# Patient Record
Sex: FEMALE | Race: WHITE | NOT HISPANIC OR LATINO | Employment: FULL TIME | URBAN - METROPOLITAN AREA
[De-identification: names, ages, dates, MRNs, and addresses within clinical notes are randomized per-mention and may not be internally consistent; named-entity substitution may affect disease eponyms.]

---

## 2017-02-23 ENCOUNTER — COMMUNICATION - HEALTHEAST (OUTPATIENT)
Dept: FAMILY MEDICINE | Facility: CLINIC | Age: 57
End: 2017-02-23

## 2017-04-18 ENCOUNTER — COMMUNICATION - HEALTHEAST (OUTPATIENT)
Dept: FAMILY MEDICINE | Facility: CLINIC | Age: 57
End: 2017-04-18

## 2017-04-18 DIAGNOSIS — I10 HYPERTENSION: ICD-10-CM

## 2017-06-08 ENCOUNTER — COMMUNICATION - HEALTHEAST (OUTPATIENT)
Dept: FAMILY MEDICINE | Facility: CLINIC | Age: 57
End: 2017-06-08

## 2017-08-09 ENCOUNTER — OFFICE VISIT - HEALTHEAST (OUTPATIENT)
Dept: FAMILY MEDICINE | Facility: CLINIC | Age: 57
End: 2017-08-09

## 2017-08-09 DIAGNOSIS — M51.369 DEGENERATIVE DISC DISEASE, LUMBAR: ICD-10-CM

## 2017-08-09 DIAGNOSIS — K90.41 GLUTEN INTOLERANCE: ICD-10-CM

## 2017-08-09 ASSESSMENT — MIFFLIN-ST. JEOR: SCORE: 1925.77

## 2017-08-15 ENCOUNTER — COMMUNICATION - HEALTHEAST (OUTPATIENT)
Dept: FAMILY MEDICINE | Facility: CLINIC | Age: 57
End: 2017-08-15

## 2017-08-21 ENCOUNTER — OFFICE VISIT - HEALTHEAST (OUTPATIENT)
Dept: FAMILY MEDICINE | Facility: CLINIC | Age: 57
End: 2017-08-21

## 2017-08-21 DIAGNOSIS — R06.2 WHEEZING: ICD-10-CM

## 2017-08-21 DIAGNOSIS — R50.9 FEVER: ICD-10-CM

## 2017-08-21 DIAGNOSIS — J20.9 ACUTE BRONCHITIS: ICD-10-CM

## 2017-08-21 DIAGNOSIS — R05.9 COUGH: ICD-10-CM

## 2017-08-29 ENCOUNTER — AMBULATORY - HEALTHEAST (OUTPATIENT)
Dept: PHYSICAL MEDICINE AND REHAB | Facility: CLINIC | Age: 57
End: 2017-08-29

## 2017-08-29 ENCOUNTER — HOSPITAL ENCOUNTER (OUTPATIENT)
Dept: PHYSICAL MEDICINE AND REHAB | Facility: CLINIC | Age: 57
Discharge: HOME | End: 2017-08-29
Attending: FAMILY MEDICINE

## 2017-08-29 ENCOUNTER — COMMUNICATION - HEALTHEAST (OUTPATIENT)
Dept: PHYSICAL MEDICINE AND REHAB | Facility: CLINIC | Age: 57
End: 2017-08-29

## 2017-08-29 DIAGNOSIS — M47.816 LUMBAR FACET ARTHROPATHY: ICD-10-CM

## 2017-08-29 DIAGNOSIS — M54.50 LUMBAR SPINE PAIN: ICD-10-CM

## 2017-08-29 DIAGNOSIS — M70.61 TROCHANTERIC BURSITIS OF RIGHT HIP: ICD-10-CM

## 2017-08-30 ENCOUNTER — COMMUNICATION - HEALTHEAST (OUTPATIENT)
Dept: FAMILY MEDICINE | Facility: CLINIC | Age: 57
End: 2017-08-30

## 2017-09-05 ENCOUNTER — OFFICE VISIT - HEALTHEAST (OUTPATIENT)
Dept: PHYSICAL THERAPY | Facility: CLINIC | Age: 57
End: 2017-09-05

## 2017-09-05 DIAGNOSIS — M54.50 RIGHT-SIDED LOW BACK PAIN WITHOUT SCIATICA: ICD-10-CM

## 2017-09-05 DIAGNOSIS — G89.29 CHRONIC RIGHT-SIDED LOW BACK PAIN WITHOUT SCIATICA: ICD-10-CM

## 2017-09-05 DIAGNOSIS — M62.81 GENERALIZED MUSCLE WEAKNESS: ICD-10-CM

## 2017-09-05 DIAGNOSIS — M25.551 LATERAL PAIN OF RIGHT HIP: ICD-10-CM

## 2017-09-05 DIAGNOSIS — M54.50 CHRONIC RIGHT-SIDED LOW BACK PAIN WITHOUT SCIATICA: ICD-10-CM

## 2017-09-06 ENCOUNTER — OFFICE VISIT - HEALTHEAST (OUTPATIENT)
Dept: FAMILY MEDICINE | Facility: CLINIC | Age: 57
End: 2017-09-06

## 2017-09-06 DIAGNOSIS — J40 BRONCHITIS: ICD-10-CM

## 2017-09-12 ENCOUNTER — OFFICE VISIT - HEALTHEAST (OUTPATIENT)
Dept: PHYSICAL THERAPY | Facility: CLINIC | Age: 57
End: 2017-09-12

## 2017-09-12 DIAGNOSIS — M54.50 CHRONIC RIGHT-SIDED LOW BACK PAIN WITHOUT SCIATICA: ICD-10-CM

## 2017-09-12 DIAGNOSIS — M62.81 GENERALIZED MUSCLE WEAKNESS: ICD-10-CM

## 2017-09-12 DIAGNOSIS — M25.551 LATERAL PAIN OF RIGHT HIP: ICD-10-CM

## 2017-09-12 DIAGNOSIS — M54.16 LUMBAR RADICULOPATHY: ICD-10-CM

## 2017-09-12 DIAGNOSIS — G89.29 CHRONIC RIGHT-SIDED LOW BACK PAIN WITHOUT SCIATICA: ICD-10-CM

## 2017-09-12 DIAGNOSIS — M62.89 MUSCLE TIGHTNESS: ICD-10-CM

## 2017-09-12 DIAGNOSIS — R29.3 ABNORMAL POSTURE: ICD-10-CM

## 2017-09-15 ENCOUNTER — OFFICE VISIT - HEALTHEAST (OUTPATIENT)
Dept: PHYSICAL THERAPY | Facility: CLINIC | Age: 57
End: 2017-09-15

## 2017-09-15 DIAGNOSIS — G89.29 CHRONIC RIGHT-SIDED LOW BACK PAIN WITHOUT SCIATICA: ICD-10-CM

## 2017-09-15 DIAGNOSIS — M25.551 LATERAL PAIN OF RIGHT HIP: ICD-10-CM

## 2017-09-15 DIAGNOSIS — R29.3 ABNORMAL POSTURE: ICD-10-CM

## 2017-09-15 DIAGNOSIS — M62.89 MUSCLE TIGHTNESS: ICD-10-CM

## 2017-09-15 DIAGNOSIS — M62.81 GENERALIZED MUSCLE WEAKNESS: ICD-10-CM

## 2017-09-15 DIAGNOSIS — M54.16 LUMBAR RADICULOPATHY: ICD-10-CM

## 2017-09-15 DIAGNOSIS — M54.50 CHRONIC RIGHT-SIDED LOW BACK PAIN WITHOUT SCIATICA: ICD-10-CM

## 2017-09-19 ENCOUNTER — OFFICE VISIT - HEALTHEAST (OUTPATIENT)
Dept: PHYSICAL THERAPY | Facility: CLINIC | Age: 57
End: 2017-09-19

## 2017-09-19 DIAGNOSIS — M25.551 LATERAL PAIN OF RIGHT HIP: ICD-10-CM

## 2017-09-19 DIAGNOSIS — G89.29 CHRONIC RIGHT-SIDED LOW BACK PAIN WITHOUT SCIATICA: ICD-10-CM

## 2017-09-19 DIAGNOSIS — M54.50 CHRONIC RIGHT-SIDED LOW BACK PAIN WITHOUT SCIATICA: ICD-10-CM

## 2017-09-19 DIAGNOSIS — M62.81 GENERALIZED MUSCLE WEAKNESS: ICD-10-CM

## 2017-09-25 ENCOUNTER — TRANSFERRED RECORDS (OUTPATIENT)
Dept: HEALTH INFORMATION MANAGEMENT | Facility: CLINIC | Age: 57
End: 2017-09-25

## 2017-10-02 ENCOUNTER — COMMUNICATION - HEALTHEAST (OUTPATIENT)
Dept: FAMILY MEDICINE | Facility: CLINIC | Age: 57
End: 2017-10-02

## 2017-10-02 DIAGNOSIS — J45.30 MILD PERSISTENT ASTHMA: ICD-10-CM

## 2017-12-29 ENCOUNTER — OFFICE VISIT - HEALTHEAST (OUTPATIENT)
Dept: FAMILY MEDICINE | Facility: CLINIC | Age: 57
End: 2017-12-29

## 2017-12-29 DIAGNOSIS — J45.901 ASTHMA EXACERBATION: ICD-10-CM

## 2017-12-29 DIAGNOSIS — J40 BRONCHITIS: ICD-10-CM

## 2017-12-29 ASSESSMENT — MIFFLIN-ST. JEOR: SCORE: 1921.23

## 2018-01-17 ENCOUNTER — COMMUNICATION - HEALTHEAST (OUTPATIENT)
Dept: FAMILY MEDICINE | Facility: CLINIC | Age: 58
End: 2018-01-17

## 2018-01-17 DIAGNOSIS — I10 HYPERTENSION: ICD-10-CM

## 2018-01-29 ENCOUNTER — COMMUNICATION - HEALTHEAST (OUTPATIENT)
Dept: FAMILY MEDICINE | Facility: CLINIC | Age: 58
End: 2018-01-29

## 2018-01-29 DIAGNOSIS — J45.30 MILD PERSISTENT ASTHMA WITHOUT COMPLICATION: ICD-10-CM

## 2018-01-29 DIAGNOSIS — J45.30 MILD PERSISTENT ASTHMA: ICD-10-CM

## 2018-03-21 ENCOUNTER — OFFICE VISIT - HEALTHEAST (OUTPATIENT)
Dept: FAMILY MEDICINE | Facility: CLINIC | Age: 58
End: 2018-03-21

## 2018-03-21 ENCOUNTER — COMMUNICATION - HEALTHEAST (OUTPATIENT)
Dept: FAMILY MEDICINE | Facility: CLINIC | Age: 58
End: 2018-03-21

## 2018-03-21 DIAGNOSIS — F32.A DEPRESSION: ICD-10-CM

## 2018-03-21 DIAGNOSIS — J45.901 ASTHMA EXACERBATION: ICD-10-CM

## 2018-03-21 ASSESSMENT — MIFFLIN-ST. JEOR: SCORE: 1880.87

## 2018-07-21 ENCOUNTER — COMMUNICATION - HEALTHEAST (OUTPATIENT)
Dept: FAMILY MEDICINE | Facility: CLINIC | Age: 58
End: 2018-07-21

## 2018-07-21 DIAGNOSIS — I10 HYPERTENSION: ICD-10-CM

## 2018-09-05 ENCOUNTER — OFFICE VISIT - HEALTHEAST (OUTPATIENT)
Dept: FAMILY MEDICINE | Facility: CLINIC | Age: 58
End: 2018-09-05

## 2018-09-05 ENCOUNTER — HOSPITAL ENCOUNTER (OUTPATIENT)
Dept: CT IMAGING | Facility: HOSPITAL | Age: 58
Discharge: HOME | End: 2018-09-05
Attending: FAMILY MEDICINE

## 2018-09-05 DIAGNOSIS — R10.84 ABDOMINAL PAIN, GENERALIZED: ICD-10-CM

## 2018-09-05 DIAGNOSIS — R19.7 DIARRHEA: ICD-10-CM

## 2018-09-05 LAB
ALBUMIN SERPL-MCNC: 4 G/DL (ref 3.5–5)
ALP SERPL-CCNC: 74 U/L (ref 45–120)
ALT SERPL W P-5'-P-CCNC: 23 U/L (ref 0–45)
ANION GAP SERPL CALCULATED.3IONS-SCNC: 12 MMOL/L (ref 5–18)
AST SERPL W P-5'-P-CCNC: 21 U/L (ref 0–40)
BILIRUB SERPL-MCNC: 1 MG/DL (ref 0–1)
BUN SERPL-MCNC: 7 MG/DL (ref 8–22)
CALCIUM SERPL-MCNC: 9.4 MG/DL (ref 8.5–10.5)
CHLORIDE BLD-SCNC: 104 MMOL/L (ref 98–107)
CO2 SERPL-SCNC: 26 MMOL/L (ref 22–31)
CREAT SERPL-MCNC: 0.74 MG/DL (ref 0.6–1.1)
ERYTHROCYTE [DISTWIDTH] IN BLOOD BY AUTOMATED COUNT: 13.6 % (ref 11–14.5)
GFR SERPL CREATININE-BSD FRML MDRD: >60 ML/MIN/1.73M2
GLUCOSE BLD-MCNC: 95 MG/DL (ref 70–125)
HCT VFR BLD AUTO: 39.9 % (ref 35–47)
HGB BLD-MCNC: 13.4 G/DL (ref 12–16)
LIPASE SERPL-CCNC: 26 U/L (ref 0–52)
MCH RBC QN AUTO: 28.4 PG (ref 27–34)
MCHC RBC AUTO-ENTMCNC: 33.7 G/DL (ref 32–36)
MCV RBC AUTO: 84 FL (ref 80–100)
PLATELET # BLD AUTO: 254 THOU/UL (ref 140–440)
PMV BLD AUTO: 7 FL (ref 7–10)
POTASSIUM BLD-SCNC: 3.9 MMOL/L (ref 3.5–5)
PROT SERPL-MCNC: 6.8 G/DL (ref 6–8)
RBC # BLD AUTO: 4.72 MILL/UL (ref 3.8–5.4)
SODIUM SERPL-SCNC: 142 MMOL/L (ref 136–145)
WBC: 5.6 THOU/UL (ref 4–11)

## 2018-09-06 ENCOUNTER — COMMUNICATION - HEALTHEAST (OUTPATIENT)
Dept: FAMILY MEDICINE | Facility: CLINIC | Age: 58
End: 2018-09-06

## 2018-09-10 ENCOUNTER — COMMUNICATION - HEALTHEAST (OUTPATIENT)
Dept: FAMILY MEDICINE | Facility: CLINIC | Age: 58
End: 2018-09-10

## 2018-09-10 ENCOUNTER — OFFICE VISIT - HEALTHEAST (OUTPATIENT)
Dept: FAMILY MEDICINE | Facility: CLINIC | Age: 58
End: 2018-09-10

## 2018-09-10 DIAGNOSIS — F41.1 GENERALIZED ANXIETY DISORDER: ICD-10-CM

## 2018-09-10 DIAGNOSIS — R10.84 ABDOMINAL PAIN, GENERALIZED: ICD-10-CM

## 2018-09-24 ENCOUNTER — COMMUNICATION - HEALTHEAST (OUTPATIENT)
Dept: FAMILY MEDICINE | Facility: CLINIC | Age: 58
End: 2018-09-24

## 2018-09-24 ENCOUNTER — AMBULATORY - HEALTHEAST (OUTPATIENT)
Dept: FAMILY MEDICINE | Facility: CLINIC | Age: 58
End: 2018-09-24

## 2018-10-08 ENCOUNTER — OFFICE VISIT - HEALTHEAST (OUTPATIENT)
Dept: FAMILY MEDICINE | Facility: CLINIC | Age: 58
End: 2018-10-08

## 2018-10-08 DIAGNOSIS — R07.89 ATYPICAL CHEST PAIN: ICD-10-CM

## 2018-10-09 LAB
ATRIAL RATE - MUSE: 57 BPM
DIASTOLIC BLOOD PRESSURE - MUSE: NORMAL MMHG
INTERPRETATION ECG - MUSE: NORMAL
P AXIS - MUSE: 15 DEGREES
PR INTERVAL - MUSE: 112 MS
QRS DURATION - MUSE: 94 MS
QT - MUSE: 454 MS
QTC - MUSE: 441 MS
R AXIS - MUSE: 4 DEGREES
SYSTOLIC BLOOD PRESSURE - MUSE: NORMAL MMHG
T AXIS - MUSE: 29 DEGREES
VENTRICULAR RATE- MUSE: 57 BPM

## 2018-10-19 ENCOUNTER — COMMUNICATION - HEALTHEAST (OUTPATIENT)
Dept: FAMILY MEDICINE | Facility: CLINIC | Age: 58
End: 2018-10-19

## 2018-10-19 DIAGNOSIS — I10 HYPERTENSION: ICD-10-CM

## 2018-11-12 ENCOUNTER — COMMUNICATION - HEALTHEAST (OUTPATIENT)
Dept: FAMILY MEDICINE | Facility: CLINIC | Age: 58
End: 2018-11-12

## 2018-11-14 ENCOUNTER — COMMUNICATION - HEALTHEAST (OUTPATIENT)
Dept: CARE COORDINATION | Facility: CLINIC | Age: 58
End: 2018-11-14

## 2018-11-15 ENCOUNTER — OFFICE VISIT - HEALTHEAST (OUTPATIENT)
Dept: FAMILY MEDICINE | Facility: CLINIC | Age: 58
End: 2018-11-15

## 2018-11-15 DIAGNOSIS — R07.89 ATYPICAL CHEST PAIN: ICD-10-CM

## 2018-11-15 DIAGNOSIS — I10 ESSENTIAL HYPERTENSION: ICD-10-CM

## 2018-11-16 ENCOUNTER — COMMUNICATION - HEALTHEAST (OUTPATIENT)
Dept: FAMILY MEDICINE | Facility: CLINIC | Age: 58
End: 2018-11-16

## 2018-11-21 ENCOUNTER — COMMUNICATION - HEALTHEAST (OUTPATIENT)
Dept: PHARMACY | Facility: CLINIC | Age: 58
End: 2018-11-21

## 2018-11-21 DIAGNOSIS — Z71.89 ENCOUNTER FOR HERB AND VITAMIN SUPPLEMENT MANAGEMENT: ICD-10-CM

## 2018-11-21 DIAGNOSIS — I10 ESSENTIAL HYPERTENSION, BENIGN: ICD-10-CM

## 2018-11-21 DIAGNOSIS — J45.30 MILD PERSISTENT ASTHMA WITHOUT COMPLICATION: ICD-10-CM

## 2018-11-21 DIAGNOSIS — M51.16 LUMBAR DISC HERNIATION WITH RADICULOPATHY: ICD-10-CM

## 2018-12-04 ENCOUNTER — COMMUNICATION - HEALTHEAST (OUTPATIENT)
Dept: SCHEDULING | Facility: CLINIC | Age: 58
End: 2018-12-04

## 2018-12-05 ENCOUNTER — AMBULATORY - HEALTHEAST (OUTPATIENT)
Dept: FAMILY MEDICINE | Facility: CLINIC | Age: 58
End: 2018-12-05

## 2018-12-05 ENCOUNTER — COMMUNICATION - HEALTHEAST (OUTPATIENT)
Dept: FAMILY MEDICINE | Facility: CLINIC | Age: 58
End: 2018-12-05

## 2018-12-05 DIAGNOSIS — R00.2 PALPITATIONS: ICD-10-CM

## 2018-12-07 ENCOUNTER — COMMUNICATION - HEALTHEAST (OUTPATIENT)
Dept: FAMILY MEDICINE | Facility: CLINIC | Age: 58
End: 2018-12-07

## 2018-12-07 ENCOUNTER — AMBULATORY - HEALTHEAST (OUTPATIENT)
Dept: FAMILY MEDICINE | Facility: CLINIC | Age: 58
End: 2018-12-07

## 2018-12-19 ENCOUNTER — OFFICE VISIT - HEALTHEAST (OUTPATIENT)
Dept: CARDIOLOGY | Facility: CLINIC | Age: 58
End: 2018-12-19

## 2018-12-19 DIAGNOSIS — R00.2 PALPITATIONS: ICD-10-CM

## 2018-12-19 DIAGNOSIS — I49.1 PREMATURE ATRIAL CONTRACTIONS: ICD-10-CM

## 2018-12-19 DIAGNOSIS — E66.01 MORBID OBESITY (H): ICD-10-CM

## 2018-12-19 DIAGNOSIS — I10 ESSENTIAL HYPERTENSION, BENIGN: ICD-10-CM

## 2018-12-19 DIAGNOSIS — R60.9 EDEMA, PITTING: ICD-10-CM

## 2018-12-19 DIAGNOSIS — R06.09 EXERTIONAL DYSPNEA: ICD-10-CM

## 2018-12-19 ASSESSMENT — MIFFLIN-ST. JEOR: SCORE: 1890.85

## 2018-12-27 ENCOUNTER — HOSPITAL ENCOUNTER (OUTPATIENT)
Dept: CARDIOLOGY | Facility: CLINIC | Age: 58
Discharge: HOME | End: 2018-12-27
Attending: INTERNAL MEDICINE

## 2018-12-27 DIAGNOSIS — I49.1 PREMATURE ATRIAL CONTRACTIONS: ICD-10-CM

## 2018-12-27 DIAGNOSIS — I10 ESSENTIAL HYPERTENSION, BENIGN: ICD-10-CM

## 2018-12-27 DIAGNOSIS — R06.09 OTHER FORMS OF DYSPNEA: ICD-10-CM

## 2018-12-27 DIAGNOSIS — R00.2 PALPITATIONS: ICD-10-CM

## 2018-12-27 DIAGNOSIS — R06.09 EXERTIONAL DYSPNEA: ICD-10-CM

## 2018-12-27 DIAGNOSIS — R60.9 EDEMA, PITTING: ICD-10-CM

## 2018-12-27 LAB
AORTIC ROOT: 3.6 CM
AORTIC VALVE MEAN VELOCITY: 99.2 CM/S
ASCENDING AORTA: 4 CM
AV DIMENSIONLESS INDEX VTI: 0.9
AV MEAN GRADIENT: 5 MMHG
AV PEAK GRADIENT: 9.5 MMHG
AV VALVE AREA: 3.2 CM2
AV VELOCITY RATIO: 0.9
BSA FOR ECHO PROCEDURE: 2.47 M2
CV BLOOD PRESSURE: ABNORMAL MMHG
CV ECHO HEIGHT: 67 IN
CV ECHO WEIGHT: 284 LBS
DOP CALC AO PEAK VEL: 154 CM/S
DOP CALC AO VTI: 33.9 CM
DOP CALC LVOT AREA: 3.46 CM2
DOP CALC LVOT DIAMETER: 2.1 CM
DOP CALC LVOT PEAK VEL: 142 CM/S
DOP CALC LVOT STROKE VOLUME: 107.7 CM3
DOP CALCLVOT PEAK VEL VTI: 31.1 CM
EJECTION FRACTION: 65 % (ref 55–75)
FRACTIONAL SHORTENING: 35.8 % (ref 28–44)
INTERVENTRICULAR SEPTUM IN END DIASTOLE: 1.1 CM (ref 0.6–0.9)
IVS/PW RATIO: 1
LA AREA 1: 27 CM2
LA AREA 2: 28 CM2
LEFT ATRIUM LENGTH: 6.5 CM
LEFT ATRIUM SIZE: 4.3 CM
LEFT ATRIUM VOLUME INDEX: 40 ML/M2
LEFT ATRIUM VOLUME: 98.9 ML
LEFT VENTRICLE CARDIAC INDEX: 2.7 L/MIN/M2
LEFT VENTRICLE CARDIAC OUTPUT: 6.8 L/MIN
LEFT VENTRICLE DIASTOLIC VOLUME INDEX: 37.2 CM3/M2 (ref 29–61)
LEFT VENTRICLE DIASTOLIC VOLUME: 92 CM3 (ref 46–106)
LEFT VENTRICLE HEART RATE: 63 BPM
LEFT VENTRICLE MASS INDEX: 92.2 G/M2
LEFT VENTRICLE SYSTOLIC VOLUME INDEX: 13 CM3/M2 (ref 8–24)
LEFT VENTRICLE SYSTOLIC VOLUME: 32 CM3 (ref 14–42)
LEFT VENTRICULAR INTERNAL DIMENSION IN DIASTOLE: 5.3 CM (ref 3.8–5.2)
LEFT VENTRICULAR INTERNAL DIMENSION IN SYSTOLE: 3.4 CM (ref 2.2–3.5)
LEFT VENTRICULAR MASS: 227.7 G
LEFT VENTRICULAR OUTFLOW TRACT MEAN GRADIENT: 3 MMHG
LEFT VENTRICULAR OUTFLOW TRACT MEAN VELOCITY: 80.3 CM/S
LEFT VENTRICULAR OUTFLOW TRACT PEAK GRADIENT: 8 MMHG
LEFT VENTRICULAR POSTERIOR WALL IN END DIASTOLE: 1.1 CM (ref 0.6–0.9)
LV STROKE VOLUME INDEX: 43.6 ML/M2
MITRAL VALVE E/A RATIO: 0.9
MV AVERAGE E/E' RATIO: 8.7 CM/S
MV DECELERATION TIME: 222 MS
MV E'TISSUE VEL-LAT: 9.07 CM/S
MV E'TISSUE VEL-MED: 8.29 CM/S
MV LATERAL E/E' RATIO: 8.3
MV MEDIAL E/E' RATIO: 9.1
MV PEAK A VELOCITY: 87.2 CM/S
MV PEAK E VELOCITY: 75.7 CM/S
NUC REST DIASTOLIC VOLUME INDEX: 4544 LBS
NUC REST SYSTOLIC VOLUME INDEX: 67 IN
PR MAX PG: 4 MMHG
PR PEAK VELOCITY: 102 CM/S
TRICUSPID VALVE ANULAR PLANE SYSTOLIC EXCURSION: 1.9 CM

## 2018-12-27 ASSESSMENT — MIFFLIN-ST. JEOR: SCORE: 1890.85

## 2019-04-16 ENCOUNTER — COMMUNICATION - HEALTHEAST (OUTPATIENT)
Dept: ADMINISTRATIVE | Facility: CLINIC | Age: 59
End: 2019-04-16

## 2019-04-24 ENCOUNTER — OFFICE VISIT - HEALTHEAST (OUTPATIENT)
Dept: FAMILY MEDICINE | Facility: CLINIC | Age: 59
End: 2019-04-24

## 2019-04-24 DIAGNOSIS — K29.70 GASTRITIS WITHOUT BLEEDING, UNSPECIFIED CHRONICITY, UNSPECIFIED GASTRITIS TYPE: ICD-10-CM

## 2019-04-24 DIAGNOSIS — Z12.31 VISIT FOR SCREENING MAMMOGRAM: ICD-10-CM

## 2019-04-24 DIAGNOSIS — M25.551 HIP PAIN, RIGHT: ICD-10-CM

## 2019-04-24 DIAGNOSIS — Z12.11 SCREEN FOR COLON CANCER: ICD-10-CM

## 2019-04-28 ENCOUNTER — COMMUNICATION - HEALTHEAST (OUTPATIENT)
Dept: FAMILY MEDICINE | Facility: CLINIC | Age: 59
End: 2019-04-28

## 2019-04-28 DIAGNOSIS — I10 BENIGN ESSENTIAL HYPERTENSION: ICD-10-CM

## 2019-04-29 ENCOUNTER — RECORDS - HEALTHEAST (OUTPATIENT)
Dept: ADMINISTRATIVE | Facility: OTHER | Age: 59
End: 2019-04-29

## 2019-05-10 ENCOUNTER — RECORDS - HEALTHEAST (OUTPATIENT)
Dept: ADMINISTRATIVE | Facility: OTHER | Age: 59
End: 2019-05-10

## 2019-05-16 ENCOUNTER — COMMUNICATION - HEALTHEAST (OUTPATIENT)
Dept: FAMILY MEDICINE | Facility: CLINIC | Age: 59
End: 2019-05-16

## 2019-05-16 DIAGNOSIS — K29.70 GASTRITIS WITHOUT BLEEDING, UNSPECIFIED CHRONICITY, UNSPECIFIED GASTRITIS TYPE: ICD-10-CM

## 2019-05-21 ENCOUNTER — COMMUNICATION - HEALTHEAST (OUTPATIENT)
Dept: FAMILY MEDICINE | Facility: CLINIC | Age: 59
End: 2019-05-21

## 2019-05-21 ENCOUNTER — RECORDS - HEALTHEAST (OUTPATIENT)
Dept: ADMINISTRATIVE | Facility: OTHER | Age: 59
End: 2019-05-21

## 2019-05-21 DIAGNOSIS — M25.551 HIP PAIN, RIGHT: ICD-10-CM

## 2019-06-03 ENCOUNTER — RECORDS - HEALTHEAST (OUTPATIENT)
Dept: ADMINISTRATIVE | Facility: OTHER | Age: 59
End: 2019-06-03

## 2019-06-04 ENCOUNTER — RECORDS - HEALTHEAST (OUTPATIENT)
Dept: ADMINISTRATIVE | Facility: OTHER | Age: 59
End: 2019-06-04

## 2019-07-16 ENCOUNTER — COMMUNICATION - HEALTHEAST (OUTPATIENT)
Dept: FAMILY MEDICINE | Facility: CLINIC | Age: 59
End: 2019-07-16

## 2019-07-16 DIAGNOSIS — M25.551 HIP PAIN, RIGHT: ICD-10-CM

## 2019-07-29 ENCOUNTER — RECORDS - HEALTHEAST (OUTPATIENT)
Dept: ADMINISTRATIVE | Facility: OTHER | Age: 59
End: 2019-07-29

## 2019-08-16 ENCOUNTER — RECORDS - HEALTHEAST (OUTPATIENT)
Dept: ADMINISTRATIVE | Facility: OTHER | Age: 59
End: 2019-08-16

## 2019-08-26 ENCOUNTER — RECORDS - HEALTHEAST (OUTPATIENT)
Dept: ADMINISTRATIVE | Facility: OTHER | Age: 59
End: 2019-08-26

## 2019-09-11 ENCOUNTER — RECORDS - HEALTHEAST (OUTPATIENT)
Dept: ADMINISTRATIVE | Facility: OTHER | Age: 59
End: 2019-09-11

## 2019-09-17 ENCOUNTER — OFFICE VISIT - HEALTHEAST (OUTPATIENT)
Dept: FAMILY MEDICINE | Facility: CLINIC | Age: 59
End: 2019-09-17

## 2019-09-17 DIAGNOSIS — R05.9 COUGH: ICD-10-CM

## 2019-09-17 DIAGNOSIS — J45.901 MODERATE ASTHMA WITH EXACERBATION, UNSPECIFIED WHETHER PERSISTENT: ICD-10-CM

## 2019-09-17 ASSESSMENT — PATIENT HEALTH QUESTIONNAIRE - PHQ9: SUM OF ALL RESPONSES TO PHQ QUESTIONS 1-9: 3

## 2019-09-19 ENCOUNTER — COMMUNICATION - HEALTHEAST (OUTPATIENT)
Dept: SCHEDULING | Facility: CLINIC | Age: 59
End: 2019-09-19

## 2019-09-23 ENCOUNTER — OFFICE VISIT - HEALTHEAST (OUTPATIENT)
Dept: FAMILY MEDICINE | Facility: CLINIC | Age: 59
End: 2019-09-23

## 2019-09-23 DIAGNOSIS — J40 BRONCHITIS: ICD-10-CM

## 2019-09-23 DIAGNOSIS — R06.2 WHEEZING: ICD-10-CM

## 2019-09-30 ENCOUNTER — COMMUNICATION - HEALTHEAST (OUTPATIENT)
Dept: FAMILY MEDICINE | Facility: CLINIC | Age: 59
End: 2019-09-30

## 2019-09-30 DIAGNOSIS — I10 HYPERTENSION: ICD-10-CM

## 2019-10-02 ENCOUNTER — COMMUNICATION - HEALTHEAST (OUTPATIENT)
Dept: FAMILY MEDICINE | Facility: CLINIC | Age: 59
End: 2019-10-02

## 2019-10-09 ENCOUNTER — RECORDS - HEALTHEAST (OUTPATIENT)
Dept: ADMINISTRATIVE | Facility: OTHER | Age: 59
End: 2019-10-09

## 2019-11-04 ENCOUNTER — COMMUNICATION - HEALTHEAST (OUTPATIENT)
Dept: FAMILY MEDICINE | Facility: CLINIC | Age: 59
End: 2019-11-04

## 2019-11-04 DIAGNOSIS — J45.30 MILD PERSISTENT ASTHMA: ICD-10-CM

## 2019-11-05 RX ORDER — ALBUTEROL SULFATE 90 UG/1
AEROSOL, METERED RESPIRATORY (INHALATION)
Qty: 54 EACH | Refills: 1 | Status: SHIPPED | OUTPATIENT
Start: 2019-11-05 | End: 2022-08-25

## 2019-11-11 ENCOUNTER — COMMUNICATION - HEALTHEAST (OUTPATIENT)
Dept: FAMILY MEDICINE | Facility: CLINIC | Age: 59
End: 2019-11-11

## 2019-11-11 ENCOUNTER — OFFICE VISIT - HEALTHEAST (OUTPATIENT)
Dept: CARDIOLOGY | Facility: CLINIC | Age: 59
End: 2019-11-11

## 2019-11-11 DIAGNOSIS — I11.9 LVH (LEFT VENTRICULAR HYPERTROPHY) DUE TO HYPERTENSIVE DISEASE, WITHOUT HEART FAILURE: ICD-10-CM

## 2019-11-11 DIAGNOSIS — E66.01 MORBID OBESITY (H): ICD-10-CM

## 2019-11-11 DIAGNOSIS — I49.1 PREMATURE ATRIAL CONTRACTIONS: ICD-10-CM

## 2019-11-11 DIAGNOSIS — Z11.1 TUBERCULOSIS SCREENING: ICD-10-CM

## 2019-11-11 DIAGNOSIS — I10 ESSENTIAL HYPERTENSION: ICD-10-CM

## 2019-11-11 DIAGNOSIS — R00.2 PALPITATIONS: ICD-10-CM

## 2019-11-21 ENCOUNTER — OFFICE VISIT - HEALTHEAST (OUTPATIENT)
Dept: FAMILY MEDICINE | Facility: CLINIC | Age: 59
End: 2019-11-21

## 2019-11-21 DIAGNOSIS — I10 ESSENTIAL HYPERTENSION, BENIGN: ICD-10-CM

## 2019-11-21 DIAGNOSIS — I10 HYPERTENSION: ICD-10-CM

## 2019-11-21 DIAGNOSIS — I10 BENIGN ESSENTIAL HYPERTENSION: ICD-10-CM

## 2019-11-21 DIAGNOSIS — Z11.1 TUBERCULOSIS SCREENING: ICD-10-CM

## 2019-11-21 DIAGNOSIS — R53.83 LETHARGY: ICD-10-CM

## 2019-11-21 DIAGNOSIS — M25.551 HIP PAIN, RIGHT: ICD-10-CM

## 2019-11-21 LAB
25(OH)D3 SERPL-MCNC: 28.9 NG/ML (ref 30–80)
ALBUMIN SERPL-MCNC: 3.8 G/DL (ref 3.5–5)
ALBUMIN UR-MCNC: NEGATIVE MG/DL
ALP SERPL-CCNC: 73 U/L (ref 45–120)
ALT SERPL W P-5'-P-CCNC: 21 U/L (ref 0–45)
ANION GAP SERPL CALCULATED.3IONS-SCNC: 8 MMOL/L (ref 5–18)
APPEARANCE UR: CLEAR
AST SERPL W P-5'-P-CCNC: 20 U/L (ref 0–40)
BILIRUB SERPL-MCNC: 0.5 MG/DL (ref 0–1)
BILIRUB UR QL STRIP: NEGATIVE
BUN SERPL-MCNC: 10 MG/DL (ref 8–22)
CALCIUM SERPL-MCNC: 9.5 MG/DL (ref 8.5–10.5)
CHLORIDE BLD-SCNC: 105 MMOL/L (ref 98–107)
CHOLEST SERPL-MCNC: 177 MG/DL
CO2 SERPL-SCNC: 30 MMOL/L (ref 22–31)
COLOR UR AUTO: YELLOW
CREAT SERPL-MCNC: 0.72 MG/DL (ref 0.6–1.1)
ERYTHROCYTE [DISTWIDTH] IN BLOOD BY AUTOMATED COUNT: 13.7 % (ref 11–14.5)
FASTING STATUS PATIENT QL REPORTED: YES
GFR SERPL CREATININE-BSD FRML MDRD: >60 ML/MIN/1.73M2
GLUCOSE BLD-MCNC: 113 MG/DL (ref 70–125)
GLUCOSE UR STRIP-MCNC: NEGATIVE MG/DL
HCT VFR BLD AUTO: 37.9 % (ref 35–47)
HDLC SERPL-MCNC: 35 MG/DL
HGB BLD-MCNC: 13.1 G/DL (ref 12–16)
HGB UR QL STRIP: NEGATIVE
KETONES UR STRIP-MCNC: NEGATIVE MG/DL
LDLC SERPL CALC-MCNC: 126 MG/DL
LEUKOCYTE ESTERASE UR QL STRIP: NEGATIVE
MCH RBC QN AUTO: 29.8 PG (ref 27–34)
MCHC RBC AUTO-ENTMCNC: 34.4 G/DL (ref 32–36)
MCV RBC AUTO: 86 FL (ref 80–100)
NITRATE UR QL: NEGATIVE
PH UR STRIP: 6 [PH] (ref 5–8)
PLATELET # BLD AUTO: 284 THOU/UL (ref 140–440)
PMV BLD AUTO: 7.3 FL (ref 7–10)
POTASSIUM BLD-SCNC: 4.5 MMOL/L (ref 3.5–5)
PROT SERPL-MCNC: 6.5 G/DL (ref 6–8)
RBC # BLD AUTO: 4.39 MILL/UL (ref 3.8–5.4)
SODIUM SERPL-SCNC: 143 MMOL/L (ref 136–145)
SP GR UR STRIP: 1.02 (ref 1–1.03)
TRIGL SERPL-MCNC: 80 MG/DL
UROBILINOGEN UR STRIP-ACNC: NORMAL
WBC: 5.5 THOU/UL (ref 4–11)

## 2019-11-22 ENCOUNTER — COMMUNICATION - HEALTHEAST (OUTPATIENT)
Dept: FAMILY MEDICINE | Facility: CLINIC | Age: 59
End: 2019-11-22

## 2019-11-25 LAB
GAMMA INTERFERON BACKGROUND BLD IA-ACNC: 0.01 IU/ML
M TB IFN-G BLD-IMP: NEGATIVE
MITOGEN IGNF BCKGRD COR BLD-ACNC: 0 IU/ML
MITOGEN IGNF BCKGRD COR BLD-ACNC: 0 IU/ML
QTF INTERPRETATION: NORMAL
QTF MITOGEN - NIL: 7.92 IU/ML

## 2019-12-20 ENCOUNTER — RECORDS - HEALTHEAST (OUTPATIENT)
Dept: ADMINISTRATIVE | Facility: OTHER | Age: 59
End: 2019-12-20

## 2020-01-31 ENCOUNTER — OFFICE VISIT - HEALTHEAST (OUTPATIENT)
Dept: FAMILY MEDICINE | Facility: CLINIC | Age: 60
End: 2020-01-31

## 2020-01-31 DIAGNOSIS — H90.8 MIXED CONDUCTIVE AND SENSORINEURAL HEARING LOSS, UNSPECIFIED LATERALITY: ICD-10-CM

## 2020-01-31 DIAGNOSIS — H69.93 DYSFUNCTION OF BOTH EUSTACHIAN TUBES: ICD-10-CM

## 2020-01-31 DIAGNOSIS — H91.93 DECREASED HEARING OF BOTH EARS: ICD-10-CM

## 2020-01-31 DIAGNOSIS — H93.13 TINNITUS, BILATERAL: ICD-10-CM

## 2020-01-31 ASSESSMENT — MIFFLIN-ST. JEOR: SCORE: 1943.92

## 2020-02-05 ENCOUNTER — COMMUNICATION - HEALTHEAST (OUTPATIENT)
Dept: FAMILY MEDICINE | Facility: CLINIC | Age: 60
End: 2020-02-05

## 2020-02-05 DIAGNOSIS — R42 DIZZINESS: ICD-10-CM

## 2020-02-05 RX ORDER — MECLIZINE HYDROCHLORIDE 25 MG/1
25 TABLET ORAL 3 TIMES DAILY PRN
Qty: 30 TABLET | Refills: 1 | Status: SHIPPED | OUTPATIENT
Start: 2020-02-05 | End: 2022-06-05

## 2020-02-07 ENCOUNTER — OFFICE VISIT - HEALTHEAST (OUTPATIENT)
Dept: AUDIOLOGY | Facility: CLINIC | Age: 60
End: 2020-02-07

## 2020-02-07 DIAGNOSIS — H93.13 TINNITUS OF BOTH EARS: ICD-10-CM

## 2020-02-07 DIAGNOSIS — R42 DIZZINESS AND GIDDINESS: ICD-10-CM

## 2020-02-07 DIAGNOSIS — H90.3 SENSORINEURAL HEARING LOSS, BILATERAL: ICD-10-CM

## 2020-02-12 ENCOUNTER — OFFICE VISIT - HEALTHEAST (OUTPATIENT)
Dept: OTOLARYNGOLOGY | Facility: CLINIC | Age: 60
End: 2020-02-12

## 2020-02-12 DIAGNOSIS — R42 DIZZINESS: ICD-10-CM

## 2020-02-12 DIAGNOSIS — G44.209 TENSION-TYPE HEADACHE, NOT INTRACTABLE, UNSPECIFIED CHRONICITY PATTERN: ICD-10-CM

## 2020-02-16 ENCOUNTER — COMMUNICATION - HEALTHEAST (OUTPATIENT)
Dept: FAMILY MEDICINE | Facility: CLINIC | Age: 60
End: 2020-02-16

## 2020-02-16 DIAGNOSIS — H69.93 DYSFUNCTION OF BOTH EUSTACHIAN TUBES: ICD-10-CM

## 2020-02-21 ENCOUNTER — HOSPITAL ENCOUNTER (OUTPATIENT)
Dept: CT IMAGING | Facility: CLINIC | Age: 60
Discharge: HOME | End: 2020-02-21
Attending: OTOLARYNGOLOGY

## 2020-02-21 DIAGNOSIS — R42 DIZZINESS: ICD-10-CM

## 2020-02-21 DIAGNOSIS — G44.209 TENSION-TYPE HEADACHE, NOT INTRACTABLE, UNSPECIFIED CHRONICITY PATTERN: ICD-10-CM

## 2020-02-27 ENCOUNTER — COMMUNICATION - HEALTHEAST (OUTPATIENT)
Dept: OTOLARYNGOLOGY | Facility: CLINIC | Age: 60
End: 2020-02-27

## 2020-03-04 ENCOUNTER — AMBULATORY - HEALTHEAST (OUTPATIENT)
Dept: OTOLARYNGOLOGY | Facility: CLINIC | Age: 60
End: 2020-03-04

## 2020-03-04 ENCOUNTER — TRANSCRIBE ORDERS (OUTPATIENT)
Dept: OTHER | Age: 60
End: 2020-03-04

## 2020-03-04 DIAGNOSIS — G44.209 TENSION HEADACHE: ICD-10-CM

## 2020-03-04 DIAGNOSIS — G44.209 TENSION HEADACHE: Primary | ICD-10-CM

## 2020-03-06 ENCOUNTER — COMMUNICATION - HEALTHEAST (OUTPATIENT)
Dept: FAMILY MEDICINE | Facility: CLINIC | Age: 60
End: 2020-03-06

## 2020-03-06 DIAGNOSIS — H69.93 DYSFUNCTION OF BOTH EUSTACHIAN TUBES: ICD-10-CM

## 2020-03-13 NOTE — TELEPHONE ENCOUNTER
RECORDS RECEIVED FROM: External   Date of Appt: 3/18/20   NOTES (FOR ALL VISITS) STATUS DETAILS   OFFICE NOTE from referring provider Care Everywhere Dr Wm Bustos @ [facility]:  2/12/20   OFFICE NOTE from other specialist Care Everywhere Dr Rhina Maurer @ [facility]:  1/31/20   DISCHARGE SUMMARY from hospital N/A    DISCHARGE REPORT from the ER N/A    OPERATIVE REPORT N/A    MEDICATION LIST Care Everywhere    IMAGING  (FOR ALL VISITS)     EMG N/A    EEG N/A    MRI (HEAD, NECK, SPINE) N/A    LUMBAR PUNCTURE N/A    [facility] Scan N/A    CT (HEAD, NECK, SPINE) In process [facility]:  CT Head 2/21/20      Action 3/13/20 MV 11.30am   Action Taken Imaging request faxed to [facility] for:  CT Head 2/21/20     Action 3.16.20 sv    Action Taken Image CT Head 2/21/20 received in PACS

## 2020-03-18 ENCOUNTER — PRE VISIT (OUTPATIENT)
Dept: NEUROLOGY | Facility: CLINIC | Age: 60
End: 2020-03-18

## 2020-10-06 ENCOUNTER — OFFICE VISIT - HEALTHEAST (OUTPATIENT)
Dept: OTOLARYNGOLOGY | Facility: CLINIC | Age: 60
End: 2020-10-06

## 2020-10-06 ENCOUNTER — OFFICE VISIT - HEALTHEAST (OUTPATIENT)
Dept: AUDIOLOGY | Facility: CLINIC | Age: 60
End: 2020-10-06

## 2020-10-06 ENCOUNTER — AMBULATORY - HEALTHEAST (OUTPATIENT)
Dept: OTOLARYNGOLOGY | Facility: CLINIC | Age: 60
End: 2020-10-06

## 2020-10-06 DIAGNOSIS — G44.209 TENSION-TYPE HEADACHE, NOT INTRACTABLE, UNSPECIFIED CHRONICITY PATTERN: ICD-10-CM

## 2020-10-06 DIAGNOSIS — H93.13 TINNITUS OF BOTH EARS: ICD-10-CM

## 2020-10-06 DIAGNOSIS — H90.3 SENSORINEURAL HEARING LOSS (SNHL) OF BOTH EARS: ICD-10-CM

## 2020-10-06 DIAGNOSIS — H93.8X3 SENSATION OF FULLNESS IN BOTH EARS: ICD-10-CM

## 2020-10-06 DIAGNOSIS — H93.8X9 EAR FULLNESS: ICD-10-CM

## 2020-10-06 DIAGNOSIS — H69.92 DYSFUNCTION OF LEFT EUSTACHIAN TUBE: ICD-10-CM

## 2020-10-07 ENCOUNTER — COMMUNICATION - HEALTHEAST (OUTPATIENT)
Dept: FAMILY MEDICINE | Facility: CLINIC | Age: 60
End: 2020-10-07

## 2020-10-07 DIAGNOSIS — M25.551 HIP PAIN, RIGHT: ICD-10-CM

## 2020-10-07 DIAGNOSIS — I10 BENIGN ESSENTIAL HYPERTENSION: ICD-10-CM

## 2020-10-07 DIAGNOSIS — I10 HYPERTENSION: ICD-10-CM

## 2020-10-10 RX ORDER — HYDROCHLOROTHIAZIDE 25 MG/1
TABLET ORAL
Qty: 90 TABLET | Refills: 3 | Status: SHIPPED | OUTPATIENT
Start: 2020-10-10 | End: 2021-10-02

## 2020-10-19 ENCOUNTER — COMMUNICATION - HEALTHEAST (OUTPATIENT)
Dept: FAMILY MEDICINE | Facility: CLINIC | Age: 60
End: 2020-10-19

## 2020-10-19 DIAGNOSIS — R06.2 WHEEZING: ICD-10-CM

## 2020-10-19 DIAGNOSIS — J40 BRONCHITIS: ICD-10-CM

## 2020-10-20 RX ORDER — BUDESONIDE AND FORMOTEROL FUMARATE DIHYDRATE 160; 4.5 UG/1; UG/1
AEROSOL RESPIRATORY (INHALATION)
Qty: 10.2 INHALER | Refills: 3 | Status: SHIPPED | OUTPATIENT
Start: 2020-10-20 | End: 2021-10-03

## 2021-02-10 ENCOUNTER — COMMUNICATION - HEALTHEAST (OUTPATIENT)
Dept: FAMILY MEDICINE | Facility: CLINIC | Age: 61
End: 2021-02-10

## 2021-02-10 DIAGNOSIS — M25.551 HIP PAIN, RIGHT: ICD-10-CM

## 2021-02-10 RX ORDER — NAPROXEN 500 MG/1
TABLET ORAL
Qty: 60 TABLET | Refills: 0 | Status: SHIPPED | OUTPATIENT
Start: 2021-02-10 | End: 2021-10-03

## 2021-02-16 ENCOUNTER — OFFICE VISIT - HEALTHEAST (OUTPATIENT)
Dept: FAMILY MEDICINE | Facility: CLINIC | Age: 61
End: 2021-02-16

## 2021-02-16 ENCOUNTER — COMMUNICATION - HEALTHEAST (OUTPATIENT)
Dept: FAMILY MEDICINE | Facility: CLINIC | Age: 61
End: 2021-02-16

## 2021-02-16 DIAGNOSIS — F41.9 ANXIETY: ICD-10-CM

## 2021-02-16 DIAGNOSIS — R42 LIGHT HEADEDNESS: ICD-10-CM

## 2021-02-16 LAB
ALBUMIN SERPL-MCNC: 4.1 G/DL (ref 3.5–5)
ALP SERPL-CCNC: 80 U/L (ref 45–120)
ALT SERPL W P-5'-P-CCNC: 24 U/L (ref 0–45)
ANION GAP SERPL CALCULATED.3IONS-SCNC: 9 MMOL/L (ref 5–18)
AST SERPL W P-5'-P-CCNC: 26 U/L (ref 0–40)
BILIRUB SERPL-MCNC: 0.6 MG/DL (ref 0–1)
BUN SERPL-MCNC: 15 MG/DL (ref 8–22)
CALCIUM SERPL-MCNC: 9.8 MG/DL (ref 8.5–10.5)
CHLORIDE BLD-SCNC: 102 MMOL/L (ref 98–107)
CO2 SERPL-SCNC: 28 MMOL/L (ref 22–31)
CREAT SERPL-MCNC: 0.71 MG/DL (ref 0.6–1.1)
ERYTHROCYTE [DISTWIDTH] IN BLOOD BY AUTOMATED COUNT: 14.2 % (ref 11–14.5)
GFR SERPL CREATININE-BSD FRML MDRD: >60 ML/MIN/1.73M2
GLUCOSE BLD-MCNC: 94 MG/DL (ref 70–125)
HBA1C MFR BLD: 6.2 %
HCT VFR BLD AUTO: 39.8 % (ref 35–47)
HGB BLD-MCNC: 12.7 G/DL (ref 12–16)
MCH RBC QN AUTO: 27.7 PG (ref 27–34)
MCHC RBC AUTO-ENTMCNC: 31.9 G/DL (ref 32–36)
MCV RBC AUTO: 87 FL (ref 80–100)
PLATELET # BLD AUTO: 260 THOU/UL (ref 140–440)
PMV BLD AUTO: 9.2 FL (ref 7–10)
POTASSIUM BLD-SCNC: 4.5 MMOL/L (ref 3.5–5)
PROT SERPL-MCNC: 6.8 G/DL (ref 6–8)
RBC # BLD AUTO: 4.59 MILL/UL (ref 3.8–5.4)
SODIUM SERPL-SCNC: 139 MMOL/L (ref 136–145)
WBC: 7.5 THOU/UL (ref 4–11)

## 2021-02-16 ASSESSMENT — MIFFLIN-ST. JEOR: SCORE: 1925.32

## 2021-02-17 ENCOUNTER — COMMUNICATION - HEALTHEAST (OUTPATIENT)
Dept: CARDIOLOGY | Facility: CLINIC | Age: 61
End: 2021-02-17

## 2021-03-10 ENCOUNTER — COMMUNICATION - HEALTHEAST (OUTPATIENT)
Dept: FAMILY MEDICINE | Facility: CLINIC | Age: 61
End: 2021-03-10

## 2021-03-10 DIAGNOSIS — F41.9 ANXIETY: ICD-10-CM

## 2021-03-11 RX ORDER — CITALOPRAM HYDROBROMIDE 10 MG/1
TABLET ORAL
Qty: 30 TABLET | Refills: 1 | Status: SHIPPED | OUTPATIENT
Start: 2021-03-11 | End: 2021-08-24

## 2021-03-16 ENCOUNTER — OFFICE VISIT - HEALTHEAST (OUTPATIENT)
Dept: CARDIOLOGY | Facility: CLINIC | Age: 61
End: 2021-03-16

## 2021-03-16 DIAGNOSIS — R68.89 COLD INTOLERANCE: ICD-10-CM

## 2021-03-16 DIAGNOSIS — I49.1 PAC (PREMATURE ATRIAL CONTRACTION): ICD-10-CM

## 2021-03-16 DIAGNOSIS — R00.2 PALPITATIONS: ICD-10-CM

## 2021-03-16 DIAGNOSIS — R06.09 EXERTIONAL DYSPNEA: ICD-10-CM

## 2021-03-16 DIAGNOSIS — I10 BENIGN ESSENTIAL HYPERTENSION: ICD-10-CM

## 2021-03-16 LAB — TSH SERPL DL<=0.005 MIU/L-ACNC: 1.27 UIU/ML (ref 0.3–5)

## 2021-03-16 ASSESSMENT — MIFFLIN-ST. JEOR: SCORE: 1927.13

## 2021-03-17 ENCOUNTER — COMMUNICATION - HEALTHEAST (OUTPATIENT)
Dept: CARDIOLOGY | Facility: CLINIC | Age: 61
End: 2021-03-17

## 2021-03-17 DIAGNOSIS — I10 BENIGN ESSENTIAL HYPERTENSION: ICD-10-CM

## 2021-03-17 DIAGNOSIS — R00.2 PALPITATIONS: ICD-10-CM

## 2021-03-17 DIAGNOSIS — I49.1 PAC (PREMATURE ATRIAL CONTRACTION): ICD-10-CM

## 2021-03-22 ENCOUNTER — COMMUNICATION - HEALTHEAST (OUTPATIENT)
Dept: CARDIOLOGY | Facility: CLINIC | Age: 61
End: 2021-03-22

## 2021-03-22 DIAGNOSIS — R00.2 PALPITATIONS: ICD-10-CM

## 2021-03-22 RX ORDER — NEBIVOLOL 10 MG/1
10 TABLET ORAL DAILY
Qty: 90 TABLET | Refills: 1 | Status: SHIPPED | OUTPATIENT
Start: 2021-03-22 | End: 2021-10-01

## 2021-03-31 ENCOUNTER — COMMUNICATION - HEALTHEAST (OUTPATIENT)
Dept: FAMILY MEDICINE | Facility: CLINIC | Age: 61
End: 2021-03-31

## 2021-03-31 DIAGNOSIS — F41.9 ANXIETY: ICD-10-CM

## 2021-04-15 ENCOUNTER — OFFICE VISIT - HEALTHEAST (OUTPATIENT)
Dept: FAMILY MEDICINE | Facility: CLINIC | Age: 61
End: 2021-04-15

## 2021-04-15 DIAGNOSIS — F41.1 GENERALIZED ANXIETY DISORDER: ICD-10-CM

## 2021-04-15 DIAGNOSIS — Z00.00 ROUTINE GENERAL MEDICAL EXAMINATION AT A HEALTH CARE FACILITY: ICD-10-CM

## 2021-04-15 DIAGNOSIS — E78.00 HYPERCHOLESTEREMIA: ICD-10-CM

## 2021-04-15 LAB
ALBUMIN UR-MCNC: NEGATIVE G/DL
APPEARANCE UR: CLEAR
BILIRUB UR QL STRIP: NEGATIVE
CHOLEST SERPL-MCNC: 178 MG/DL
COLOR UR AUTO: YELLOW
FASTING STATUS PATIENT QL REPORTED: YES
GLUCOSE UR STRIP-MCNC: NEGATIVE MG/DL
HDLC SERPL-MCNC: 41 MG/DL
HGB UR QL STRIP: NEGATIVE
KETONES UR STRIP-MCNC: NEGATIVE MG/DL
LDLC SERPL CALC-MCNC: 117 MG/DL
LEUKOCYTE ESTERASE UR QL STRIP: NEGATIVE
NITRATE UR QL: NEGATIVE
PH UR STRIP: 6.5 [PH] (ref 5–8)
SP GR UR STRIP: 1.02 (ref 1–1.03)
TRIGL SERPL-MCNC: 100 MG/DL
TSH SERPL DL<=0.005 MIU/L-ACNC: 1.55 UIU/ML (ref 0.3–5)
UROBILINOGEN UR STRIP-ACNC: NORMAL

## 2021-04-16 ENCOUNTER — HOSPITAL ENCOUNTER (OUTPATIENT)
Dept: CARDIOLOGY | Facility: HOSPITAL | Age: 61
Discharge: HOME | End: 2021-04-16
Attending: INTERNAL MEDICINE

## 2021-04-16 DIAGNOSIS — R00.2 PALPITATIONS: ICD-10-CM

## 2021-04-16 DIAGNOSIS — I10 BENIGN ESSENTIAL HYPERTENSION: ICD-10-CM

## 2021-04-16 DIAGNOSIS — R06.09 EXERTIONAL DYSPNEA: ICD-10-CM

## 2021-04-16 LAB
AORTIC ROOT: 3.7 CM
AORTIC VALVE MEAN VELOCITY: 85 CM/S
ASCENDING AORTA: 4.2 CM
AV DIMENSIONLESS INDEX VTI: 1
AV MEAN GRADIENT: 3 MMHG
AV PEAK GRADIENT: 5.4 MMHG
AV VALVE AREA: 3.4 CM2
AV VELOCITY RATIO: 1
BSA FOR ECHO PROCEDURE: 2.47 M2
CV BLOOD PRESSURE: ABNORMAL MMHG
CV ECHO HEIGHT: 67 IN
CV ECHO WEIGHT: 286 LBS
DOP CALC AO PEAK VEL: 116 CM/S
DOP CALC AO VTI: 29.3 CM
DOP CALC LVOT AREA: 3.46 CM2
DOP CALC LVOT DIAMETER: 2.1 CM
DOP CALC LVOT PEAK VEL: 114 CM/S
DOP CALC LVOT STROKE VOLUME: 98.7 CM3
DOP CALCLVOT PEAK VEL VTI: 28.5 CM
EJECTION FRACTION: 59 % (ref 55–75)
FRACTIONAL SHORTENING: 25.5 % (ref 28–44)
INTERVENTRICULAR SEPTUM IN END DIASTOLE: 0.8 CM (ref 0.6–0.9)
IVS/PW RATIO: 1
LA AREA 1: 21.8 CM2
LA AREA 2: 22.2 CM2
LEFT ATRIUM LENGTH: 5.85 CM
LEFT ATRIUM SIZE: 4.2 CM
LEFT ATRIUM VOLUME INDEX: 28.5 ML/M2
LEFT ATRIUM VOLUME: 70.3 ML
LEFT VENTRICLE CARDIAC INDEX: 2.5 L/MIN/M2
LEFT VENTRICLE CARDIAC OUTPUT: 6.1 L/MIN
LEFT VENTRICLE DIASTOLIC VOLUME INDEX: 62.8 CM3/M2 (ref 29–61)
LEFT VENTRICLE DIASTOLIC VOLUME: 155 CM3 (ref 46–106)
LEFT VENTRICLE HEART RATE: 62 BPM
LEFT VENTRICLE MASS INDEX: 49.5 G/M2
LEFT VENTRICLE SYSTOLIC VOLUME INDEX: 25.9 CM3/M2 (ref 8–24)
LEFT VENTRICLE SYSTOLIC VOLUME: 64 CM3 (ref 14–42)
LEFT VENTRICULAR INTERNAL DIMENSION IN DIASTOLE: 4.7 CM (ref 3.8–5.2)
LEFT VENTRICULAR INTERNAL DIMENSION IN SYSTOLE: 3.5 CM (ref 2.2–3.5)
LEFT VENTRICULAR MASS: 122.3 G
LEFT VENTRICULAR OUTFLOW TRACT MEAN GRADIENT: 3 MMHG
LEFT VENTRICULAR OUTFLOW TRACT MEAN VELOCITY: 79.7 CM/S
LEFT VENTRICULAR OUTFLOW TRACT PEAK GRADIENT: 5 MMHG
LEFT VENTRICULAR POSTERIOR WALL IN END DIASTOLE: 0.8 CM (ref 0.6–0.9)
LV STROKE VOLUME INDEX: 39.9 ML/M2
MITRAL VALVE E/A RATIO: 1.2
MV AVERAGE E/E' RATIO: 6.5 CM/S
MV DECELERATION TIME: 257 MS
MV E'TISSUE VEL-LAT: 12.3 CM/S
MV E'TISSUE VEL-MED: 10.1 CM/S
MV LATERAL E/E' RATIO: 5.9
MV MEDIAL E/E' RATIO: 7.2
MV PEAK A VELOCITY: 62.7 CM/S
MV PEAK E VELOCITY: 73.1 CM/S
NUC REST DIASTOLIC VOLUME INDEX: 4576 LBS
NUC REST SYSTOLIC VOLUME INDEX: 67 IN
PV ACCELERATION TIME: 74 MS
RIGHT VENTRICULAR INTERNAL DIMENSION IN DYSTOLE: 3.9 CM
TRICUSPID VALVE ANULAR PLANE SYSTOLIC EXCURSION: 2 CM

## 2021-04-16 ASSESSMENT — MIFFLIN-ST. JEOR: SCORE: 1899.92

## 2021-04-20 ENCOUNTER — COMMUNICATION - HEALTHEAST (OUTPATIENT)
Dept: CARDIOLOGY | Facility: CLINIC | Age: 61
End: 2021-04-20

## 2021-04-26 ENCOUNTER — COMMUNICATION - HEALTHEAST (OUTPATIENT)
Dept: FAMILY MEDICINE | Facility: CLINIC | Age: 61
End: 2021-04-26

## 2021-05-26 ENCOUNTER — RECORDS - HEALTHEAST (OUTPATIENT)
Dept: ADMINISTRATIVE | Facility: CLINIC | Age: 61
End: 2021-05-26

## 2021-05-26 ASSESSMENT — PATIENT HEALTH QUESTIONNAIRE - PHQ9: SUM OF ALL RESPONSES TO PHQ QUESTIONS 1-9: 3

## 2021-05-28 ASSESSMENT — ASTHMA QUESTIONNAIRES
ACT_TOTALSCORE: 22
ACT_TOTALSCORE: 16

## 2021-05-28 NOTE — PROGRESS NOTES
Assessment:     1. Visit for screening mammogram  Mammo Screening Bilateral   2. Screen for colon cancer  Ambulatory referral for Colonoscopy   3. Hip pain, right  naproxen (NAPROSYN) 500 MG tablet   4. Gastritis without bleeding, unspecified chronicity, unspecified gastritis type  omeprazole (PRILOSEC) 20 MG capsule       Plan:     1. Visit for screening mammogram  Following screening will be ordered  - Mammo Screening Bilateral; Future    2. Screen for colon cancer    - Ambulatory referral for Colonoscopy    3. Hip pain, right  Patient will take the following medication morning and night in addition to an extra strength Tylenol along with it; patient is to discontinue ibuprofen  - naproxen (NAPROSYN) 500 MG tablet; Take 1 tablet (500 mg total) by mouth 2 (two) times a day with meals.  Dispense: 60 tablet; Refill: 0    4. Gastritis without bleeding, unspecified chronicity, unspecified gastritis type  Patient is a take the following medication for 30 days first thing in the morning on empty stomach  - omeprazole (PRILOSEC) 20 MG capsule; Take 1 capsule (20 mg total) by mouth daily.  Dispense: 30 capsule; Refill: 0      Subjective:   Milana is here complaining of right hip pain right back discomfort and ongoing pain in her right foot.  Patient sustained a fracture of her right fifth metatarsal distally comminuted due to slipping on a crack in the sidewalk and was seen by orthopedic surgery placed in immobilizer and now is 3 weeks out.  She has been getting physical therapy.  She has ongoing pain has been taking at least 10 ibuprofen daily's 3 at a time sometimes on empty stomach.  She is experiencing some abdominal bloating discomfort and poor pain relief.  We are going to change her therapy were going to combine Naprosyn and Tylenol hopefully that there will potentiate each other.  Patient is experiencing right hip pain secondary to imbalance of her lower extremities due to wearing compensatory boot to immobilize the  foot fracture.  Patient also has lumbar disc disease prominent on that right side.  She is experiencing gastritis-like symptoms and abdominal discomfort because of the ibuprofen in my opinion.  We did discuss safe use of Tylenol extra strength for chronic pain.  Patient is seeing orthopedist this afternoon but wanted to know my opinion on these various matters.  We will follow along with her she will let us know if her stomach issues worsen or do not improve on the new plan.    Review of Systems: A complete 14 point review of systems was obtained and is negative or as stated in the history of present illness.    Past Medical History:   Diagnosis Date     Asthma      Hypertension      Low back pain      Family History   Problem Relation Age of Onset     Hypertension Mother      Cancer Father      Diabetes Father      Hypertension Father      Cancer Sister      Past Surgical History:   Procedure Laterality Date     BACK SURGERY  08/2010    L4-5, S1 Hemilaminectomy, foraminotomy     SC REMOVAL OF OVARY/TUBE(S)      Description: Salpingo-oophorectomy Left Side;  Proc Date: 12/06/2004;     SC SUPRACERV ABD HYSTERECTOMY      Description: Supracervical Hysterectomy;  Proc Date: 12/06/2004;     Social History     Tobacco Use     Smoking status: Never Smoker     Smokeless tobacco: Never Used   Substance Use Topics     Alcohol use: Yes     Alcohol/week: 1.2 oz     Types: 1 Glasses of wine, 1 Cans of beer per week     Drug use: No         Objective:   /74   Pulse 64   Temp 98  F (36.7  C) (Oral)   Wt (!) 285 lb 1.6 oz (129.3 kg)   BMI 44.65 kg/m      General Appearance:  Alert, cooperative, no distress  Head:  Normocephalic, no obvious abnormality  Ears: TM anatomy normal  Eyes:  PERRL, EOM's intact, conjunctiva and corneas clear  Nose:  Nares symmetrical, septum midline, mucosa pink, no sinus tenderness  Throat:  Lips, tongue, and mucosa are moist, pink, and intact  Neck:  Supple, symmetrical, trachea midline, no  adenopathy; thyroid: no enlargement, symmetric,no tenderness/mass/nodules; no carotid bruit, no JVD  Back:  Symmetrical, no curvature, ROM normal, no CVA tenderness  Chest/Breast:  No mass or tenderness  Lungs:  Clear to auscultation bilaterally, respirations unlabored   Heart:  Normal PMI, regular rate & rhythm, S1 and S2 normal, no murmurs, rubs, or gallops  Abdomen:  Soft, non-tender, bowel sounds active all four quadrants, no mass, or organomegaly  Musculoskeletal:  Tone and strength strong and symmetrical, all extremities  Lymphatic:  No adenopathy  Skin/Hair/Nails:  Skin warm, dry, and intact, no rashes  Neurologic:  Alert and oriented x3, no cranial nerve deficits, normal strength and tone, gait steady  Extremities:  No edema.  Ibrahima's sign negative.  Patient is walking with an immobilizer boot on her right lower extremity to include her foot  Genitourinary: deferred  Pulses:  Equal bilaterally     The following high BMI interventions were performed this visit: weight monitoring      This note has been dictated using voice recognition software. Any grammatical or context distortions are unintentional and inherent to the the software.

## 2021-05-28 NOTE — TELEPHONE ENCOUNTER
Refill Approved    Rx renewed per Medication Renewal Policy. Medication was last renewed on 4/24/19.    Jasmin Baker, [facility] Triage/Med Refill 5/16/2019     Requested Prescriptions   Pending Prescriptions Disp Refills     omeprazole (PRILOSEC) 20 MG capsule [Pharmacy Med Name: OMEPRAZOLE DR 20 MG CAPSULE] 30 capsule 0     Sig: TAKE 1 CAPSULE BY MOUTH EVERY DAY       GI Medications Refill Protocol Passed - 5/16/2019  9:36 AM        Passed - PCP or prescribing provider visit in last 12 or next 3 months.     Last office visit with prescriber/PCP: 4/24/2019 Burak Arnold MD OR same dept: 4/24/2019 Burak Arnold MD OR same specialty: 4/24/2019 Burak Arnold MD  Last physical: Visit date not found Last MTM visit: Visit date not found   Next visit within 3 mo: Visit date not found  Next physical within 3 mo: Visit date not found  Prescriber OR PCP: Burak Arnold MD  Last diagnosis associated with med order: 1. Gastritis without bleeding, unspecified chronicity, unspecified gastritis type  - omeprazole (PRILOSEC) 20 MG capsule [Pharmacy Med Name: OMEPRAZOLE DR 20 MG CAPSULE]; TAKE 1 CAPSULE BY MOUTH EVERY DAY  Dispense: 30 capsule; Refill: 0    If protocol passes may refill for 12 months if within 3 months of last provider visit (or a total of 15 months).

## 2021-05-28 NOTE — TELEPHONE ENCOUNTER
RN cannot approve Refill Request    RN can NOT refill this medication med is not covered by policy/route to provider     . Last office visit: 4/24/2019 Burak Arnold MD Last Physical: Visit date not found Last MTM visit: Visit date not found Last visit same specialty: 4/24/2019 Burak Arnold MD.  Next visit within 3 mo: Visit date not found  Next physical within 3 mo: Visit date not found      Jasmin Baker, [facility] Triage/Med Refill 4/29/2019    Requested Prescriptions   Pending Prescriptions Disp Refills     BYSTOLIC 5 mg tablet [Pharmacy Med Name: BYSTOLIC TAB 5MG] 90 tablet 1     Sig: TAKE 1 TABLET BY MOUTH     DAILY       There is no refill protocol information for this order

## 2021-05-29 NOTE — TELEPHONE ENCOUNTER
RN cannot approve Refill Request    RN can NOT refill this medication med is not covered by policy/route to provider. Last office visit: 4/24/2019 Burak Arnold MD Last Physical: Visit date not found Last MTM visit: Visit date not found Last visit same specialty: 4/24/2019 Burak Arnold MD.  Next visit within 3 mo: Visit date not found  Next physical within 3 mo: Visit date not found      Nighat Cifuentes, [facility] Triage/Med Refill 5/21/2019    Requested Prescriptions   Pending Prescriptions Disp Refills     naproxen (NAPROSYN) 500 MG tablet [Pharmacy Med Name: NAPROXEN 500 MG TABLET] 60 tablet 0     Sig: TAKE 1 TABLET BY MOUTH TWICE A DAY WITH MEALS       There is no refill protocol information for this order

## 2021-05-30 NOTE — TELEPHONE ENCOUNTER
RN cannot approve Refill Request    RN can NOT refill this medication med is not covered by policy/route to provider. Last office visit: 4/24/2019 Burak Arnold MD Last Physical: Visit date not found Last MTM visit: Visit date not found Last visit same specialty: 4/24/2019 Burak Arnold MD.  Next visit within 3 mo: Visit date not found  Next physical within 3 mo: Visit date not found      Nighat Cifuentes, [facility] Triage/Med Refill 7/16/2019    Requested Prescriptions   Pending Prescriptions Disp Refills     naproxen (NAPROSYN) 500 MG tablet [Pharmacy Med Name: NAPROXEN 500 MG TABLET] 60 tablet 0     Sig: TAKE 1 TABLET BY MOUTH TWICE A DAY WITH MEALS       There is no refill protocol information for this order

## 2021-05-31 ENCOUNTER — RECORDS - HEALTHEAST (OUTPATIENT)
Dept: ADMINISTRATIVE | Facility: CLINIC | Age: 61
End: 2021-05-31

## 2021-05-31 VITALS — BODY MASS INDEX: 43.68 KG/M2 | WEIGHT: 288.2 LBS | HEIGHT: 68 IN

## 2021-05-31 VITALS — WEIGHT: 287.2 LBS | BODY MASS INDEX: 43.53 KG/M2 | HEIGHT: 68 IN

## 2021-05-31 VITALS — BODY MASS INDEX: 43.58 KG/M2 | WEIGHT: 286.6 LBS

## 2021-05-31 VITALS — BODY MASS INDEX: 43.06 KG/M2 | WEIGHT: 283.19 LBS

## 2021-05-31 VITALS — BODY MASS INDEX: 43.49 KG/M2 | WEIGHT: 286 LBS

## 2021-06-01 ENCOUNTER — RECORDS - HEALTHEAST (OUTPATIENT)
Dept: ADMINISTRATIVE | Facility: CLINIC | Age: 61
End: 2021-06-01

## 2021-06-01 VITALS — WEIGHT: 287.44 LBS | BODY MASS INDEX: 45.02 KG/M2

## 2021-06-01 VITALS — BODY MASS INDEX: 44.23 KG/M2 | HEIGHT: 67 IN | WEIGHT: 281.8 LBS

## 2021-06-01 VITALS — WEIGHT: 282.06 LBS | BODY MASS INDEX: 44.18 KG/M2

## 2021-06-01 NOTE — PROGRESS NOTES
Assessment:     1. Bronchitis  XR Chest 2 Views    budesonide-formoterol (SYMBICORT) 160-4.5 mcg/actuation inhaler   2. Wheezing  XR Chest 2 Views    budesonide-formoterol (SYMBICORT) 160-4.5 mcg/actuation inhaler       Plan:     1. Bronchitis  I do not see an active infiltrate on today's repeat film however patient is clinically worse according to her description and review of last week's visit she has finished her corticosteroid taper as well as a course of antibiotics and has been nebulizing 2-3 times per day; medical decision making was to put her on a combination inhaler at this point as she is experiencing side effects from the corticosteroid orally  - XR Chest 2 Views  - budesonide-formoterol (SYMBICORT) 160-4.5 mcg/actuation inhaler; Inhale 2 puffs 2 (two) times a day.  Dispense: 1 Inhaler; Refill: 3    2. Wheezing  See plan as above; patient should probably be off of work this week as well as last week we will cover with a letter depending on when she goes back to work depending on whether or not she implies short-term disability benefit she will call us if she develops a fever or productive cough or any change in her clinical situation  - XR Chest 2 Views  - budesonide-formoterol (SYMBICORT) 160-4.5 mcg/actuation inhaler; Inhale 2 puffs 2 (two) times a day.  Dispense: 1 Inhaler; Refill: 3      Subjective:   Milana returns after being seen 1 week ago for acute bronchitis with wheezing was treated very appropriately with antibiotics corticosteroids and nebulization.  She is failed to respond to this treatment and is audibly wheezing most of the day and is becoming tachypneic and more short of breath.  She does not describe febrile episodes although she does and is experiencing some night sweats.  Temp has been below 99.2.  Patient is hydrating herself.  She is unable to work she was given Aleve for last week and will undoubtedly need more time this week.  Clinical examination reveals expiratory wheezes and  limited inspiratory effort chest x-ray was repeated again today and I did not see active infiltrate.  I will place the patient on combination inhaled corticosteroid medication as she is not anxious to continue on with oral steroids.  No indication here for increased antibiotics.  Will review chest x-ray with radiology however.  I will cover her with a short-term disability note or medical leave if she requires longer length of time then this 10-day to 2-week.  As noted in the plan above.    Review of Systems: A complete 14 point review of systems was obtained and is negative or as stated in the history of present illness.    Past Medical History:   Diagnosis Date     Asthma      Hypertension      Low back pain      Family History   Problem Relation Age of Onset     Hypertension Mother      Cancer Father      Diabetes Father      Hypertension Father      Cancer Sister      Past Surgical History:   Procedure Laterality Date     BACK SURGERY  08/2010    L4-5, S1 Hemilaminectomy, foraminotomy     VT REMOVAL OF OVARY/TUBE(S)      Description: Salpingo-oophorectomy Left Side;  Proc Date: 12/06/2004;     VT SUPRACERV ABD HYSTERECTOMY      Description: Supracervical Hysterectomy;  Proc Date: 12/06/2004;     Social History     Tobacco Use     Smoking status: Never Smoker     Smokeless tobacco: Never Used   Substance Use Topics     Alcohol use: Yes     Alcohol/week: 2.0 standard drinks     Types: 1 Glasses of wine, 1 Cans of beer per week     Drug use: No         Objective:   /80   Pulse 68   Temp 97.5  F (36.4  C) (Oral)   Wt (!) 287 lb 11.2 oz (130.5 kg)   SpO2 94%   BMI 45.06 kg/m      General Appearance:  Alert, cooperative, no distress  Head:  Normocephalic, no obvious abnormality  Ears: TM anatomy normal  Eyes:  PERRL, EOM's intact, conjunctiva and corneas clear  Nose:  Nares symmetrical, septum midline, mucosa pink, no sinus tenderness  Throat:  Lips, tongue, and mucosa are moist, pink, and intact  Neck:   Supple, symmetrical, trachea midline, no adenopathy; thyroid: no enlargement, symmetric,no tenderness/mass/nodules; no carotid bruit, no JVD  Back:  Symmetrical, no curvature, ROM normal, no CVA tenderness  Chest/Breast:  No mass or tenderness  Lungs: Patient has limited inspiratory effort bilaterally and expiratory wheezes throughout her chest no rhonchi or rales were heard however  Heart:  Normal PMI, regular rate & rhythm, S1 and S2 normal, no murmurs, rubs, or gallops  Abdomen:  Soft, non-tender, bowel sounds active all four quadrants, no mass, or organomegaly  Musculoskeletal:  Tone and strength strong and symmetrical, all extremities  Lymphatic:  No adenopathy  Skin/Hair/Nails:  Skin warm, dry, and intact, no rashes  Neurologic:  Alert and oriented x3, no cranial nerve deficits, normal strength and tone, gait steady  Extremities:  No edema.  Ibrahima's sign negative.    Genitourinary: deferred  Pulses:  Equal bilaterally           This note has been dictated using voice recognition software. Any grammatical or context distortions are unintentional and inherent to the the software.

## 2021-06-01 NOTE — TELEPHONE ENCOUNTER
Refill Approved    Rx renewed per Medication Renewal Policy. Medication was last renewed on 2/29/16.    Jasmin Baker, [facility] Triage/Med Refill 9/30/2019     Requested Prescriptions   Pending Prescriptions Disp Refills     hydroCHLOROthiazide (HYDRODIURIL) 25 MG tablet [Pharmacy Med Name: HYDROCHLOROT TAB 25MG] 90 tablet 3     Sig: TAKE 1 TABLET BY MOUTH     DAILY       Diuretics/Combination Diuretics Refill Protocol  Passed - 9/30/2019  2:53 PM        Passed - Visit with PCP or prescribing provider visit in past 12 months     Last office visit with prescriber/PCP: 9/23/2019 Burak Arnold MD OR same dept: 9/23/2019 Burak Arnold MD OR same specialty: 9/23/2019 Burak Arnold MD  Last physical: Visit date not found Last MTM visit: Visit date not found   Next visit within 3 mo: Visit date not found  Next physical within 3 mo: Visit date not found  Prescriber OR PCP: Burak Arnold MD  Last diagnosis associated with med order: 1. Hypertension  - hydroCHLOROthiazide (HYDRODIURIL) 25 MG tablet [Pharmacy Med Name: HYDROCHLOROT TAB 25MG]; TAKE 1 TABLET BY MOUTH     DAILY  Dispense: 90 tablet; Refill: 3    If protocol passes may refill for 12 months if within 3 months of last provider visit (or a total of 15 months).             Passed - Serum Potassium in past 12 months      Lab Results   Component Value Date    Potassium 3.4 (L) 12/06/2018             Passed - Serum Sodium in past 12 months      Lab Results   Component Value Date    Sodium 140 12/06/2018             Passed - Blood pressure on file in past 12 months     BP Readings from Last 1 Encounters:   09/23/19 138/80             Passed - Serum Creatinine in past 12 months      Creatinine   Date Value Ref Range Status   12/06/2018 0.72 0.60 - 1.10 mg/dL Final

## 2021-06-01 NOTE — TELEPHONE ENCOUNTER
"Cox on Tuesday     CC: Status update       Since visit     > \"Settling more in to the chest\"      > \"Chest is still a bit heavy\"    > Fever yesterday 100F   > Today - no fever     > Albuterol - yes helps partially    > Is taking ABX and just filled pred yesterday      Exertional shortness of breath       Tele# [phone]      Needs note for work - today and tomorrow  - can be put online for her to  please put on line       A/P:   > Will message provider to let them know your status - does take some time for ABX to be fully effective       > Cont with pred and prn nebs - push fluids      > If worsening shortness of breath, high fevers or other worsening sx, should be seen in ED            Chinmay Mckoy RN   Triage and Medication Refills       "

## 2021-06-01 NOTE — TELEPHONE ENCOUNTER
Spoke with pt and informed her that the form was signed and faxed back to her employer. Form sent in for scanning.

## 2021-06-01 NOTE — TELEPHONE ENCOUNTER
Name of form/paperwork: Other:  Devika form - short term disability  Have you been seen for this request: Yes:  9/23/19  Do we have the form: Devika will be faxing the form to [phone]  When is form needed by: the sooner the better per patient  How would you like the form returned: fax  Fax Number: see form  Patient Notified form requests are processed in 3-5 business days: Yes  (If patient needs form sooner, please note that in this message.)  Okay to leave a detailed message? Yes

## 2021-06-01 NOTE — TELEPHONE ENCOUNTER
Who is requesting the letter?  patient  Why is the letter needed? Requesting a letter to return to work today 9/30/2019 with no restrictions.  How would you like this letter returned? Please put on patient's my chart.  Okay to leave a detailed message? Yes

## 2021-06-01 NOTE — PROGRESS NOTES
ASSESSMENT:  1. Acute Bronchitis  58-year-old female with acute bronchitis in the setting of underlying asthma.  We will start a prednisone taper and albuterol nebs every 4 hours as needed.  Chest x-ray did not reveal evidence of an infiltrate.  Follow-up if symptoms are worsening or unresolving.  - XR Chest 2 Views  - albuterol nebulizer solution 2.5 mg (PROVENTIL)       PLAN:  There are no Patient Instructions on file for this visit.    Orders Placed This Encounter   Procedures     XR Chest 2 Views     Order Specific Question:   Reason for Exam (Describe Symptoms):     Answer:   cough and fever     Order Specific Question:   Is the patient pregnant?     Answer:   No     Order Specific Question:   Can the procedure be changed per Radiologist protocol?     Answer:   Yes     There are no discontinued medications.    No follow-ups on file.    CHIEF COMPLAINT:  Chief Complaint   Patient presents with     Cough     x yesterday- chest hurts when coughing.     Sore Throat     x yesterday     Breathing Problem     Fever     x today       HISTORY OF PRESENT ILLNESS:  Milana is a 58 y.o. female presenting to the clinic today to address her sore throat, cough, breathing problem, and fever.    Coughing and sore throat: The patient reports that she has been coughing since yesterday, and acknowledge that her coughing has been increasing in intensity and pain level. In addition she confirms that she has a sore throat.     Fever: She has an ongoing fever since yesterday.     Nasal congestion and Possible Sinus Infection: The patient acknowledge that her ears are starting to get plugged, and reports that her face hurts.     Chest pain: The patient reports that her chest is congested and it takes a lot of effort for her to breath. She has been taking Albuterol nebulizer solution 2.5 mg to treat her condition.       REVIEW OF SYSTEMS:   Positive: Fever, coughing, sore throat, chest pain.   All other systems are  negative.    PFSH:  Laminectomy mircodisectomy 8/19/2010  Broke her right foot recently    TOBACCO USE:  Social History     Tobacco Use   Smoking Status Never Smoker   Smokeless Tobacco Never Used       VITALS:  Vitals:    09/17/19 1342 09/17/19 1346   BP: 128/70    Patient Site: Left Arm    Patient Position: Sitting    Cuff Size: Adult Large    Pulse: 88    Temp:  100  F (37.8  C)   TempSrc:  Oral   SpO2: 91%    Weight: (!) 297 lb 3.2 oz (134.8 kg)      Wt Readings from Last 3 Encounters:   09/17/19 (!) 297 lb 3.2 oz (134.8 kg)   04/24/19 (!) 285 lb 1.6 oz (129.3 kg)   12/27/18 (!) 280 lb (127 kg)     Body mass index is 46.55 kg/m .    PHYSICAL EXAM:    General Appearance:  Alert, cooperative, no distress, appears stated age   HEENT:  Throat appears red    Neck: Supple, symmetrical, no adenopathy.   Lungs:   Crackles basal  bilaterally, good air movements.    Heart:  Regular rate and rhythm, S1, S2 normal, no murmur, rub or gallop   Abdomen:   Soft, non-tender, positive bowel sounds, no masses, no organomegaly   Extremities: Extremities normal.  No cyanosis or edema   Skin: Warm, dry.  Skin color, texture, turgor normal, no rashes or lesions   Neurologic: Nonfocal.           QUALITY MEASURES:  ADDITIONAL HISTORY SUMMARIZED (2): Review 7/27/2019 ED Dr. Hill regarding foot pain or injury   DECISION TO OBTAIN EXTRA INFORMATION (1): None.   RADIOLOGY TESTS (1): Order chest x-ray 2 view  LABS (1): None.  MEDICINE TESTS (1): None.  INDEPENDENT REVIEW (2 each): Review and interpreted chest x-ray showing no signs of Pneumonia.     The visit lasted a total of 25 minutes face to face with the patient. Over 50% of the time was spent counseling and educating the patient about acute bronchitis exacerbation.    ILucia, am scribing for and in the presence of, Dr. Cox.    I, Dr. Cox, personally performed the services described in this documentation, as scribed by Lucia Still in my presence, and it is both  accurate and complete.    MEDICATIONS:  Current Outpatient Medications   Medication Sig Dispense Refill     aspirin 81 MG EC tablet Take 1 tablet (81 mg total) by mouth daily.  0     b complex vitamins tablet Take 1 tablet by mouth daily.       BLACK COHOSH ORAL Take 1 tablet by mouth daily.       BYSTOLIC 5 mg tablet TAKE 1 TABLET BY MOUTH     DAILY 90 tablet 3     hydrochlorothiazide (HYDRODIURIL) 25 MG tablet Take 1 tablet (25 mg total) by mouth daily. 90 tablet 3     magnesium oxide 250 mg Tab Take 250 mg by mouth daily.       methocarbamol (ROBAXIN) 500 MG tablet Take 1 tablet (500 mg total) by mouth 3 (three) times a day as needed. 45 tablet 1     naproxen (NAPROSYN) 500 MG tablet TAKE 1 TABLET BY MOUTH TWICE A DAY WITH MEALS 60 tablet 0     omeprazole (PRILOSEC) 20 MG capsule TAKE 1 CAPSULE BY MOUTH EVERY DAY 90 capsule 3     s-adenosylmethionine sul tosyl (NAYELY-E ORAL) Take by mouth daily.       VENTOLIN HFA 90 mcg/actuation inhaler INHALE 1 TO 2 PUFFS ORALLY EVERY FOUR HOURS AS NEEDED 54 each 1     Current Facility-Administered Medications   Medication Dose Route Frequency Provider Last Rate Last Dose     albuterol nebulizer solution 2.5 mg (PROVENTIL)  2.5 mg Nebulization Once Elyse Cox MD           Total data points:3

## 2021-06-02 VITALS — WEIGHT: 285.1 LBS | BODY MASS INDEX: 44.65 KG/M2

## 2021-06-02 VITALS — HEIGHT: 67 IN | WEIGHT: 284 LBS | BODY MASS INDEX: 44.57 KG/M2

## 2021-06-02 VITALS — HEIGHT: 67 IN | BODY MASS INDEX: 44.57 KG/M2 | WEIGHT: 284 LBS

## 2021-06-02 VITALS — WEIGHT: 283.2 LBS | BODY MASS INDEX: 44.36 KG/M2

## 2021-06-02 VITALS — WEIGHT: 288.1 LBS | BODY MASS INDEX: 45.12 KG/M2

## 2021-06-03 VITALS
WEIGHT: 293 LBS | DIASTOLIC BLOOD PRESSURE: 78 MMHG | BODY MASS INDEX: 45.97 KG/M2 | SYSTOLIC BLOOD PRESSURE: 126 MMHG | HEART RATE: 72 BPM

## 2021-06-03 VITALS
OXYGEN SATURATION: 97 % | HEART RATE: 65 BPM | RESPIRATION RATE: 18 BRPM | SYSTOLIC BLOOD PRESSURE: 118 MMHG | DIASTOLIC BLOOD PRESSURE: 76 MMHG | BODY MASS INDEX: 46.39 KG/M2 | WEIGHT: 293 LBS

## 2021-06-03 VITALS
WEIGHT: 293 LBS | BODY MASS INDEX: 46.55 KG/M2 | TEMPERATURE: 100 F | DIASTOLIC BLOOD PRESSURE: 70 MMHG | SYSTOLIC BLOOD PRESSURE: 128 MMHG | OXYGEN SATURATION: 91 % | HEART RATE: 88 BPM

## 2021-06-03 VITALS
DIASTOLIC BLOOD PRESSURE: 80 MMHG | BODY MASS INDEX: 45.06 KG/M2 | WEIGHT: 287.7 LBS | SYSTOLIC BLOOD PRESSURE: 138 MMHG | OXYGEN SATURATION: 94 % | TEMPERATURE: 97.5 F | HEART RATE: 68 BPM

## 2021-06-03 NOTE — TELEPHONE ENCOUNTER
New Appointment Needed  What is the reason for the visit:  Patient requesting Blood Tb Screening  Mantoux Placement  Appt Request  What is the purpose of the mantoux?:  volunteering    Is there a form to be completed?:   No - states she just needs documentation  How soon do you need the mantoux placed?:  Patient states she needs it this week    Provider Preference: [facility] nurse  How soon do you need to be seen?: this week   Waitlist offered?: No  Okay to leave a detailed message:  Yes

## 2021-06-03 NOTE — PROGRESS NOTES
Assessment:     1. Benign essential hypertension  nebivolol (BYSTOLIC) 5 MG tablet    Comprehensive Metabolic Panel    HM2(CBC w/o Differential)    Lipid Cascade    Urinalysis-UC if Indicated    Vitamin D, Total (25-Hydroxy)   2. Hypertension  hydroCHLOROthiazide (HYDRODIURIL) 25 MG tablet    Comprehensive Metabolic Panel    HM2(CBC w/o Differential)    Lipid Cascade    Urinalysis-UC if Indicated    Vitamin D, Total (25-Hydroxy)   3. Hip pain, right  naproxen (NAPROSYN) 500 MG tablet    Comprehensive Metabolic Panel   4. Lethargy  Comprehensive Metabolic Panel    HM2(CBC w/o Differential)    Lipid Cascade    Urinalysis-UC if Indicated    Vitamin D, Total (25-Hydroxy)       Plan:     1. Benign essential hypertension  Patient's blood pressure is well controlled on the Bystolic however she is behind with regards to monitoring her laboratories will order the following parameters  - nebivolol (BYSTOLIC) 5 MG tablet; Take 1 tablet (5 mg total) by mouth daily.  Dispense: 90 tablet; Refill: 3  - Comprehensive Metabolic Panel  - HM2(CBC w/o Differential)  - Lipid Cascade  - Urinalysis-UC if Indicated  - Vitamin D, Total (25-Hydroxy)    2. Hypertension  Patient is continue to take her diuretic  - hydroCHLOROthiazide (HYDRODIURIL) 25 MG tablet; Take 1 tablet (25 mg total) by mouth daily.  Dispense: 90 tablet; Refill: 3  - Comprehensive Metabolic Panel  - HM2(CBC w/o Differential)  - Lipid Cascade  - Urinalysis-UC if Indicated  - Vitamin D, Total (25-Hydroxy)    3. Hip pain, right  Patient will continue to try to lose some weight she may take naproxen for multiple joint discomfort along with Tylenol  - naproxen (NAPROSYN) 500 MG tablet; Take 1 tablet (500 mg total) by mouth 2 (two) times a day with meals.  Dispense: 60 tablet; Refill: 0  - Comprehensive Metabolic Panel    4. Lethargy  Patient has had a past medical history of low vitamin D therefore will add the following parameter Milana is  - Comprehensive Metabolic Panel  -  HM2(CBC w/o Differential)  - Lipid Cascade  - Urinalysis-UC if Indicated  - Vitamin D, Total (25-Hydroxy)      Subjective:   Being required to do a TB Gold test so we will order that today.  Also she is somewhat behind in her health monitoring with regards to her lipids with kidney function and hemogram which we will order here today.  She is been a little more lethargic but will investigate her vitamin D status especially now.  She fortunately has recovered from her asthma exacerbation with her inhaler.  She questions whether she should go on her Singulair which is helped her in the past and we agree to restart that and take it throughout the winter.  She denies any other constitutional complaints at this time no cranial nerve dysfunction no shortness of breath or angina or chest discomfort her GERD is under good control bowels are moving normally patient will get her zoster immunizations probably at [facility].  All medical questions asked were answered we did we did do a chart review medication review today we will see her for her follow-up examination in 3 months.    Review of Systems: A complete 14 point review of systems was obtained and is negative or as stated in the history of present illness.    Past Medical History:   Diagnosis Date     Asthma      Hypertension      Low back pain      Family History   Problem Relation Age of Onset     Hypertension Mother      Cancer Father      Diabetes Father      Hypertension Father      Cancer Sister      Past Surgical History:   Procedure Laterality Date     BACK SURGERY  08/2010    L4-5, S1 Hemilaminectomy, foraminotomy     AK REMOVAL OF OVARY/TUBE(S)      Description: Salpingo-oophorectomy Left Side;  Proc Date: 12/06/2004;     AK SUPRACERV ABD HYSTERECTOMY      Description: Supracervical Hysterectomy;  Proc Date: 12/06/2004;     Social History     Tobacco Use     Smoking status: Never Smoker     Smokeless tobacco: Never Used   Substance Use Topics     Alcohol use:  Yes     Alcohol/week: 2.0 standard drinks     Types: 1 Glasses of wine, 1 Cans of beer per week     Drug use: No         Objective:   /78   Pulse 72   Wt (!) 293 lb 8 oz (133.1 kg)   BMI 45.97 kg/m      General Appearance:  Alert, cooperative, no distress  Head:  Normocephalic, no obvious abnormality  Ears: TM anatomy normal  Eyes:  PERRL, EOM's intact, conjunctiva and corneas clear  Nose:  Nares symmetrical, septum midline, mucosa pink, no sinus tenderness  Throat:  Lips, tongue, and mucosa are moist, pink, and intact  Neck:  Supple, symmetrical, trachea midline, no adenopathy; thyroid: no enlargement, symmetric,no tenderness/mass/nodules; no carotid bruit, no JVD  Back:  Symmetrical, no curvature, ROM normal, no CVA tenderness  Chest/Breast:  No mass or tenderness  Lungs:  Clear to auscultation bilaterally, respirations unlabored   Heart:  Normal PMI, regular rate & rhythm, S1 and S2 normal, no murmurs, rubs, or gallops  Abdomen:  Soft, non-tender, bowel sounds active all four quadrants, no mass, or organomegaly  Musculoskeletal:  Tone and strength strong and symmetrical, all extremities  Lymphatic:  No adenopathy  Skin/Hair/Nails:  Skin warm, dry, and intact, no rashes  Neurologic:  Alert and oriented x3, no cranial nerve deficits, normal strength and tone, gait steady  Extremities:  No edema.  Ibrahima's sign negative.    Genitourinary: deferred  Pulses:  Equal bilaterally     The following high BMI interventions were performed this visit: weight monitoring      This note has been dictated using voice recognition software. Any grammatical or context distortions are unintentional and inherent to the the software.

## 2021-06-04 VITALS
DIASTOLIC BLOOD PRESSURE: 76 MMHG | WEIGHT: 293 LBS | SYSTOLIC BLOOD PRESSURE: 110 MMHG | BODY MASS INDEX: 45.99 KG/M2 | OXYGEN SATURATION: 96 % | HEART RATE: 68 BPM | HEIGHT: 67 IN

## 2021-06-05 VITALS
BODY MASS INDEX: 45.77 KG/M2 | OXYGEN SATURATION: 96 % | RESPIRATION RATE: 20 BRPM | HEART RATE: 98 BPM | SYSTOLIC BLOOD PRESSURE: 124 MMHG | HEIGHT: 67 IN | DIASTOLIC BLOOD PRESSURE: 78 MMHG | TEMPERATURE: 68 F | WEIGHT: 291.6 LBS

## 2021-06-05 VITALS
DIASTOLIC BLOOD PRESSURE: 78 MMHG | WEIGHT: 292 LBS | HEART RATE: 68 BPM | HEIGHT: 67 IN | BODY MASS INDEX: 45.83 KG/M2 | SYSTOLIC BLOOD PRESSURE: 126 MMHG | RESPIRATION RATE: 14 BRPM

## 2021-06-05 VITALS — WEIGHT: 286 LBS | HEIGHT: 67 IN | BODY MASS INDEX: 44.89 KG/M2

## 2021-06-05 VITALS
HEART RATE: 68 BPM | SYSTOLIC BLOOD PRESSURE: 118 MMHG | DIASTOLIC BLOOD PRESSURE: 68 MMHG | OXYGEN SATURATION: 96 % | WEIGHT: 286 LBS | BODY MASS INDEX: 44.79 KG/M2

## 2021-06-05 NOTE — TELEPHONE ENCOUNTER
Medication Request  Medication name:   Meclizine  Requested Pharmacy: [facility][id number]  Reason for request:   Dizziness  When did you use medication last?:    Currently using  Patient offered appointment:  No, patient saw Provider on 1/31/2020.  Okay to leave a detailed message: yes

## 2021-06-05 NOTE — PROGRESS NOTES
Assessment/Plan:     1. Dysfunction of both eustachian tubes  Ambulatory referral to ENT    fluticasone propionate (FLONASE) 50 mcg/actuation nasal spray   2. Decreased hearing of both ears  Hearing Screening    Ambulatory referral to ENT   3. Tinnitus, bilateral  Ambulatory referral to ENT   4. Mixed conductive and sensorineural hearing loss, unspecified laterality  Ambulatory referral to ENT       Diagnoses and all orders for this visit:    Dysfunction of both eustachian tubes  -     Ambulatory referral to ENT  -     fluticasone propionate (FLONASE) 50 mcg/actuation nasal spray; Instill 2 sprays in each nostril once daily  Dispense: 16 g; Refill: 1  -Overall, I discussed I feel her symptoms are consistent with eustachian tube dysfunction.  Given the significant decrease in her hearing, I am recommending that she be evaluated by ear nose and throat provider.  In the meantime we will try some fluticasone nasal spray.  Counseled her on use of this medication and common side effects.  Encouraged her to try to keep her ears above the water when she goes to the float spa.    Decreased hearing of both ears  -     Hearing Screening  -     Ambulatory referral to ENT    Tinnitus, bilateral  -     Ambulatory referral to ENT    Mixed conductive and sensorineural hearing loss, unspecified laterality  -     Ambulatory referral to ENT           Subjective:      Milana Blum is a 59 y.o. female who comes in today with concern about dizziness, headache as well as a buzzing sound in her ear.  She reports that the the symptoms have been going on for about 2 to 3 weeks.  She reports that she has started going to a float spa where the water contains magnesium and Epson salts.  Her ears do sit under the water.  She is not sure if that is causing symptoms.  States that at times the buzzing in her ears is very loud and it is difficult to hear other things.  Seems to be louder when she is feeling more stressed.  She describes a  headache across the forehead and over the top of her head.  She has felt dizzy when standing up.  Sort of a lightheaded feeling.  She has not had pain in her ears.  No drainage in her ears.  No pressure in the cheeks.  Little bit of runny nose.  She did have a slight sore throat this morning.  She feels tired but feels that that is probably just due to work.  Does not have significant cough or chest congestion.  She does have asthma and is compliant with use of her inhalers as well as her Singulair.  She has not had fevers or chills.  She has hypertension and reports that her blood pressure has been under good control.  We reviewed medications and allergies.  Review of systems is otherwise negative.  No other concerns or questions today.    Current Outpatient Medications   Medication Sig Dispense Refill     albuterol (PROVENTIL) 2.5 mg /3 mL (0.083 %) nebulizer solution Take 3 mL (2.5 mg total) by nebulization every 4 (four) hours as needed for wheezing. 25 vial 2     albuterol (VENTOLIN HFA) 90 mcg/actuation inhaler INHALE 1 TO 2 PUFFS ORALLY EVERY FOUR HOURS AS NEEDED 54 each 1     aspirin 81 MG EC tablet Take 1 tablet (81 mg total) by mouth daily.  0     b complex vitamins tablet Take 1 tablet by mouth daily.       BLACK COHOSH ORAL Take 1 tablet by mouth daily.       budesonide-formoterol (SYMBICORT) 160-4.5 mcg/actuation inhaler Inhale 2 puffs 2 (two) times a day. 1 Inhaler 3     hydroCHLOROthiazide (HYDRODIURIL) 25 MG tablet Take 1 tablet (25 mg total) by mouth daily. 90 tablet 3     magnesium oxide 250 mg Tab Take 250 mg by mouth daily.       methocarbamol (ROBAXIN) 500 MG tablet Take 1 tablet (500 mg total) by mouth 3 (three) times a day as needed. 45 tablet 1     naproxen (NAPROSYN) 500 MG tablet Take 1 tablet (500 mg total) by mouth 2 (two) times a day with meals. 60 tablet 0     nebivolol (BYSTOLIC) 5 MG tablet Take 1 tablet (5 mg total) by mouth daily. 90 tablet 3     omeprazole (PRILOSEC) 20 MG capsule  TAKE 1 CAPSULE BY MOUTH EVERY DAY 90 capsule 3     s-adenosylmethionine sul tosyl (NAYELY-E ORAL) Take by mouth daily.       fluticasone propionate (FLONASE) 50 mcg/actuation nasal spray Instill 2 sprays in each nostril once daily 16 g 1     No current facility-administered medications for this visit.        Past Medical History, Family History, and Social History reviewed.  Past Medical History:   Diagnosis Date     Asthma      Hypertension      Low back pain      Past Surgical History:   Procedure Laterality Date     BACK SURGERY  08/2010    L4-5, S1 Hemilaminectomy, foraminotomy     AL REMOVAL OF OVARY/TUBE(S)      Description: Salpingo-oophorectomy Left Side;  Proc Date: 12/06/2004;     AL SUPRACERV ABD HYSTERECTOMY      Description: Supracervical Hysterectomy;  Proc Date: 12/06/2004;     House dust and Other environmental allergy  Family History   Problem Relation Age of Onset     Hypertension Mother      Cancer Father      Diabetes Father      Hypertension Father      Cancer Sister      Social History     Socioeconomic History     Marital status:      Spouse name: Not on file     Number of children: Not on file     Years of education: Not on file     Highest education level: Not on file   Occupational History     Not on file   Social Needs     Financial resource strain: Not on file     Food insecurity:     Worry: Not on file     Inability: Not on file     Transportation needs:     Medical: Not on file     Non-medical: Not on file   Tobacco Use     Smoking status: Never Smoker     Smokeless tobacco: Never Used   Substance and Sexual Activity     Alcohol use: Yes     Alcohol/week: 2.0 standard drinks     Types: 1 Glasses of wine, 1 Cans of beer per week     Drug use: No     Sexual activity: Yes     Partners: Male     Birth control/protection: Surgical   Lifestyle     Physical activity:     Days per week: Not on file     Minutes per session: Not on file     Stress: Not on file   Relationships     Social  "connections:     Talks on phone: Not on file     Gets together: Not on file     Attends Religion service: Not on file     Active member of club or organization: Not on file     Attends meetings of clubs or organizations: Not on file     Relationship status: Not on file     Intimate partner violence:     Fear of current or ex partner: Not on file     Emotionally abused: Not on file     Physically abused: Not on file     Forced sexual activity: Not on file   Other Topics Concern     Not on file   Social History Narrative     Not on file         Review of systems is as stated in HPI, and the remainder of the 10 system review is otherwise negative.    Objective:     Vitals:    01/31/20 1136   BP: 110/76   Patient Site: Right Arm   Patient Position: Sitting   Cuff Size: Thigh   Pulse: 68   SpO2: 96%   Weight: (!) 295 lb 11.2 oz (134.1 kg)   Height: 5' 7\" (1.702 m)    Body mass index is 46.31 kg/m .    General appearance: alert, appears stated age and cooperative  Head: Normocephalic, without obvious abnormality, atraumatic  Eyes: conjunctivae/corneas clear. PERRL, EOM's intact. Fundi benign.  Ears: normal TM's and external ear canals both ears  Nose: Nares normal. Septum midline. Mucosa normal. No drainage or sinus tenderness.  Throat: lips, mucosa, and tongue normal; teeth and gums normal  Neck: no adenopathy  Lungs: clear to auscultation bilaterally  Heart: regular rate and rhythm, S1, S2 normal, no murmur, click, rub or gallop  Neurologic: Grossly normal        This note has been dictated using voice recognition software. Any grammatical or context distortions are unintentional and inherent to the the software.       "

## 2021-06-05 NOTE — TELEPHONE ENCOUNTER
I see you saw this patient on 1/31/2020  After reviewing your note I did not see meclizine was a recommendation.    Patient states she is taking meclizine but I am unable to determine who prescribed this for her previously.

## 2021-06-05 NOTE — PATIENT INSTRUCTIONS - HE
Try flonase (fluticasone) nasal spray    Take naproxen twice daily for 5-7 days    ENT referral placed

## 2021-06-06 NOTE — TELEPHONE ENCOUNTER
Refill Approved    Rx renewed per Medication Renewal Policy. Medication was last renewed on 1/31/20.    Emerita Hernandez, [facility] [place] Connection Triage/Med Refill 3/8/2020     Requested Prescriptions   Pending Prescriptions Disp Refills     fluticasone propionate (FLONASE) 50 mcg/actuation nasal spray 16 g 1     Sig: Instill 2 sprays in each nostril once daily       Nasal Steroid Refill Protocol Passed - 3/6/2020 10:09 AM        Passed - Patient has had office visit/physical in last 2 years     Last office visit with prescriber/PCP: 11/21/2019 OR same dept: 1/31/2020 Rhina Maurer MD OR same specialty: 1/31/2020 Rhina Maurer MD Last physical: Visit date not found Last MTM visit: Visit date not found    Next appt within 3 mo: Visit date not found  Next physical within 3 mo: Visit date not found  Prescriber OR PCP: Burak Arnold MD  Last diagnosis associated with med order: 1. Dysfunction of both eustachian tubes  - fluticasone propionate (FLONASE) 50 mcg/actuation nasal spray; Instill 2 sprays in each nostril once daily  Dispense: 16 g; Refill: 1     If protocol passes may refill for 12 months if within 3 months of last provider visit (or a total of 15 months).

## 2021-06-12 NOTE — PROGRESS NOTES
Assessment/Plan:     1. Bronchitis  Normal exam I see no indication for reimaging at this time as patient is improving and she is agreeable but could consider if symptoms continue to linger.  I see no indication for continuing further antibiotics and suspect postinflammatory bronchitis.  Recommended continue with albuterol at home can take over-the-counter medications and will add prescription syrup.  Call return to care if symptoms worsen or do not improve.  - albuterol nebulizer solution 3 mL (PROVENTIL); Take 3 mL by nebulization once.  - Nebulizer  - codeine-guaiFENesin (GUAIFENESIN AC)  mg/5 mL liquid; Take 5-10 mL by mouth 3 (three) times a day as needed for cough.  Dispense: 240 mL; Refill: 0        Subjective:      Milana Blum is a 56 y.o. female comes in today to follow-up on bronchitis.  States it has been going on for about 3 weeks saw primary care provider and had albuterol nebulizer as well as chest x-ray which was normal and given prescription of Zithromax.  I reviewed the note of the chest x-ray.  She states that she did feel significantly better after the Zithromax and has been using her albuterol but will sometimes still cough particularly at the end of the day and at night when she lays down.  She states it is keeping her up and this has become quite inconvenient for her.  She states that wheezing and shortness of breath have improved.  She feels albuterol nebulizer works better than the inhaler and requests an additional treatment today.  She states no new or different symptoms.  She feels minimal postnasal drip and mucus again more so in the evening.  No other new concerns today.  She has not been using any over-the-counter medications.  She states she had some old prescription cough syrup which she used a few times and it did seem to help.    Current Outpatient Prescriptions   Medication Sig Dispense Refill     albuterol (PROVENTIL HFA;VENTOLIN HFA) 90 mcg/actuation inhaler Inhale  1-2 puffs every 4 (four) hours as needed. 3 Inhaler 3     b complex vitamins tablet Take 1 tablet by mouth daily.       BLACK COHOSH ORAL Take 1 tablet by mouth daily.       BYSTOLIC 5 mg tablet TAKE 1 TABLET BY MOUTH DAILY 90 tablet 1     fluticasone (FLONASE) 50 mcg/actuation nasal spray 1 spray into each nostril daily. (Patient taking differently: 1 spray into each nostril daily as needed. ) 16 g 2     hydrochlorothiazide (HYDRODIURIL) 25 MG tablet Take 1 tablet (25 mg total) by mouth daily. 90 tablet 3     magnesium 30 mg tablet Take 30 mg by mouth 3 (three) times a week.       methocarbamol (ROBAXIN) 500 MG tablet Take 1 tablet (500 mg total) by mouth 3 (three) times a day as needed. 45 tablet 1     oxyCODONE-acetaminophen (PERCOCET) 5-325 mg per tablet Take 1 tablet by mouth every 4 (four) hours as needed for pain. 20 tablet 0     potassium citrate (UROCIT-K) 10 mEq (1,080 mg) SR tablet Take by mouth 3 (three) times a week.       QVAR 40 mcg/actuation inhaler        triamcinolone (KENALOG) 0.1 % cream Apply topically 2 (two) times a day. 30 g 1     codeine-guaiFENesin (GUAIFENESIN AC)  mg/5 mL liquid Take 5-10 mL by mouth 3 (three) times a day as needed for cough. 240 mL 0     diclofenac (CATAFLAM) 50 MG tablet Take 1 tablet (50 mg total) by mouth 3 (three) times a day as needed. 60 tablet 1     No current facility-administered medications for this visit.      Allergies   Allergen Reactions     Other Environmental Allergy Itching     Past Medical History, Family History, and Social History reviewed.  Past Medical History:   Diagnosis Date     Low back pain      Past Surgical History:   Procedure Laterality Date     BACK SURGERY  08/2010    L4-5, S1 Hemilaminectomy, foraminotomy     MA REMOVAL OF OVARY/TUBE(S)      Description: Salpingo-oophorectomy Left Side;  Proc Date: 12/06/2004;     MA SUPRACERV ABD HYSTERECTOMY      Description: Supracervical Hysterectomy;  Proc Date: 12/06/2004;     Other  environmental allergy  Family History   Problem Relation Age of Onset     Hypertension Mother      Cancer Father      Diabetes Father      Hypertension Father      Cancer Sister      Social History     Social History     Marital status:      Spouse name: N/A     Number of children: N/A     Years of education: N/A     Occupational History     Not on file.     Social History Main Topics     Smoking status: Never Smoker     Smokeless tobacco: Never Used     Alcohol use 1.2 oz/week     1 Cans of beer, 1 Glasses of wine per week     Drug use: No     Sexual activity: Yes     Partners: Male     Birth control/ protection: Surgical     Other Topics Concern     Not on file     Social History Narrative         Review of systems is as stated in HPI, and the remainder of the 10 system review is otherwise negative.    Objective:     Vitals:    09/06/17 1341   BP: 134/78   Patient Site: Right Arm   Patient Position: Sitting   Cuff Size: Adult Large   Pulse: 64   Weight: (!) 286 lb 9.6 oz (130 kg)    Body mass index is 43.58 kg/(m^2).  Wt Readings from Last 3 Encounters:   09/06/17 (!) 286 lb 9.6 oz (130 kg)   08/29/17 (!) 286 lb (129.7 kg)   08/21/17 (!) 283 lb 3 oz (128.5 kg)       General Appearance:    Alert, cooperative, no distress, appears stated age    Head:    Normocephalic, without obvious abnormality, atraumatic   Eyes:    PERRL, EOM's intact, no conjunctivitis    Ears:    Normal TM's and external ear canals   Nose:   Mucosa normal, no drainage     or sinus tenderness   Throat:   Oropharynx is clear   Neck:   Supple, symmetrical, no adenopathy, no thyromegally, no carotid bruit        Lungs:     Clear to auscultation bilaterally, respirations unlabored   Chest Wall:    No tenderness or deformity    Heart:    Regular rate and rhythm, S1 and S2 normal, no murmur, rub    or gallop                               Psych:   grossly normal mood and affect without acute anxiety or psychosis    Skin:   No rashes or lesions    :          This note has been dictated using voice recognition software. Any grammatical or context distortions are unintentional and inherent to the software.

## 2021-06-12 NOTE — PROGRESS NOTES
Assessment/Plan:     1. Degenerative disc disease, lumbar  No signs of urinary tract infection and urinalysis.  Will try Robaxin and refer to spine care for ongoing concerns  - Ambulatory referral to [facility]  - Urinalysis-UC if Indicated  - methocarbamol (ROBAXIN) 500 MG tablet; Take 1 tablet (500 mg total) by mouth 3 (three) times a day as needed.  Dispense: 45 tablet; Refill: 1    2. Gluten intolerance  Will try cream to help with gluten intolerance rash.  Patient will work on avoiding gluten.  - triamcinolone (KENALOG) 0.1 % cream; Apply topically 2 (two) times a day.  Dispense: 30 g; Refill: 1        Subjective:      Milana Blum is a 56 y.o. female comes in today due to intermittent back pain.  Has known lumbar disc disease.  She did have UTI with pyelonephritis a few months ago so she is cautious every time she gets back pain wants to check a urine but has not had any other urinary symptoms.  Pain is mostly in the low mid back not in the flank region.  She states that the pain is intermittent mostly in the morning when she first gets out of bed has been going on for a month after she moves around it eases up and feels better.  She has been using the Flexeril muscle relaxer but it does not seem to help wonders what else she could take.  She feels stiff.  She has had no changes with her bowel movements.  States that she is a little bit more gassy.  She has no gluten intolerance but has been eating gluten she developed a rash that she believes is from gluten has been using over-the-counter creams wants to know what else she can use.    Current Outpatient Prescriptions   Medication Sig Dispense Refill     albuterol (PROVENTIL HFA;VENTOLIN HFA) 90 mcg/actuation inhaler Inhale 1-2 puffs every 4 (four) hours as needed. 3 Inhaler 3     b complex vitamins tablet Take 1 tablet by mouth daily.       BLACK COHOSH ORAL Take 1 tablet by mouth daily.       BYSTOLIC 5 mg tablet TAKE 1 TABLET BY MOUTH DAILY 90 tablet  1     fluticasone (FLONASE) 50 mcg/actuation nasal spray 1 spray into each nostril daily. (Patient taking differently: 1 spray into each nostril daily as needed. ) 16 g 2     hydrochlorothiazide (HYDRODIURIL) 25 MG tablet Take 1 tablet (25 mg total) by mouth daily. 90 tablet 3     magnesium 30 mg tablet Take 30 mg by mouth 3 (three) times a week.       oxyCODONE-acetaminophen (PERCOCET) 5-325 mg per tablet Take 1 tablet by mouth every 4 (four) hours as needed for pain. 20 tablet 0     potassium citrate (UROCIT-K) 10 mEq (1,080 mg) SR tablet Take by mouth 3 (three) times a week.       methocarbamol (ROBAXIN) 500 MG tablet Take 1 tablet (500 mg total) by mouth 3 (three) times a day as needed. 45 tablet 1     triamcinolone (KENALOG) 0.1 % cream Apply topically 2 (two) times a day. 30 g 1     No current facility-administered medications for this visit.      Allergies   Allergen Reactions     Other Environmental Allergy Itching     Past Medical History, Family History, and Social History reviewed.  Past Medical History:   Diagnosis Date     Low back pain      Past Surgical History:   Procedure Laterality Date     BACK SURGERY  08/2010    L4-5, S1 Hemilaminectomy, foraminotomy     HI REMOVAL OF OVARY/TUBE(S)      Description: Salpingo-oophorectomy Left Side;  Proc Date: 12/06/2004;     HI SUPRACERV ABD HYSTERECTOMY      Description: Supracervical Hysterectomy;  Proc Date: 12/06/2004;     Other environmental allergy  Family History   Problem Relation Age of Onset     Hypertension Mother      Cancer Father      Diabetes Father      Hypertension Father      Cancer Sister      Social History     Social History     Marital status:      Spouse name: N/A     Number of children: N/A     Years of education: N/A     Occupational History     Not on file.     Social History Main Topics     Smoking status: Never Smoker     Smokeless tobacco: Never Used     Alcohol use 1.2 oz/week     1 Cans of beer, 1 Glasses of wine per week  "    Drug use: No     Sexual activity: Yes     Partners: Male     Birth control/ protection: Surgical     Other Topics Concern     Not on file     Social History Narrative         Review of systems is as stated in HPI, and the remainder of the 10 system review is otherwise negative.    Objective:     Vitals:    08/09/17 1410   BP: 126/78   Patient Site: Left Arm   Patient Position: Sitting   Cuff Size: Adult Large   Pulse: 72   Weight: (!) 288 lb 3.2 oz (130.7 kg)   Height: 5' 8\" (1.727 m)    Body mass index is 43.82 kg/(m^2).  Wt Readings from Last 3 Encounters:   08/09/17 (!) 288 lb 3.2 oz (130.7 kg)   12/16/16 (!) 282 lb 7 oz (128.1 kg)   11/29/16 (!) 285 lb (129.3 kg)       General Appearance:    Alert, cooperative, no distress, appears stated age    Head:    Normocephalic, without obvious abnormality, atraumatic   Eyes:    PERRL, no conjunctivitis    Ears:    Normal external ear canals   Nose:   Mucosa normal, no drainage        Throat:   Oropharynx is clear   Neck:   Supple, symmetrical, no adenopathy, no thyromegally, no carotid bruit        Lungs:     Clear to auscultation bilaterally, respirations unlabored   Chest Wall:    No tenderness or deformity    Heart:    Regular rate and rhythm, S1 and S2 normal, no murmur, rub    or gallop       Abdomen:     Soft, non-tender, bowel sounds active all four quadrants,     no masses, no organomegaly           Extremities:   Extremities normal, atraumatic, no cyanosis or edema   Pulses:   2+ and symmetric all extremities   Neuro:   cranial nerves grossly intact, grossly normal sensation and reflexes in extremities    Psych:   grossly normal mood and affect without acute anxiety or psychosis    Skin:  Several patches of mild the erythematous micropapules, otherwise no rashes or lesions   Spine:  Somewhat difficult to fully appreciate due to body habitus.  There is exaggerated lumbar lordosis and some muscle spasms in the very low lumbar region area of patient's pain. "         This note has been dictated using voice recognition software. Any grammatical or context distortions are unintentional and inherent to the software.

## 2021-06-12 NOTE — PROGRESS NOTES
ASSESSMENT: Milana Blum is a 56 y.o. female with past medical history significant for hyperlipidemia, hypertension, asthma, morbid obesity, gluten intolerance who presents today for new patient evaluation of chronic bilateral low back pain and right lateral hip pain.  Patient has a history of a left L4-5, L5-S1 hemilaminectomy in 2010. She did well after surgery until about a year and half ago after a slip and fall on the ice.  MRI of the lumbar spine showed moderate to advanced facet arthropathy at L4-5 and L5-S1 which I believe is likely the source of her pain.  She may also have a mild right trochanteric bursitis.    COLLIN: 40  Who 5: 12    PLAN:  A shared decision making model was used.  The patient's values and choices were respected.  The following represents what was discussed and decided upon by the physician assistant and the patient.      1.  DIAGNOSTIC TESTS: I reviewed the MRI lumbar spine.  No further diagnostic tests were ordered.    2.  PHYSICAL THERAPY: The patient will benefit from physical therapy.  I would like to discuss her case with 1 of our physical therapist to see if she would be a good candidate for the medics program.  She has minimal motion at her degenerative spondylolisthesis.  If they do not feel that she is a candidate for medics, I will refer her to traditional physical therapy.    3.  MEDICATIONS: No changes are made to the patient's medications.  She uses pain relieving medication sparingly.  She uses methocarbamol and Percocet sparingly for pain.  She also applies topical pain relieving medications including Tegretol and on a cup.    4.  INTERVENTIONS:   No interventions were ordered today.  The patient does not feel that her pain is severe enough to warrant intervention at this time.  If it were to worsen, I recommend a bilateral L4-5 and L5-S1 facet joint injection.    5.  PATIENT EDUCATION: The patient would benefit from weight loss.  We did not discuss this at today's  visit.  I will plan on discussing this with her at her follow-up visit.  She may benefit from referral to the bariatric clinic.  -We did discuss utilizing the sit to stand mechanism on her desk at work.  She should also take frequent breaks to walk around the office.  -The patient is in agreement the above plan.  All questions were answered.    6.  FOLLOW-UP:   A nurse will call the patient once I have touch base with 1 of our physical therapist let her know whether she will be beginning the medics physical therapy program our traditional physical therapy program.  I would like to follow-up with the patient in 4-6 weeks after she has started physical therapy to monitor her progress.  If she has any questions or concerns in the meantime, she should not hesitate to contact our clinic.      SUBJECTIVE:  Milana Blum  Is a 56 y.o. female who presents today in consultation at the request of Dr. Thorne for new patient evaluation of low back pain.  The patient has a history of a left L4-5, L5-S1 hemilaminectomy in 2010 with Dr. Christianson.  At that time the patient had severe pain radiating down the left leg.  The patient states that she did very well after surgery.  She had 100% resolution of her leg pain and significant relief of her low back pain.  In February 2016 she slipped and fell on the ice and had increased pain again.  At that time she had low back pain radiating down the left leg.  She went to physical therapy at [facility] in 2016.  They did lumbar traction which helped to alleviate the leg pain.  The leg pain has now completely resolved.  However, she continues to have bilateral low back pain.    The patient complains of bilateral low back pain.  The pain spans across low back and a broadband distribution at the belt line.  Both sides the lower back are equally affected.  The pain radiates out into the right lateral hip.  She denies any radiation further down the leg.  She describes the pain  as a stiff, burning pain.  It is worse when she first wakes up in the morning.  It is also aggravated with transitioning from a seated to a standing position, especially she has been sitting for a prolonged period of time.  The pain is alleviated with repositioning and stretching.  The patient has chronic intermittent numbness and tingling in the plantar aspect of her left foot which is stable.  She denies any weakness down the legs.  She denies any loss of bowel or bladder control.    The patient to physical therapy 2016 at [facility], as mentioned above.  She has a home traction unit which she uses which does help to alleviate the numbness and tingling in the left foot.  She goes to chiropractor occasionally which provides temporary relief of her pain.  She is also be getting massages once per month which have been helpful.  The patient did try a right sacroiliac joint injection on July 5, 2016 which did not provide any relief of her pain.  She has not had any additional interventional pain management since her surgery.  The patient uses Percocet sparingly for pain.  She uses methocarbamol 500 mg.  She is taking this once over the past couple of weeks.  She also has diclofenac 50 mg which she has not started taking.  She uses Arnica and Tiger balm as well.    Current Outpatient Prescriptions on File Prior to Encounter   Medication Sig Dispense Refill     albuterol (PROVENTIL HFA;VENTOLIN HFA) 90 mcg/actuation inhaler Inhale 1-2 puffs every 4 (four) hours as needed. 3 Inhaler 3     b complex vitamins tablet Take 1 tablet by mouth daily.       BLACK COHOSH ORAL Take 1 tablet by mouth daily.       BYSTOLIC 5 mg tablet TAKE 1 TABLET BY MOUTH DAILY 90 tablet 1     diclofenac (CATAFLAM) 50 MG tablet Take 1 tablet (50 mg total) by mouth 3 (three) times a day as needed. 60 tablet 1     fluticasone (FLONASE) 50 mcg/actuation nasal spray 1 spray into each nostril daily. (Patient taking differently: 1 spray into each nostril  daily as needed. ) 16 g 2     hydrochlorothiazide (HYDRODIURIL) 25 MG tablet Take 1 tablet (25 mg total) by mouth daily. 90 tablet 3     magnesium 30 mg tablet Take 30 mg by mouth 3 (three) times a week.       methocarbamol (ROBAXIN) 500 MG tablet Take 1 tablet (500 mg total) by mouth 3 (three) times a day as needed. 45 tablet 1     oxyCODONE-acetaminophen (PERCOCET) 5-325 mg per tablet Take 1 tablet by mouth every 4 (four) hours as needed for pain. 20 tablet 0     potassium citrate (UROCIT-K) 10 mEq (1,080 mg) SR tablet Take by mouth 3 (three) times a week.       QVAR 40 mcg/actuation inhaler        triamcinolone (KENALOG) 0.1 % cream Apply topically 2 (two) times a day. 30 g 1     [DISCONTINUED] azithromycin (ZITHROMAX) 250 MG tablet Take 2 tabs on day 1 and then 1 tab daily for next 4 days 6 tablet 0         Allergies   Allergen Reactions     Other Environmental Allergy Itching       Past Medical History:   Diagnosis Date     Low back pain       Patient Active Problem List   Diagnosis     Hyperlipidemia     Earache (Symptom)     Hypertension     Hay Fever     Mild persistent asthma     Varicose veins     Degenerative disc disease, lumbar     Edema     Morbid obesity     Gluten intolerance       Past Surgical History:   Procedure Laterality Date     BACK SURGERY  08/2010    L4-5, S1 Hemilaminectomy, foraminotomy     CO REMOVAL OF OVARY/TUBE(S)      Description: Salpingo-oophorectomy Left Side;  Proc Date: 12/06/2004;     CO SUPRACERV ABD HYSTERECTOMY      Description: Supracervical Hysterectomy;  Proc Date: 12/06/2004;       Family History   Problem Relation Age of Onset     Hypertension Mother      Cancer Father      Diabetes Father      Hypertension Father      Cancer Sister        Social history: The patient is single.  She works as a .  She drinks alcohol occasionally.  She denies tobacco or illicit drug use.    ROS: Positive for irregular heartbeat, swelling of feet, anxiety, poor sleep  quality, pain with intimacy, back pain, joint pain, headache, depression/worry.  Specifically negative for bowel/bladder dysfunction, fevers,chills, appetite changes, unexplained weight loss.   Otherwise 13 systems reviewed are negative.  Please see the patient's intake questionnaire from today for details.      OBJECTIVE:  PHYSICAL EXAMINATION:    CONSTITUTIONAL:  Vital signs as above.  No acute distress.  The patient is well nourished and well groomed.  PSYCHIATRIC:  The patient is awake, alert, oriented to person, place, time and answering questions appropriately with clear speech.    HEENT: Normocephalic, atraumatic.  Sclera clear.  Neck is supple.  SKIN:  Skin over the face, bilateral lower extremities, and posterior torso is clean, dry, intact without rashes.    GAIT:  Gait is non-antalgic.  The patient is able to heel and toe walk without significant difficulty.    STANDING EXAMINATION: Tender to palpation over bilateral lower lumbar paraspinous muscles, right greater than left.  Lumbar flexion is within normal limits.  Lumbar extension is within normal limits.  Lumbar facet loading maneuvers reproduce low back pain bilaterally.  MUSCLE STRENGTH:  The patient has 5/5 strength for the bilateral hip flexors, knee flexors/extensors, ankle dorsiflexors/plantar flexors, great toe extensors, ankle evertors/invertors.  NEUROLOGICAL: 1+ patellar, and achilles reflexes bilaterally.  Negative Babinski's bilaterally.  No ankle clonus bilaterally. Sensation to light touch is intact in the bilateral L4, L5, and S1 dermatomes.  VASCULAR:  2/4 dorsalis pedis pulses bilaterally.  Bilateral lower extremities are warm.  There is no pitting edema of the bilateral lower extremities.  ABDOMINAL:  Soft, non-distended, non-tender throughout all quadrants.  No pulsatile mass palpated in the left lower quadrant.  LYMPH NODES:  No palpable or tender inguinal lymph nodes.  MUSCULOSKELETAL: Straight leg raise is negative bilaterally.   Internal and external rotation of the right hip reproduces right lateral hip pain.  The patient tenderness palpation of the right greater trochanter.    RESULTS: I reviewed the MRI lumbar spine from [facility] dated March 23, 2016.  This shows postoperative changes of a left L4-5, L5-S1 hemilaminectomy.  There is moderate chronic appearing left L5-S1 foraminal stenosis.  There is mild to moderate left and mild right L4-5 foraminal stenosis.  There is mild central canal stenosis and mild right foraminal stenosis at L3-4.  The patient has moderate to advanced facet arthropathy at L4-5 and moderate facet arthropathy bilaterally at L5-S1.  She also has mild facet arthropathy at L2-3 and mild to moderate facet arthropathy at L3-4.  Please see report for further details.    As reviewed flexion-extension lumbar spine x-rays from [facility] dated May 11, 2016.  This shows 1 mm degenerative anterolisthesis of L4 in relation to L5 in the neutral upright position which increases to 2 mm with flexion and reduces to 0 mm with extension.

## 2021-06-12 NOTE — TELEPHONE ENCOUNTER
RN cannot approve Refill Request    RN can NOT refill this medication med is not covered by policy/route to provider. Last office visit: 11/21/2019 Burak Arnold MD Last Physical: Visit date not found Last MTM visit: Visit date not found Last visit same specialty: 1/31/2020 Rhina Maurer MD.  Next visit within 3 mo: Visit date not found  Next physical within 3 mo: Visit date not found      Elizabeth Martínez, [facility] Triage/Med Refill 10/19/2020    Requested Prescriptions   Pending Prescriptions Disp Refills     SYMBICORT 160-4.5 mcg/actuation inhaler [Pharmacy Med Name: SYMBICORT 160-4.5 MCG INHALER] 10.2 Inhaler 3     Sig: INHALE 2 PUFFS BY MOUTH TWICE A DAY       There is no refill protocol information for this order

## 2021-06-12 NOTE — PROGRESS NOTES
Assessment/Plan:     1. Acute bronchitis     2. Screen for colon cancer     3. Cough  XR Chest PA and Lateral    albuterol nebulizer solution 3 mL (PROVENTIL)    Nebulizer   4. Fever  XR Chest PA and Lateral    albuterol nebulizer solution 3 mL (PROVENTIL)    Nebulizer   5. Wheezing  XR Chest PA and Lateral    albuterol nebulizer solution 3 mL (PROVENTIL)    Nebulizer       Diagnoses and all orders for this visit:    Acute bronchitis  -Albuterol neb administered in clinic.  Following nebulizer treatment, patient had some improvement in symptoms.  Chest x-ray was negative.  Discussed my concern about the crackles and wheezing primarily located in the left lower lung.  Elected to treat with azithromycin.  Prescription sent to pharmacy.  Counseled on use of medication and common side effects.  Continue with Qvar inhaler and albuterol inhaler.  No provided to remain off of work until Wednesday.  Encouraged rest and adequate fluid intake.  Follow-up if symptoms worsening, not improving or new concerns develop.    Cough  -     XR Chest PA and Lateral  -     albuterol nebulizer solution 3 mL (PROVENTIL); Take 3 mL by nebulization once.  -     Nebulizer    Fever  -     XR Chest PA and Lateral  -     albuterol nebulizer solution 3 mL (PROVENTIL); Take 3 mL by nebulization once.  -     Nebulizer    Wheezing  -     XR Chest PA and Lateral  -     albuterol nebulizer solution 3 mL (PROVENTIL); Take 3 mL by nebulization once.  -     Nebulizer    Other orders  -     Cancel: Ambulatory referral for Colonoscopy  -     azithromycin (ZITHROMAX) 250 MG tablet; Take 2 tabs on day 1 and then 1 tab daily for next 4 days  Dispense: 6 tablet; Refill: 0               Subjective:      Milana Blum is a 56 y.o. female who comes in today with concern about cough and fever.  Symptoms started 3 days ago.  Initially had sinus symptoms.  She thought it was allergies or possibly due to breathing and some Borax which she had used to clean her  carpet.  She had sinus pressure and ear pain.  Also a little bit of sore throat.  Symptoms became worse on Saturday and moved down into her chest.  Sinus symptoms improved and the ear pain resolved.  No longer having sore throat.  Has been having a cough.  Coughed most of the night Saturday and Sunday.  Developed fever on Saturday and had a temperature up to 102 yesterday.  Fever broke this morning.  Chest feels very congested and tight.  Has difficulty coughing due to discomfort.  Cough is productive of some phlegm in the morning.  She does have underlying asthma.  Only uses Qvar and albuterol inhaler when needed.  Has been using Qvar for the last couple of days as well as her albuterol inhaler.  Has been taking Advil for the fever.  Has had sick contacts at work.  Medications and allergies are reviewed.  Review of systems is otherwise negative.  No other concerns today.    Current Outpatient Prescriptions   Medication Sig Dispense Refill     albuterol (PROVENTIL HFA;VENTOLIN HFA) 90 mcg/actuation inhaler Inhale 1-2 puffs every 4 (four) hours as needed. 3 Inhaler 3     b complex vitamins tablet Take 1 tablet by mouth daily.       BLACK COHOSH ORAL Take 1 tablet by mouth daily.       BYSTOLIC 5 mg tablet TAKE 1 TABLET BY MOUTH DAILY 90 tablet 1     fluticasone (FLONASE) 50 mcg/actuation nasal spray 1 spray into each nostril daily. (Patient taking differently: 1 spray into each nostril daily as needed. ) 16 g 2     hydrochlorothiazide (HYDRODIURIL) 25 MG tablet Take 1 tablet (25 mg total) by mouth daily. 90 tablet 3     magnesium 30 mg tablet Take 30 mg by mouth 3 (three) times a week.       methocarbamol (ROBAXIN) 500 MG tablet Take 1 tablet (500 mg total) by mouth 3 (three) times a day as needed. 45 tablet 1     potassium citrate (UROCIT-K) 10 mEq (1,080 mg) SR tablet Take by mouth 3 (three) times a week.       QVAR 40 mcg/actuation inhaler        triamcinolone (KENALOG) 0.1 % cream Apply topically 2 (two) times a  day. 30 g 1     azithromycin (ZITHROMAX) 250 MG tablet Take 2 tabs on day 1 and then 1 tab daily for next 4 days 6 tablet 0     diclofenac (CATAFLAM) 50 MG tablet Take 1 tablet (50 mg total) by mouth 3 (three) times a day as needed. 60 tablet 1     oxyCODONE-acetaminophen (PERCOCET) 5-325 mg per tablet Take 1 tablet by mouth every 4 (four) hours as needed for pain. 20 tablet 0     No current facility-administered medications for this visit.        Past Medical History, Family History, and Social History reviewed.  Past Medical History:   Diagnosis Date     Low back pain      Past Surgical History:   Procedure Laterality Date     BACK SURGERY  08/2010    L4-5, S1 Hemilaminectomy, foraminotomy     MI REMOVAL OF OVARY/TUBE(S)      Description: Salpingo-oophorectomy Left Side;  Proc Date: 12/06/2004;     MI SUPRACERV ABD HYSTERECTOMY      Description: Supracervical Hysterectomy;  Proc Date: 12/06/2004;     Other environmental allergy  Family History   Problem Relation Age of Onset     Hypertension Mother      Cancer Father      Diabetes Father      Hypertension Father      Cancer Sister      Social History     Social History     Marital status:      Spouse name: N/A     Number of children: N/A     Years of education: N/A     Occupational History     Not on file.     Social History Main Topics     Smoking status: Never Smoker     Smokeless tobacco: Never Used     Alcohol use 1.2 oz/week     1 Cans of beer, 1 Glasses of wine per week     Drug use: No     Sexual activity: Yes     Partners: Male     Birth control/ protection: Surgical     Other Topics Concern     Not on file     Social History Narrative         Review of systems is as stated in HPI, and the remainder of the 10 system review is otherwise negative.    Objective:     Vitals:    08/21/17 1527   BP: 128/80   Patient Site: Right Arm   Patient Position: Sitting   Cuff Size: Adult Large   Pulse: 65   Temp: 98.1  F (36.7  C)   TempSrc: Tympanic   SpO2: 96%    Weight: (!) 283 lb 3 oz (128.5 kg)    Body mass index is 43.06 kg/(m^2).    General appearance: alert, appears stated age and cooperative  Eyes: conjunctivae/corneas clear. PERRL, EOM's intact. Fundi benign.  Ears: normal TM's and external ear canals both ears  Nose: Nares normal. Septum midline. Mucosa normal. No drainage or sinus tenderness.  Throat: lips, mucosa, and tongue normal; teeth and gums normal  Neck: no adenopathy  Lungs: Decreased air movement bilaterally, crackles present left lower lung field.  Following albuterol nebulizer treatment there was slight improvement in air movement in both lungs.  Crackles and wheezing remained in left lower lung.  Heart: regular rate and rhythm, S1, S2 normal, no murmur, click, rub or gallop  Neurologic: Grossly normal    Results: Chest x-ray was performed in clinic.  I personally reviewed this chest x-ray.  It was negative      This note has been dictated using voice recognition software. Any grammatical or context distortions are unintentional and inherent to the the software.

## 2021-06-12 NOTE — PROGRESS NOTES
[facility] Daily Progress Note    Patient Name: Milana Blum  Date of evaluation: 9/5/2017  Today's Date: 9/12/2017   Visit Number: 2/16 ([facility])  Referral Diagnosis: Lumbar facet arthropathy  Referring provider: Katie Villaseñor  Visit Diagnosis:     ICD-10-CM    1. Chronic right-sided low back pain without sciatica M54.5     G89.29    2. Lateral pain of right hip M25.551    3. Generalized muscle weakness M62.81    4. Lumbar radiculopathy M54.16    5. Muscle tightness M62.89    6. Abnormal posture R29.3        Assessment:       Patient tolerated MedX well today. Benefits from more movement as this makes her feel better.     HPI from eval: Patient presenting with Chronic history of R sided LBP and hip pain secondary to moderate facet arthropathy. Also of note is history of previous lumbar surgery in 2010 and fall in 2/2016 that exacerbated her symptoms. Presently, she is noting difficulty with sit to stand, difficulty with extended sitting/standing/walking duration. PT found on exam reduction in B hip IR and positive neural tension at end range on the R, as well as below average lumbar extension strength. Patient is a good candidate for admittance to MedX program.    Goals:  Pt. will be independent with home exercise program in : 4 weeks  Pt will: improve flexion to extension ratio on lumbar MedX <2.5:1 for better muscle balance in 8 weeks  Pt will: decrease OCLLIN by 30% to demonstrate improved function in 8 weeks  Pt will: imprive APTA sit to stand score > 9 reps for improved balance in 8 weeks     Plan / Patient Instructions:      Plan for next visit:  check hip flexor tightness, dynamic exercise on MedX, rotary torso, sit<>Stand as HEP     Subjective:        Left lower back pain since carrying something heavy over the weekend.   Sees chiro occasionally, gets massages 1x/month. Sits all day for work.     Objective:       Showed patient STM with tennis ball today, this seemed helpful.     Exercises: see  flowsheet for date performed in clinic  Exercise #1: Supine piriformis stretch x 30 seconds/leg  Comment #1: supine sciatic nerve glide x 20 reps  Exercise #2: Treadmill x 4 minutes  Comment #2: quadruped 3 way  Exercise #3: supine LTR 10x    Treatment Today     TREATMENT MINUTES COMMENTS   Evaluation     Self-care/ Home management     Manual therapy     Neuromuscular Re-education     Therapeutic Activity     Therapeutic Exercises 25 Discussed changing positions frequently, she sits long hours at work. She works from home 2x/wk so may be able to use her counter more often.    Gait training     Modality__________________                Total 25    Blank areas are intentional and mean the treatment did not include these items.          Lucia Sanchez, HAO, CLT  9/12/2017  8:21 AM

## 2021-06-12 NOTE — PROGRESS NOTES
[facility]   [facility] Initial Evaluation    Patient Name: Milana Blum  Date of evaluation: 9/5/2017  Referral Diagnosis: Lumbar facet arthropathy  Referring provider: Katie Villaseñor PA-C  Visit Diagnosis:     ICD-10-CM    1. Right-sided low back pain without sciatica M54.5    2. Lateral pain of right hip M25.551    3. Generalized muscle weakness M62.81        Assessment:    Patient presenting with Chronic history of R sided LBP and hip pain secondary to moderate facet arthropathy. Also of note is history of previous lumbar surgery in 2010 and fall in 2/2016 that exacerbated her symptoms. Presently, she is noting difficulty with sit to stand, difficulty with extended sitting/standing/walking duration. PT found on exam reduction in B hip IR and positive neural tension at end range on the R, as well as below average lumbar extension strength. Patient is a good candidate for admittance to Med[facility] program.    Pt. is appropriate for skilled PT intervention as outlined in the Plan of Care (POC).  Pt. is a good candidate for skilled PT services to improve pain levels and function.    Goals:  Pt. will be independent with home exercise program in : 4 weeks  Pt will: improve flexion to extension ratio on lumbar MedX <2.5:1 for better muscle balance in 8 weeks  Pt will: decrease COLLIN by 30% to demonstrate improved function in 8 weeks  Pt will: imprive APTA sit to stand score > 9 reps for improved balance in 8 weeks    Patient's expectations/goals are realistic.    Barriers to Learning or Achieving Goals:  Chronicity of the problem.       Plan / Patient Instructions:        Plan of Care:   Communication with: Referral Source  Patient Related Instruction: Treatment plan and rationale;Nature of Condition;Posture;Body mechanics;Self Care instruction  Times per Week: 2  Number of Weeks: 12  Number of Visits: 20  Therapeutic Exercise: Strengthening;Stretching  Neuromuscular Reeducation:  posture;balance/proprioception  Manual Therapy: soft tissue mobilization;myofascial release  Modalities: other  Modalities: prn    Plan for next visit: check hip flexor tightness, dynamic exercise on MedX, rotary torso     Subjective:       History of Present Illness:    Milana is a 56 y.o. female who presents to therapy today with complaints of LBP and R lateral hip pain that has worsened. She reports lumbar spine surgery in 2010 which helped for awhile but she experienced a re-aggrevation of symptoms this past winter when she fell on the ice. Reports to have traction unit at home which helps and is seeing a massage therapist. She worries about falling. Her current routine includes walking downtown.    Goals: strength, better balance (confidence)  Worse with: getting out of chair, sitting duration (discussed limiting this to 20 minutes)  Better with: massage, traction, movement      Pain Rating:3  Pain rating at best: 2  Pain rating at worst: 10-->after strenuous activity, too much walking, sitting to much  Pain description: sharp    Functional limitations are described as occurring with:   sitting 30 minute  standing 15 min  walking 30 minute    Patient reports benefit from:  movement or exercise          Objective:      Note: Items left blank indicates the item was not performed or not indicated at the time of the evaluation.    Patient Outcome Measures :    No Data Recorded   Scores range from 0-100%, where a score of 0% represents minimal pain and maximal function. The minimal clinically important difference is a score reduction of 12%.    Examination  1. Right-sided low back pain without sciatica     2. Lateral pain of right hip     3. Generalized muscle weakness       Involved side: Right  Posture Observation:      General sitting posture is  fair.  General standing posture is fair.    Lumbar ROM:    Date: 9/5/17     *Indicate scale AROM AROM AROM   Lumbar Flexion 22 cm finger tip to floor     Lumbar Extension  Moderate loss, small increase      Right Left Right Left Right Left   Lumbar Sidebending 55 cm finger tip, p on R 58 cm finger tip to floor       Lumbar Rotation           Lower Extremity Strength:     Date: 9/5/17     LE strength/5 Right Left Right Left Right Left   Hip Flexion (L1-3) 4- 4-       Hip Extension (L5-S1)         Hip Abduction (L4-5) 4 4       Hip Adduction (L2-3) 4 4       Hip External Rotation         Hip Internal Rotation         Knee Extension (L3-4) 5 5       Knee Flexion 5 5       Ankle Dorsiflexion (L4-5) 4- 4-       Great Toe Extension (L5) 5 5       Ankle Plantar flexion (S1) 4 4       Abdominals        Sensation   Equal and normal B       Reflex Testing  Lumbar Dermatomes Right Left UE Reflexes Right Left   Iliac Crest and Groin (L1)   Biceps (C5-6)     Anterior Medial Thigh (L2)   Brachioradialis (C5-6)     Anterior Thigh, Medial Epicondyle Femur (L3)   Triceps (C7-8)     Lateral Thigh, Anterior Knee, Medial Leg/Malleolus (L4)   Analy s test     Lateral Leg, Dorsal Foot (L5)   LE Reflexes     Lateral Foot (S1)   Patellar (L3-4)     Posterior Leg (S2)   Achilles (S1-2)     Other:   Babinski Response       Palpation: R superior glut on MedX    Lumbar Special Tests:     Lumbar Special Tests Right Left SI Tests Right  Left   Quadrant test   SI Compression     Straight leg raise 75+, neural tension 60- SI Distraction     Crossover response - - POSH Test     Slump   Sacral Thrust     Sit-up test  FADIR     Trunk extensor endurance test  Resisted Abduction     Prone instability test  Other:     Pubic shotgun  Other:       Repeated Motion Testing:  Does not peripheralize    Passive Mobility - Joint Integrity:  Hypomobile    LE Screen:  Major loss of Hip IR B     APTA sit to stand test: 6 reps in 30 seconds    Treatment Today     TREATMENT MINUTES COMMENTS   Evaluation 20 low   Self-care/ Home management     Manual therapy     Neuromuscular Re-education     Therapeutic Activity     Therapeutic  Exercises 25 See flow sheet  Patient educated on PT POC and goals for therapy.   Gait training     Modality__________________                Total 45    Blank areas are intentional and mean the treatment did not include these items.       PT Evaluation Code: (Please list factors)  Patient History/Comorbidities: lumbar surgery, obesity  Examination: lumbopelvic  Clinical Presentation: stable  Clinical Decision Making: low      Patient History/  Comorbidities Examination  (body structures and functions, activity limitations, and/or participation restrictions) Clinical Presentation Clinical Decision Making (Complexity)   No documented Comorbidities or personal factors 1-2 Elements Stable and/or uncomplicated Low   1-2 documented comorbidities or personal factor 3 Elements Evolving clinical presentation with changing characteristics Moderate   3-4 documented comorbidities or personal factors 4 or more Unstable and unpredictable High              Jessica England  9/5/2017  8:21 AM

## 2021-06-12 NOTE — TELEPHONE ENCOUNTER
RN cannot approve Refill Request    RN can NOT refill this medication Protocol failed and NO refill given. Last office visit: 11/21/2019 Burak Arnold MD Last Physical: Visit date not found Last MTM visit: Visit date not found Last visit same specialty: 1/31/2020 Rhina Maurer MD.  Next visit within 3 mo: Visit date not found  Next physical within 3 mo: Visit date not found      Judi Diamond, [facility] Triage/Med Refill 10/9/2020    Requested Prescriptions   Pending Prescriptions Disp Refills     hydroCHLOROthiazide (HYDRODIURIL) 25 MG tablet [Pharmacy Med Name: HYDROCHLOROT TAB 25MG] 90 tablet 3     Sig: TAKE 1 TABLET DAILY       Diuretics/Combination Diuretics Refill Protocol  Passed - 10/7/2020  9:47 AM        Passed - Visit with PCP or prescribing provider visit in past 12 months     Last office visit with prescriber/PCP: 11/21/2019 Burak Arnold MD OR same dept: 1/31/2020 Rhina Maurer MD OR same specialty: 1/31/2020 Rhina Maurer MD  Last physical: Visit date not found Last MTM visit: Visit date not found   Next visit within 3 mo: Visit date not found  Next physical within 3 mo: Visit date not found  Prescriber OR PCP: Burak Arnold MD  Last diagnosis associated with med order: 1. Hypertension  - hydroCHLOROthiazide (HYDRODIURIL) 25 MG tablet [Pharmacy Med Name: HYDROCHLOROT TAB 25MG]; TAKE 1 TABLET DAILY  Dispense: 90 tablet; Refill: 3    2. Hip pain, right  - naproxen (NAPROSYN) 500 MG tablet [Pharmacy Med Name: NAPROXEN TAB 500MG]; TAKE 1 TABLET TWICE DAILY  WITH MEALS  Dispense: 60 tablet; Refill: 0    3. Benign essential hypertension  - BYSTOLIC 5 mg tablet [Pharmacy Med Name: BYSTOLIC TAB 5MG]; TAKE 1 TABLET DAILY  Dispense: 90 tablet; Refill: 3    If protocol passes may refill for 12 months if within 3 months of last provider visit (or a total of 15 months).             Passed - Serum Potassium in past 12 months      Lab Results   Component Value Date    Potassium 4.5 11/21/2019              Passed - Serum Sodium in past 12 months      Lab Results   Component Value Date    Sodium 143 11/21/2019             Passed - Blood pressure on file in past 12 months     BP Readings from Last 1 Encounters:   01/31/20 110/76             Passed - Serum Creatinine in past 12 months      Creatinine   Date Value Ref Range Status   11/21/2019 0.72 0.60 - 1.10 mg/dL Final                naproxen (NAPROSYN) 500 MG tablet [Pharmacy Med Name: NAPROXEN TAB 500MG] 60 tablet 0     Sig: TAKE 1 TABLET TWICE DAILY  WITH MEALS       There is no refill protocol information for this order        BYSTOLIC 5 mg tablet [Pharmacy Med Name: BYSTOLIC TAB 5MG] 90 tablet 3     Sig: TAKE 1 TABLET DAILY       There is no refill protocol information for this order

## 2021-06-12 NOTE — PROGRESS NOTES
HISTORY OF PRESENT ILLNESS  Patient comes in for recheck. She is complaining of similar issues for which she was seen in 7-8 months ago. issues as before. She has had pain around the left ear with pressure. In addition she continues to have tinnitus. She also feels pressure in the left ear. She had a CT HEAD and CT IAC, both of which were unremarkable.    REVIEW OF SYSTEMS  Review of Systems: a 10-system review was performed. Pertinent positives are noted in the HPI and on a separate scanned document in the chart.    PMH, PSH, FH and SH has documented in the EHR.      EXAM    CONSTITUTIONAL  General Appearance:  Normal, well developed, well nourished, no obvious distress  Ability to Communicate:  communicates appropriately.    HEAD AND FACE  Appearance and Symmetry:  Normal, no scalp or facial scarring or suspicious lesions.    EARS  Clinical speech reception threshold:  Normal, able to hear normal speech.  Auricle:  Normal, Auricles without scars, lesions, masses.  External auditory canal:  Normal, External auditory canal normal.  Tympanic membrane:  Normal, Tympanic membranes normal without swelling or erythema.    NOSE (speculum or scope)  Architecture:  Normal, Grossly normal external nasal architecture with no masses or lesions.  Mucosa:  Normal mucosa, No polyps or masses.  Septum:  Normal, Septum non-obstructing.  Turbinates:  Normal, No turbinate abnormalities    ORAL CAVITY AND OROPHARYNX  Lips:  Normal.  Dental and gingiva:  Normal, No obvious dental or gingival disease.  Mucosa:  Normal, Moist mucous membranes.  Tongue:  Normal, Tongue mobile with no mucosal abnormalities  Hard and soft palate:  Normal, Hard and soft palate without cleft or mucosal lesions.  Oral pharynx:  Normal, Posterior pharynx without lesions or remarkable asymmetry.  Saliva:  Normal, Clear saliva.  Masses:  Normal, No palpable masses or pathologically enlarged lymph nodes.    NECK  Masses/lymph nodes:  Normal, No worrisome neck  masses or lymph nodes.  Salivary glands:  Normal, Parotid and submandibular glands.  Trachea and larynx position:  Normal, Trachea and larynx midline.  Thyroid:  Normal, No thyroid abnormality.  Tenderness:  Normal, No cervical tenderness.  Suppleness:  Normal, Neck supple    NEUROLOGICAL  Speech pattern:  Normal, Proasaic    RESPIRATORY  Symmetry and Respiratory effort:  Normal, Symmetric chest movement and expansion with no increased intercostal retractions or use of accessory muscles.     HEARING TEST  Results of hearing test as documented in audiology notes which were reviewed.    IMPRESSION  1. No evidence of ear pathology. I explained that her symptoms are likely secondary to Eustachian tube dysfunction and possible TMJ discomfort. The area of discomfort is in the area of the temporalis muscle.   2. Bilateral sensorineural hearing loss with tinnitus.    RECOMMENDATION  Findings were discussed and I provided reassurance.     Wm Bustos MD

## 2021-06-13 NOTE — PROGRESS NOTES
[facility] Daily Progress Note    Patient Name: Milana Blum  Date of evaluation: 9/5/2017  Today's Date: 9/15/2017   Visit Number: 3/16 (MedX)  Referral Diagnosis: Lumbar facet arthropathy  Referring provider: Katie Villaseñor  Visit Diagnosis:     ICD-10-CM    1. Chronic right-sided low back pain without sciatica M54.5     G89.29    2. Lateral pain of right hip M25.551    3. Generalized muscle weakness M62.81    4. Lumbar radiculopathy M54.16    5. Muscle tightness M62.89    6. Abnormal posture R29.3        Assessment:       Patient tolerated MedX well today. Benefits from more movement as this makes her feel better.     HPI from eval: Patient presenting with Chronic history of R sided LBP and hip pain secondary to moderate facet arthropathy. Also of note is history of previous lumbar surgery in 2010 and fall in 2/2016 that exacerbated her symptoms. Presently, she is noting difficulty with sit to stand, difficulty with extended sitting/standing/walking duration. PT found on exam reduction in B hip IR and positive neural tension at end range on the R, as well as below average lumbar extension strength. Patient is a good candidate for admittance to MedX program.    Goals:  Pt. will be independent with home exercise program in : 4 weeks  Pt will: improve flexion to extension ratio on lumbar MedX <2.5:1 for better muscle balance in 8 weeks  Pt will: decrease COLLIN by 30% to demonstrate improved function in 8 weeks  Pt will: imprive APTA sit to stand score > 9 reps for improved balance in 8 weeks     Plan / Patient Instructions:      Plan for next visit: keep resistance low on MedX and slowly increase d/t initial day on MedX with DE.   Add core strengthening exercises to HEP (has stretches right now) also can go to the gym.      Subjective:        Saw chiro yesterday, helped neck.   She was very sore in her whole back after last session (1st session of DE on MedX). She had to take a muscle relaxor, tried  stretches and iced.      Left lower back pain since carrying something heavy over the weekend.   Sees chiro occasionally, gets massages 1x/month. Sits all day for work.     Objective:       Reminded patient of tennis ball trick.     Discussed DOMS and what to do to keep making it better.     Left trunk spasms improve with reach and roll.     Showed patient STM with tennis ball today, this seemed helpful.    Kept medx and rotary torso resistance low.     Exercises: see flowsheet for date performed in clinic  Exercise #1: Supine piriformis stretch x 30 seconds/leg  Comment #1: supine sciatic nerve glide x 20 reps  Exercise #2: Treadmill x 4 minutes  Comment #2: quadruped 3 way  Exercise #3: supine LTR 10x  Comment #3: standing at parallel bar trunk flexion stretch 10x in beginning and at end of session  Exercise #4: reach and roll 5x bilat  Comment #4: rotary torso 20# small ROM 90 sec each way.    Treatment Today     TREATMENT MINUTES COMMENTS   Evaluation     Self-care/ Home management     Manual therapy     Neuromuscular Re-education     Therapeutic Activity     Therapeutic Exercises 28 Discussed again changing positions frequently, she sits long hours at work. She works from home 2x/wk so may be able to use her counter more often.   Ed on DOMS  Added reach and roll to HEP   Gait training     Modality__________________                Total 28    Blank areas are intentional and mean the treatment did not include these items.          Lucia Sanchez, HAMIDAT, CLT  9/15/2017  8:21 AM

## 2021-06-13 NOTE — PROGRESS NOTES
[facility] Daily Progress Note    Patient Name: Milana Blum   Today's Date: 9/20/2017   Date of evaluation: 9/5/2017  Visit Number: 4/16 (MedX)  Referral Diagnosis: Lumbar facet arthropathy  Referring provider: Katie Villaseñor PA-C  Visit Diagnosis:     ICD-10-CM    1. Chronic right-sided low back pain without sciatica M54.5     G89.29    2. Lateral pain of right hip M25.551    3. Generalized muscle weakness M62.81        Assessment:       Patient is currently in the 2nd week of the Med program. She is having increased symptoms today due to the amount of time she has been sitting at work. PT continues to apply encouragement about at least standing up from the seated position after 20 minutes to stretch even if it is for a small amount of time.  Patient with high reps until fatigue and will increase weight next session.      HPI from eval: Patient presenting with Chronic history of R sided LBP and hip pain secondary to moderate facet arthropathy. Also of note is history of previous lumbar surgery in 2010 and fall in 2/2016 that exacerbated her symptoms. Presently, she is noting difficulty with sit to stand, difficulty with extended sitting/standing/walking duration. PT found on exam reduction in B hip IR and positive neural tension at end range on the R, as well as below average lumbar extension strength. Patient is a good candidate for admittance to MedX program.    Goals:  Pt. will be independent with home exercise program in : 4 weeks  Pt will: improve flexion to extension ratio on lumbar MedX <2.5:1 for better muscle balance in 8 weeks  Pt will: decrease COLLIN by 30% to demonstrate improved function in 8 weeks  Pt will: imprive APTA sit to stand score > 9 reps for improved balance in 8 weeks     Plan / Patient Instructions:      Plan for next visit: increase weight on medx   Add core strengthening exercises to HEP (has stretches right now) also can go to the gym.      Subjective:     Pain report:  "3/10    She is noting to have some good days and bad. She is doing alright. She states that she was not as sores after the last session. She states today was just a bad day at work with a lot of sitting      Objective:       Patient is using the tennis ball at home. Patient is currently in 2nd week of the medX program.    Kept medx and rotary torso resistance low.   Lumbar MedX Initial testing ( 4-week Re-test   AROM (full=  0-72  lumbar) 0-45    Max Extension Torque  195#    Average Extension Torque 127#    Flex: ext ratio (ideal 1.4:1) 3.15:1        Exercises: see flowsheet for date performed in clinic  Exercise #1: Supine piriformis stretch x 30 seconds/leg  Comment #1: supine sciatic nerve glide x 20 reps  Exercise #2: Treadmill x 5  minutes  Comment #2: quadruped 3 way  Exercise #3: supine LTR 10x  Comment #3: standing at parallel bar trunk flexion stretch 10x in beginning and at end of session  Exercise #4: reach and roll 5x bilat, reviewed and gave picture for  Comment #4: Rotary Torso 90\" 22# started to the L    Treatment Today     TREATMENT MINUTES COMMENTS   Evaluation     Self-care/ Home management     Manual therapy     Neuromuscular Re-education     Therapeutic Activity     Therapeutic Exercises 25 See flow sheet   Gait training     Modality__________________                Total 25    Blank areas are intentional and mean the treatment did not include these items.          Jessica England, HAO  9/20/2017  8:21 AM               "

## 2021-06-14 NOTE — PROGRESS NOTES
[facility] Discharge Summary  Patient Name: Milana Blum  Date: 11/28/2017  Referral Diagnosis: lumbar facet arthropathy  Referring provider: Burak Arnold MD  Visit Diagnosis:   1. Chronic right-sided low back pain without sciatica     2. Lateral pain of right hip     3. Generalized muscle weakness         Goals:  Pt. will be independent with home exercise program in : 4 weeks  Pt will: improve flexion to extension ratio on lumbar MedX <2.5:1 for better muscle balance in 8 weeks  Pt will: decrease COLLIN by 30% to demonstrate improved function in 8 weeks  Pt will: imprive APTA sit to stand score > 9 reps for improved balance in 8 weeks    Patient was seen for 4 visits from 9/5/17 to 9/19/17 with 0 missed appointments.  The patient discontinued therapy, did not return.    Therapy will be discontinued at this time.  The patient will need a new referral to resume.    Thank you for your referral.  Jessica England  11/28/2017  4:23 PM

## 2021-06-15 NOTE — PROGRESS NOTES
Assessment/Plan:     1. Bronchitis    Suspect likely viral self-limiting illness.  Rapid flu checked because patient does not want to spread this to her immunocompromised boyfriend.  Nebulizer given in office with some improvement of symptoms.  Has inhaler at home.  Due to long holiday weekend will be willing to give prescription of Zithromax but discussed with patient that she really may not need it as this is likely viral and she is in agreement to only fill if needed.  Plan to call return to care if symptoms worsen or do not improve.  - Influenza A/B Rapid Test  - albuterol nebulizer solution 3 mL (PROVENTIL); Take 3 mL by nebulization once.  - Nebulizer  Recent Results (from the past 24 hour(s))   Influenza A/B Rapid Test   Result Value Ref Range    Influenza  A, Rapid Antigen No Influenza A antigen detected No Influenza A antigen detected    Influenza B, Rapid Antigen No Influenza B antigen detected No Influenza B antigen detected     2. Asthma exacerbation  Patient has albuterol inhaler at home.  Has not from Qvar to be helpful.  Discussed trying other inhaler she is more interested in trying Singulair sent to pharmacy as below.  - montelukast (SINGULAIR) 10 mg tablet; Take 1 tablet (10 mg total) by mouth daily.  Dispense: 30 tablet; Refill: 6      Reviewed quality measures including update of mammogram and colon cancer screening which patient adamantly denies.  Strongly recommended that she consider the screenings in the future.  Discussed immunization update including flu shot and tetanus shot which patient declines.    Subjective:      Milana Blum is a 57 y.o. female is in today for upper respiratory infection for about 3 days.  She states it started first with a sore throat and chest congestion and headache.  She states that she believes she had a low-grade fever about 100 the first few days but in the last 24 hours no headache.  She states it caused her asthma to flare up and she has had some  wheezing at night.  She has albuterol inhaler at home which she is using.  She was previously prescribed Qvar but states that this has not been helpful for her.  She wonders what else she could be using.  States she is having more flareups because her boyfriend's house is very [place]ann[place] and she is taking care of him.  He was diagnosed with cancer he is on chemo has low immune system so she is particularly concerned because she does not want to get him sick.      Current Outpatient Prescriptions   Medication Sig Dispense Refill     b complex vitamins tablet Take 1 tablet by mouth daily.       BLACK COHOSH ORAL Take 1 tablet by mouth daily.       BYSTOLIC 5 mg tablet TAKE 1 TABLET BY MOUTH DAILY 90 tablet 1     hydrochlorothiazide (HYDRODIURIL) 25 MG tablet Take 1 tablet (25 mg total) by mouth daily. 90 tablet 3     magnesium 30 mg tablet Take 30 mg by mouth 3 (three) times a week.       potassium citrate (UROCIT-K) 10 mEq (1,080 mg) SR tablet Take by mouth 3 (three) times a week.       QVAR 40 mcg/actuation inhaler        VENTOLIN HFA 90 mcg/actuation inhaler INHALE 1 TO 2 PUFFS ORALLY EVERY FOUR HOURS AS NEEDED 1 Inhaler 2     azithromycin (ZITHROMAX Z-PENG) 250 MG tablet Take 2 tablets (500 mg) on  Day 1,  followed by 1 tablet (250 mg) once daily on Days 2 through 5. 6 tablet 0     codeine-guaiFENesin (GUAIFENESIN AC)  mg/5 mL liquid Take 5-10 mL by mouth 3 (three) times a day as needed for cough. 240 mL 0     diclofenac (CATAFLAM) 50 MG tablet Take 1 tablet (50 mg total) by mouth 3 (three) times a day as needed. 60 tablet 1     methocarbamol (ROBAXIN) 500 MG tablet Take 1 tablet (500 mg total) by mouth 3 (three) times a day as needed. 45 tablet 1     montelukast (SINGULAIR) 10 mg tablet Take 1 tablet (10 mg total) by mouth daily. 30 tablet 6     oxyCODONE-acetaminophen (PERCOCET) 5-325 mg per tablet Take 1 tablet by mouth every 4 (four) hours as needed for pain. 20 tablet 0     triamcinolone (KENALOG) 0.1 % cream  "Apply topically 2 (two) times a day. 30 g 1     No current facility-administered medications for this visit.      Allergies   Allergen Reactions     Other Environmental Allergy Itching     Past Medical History, Family History, and Social History reviewed.  Past Medical History:   Diagnosis Date     Low back pain      Past Surgical History:   Procedure Laterality Date     BACK SURGERY  08/2010    L4-5, S1 Hemilaminectomy, foraminotomy     CT REMOVAL OF OVARY/TUBE(S)      Description: Salpingo-oophorectomy Left Side;  Proc Date: 12/06/2004;     CT SUPRACERV ABD HYSTERECTOMY      Description: Supracervical Hysterectomy;  Proc Date: 12/06/2004;     Other environmental allergy  Family History   Problem Relation Age of Onset     Hypertension Mother      Cancer Father      Diabetes Father      Hypertension Father      Cancer Sister      Social History     Social History     Marital status:      Spouse name: N/A     Number of children: N/A     Years of education: N/A     Occupational History     Not on file.     Social History Main Topics     Smoking status: Never Smoker     Smokeless tobacco: Never Used     Alcohol use 1.2 oz/week     1 Cans of beer, 1 Glasses of wine per week     Drug use: No     Sexual activity: Yes     Partners: Male     Birth control/ protection: Surgical     Other Topics Concern     Not on file     Social History Narrative         Review of systems is as stated in HPI, and the remainder of the 10 system review is otherwise negative.    Objective:     Vitals:    12/29/17 1445   BP: 120/82   Pulse: 75   Temp: 97.9  F (36.6  C)   SpO2: 96%   Weight: (!) 287 lb 3.2 oz (130.3 kg)   Height: 5' 8\" (1.727 m)    Body mass index is 43.67 kg/(m^2).  Wt Readings from Last 3 Encounters:   12/29/17 (!) 287 lb 3.2 oz (130.3 kg)   09/06/17 (!) 286 lb 9.6 oz (130 kg)   08/29/17 (!) 286 lb (129.7 kg)       General Appearance:    Alert, cooperative, no distress, appears stated age    Head:    Normocephalic, " without obvious abnormality, atraumatic   Eyes:    PERRL, EOM's intact, no conjunctivitis    Ears:    Normal TM's and external ear canals   Nose:   Mucosa normal, no drainage     or sinus tenderness   Throat:   Oropharynx is clear   Neck:   Supple, symmetrical, no adenopathy, no thyromegally, no carotid bruit        Lungs:     Clear to auscultation bilaterally, respirations unlabored   Chest Wall:    No tenderness or deformity    Heart:    Regular rate and rhythm, S1 and S2 normal, no murmur, rub    or gallop                   Extremities:   Extremities normal, atraumatic, no cyanosis or edema               Skin:   No rashes or lesions   :          This note has been dictated using voice recognition software. Any grammatical or context distortions are unintentional and inherent to the software.

## 2021-06-15 NOTE — TELEPHONE ENCOUNTER
"Review of patient chart shows in office visit with PCP office 2/16/21. B/P WNL, HR slightly elevated at 98 BPM.     PC to patient and discussion. Reports sudden onset this past Friday of a being tired/fatigued, a headache and feeling lightheaded. Headache is a \"pressure\" squeezing my head at the temples bilaterally. This started on Friday. So patient stopped caffeine and reduced her sugar thinking that it could be related to her blood sugar. Pt states that she has been told she is pre-diabetic and has a family history of diabetes and stroke. States that with movement, standing or any movement the lightheaded feeling comes on, headache and then a \"hot rush\" to the top of her head. Denies sweating or chills with this head rush sensation.     Denies any acute illness or covid symptoms; N/V/D, cough, shortness of breath, fever, chills,     B/P today 120/70 and HR 68. Still having lightheaded/floating of her head feeling today with getting up and movement. No headache today, has subsided. No palpitations.     Pt has lost weight due to decreased eating, however eating candy frequently.     Pt does endorse some anxiety and increased stress level. 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 She was prescribed a low dose anti-anxiety medication but she hasn't started that yet;Celexa 10 mg daily.     Writer inquired what the patient is doing for good self-care and reports that she has an appt with her acupuncturist tonight. She has been drinking an herbal calming tea.   -refrain from Naproxen or NSAIDS if possible, use Tylenol instead to treat headache or discomfort  -Maintain hydration  -Reduce caffeine from 2 cups of coffee per day to a 1/2 cup to 1 cup per day, as it may be contributing to your headache.  -also balanced diet, drastic removal of sugar can also cause low glucose levels " which can cause headaches, dizziness, etc.       Offered reassurance that she as seen in person by a medical physician just a day ago. Also, that her dizziness is lessening, not increasing. Continue to monitor. Will have an appt arranged with MAT, which is arranged for 3/16/2021. Pt will call if any ongoing concerns or questions between now and OV. Continue to promote self-care. Encouraged follow-up discussion with prescriber to alleviate her fears/hesitancy of starting the medicationCMM,Rn

## 2021-06-15 NOTE — TELEPHONE ENCOUNTER
RN cannot approve Refill Request    RN can NOT refill this medication med is not covered by policy/route to provider. Last office visit: 11/21/2019 Burak Arnold MD Last Physical: Visit date not found Last MTM visit: Visit date not found Last visit same specialty: 1/31/2020 Rhina Maurer MD.  Next visit within 3 mo: Visit date not found  Next physical within 3 mo: Visit date not found      Kassi Baird, [facility] Connection Triage/Med Refill 2/10/2021    Requested Prescriptions   Pending Prescriptions Disp Refills     naproxen (NAPROSYN) 500 MG tablet [Pharmacy Med Name: NAPROXEN TAB 500MG] 60 tablet 0     Sig: TAKE 1 TABLET TWICE DAILY  WITH MEALS       There is no refill protocol information for this order

## 2021-06-15 NOTE — PROGRESS NOTES
Assessment and Plan     1. Light headedness  Patient with a constellation of symptoms some chronic including dizziness, tinnitus, fatigue and some worsening recently such as headache, blurry vision, palpitations.  Has been worked up for dizziness/tinnitus by ENT.  Thought to be eustachian tube dysfunction.  Does have a history of PACs that she previously saw cardiology for and was placed on a beta-blocker.  Has not seen cardiology in over a year.  However her exam is normal today with regular heart beat and normal neurological exam.  I think her symptoms could be multifactorial in etiology but I do think that anxiety is playing some part with many symptoms characteristic of depression/anxiety, notes of increased stress lately, patient's description of a panic attack-like episode.  We will screen her for diabetes as well as check her electrolytes and rule out anemia with the blood test as below.  Given normal neuro exam we will not do imaging of her head at this time.  She also had a CT scan of her head not more than a year ago.  We will treat her for anxiety as below with citalopram and see if this improves symptoms.  - Glycosylated Hemoglobin A1c  - Comprehensive Metabolic Panel  - HM2(CBC w/o Differential)    2. Anxiety  - citalopram (CELEXA) 10 MG tablet; Take 1 tablet (10 mg total) by mouth daily.  Dispense: 30 tablet; Refill: 2      Follow up: Pending results otherwise 1 month for anxiety if normal or Sooner if worsening symptoms  Options for treatment and follow-up care were reviewed with the patient and/or guardian. Milana Blum and/or guardian engaged in the decision making process and verbalized understanding of the options discussed and agreed with the final plan.    Dr. Heron Jang MD         HPI:   Milana Blum is a 60 y.o.  female with problems as below who presents for:    Chief Complaint   Patient presents with     Dizziness     since friday gets sudden onset of dizziness, lightheaded,  "vision gets blurred, headache, palpitations, fatigue, rininging in ears and a warm feeling ontop of head. not short of breath, bp is good.. concerned it could be her blood sugar so has cut down on sugars since friday     Dizziness/Tinnitus:  This is a chronic problem for patient over the last year. originally saw PCP thought symptoms most likely represented eustachian tube dysfunction and referred patient for further work-up to ENT.  In 2/20 patient originally seen by ENT who recommended imaging with CT scans of her head and internal auditory canals which were normal.  Patient recommended to see neurology for complaints of a headache as well.  Though I do not know if she actually saw them.  No notes.  She saw ENT again 10/6/2020.  At that time ENT believe symptoms related to eustachian tube dysfunction and TMJ syndrome, and sensorineural hearing loss with tinnitus and provided reassurance to the patient.    She notes that now she feels different. She states that she feels like \"she is gonna pass out\".  She notes dizziness. She has a headache and then the \"ringing in her ears increases with the headache\".   Warm feeling on top of her head with this.  She describes dizziness as feeling pato he is going to faint not the room spinning. She describes the headache as a \"pressure headache\" at the temples and goes over the forehead and in the back of her neck and head. She has some visual disturbances but describes it different than her ocular migraines she had tin the past. Yesterday severe headache that was improved with naproxen.  She notes she is sleeping all the time. She dos not feel rested after sleeping and is sleeping during the day significantly.  She has been tested for sleep apnea in the past and was negative. She feels like she is having low blood sugars and has been putting out all sugar with no changes in symptoms. She also notes some palpitations or fluttering in her chest occasionally. She has a Hx of PACS " causing symptoms that were once controlled with bta blocker and she is now taking bystolic to control this. Last saw Dr. Theodore her cardiologist in 2019. She notes that she is stressed lately with work, neighbors, and a small puppy that requires a lot of care.  She notes an episode of breathing fast, sensation that something bad was going to happen, possibly some numbness and tingling in her face and hands         PMHX:     Patient Active Problem List   Diagnosis     Hypercholesterolemia     Essential hypertension, benign     Hay Fever     Mild persistent asthma     Varicose veins     Degenerative disc disease, lumbar     Edema     Morbid obesity (H)     Gluten intolerance     Numbness     Generalized muscle weakness     Lumbar disc herniation with radiculopathy     Leg pain     Intervertebral disk disease     Exertional dyspnea       Current Outpatient Medications   Medication Sig Dispense Refill     albuterol (VENTOLIN HFA) 90 mcg/actuation inhaler INHALE 1 TO 2 PUFFS ORALLY EVERY FOUR HOURS AS NEEDED 54 each 1     aspirin 81 MG EC tablet Take 1 tablet (81 mg total) by mouth daily.  0     b complex vitamins tablet Take 1 tablet by mouth daily.       BLACK COHOSH ORAL Take 1 tablet by mouth daily.       BYSTOLIC 5 mg tablet TAKE 1 TABLET DAILY 90 tablet 3     hydroCHLOROthiazide (HYDRODIURIL) 25 MG tablet TAKE 1 TABLET DAILY 90 tablet 3     magnesium oxide 250 mg Tab Take 250 mg by mouth daily.       meclizine (ANTIVERT) 25 mg tablet Take 1 tablet (25 mg total) by mouth 3 (three) times a day as needed for dizziness. 30 tablet 1     naproxen (NAPROSYN) 500 MG tablet TAKE 1 TABLET TWICE DAILY  WITH MEALS 60 tablet 0     s-adenosylmethionine sul tosyl (NAYELY-E ORAL) Take by mouth daily.       SYMBICORT 160-4.5 mcg/actuation inhaler INHALE 2 PUFFS BY MOUTH TWICE A DAY 10.2 Inhaler 3     No current facility-administered medications for this visit.        Social History     Tobacco Use     Smoking status: Never Smoker      "Smokeless tobacco: Never Used   Substance Use Topics     Alcohol use: Yes     Alcohol/week: 2.0 standard drinks     Types: 1 Glasses of wine, 1 Cans of beer per week     Drug use: No       Social History     Social History Narrative     Not on file       Allergies   Allergen Reactions     House Dust Shortness Of Breath and Wheezing     Pollen      Other Environmental Allergy Itching              Review of Systems:    Complete ROS is negative except as noted in the HPI         Physical Exam:   /78   Pulse 98   Temp (!) 68  F (20  C) (Oral)   Resp 20   Ht 5' 7\" (1.702 m)   Wt (!) 291 lb 9.6 oz (132.3 kg)   SpO2 96%   BMI 45.67 kg/m      General appearance: Alert, cooperative, no distress, appears stated age  Head: Normocephalic, atraumatic, without obvious abnormality  Eyes: Pupils equal round, reactive.  Conjunctiva clear.  Nose: Nares normal, no drainage.  Throat: Lips, mucosa, tongue normal mucosa pink and moist  Neck: Supple, symmetric, trachea midline, no adenopathy.  No thyroid enlargement, tenderness or nodules.    Back: Symmetric, no CVA tenderness.  Lungs: Clear to auscultation bilaterally, no wheezing or crackles present.  Respirations unlabored  Heart: Regular rate and rhythm, normal S1 and S2, no murmur, rub or gallop.  Abdomen: Soft, nontender, nondistended.  Bowel sounds active in all 4 quadrants.  No masses or organomegaly.  Extremities: Extremities normal, atraumatic.  No cyanosis or edema.  Skin: Skin color, texture, turgor normal no rashes or lesions on limited skin exam  Neuro: EOMI, PERRL. Orbicularis oculi and orbicularis oris muscles intact.  Smile intact and symmetrical.  Hearing normal and symmetrical.  No tongue deviation. Speech normal. Swallow normal. Shoulder shrug normal and symmetrical.  Strength 5/5 and symmetrical in upper and lower extremities. DTR 2+ and symmetric. Sensation normal in extremities. Balance intact.     "

## 2021-06-15 NOTE — TELEPHONE ENCOUNTER
----- Message from DAVID Barboza sent at 2/17/2021  8:40 AM CST -----  Regarding: ADEOLA PATIENT  General phone call:    Caller: Patient    Primary cardiologist: ADEOLA    Detailed reason for call: Patient is having dizziness, palpitations, pressure in head, she does not want to go to ER.     Best phone number: [phone]    Best time to contact: any    Ok to leave a detailed message? Yes    Device? no    Additional Info:

## 2021-06-15 NOTE — PROGRESS NOTES
Please call the patient and give them my message below,    Milana,  Your results from your recent clinic visit show:  Your CMP was normal with normal electrolytes, kidney function, and liver function  Your test for diabetes showed you are prediabetic  Your CBC is normal with no anemia or signs of infection seen    If you have more questions please send me a MyChart message saying you saw me or call the clinic    Dr. Heron Jang

## 2021-06-16 PROBLEM — R73.03 PREDIABETES: Status: ACTIVE | Noted: 2021-02-16

## 2021-06-16 PROBLEM — R06.09 EXERTIONAL DYSPNEA: Status: ACTIVE | Noted: 2018-12-19

## 2021-06-16 NOTE — PROGRESS NOTES
CC: I am here for my checkup; I would like to discuss my anxiety    HPI: Milana has been under my care for decades; I have taken care of her family previously.  Most recently she has had difficulty with dizziness and has been evaluated by ear nose and throat and currently is undergoing work-up with cardiology.  She has had a history of palpitations and shortness of breath recent echo 2018 normal currently scheduled for another nuclear stress echo today or tomorrow.  She is under the care of Dr. Theodore with took care of her father.  She recently went on [place] and in general relaxed.  She works as an  and has been employed by the same company for 26 years.  Stress level at her work is extremely high.  It is getting her physical symptoms.  She is previously tried antianxiety agents such as duloxetine citalopram paroxetine but gets side effects which include palpitations and sweating.  Apparently serotonin drugs bother her.  She has had relief in the past from lorazepam but never took that on a regular basis.  We had a long discussion about her anxiety.  Its obvious she is stressed out to the max when she is working.  Unfortunately she has to work like many people do despite the internal turmoil that it can cause.  We discussed referral to psychiatry for counseling and possible reevaluation of SSRI medications with a possibility of benzodiazepine medication under the care of psychiatry.  She is hopeful that this would be arranged for her.  I will initiate the referral here today.  I bruised all of her laboratory tests and note that we should do thyroid screening lipid screening and do a urine rest of tests were within normal limits will follow along with cardiology and hopefully are psychiatric/psychology colleagues to see if we can provide her with some generalized relief from her anxiety which is causing her multiple problems.  I did review her A1c and she is aware that  weight loss would get that under the 6.0 level.  I really enjoyed seeing Milana again here today      Review of Systems: A complete 14 point review of systems was obtained and is negative or as stated in the history of present illness.    Past Medical History:   Diagnosis Date     Asthma      Hypertension      Low back pain      Family History   Problem Relation Age of Onset     Hypertension Mother      Cancer Father      Diabetes Father      Hypertension Father      Cancer Sister      Past Surgical History:   Procedure Laterality Date     BACK SURGERY  08/2010    L4-5, S1 Hemilaminectomy, foraminotomy     [facility] REMOVAL OF OVARY/TUBE(S)      Description: Salpingo-oophorectomy Left Side;  Proc Date: 12/06/2004;     [facility] SUPRACERV ABD HYSTERECTOMY      Description: Supracervical Hysterectomy;  Proc Date: 12/06/2004;     Social History     Tobacco Use     Smoking status: Never Smoker     Smokeless tobacco: Never Used   Substance Use Topics     Alcohol use: Yes     Alcohol/week: 2.0 standard drinks     Types: 1 Glasses of wine, 1 Cans of beer per week     Drug use: No       PE:   /68   Pulse 68   Wt (!) 286 lb (129.7 kg)   SpO2 96%   BMI 44.79 kg/m       General Appearance:  Alert, cooperative, no distress  Head:  Normocephalic, no obvious abnormality  Ears: TM anatomy normal  Eyes:  PERRL, EOM's intact, conjunctiva and corneas clear  Nose:  Nares symmetrical, septum midline, mucosa pink, no sinus tenderness  Throat:  Lips, tongue, and mucosa are moist, pink, and intact  Neck:  Supple, symmetrical, trachea midline, no adenopathy; thyroid: no enlargement, symmetric,no tenderness/mass/nodules; no carotid bruit, no JVD  Back:  Symmetrical, no curvature, ROM normal, no CVA tenderness  Chest/Breast:  No mass or tenderness  Lungs:  Clear to auscultation bilaterally, respirations unlabored   Heart:  Normal PMI, regular rate & rhythm, S1 and S2 normal, no murmurs, rubs, or gallops  Abdomen:  Soft, non-tender, bowel sounds  active all four quadrants, no mass, or organomegaly  Musculoskeletal:  Tone and strength strong and symmetrical, all extremities  Lymphatic:  No adenopathy  Skin/Hair/Nails:  Skin warm, dry, and intact, no rashes  Neurologic:  Alert and oriented x3, no cranial nerve deficits, normal strength and tone, gait steady  Extremities:  No edema.  Ibrahima's sign negative.    Genitourinary: deferred  Pulses:  Equal bilaterally    Impression:     1. Hypercholesteremia  Lipid Cascade    Thyroid [place]   2. Routine general medical examination at a health care facility  Lipid [place]    Thyroid [place]    Urinalysis-UC if Indicated   3. Generalized anxiety disorder  Ambulatory referral to Psychiatry        PLAN:   1. Routine general medical examination at a [facility] care facility  Work-up to include  - Lipid Cascade  - Thyroid Cascade  - Urinalysis-UC if Indicated    2. Generalized anxiety disorder  Appointment will be made  - Ambulatory referral to Psychiatry    3. Hypercholesteremia  Work-up as follows  - Lipid Cascade  - Thyroid [place]              This note has been dictated using voice recognition software. Any grammatical or context distortions are unintentional and inherent to the the software.

## 2021-06-16 NOTE — TELEPHONE ENCOUNTER
===View-only below this line===  ----- Message -----  From: Mati Theodore DO  Sent: 4/20/2021   4:13 PM CDT  To: Samuel Sherman RN    Agree.  Echo is unchanged and normal heart function.

## 2021-06-16 NOTE — TELEPHONE ENCOUNTER
----- Message from Apple Avendano V sent at 4/20/2021  2:28 PM CDT -----  General phone call: pt wants to discuss results of echo done last week.      Caller: Milana  Primary cardiologist: jhonny  Detailed reason for call: see above  New or active symptoms? no  Best phone number: [phone]  Best time to contact: anytime  Ok to leave a detailed message? yes  Device? no    Additional Info:

## 2021-06-16 NOTE — PROGRESS NOTES
Assessment:     1. Depression     2. Asthma exacerbation  montelukast (SINGULAIR) 10 mg tablet    citalopram (CELEXA) 10 MG tablet       Plan:     1. Asthma exacerbation  Patient may refill the following for her allergies and asthma we will start her on the citalopram  - montelukast (SINGULAIR) 10 mg tablet; Take 1 tablet (10 mg total) by mouth daily.  Dispense: 90 tablet; Refill: 3  - citalopram (CELEXA) 10 MG tablet; Take 1 tablet (10 mg total) by mouth daily.  Dispense: 30 tablet; Refill: 2    2. Depression  Begin citalopram 10 mg once daily was phoned to the local pharmacy long-term use we will phone it to her mail order pharmacy      Subjective:   Milana presents here suffering from the acute loss of her consort of 14 years who had multiple medical problems and eventually ended up in hospice situation.  She has been caring for him for the last 5 or 6 weeks and currently is on FMLA.  She has tried to go back to work but has numerous breakdowns due to the situational disturbance caused by this tragic loss.  She presents today we discussed loss of a loved one at length and the anticipated healing process which is lengthy at best.  We allowed her to ventilate.  We did discuss group therapy for loss victims.  After our discussion we both decided that she should be on an antidepressant.  Citalopram as recommended by me.  We will begin it 10 mg patient was instructed not to expect in effect for 10-14 days and then will begin at a low dosage and titrate up after a revisit at 30 days.  She seemed relieved and comforted by this approach.  We will see what we will see.  I have cared for her for many years all medical questions that were asked were answered she denies any constitutional complaints other than the ache of a severe loss no shortness of breath dyspnea chest pain abdominal pain bowel changes neurological complaints other than the psychiatric psychological effects of loss.  I will see her for follow-up 30 days.   "On another note the patient is getting good relief with regards to her seasonal asthma and allergies on the recommendation of Dr. Thorne with regards to Singulair.  I have refilled her singular prescription today for her upper respiratory complaints and ongoing allergies.  We will follow both of these problems at the next visit.    Review of Systems: A complete 14 point review of systems was obtained and is negative or as stated in the history of present illness.    Past Medical History:   Diagnosis Date     Low back pain      Family History   Problem Relation Age of Onset     Hypertension Mother      Cancer Father      Diabetes Father      Hypertension Father      Cancer Sister      Past Surgical History:   Procedure Laterality Date     BACK SURGERY  08/2010    L4-5, S1 Hemilaminectomy, foraminotomy     [place] REMOVAL OF OVARY/TUBE(S)      Description: Salpingo-oophorectomy Left Side;  Proc Date: 12/06/2004;     [place] SUPRACERV ABD HYSTERECTOMY      Description: Supracervical Hysterectomy;  Proc Date: 12/06/2004;     Social History   Substance Use Topics     Smoking status: Never Smoker     Smokeless tobacco: Never Used     Alcohol use 1.2 oz/week     1 Cans of beer, 1 Glasses of wine per week         Objective:   /78  Pulse 72  Ht 5' 7\" (1.702 m)  Wt (!) 281 lb 12.8 oz (127.8 kg)  BMI 44.14 kg/m2    General Appearance:  Alert, cooperative, no distress  Head:  Normocephalic, no obvious abnormality  Ears: TM anatomy normal  Eyes:  PERRL, EOM's intact, conjunctiva and corneas clear  Nose:  Nares symmetrical, septum midline, mucosa pink, no sinus tenderness  Throat:  Lips, tongue, and mucosa are moist, pink, and intact  Neck:  Supple, symmetrical, trachea midline, no adenopathy; thyroid: no enlargement, symmetric,no tenderness/mass/nodules; no carotid bruit, no JVD  Back:  Symmetrical, no curvature, ROM normal, no CVA tenderness  Chest/Breast:  No mass or tenderness  Lungs:  Clear to auscultation bilaterally, " respirations unlabored   Heart:  Normal PMI, regular rate & rhythm, S1 and S2 normal, no murmurs, rubs, or gallops  Abdomen:  Soft, non-tender, bowel sounds active all four quadrants, no mass, or organomegaly  Musculoskeletal:  Tone and strength strong and symmetrical, all extremities  Lymphatic:  No adenopathy  Skin/Hair/Nails:  Skin warm, dry, and intact, no rashes  Neurologic:  Alert and oriented x3, no cranial nerve deficits, normal strength and tone, gait steady  Extremities:  No edema.  Ibrahima's sign negative.    Genitourinary: deferred  Pulses:  Equal bilaterally     The following high BMI interventions were performed this visit: encouragement to exercise      This note has been dictated using voice recognition software. Any grammatical or context distortions are unintentional and inherent to the the software.

## 2021-06-16 NOTE — TELEPHONE ENCOUNTER
PC to patient, and review of echo imaging. No further questions at this time. Pt appreciative of the call. CAMDEN,RN

## 2021-06-16 NOTE — TELEPHONE ENCOUNTER
Dr. Theodore please review the patients' echo from 4/16/21. Appears to be unchanged. After review, any recommendations? CAMDENRN

## 2021-06-18 NOTE — PATIENT INSTRUCTIONS - HE
Patient Instructions by Mati Theodore DO at 3/16/2021 11:10 AM     Author: Mati Theodore DO Service: -- Author Type: Physician    Filed: 3/16/2021 11:48 AM Encounter Date: 3/16/2021 Status: Addendum    : Mati Theodore DO (Physician)    Related Notes: Original Note by Mati Theodore DO (Physician) filed at 3/16/2021 11:47 AM       It was a pleasure to meet with you today in clinic.  Please do not hesitate to call the [facility] clinic with any questions or concerns at [phone].    Additional Provider Instructions:   1. Echo to re-evaluate heart function  2. Increase bystolic 10 mg dialy.   3. Check thyroid function      Sincerely,

## 2021-06-19 NOTE — LETTER
Letter by Burak Arnold MD at      Author: Burak Arnold MD Service: -- Author Type: --    Filed:  Encounter Date: 11/22/2019 Status: Signed         Milana Blum  [address]  [place] [address]             November 22, 2019         Dear Ms. Blum,    Below are the results from your recent visit:    Resulted Orders   HM2(CBC w/o Differential)   Result Value Ref Range    WBC 5.5 4.0 - 11.0 thou/uL    RBC 4.39 3.80 - 5.40 mill/uL    Hemoglobin 13.1 12.0 - 16.0 g/dL    Hematocrit 37.9 35.0 - 47.0 %    MCV 86 80 - 100 fL    MCH 29.8 27.0 - 34.0 pg    MCHC 34.4 32.0 - 36.0 g/dL    RDW 13.7 11.0 - 14.5 %    Platelets 284 140 - 440 thou/uL    MPV 7.3 7.0 - 10.0 fL   Lipid Cascade   Result Value Ref Range    Cholesterol 177 <=199 mg/dL    Triglycerides 80 <=149 mg/dL    HDL Cholesterol 35 (L) >=50 mg/dL    LDL Calculated 126 <=129 mg/dL    Patient Fasting > 8hrs? Yes    Urinalysis-UC if Indicated   Result Value Ref Range    Color, UA Yellow Colorless, Yellow, Straw, Light Yellow    Clarity, UA Clear Clear    Glucose, UA Negative Negative    Bilirubin, UA Negative Negative    Ketones, UA Negative Negative    Specific Gravity, UA 1.020 1.005 - 1.030    Blood, UA Negative Negative    pH, UA 6.0 5.0 - 8.0    Protein, UA Negative Negative mg/dL    Urobilinogen, UA 0.2 E.U./dL 0.2 E.U./dL, 1.0 E.U./dL    Nitrite, UA Negative Negative    Leukocytes, UA Negative Negative    Narrative    Microscopic not indicated  UC not indicated   Vitamin D, Total (25-Hydroxy)   Result Value Ref Range    Vitamin D, Total (25-Hydroxy) 28.9 (L) 30.0 - 80.0 ng/mL    Narrative    Deficiency <10.0 ng/mL  Insufficiency 10.0-29.9 ng/mL  Sufficiency 30.0-80.0 ng/mL  Toxicity (possible) >100.0 ng/mL   Comprehensive Metabolic Panel   Result Value Ref Range    Sodium 143 136 - 145 mmol/L    Potassium 4.5 3.5 - 5.0 mmol/L    Chloride 105 98 - 107 mmol/L    CO2 30 22 - 31 mmol/L    Anion Gap, Calculation 8 5 - 18 mmol/L    Glucose 113 70 -  125 mg/dL    BUN 10 8 - 22 mg/dL    Creatinine 0.72 0.60 - 1.10 mg/dL    GFR MDRD Af Amer >60 >60 mL/min/1.73m2    GFR MDRD Non Af Amer >60 >60 mL/min/1.73m2    Bilirubin, Total 0.5 0.0 - 1.0 mg/dL    Calcium 9.5 8.5 - 10.5 mg/dL    Protein, Total 6.5 6.0 - 8.0 g/dL    Albumin 3.8 3.5 - 5.0 g/dL    Alkaline Phosphatase 73 45 - 120 U/L    AST 20 0 - 40 U/L    ALT 21 0 - 45 U/L    Narrative    Fasting Glucose reference range is 70-99 mg/dL per  American Diabetes Association (ADA) guidelines.       Looks good ;vit D is very slightly low    Please call with questions or contact us using [employer].    Sincerely,        Electronically signed by Burak Arnold MD

## 2021-06-19 NOTE — LETTER
Letter by Elyse Cox MD at      Author: Elyse Cox MD Service: -- Author Type: --    Filed:  Encounter Date: 9/19/2019 Status: (Other)         September 19, 2019     Patient: Milana Blum   YOB: 1960   Date of Visit: 9/17/2019       To Whom It May Concern:    It is my medical opinion that Milana Blum should be excused from work 9/18/2019 and 9/19/2019 due to medical condition.    If you have any questions or concerns, please don't hesitate to call.    Sincerely,        Electronically signed by Elyse Cox MD

## 2021-06-19 NOTE — LETTER
Letter by Burak Arnold MD at      Author: Burak Arnold MD Service: -- Author Type: --    Filed:  Encounter Date: 9/30/2019 Status: Signed         September 30, 2019     Patient: Milana Blum   YOB: 1960   Date of Visit: 9/23/2019       To Whom It May Concern:    It is my medical opinion that Milana Blum may return to work on 9/30/2019 without restrictions.    If you have any questions or concerns, please don't hesitate to call.    Sincerely,        Electronically signed by Burak Arnold MD

## 2021-06-19 NOTE — LETTER
Letter by Mati Theodore DO at      Author: Mati Theodore DO Service: -- Author Type: --    Filed:  Encounter Date: 4/16/2019 Status: (Other)         Milana TEVIN Blum  [address]  [place] [address]      April 16, 2019      Dear Milana,    This letter is to remind you that you will be due for your follow up appointment with Dr. Mati Theodore. To help ensure you are in the best health possible, a regular follow-up with your cardiologist is essential.     Please call our Patient Scheduling Line at [phone] to schedule your appointment at your earliest convenience.  If you have recently scheduled an appointment, please disregard this letter.    We look forward to seeing you again. As always, we are available at the number  above for any questions or concerns you may have.      Sincerely,     The Physicians and Staff of [facility]

## 2021-06-20 NOTE — PROGRESS NOTES
Assessment:     1. Atypical chest pain  Electrocardiogram Perform - Clinic    Echo Stress Exercise       Plan:     1. Atypical chest pain  Following will be scheduled patient will follow up with me  - Electrocardiogram Perform - Clinic  - Echo Stress Exercise; Future      Subjective:   Milana has been experiencing fleeting substernal chest discomfort sometimes awakening her at night for the last 3 or 4 weeks.  This is not accompanied by any shortness of breath dyspnea diaphoresis.  Patient has been under some stress lately.  She denies any recent chest trauma.  No change in medications.  Patient did not begin therapy with paroxetine as previously recommended because of side effect profile.  Patient had similar chest pain about 20 years ago when she was going through a divorce at that time had cardiovascular workup to include cardiac consultation and stress echocardiography.  Plan is to repeat this stress echo after reviewing her EKG today which was normal.  My impression is that this is musculoskeletal not gastrointestinal and we need to rule out cardiovascular sources before instituting symptomatic therapy.  Patient understands I will see her for follow-up once test results are in.    Review of Systems: A complete 14 point review of systems was obtained and is negative or as stated in the history of present illness.    Past Medical History:   Diagnosis Date     Low back pain      Family History   Problem Relation Age of Onset     Hypertension Mother      Cancer Father      Diabetes Father      Hypertension Father      Cancer Sister      Past Surgical History:   Procedure Laterality Date     BACK SURGERY  08/2010    L4-5, S1 Hemilaminectomy, foraminotomy     [place] REMOVAL OF OVARY/TUBE(S)      Description: Salpingo-oophorectomy Left Side;  Proc Date: 12/06/2004;     [place] SUPRACERV ABD HYSTERECTOMY      Description: Supracervical Hysterectomy;  Proc Date: 12/06/2004;     Social History   Substance Use Topics     Smoking  status: Never Smoker     Smokeless tobacco: Never Used     Alcohol use 1.2 oz/week     1 Cans of beer, 1 Glasses of wine per week         Objective:   /80  Pulse 64  Wt (!) 288 lb 1.6 oz (130.7 kg)  BMI 45.12 kg/m2    General Appearance:  Alert, cooperative, no distress  Head:  Normocephalic, no obvious abnormality  Ears: TM anatomy normal  Eyes:  PERRL, EOM's intact, conjunctiva and corneas clear  Nose:  Nares symmetrical, septum midline, mucosa pink, no sinus tenderness  Throat:  Lips, tongue, and mucosa are moist, pink, and intact  Neck:  Supple, symmetrical, trachea midline, no adenopathy; thyroid: no enlargement, symmetric,no tenderness/mass/nodules; no carotid bruit, no JVD  Back:  Symmetrical, no curvature, ROM normal, no CVA tenderness  Chest/Breast:  No mass or tenderness  Lungs:  Clear to auscultation bilaterally, respirations unlabored   Heart:  Normal PMI, regular rate & rhythm, S1 and S2 normal, no murmurs, rubs, or gallops  Abdomen:  Soft, non-tender, bowel sounds active all four quadrants, no mass, or organomegaly  Musculoskeletal:  Tone and strength strong and symmetrical, all extremities  Lymphatic:  No adenopathy  Skin/Hair/Nails:  Skin warm, dry, and intact, no rashes  Neurologic:  Alert and oriented x3, no cranial nerve deficits, normal strength and tone, gait steady  Extremities:  No edema.  Ibrahima's sign negative.    Genitourinary: deferred  Pulses:  Equal bilaterally           This note has been dictated using voice recognition software. Any grammatical or context distortions are unintentional and inherent to the the software.

## 2021-06-20 NOTE — PROGRESS NOTES
Assessment/Plan:     1. Abdominal pain, generalized  Comprehensive Metabolic Panel    HM2(CBC w/o Differential)    Lipase    CT Abdomen Pelvis With Oral With IV Contrast   2. Diarrhea         Diagnoses and all orders for this visit:    Abdominal pain, generalized and diarrhea  -     Comprehensive Metabolic Panel  -     HM2(CBC w/o Differential)  -     Lipase  -     CT Abdomen Pelvis With Oral With IV Contrast; Future; Expected date: 9/5/18  -Vital signs are normal.  White blood cell count is not elevated.  Given severity of symptoms, recommend CT scan of the abdomen and pelvis.  This will be a hold and call.  Will also obtain comprehensive metabolic panel and lipase today as part of lab workup.  Further recommendations will be made once results are available.             Subjective:      Milana Blum is a 57 y.o. female who comes in today complaining of severe abdominal pain and severe diarrhea.  Symptoms started approximately 2 days ago.  She initially thought that she had contracted food poisoning.  She was at a music festival this past weekend.  Was eating food about once daily from food trucks.  However no one else who attended the festival with her had any problems with the [employer].  On her way home Monday morning she started to feel sick.  Monday afternoon she developed the symptoms of abdominal pain and diarrhea.  Has had multiple loose, watery brown to yellow colored stools.  No blood in stools.  Complains of pain throughout her abdomen but primarily located in the upper abdomen above the navel to the sternum.  Symptoms are worse anytime she tries to eat or drink.  She has been trying to stay hydrated.  She is making urine.  No urinary symptoms.  Has not had fevers or chills.  No headaches, body aches, dizziness or lightheadedness.  No vomiting.  Pain fluctuates in intensity.  Is concerned that symptoms have been going on now for 2 days.  She has no prior history of inflammatory bowel disease  or other GI problems.  She has not had colonoscopy.  Review of systems is assessed and is otherwise negative.  No other concerns or questions today.    Current Outpatient Prescriptions   Medication Sig Dispense Refill     b complex vitamins tablet Take 1 tablet by mouth daily.       BLACK COHOSH ORAL Take 1 tablet by mouth daily.       BYSTOLIC 5 mg tablet TAKE 1 TABLET BY MOUTH DAILY 90 tablet 0     hydrochlorothiazide (HYDRODIURIL) 25 MG tablet Take 1 tablet (25 mg total) by mouth daily. 90 tablet 3     potassium citrate (UROCIT-K) 10 mEq (1,080 mg) SR tablet Take by mouth 3 (three) times a week.       triamcinolone (KENALOG) 0.1 % cream Apply topically 2 (two) times a day. 30 g 1     VENTOLIN HFA 90 mcg/actuation inhaler INHALE 1 TO 2 PUFFS ORALLY EVERY FOUR HOURS AS NEEDED 54 each 1     diclofenac (CATAFLAM) 50 MG tablet Take 1 tablet (50 mg total) by mouth 3 (three) times a day as needed. 60 tablet 1     methocarbamol (ROBAXIN) 500 MG tablet Take 1 tablet (500 mg total) by mouth 3 (three) times a day as needed. 45 tablet 1     montelukast (SINGULAIR) 10 mg tablet Take 1 tablet (10 mg total) by mouth daily. 90 tablet 3     QVAR 40 mcg/actuation inhaler INHALE 2 PUFFS 2 (TWO) TIMES A DAY. 8.7 Inhaler 2     No current facility-administered medications for this visit.        Past Medical History, Family History, and Social History reviewed.  Past Medical History:   Diagnosis Date     Low back pain      Past Surgical History:   Procedure Laterality Date     BACK SURGERY  08/2010    L4-5, S1 Hemilaminectomy, foraminotomy     [place] REMOVAL OF OVARY/TUBE(S)      Description: Salpingo-oophorectomy Left Side;  Proc Date: 12/06/2004;     [place] SUPRACERV ABD HYSTERECTOMY      Description: Supracervical Hysterectomy;  Proc Date: 12/06/2004;     Other environmental allergy  Family History   Problem Relation Age of Onset     Hypertension Mother      Cancer Father      Diabetes Father      Hypertension Father      Cancer Sister       Social History     Social History     Marital status:      Spouse name: N/A     Number of children: N/A     Years of education: N/A     Occupational History     Not on file.     Social History Main Topics     Smoking status: Never Smoker     Smokeless tobacco: Never Used     Alcohol use 1.2 oz/week     1 Cans of beer, 1 Glasses of wine per week     Drug use: No     Sexual activity: Yes     Partners: Male     Birth control/ protection: Surgical     Other Topics Concern     Not on file     Social History Narrative         Review of systems is as stated in HPI, and the remainder of the 10 system review is otherwise negative.    Objective:     Vitals:    09/05/18 1150   BP: 130/70   Patient Site: Left Arm   Patient Position: Sitting   Cuff Size: Adult Large   Pulse: 78   Temp: 98.3  F (36.8  C)   TempSrc: Oral   SpO2: 97%   Weight: (!) 287 lb 7 oz (130.4 kg)    Body mass index is 45.02 kg/(m^2).    General appearance: alert, appears stated age and cooperative, moderate distress  Head: Normocephalic, without obvious abnormality, atraumatic  Lungs: clear to auscultation bilaterally  Heart: regular rate and rhythm, S1, S2 normal, no murmur, click, rub or gallop  Abdomen: obese, bowel sounds present all 4 quadrants, abdomen is soft, there is tenderness throughout the abdomen, no rebound, no guarding, no masses  Extremities: extremities normal, atraumatic, no cyanosis or edema    Recent Results (from the past 24 hour(s))   HM2(CBC w/o Differential)    Collection Time: 09/05/18 12:14 PM   Result Value Ref Range    WBC 5.6 4.0 - 11.0 thou/uL    RBC 4.72 3.80 - 5.40 mill/uL    Hemoglobin 13.4 12.0 - 16.0 g/dL    Hematocrit 39.9 35.0 - 47.0 %    MCV 84 80 - 100 fL    MCH 28.4 27.0 - 34.0 pg    MCHC 33.7 32.0 - 36.0 g/dL    RDW 13.6 11.0 - 14.5 %    Platelets 254 140 - 440 thou/uL    MPV 7.0 7.0 - 10.0 fL         This note has been dictated using voice recognition software. Any grammatical or context distortions are  unintentional and inherent to the the software.

## 2021-06-20 NOTE — PROGRESS NOTES
Assessment:     1. Generalized anxiety disorder  hydrOXYzine HCl (ATARAX) 25 MG tablet   2. Abdominal pain, generalized         Plan:     1. Generalized anxiety disorder  Patient requested Ativan therapy following the death of her father but I feel this is not indicated.  We will use this alternative medicine to help her with her anxiety and her morning state patient agrees  - hydrOXYzine HCl (ATARAX) 25 MG tablet; Take 1 tablet (25 mg total) by mouth 3 (three) times a day as needed for itching.  Dispense: 30 tablet; Refill: 1    2. Abdominal pain, generalized  I did review with the patient all of her laboratory findings including her CT scan all of which were normal; interestingly enough her diarrhea constipation abdominal pain abated 24 hours ago prior to the visit.  We had a long discussion of her past medical history and her family history of gastrointestinal cancer father recently passed away from pancreatic cancer following many years of battling pancreatitis recurrent      Subjective:   Patient is being seen here for follow-up of abdominal pain and diarrhea.  Patient attended a rock concert and there was a concern about food contamination however workup was entirely negative including laboratory work CT scan.  Patient's symptoms abated 24 hours ago the patient thought she come in to discuss anxiety and her morning state and loss and grief following the death of her father from pancreatic cancer recently.  Father was my patient also.  I am well aware of the family history of gastrointestinal cancer.  We did discuss conversion reactions and patient did come some understanding of what is causing her anxiety.  She did request benzodiazepine therapy for rescue but I do not feel this is indicated we will give her hydroxyzine as a stop gap.  I did discuss mechanism of action and we will see her for follow-up on a as needed basis.  We should give this a few months trial.  All questions that were asked were answered  system review otherwise [id number] minute of which 50% of the time was spent counseling the patient.    Review of Systems: A complete 14 point review of systems was obtained and is negative or as stated in the history of present illness.    Past Medical History:   Diagnosis Date     Low back pain      Family History   Problem Relation Age of Onset     Hypertension Mother      Cancer Father      Diabetes Father      Hypertension Father      Cancer Sister      Past Surgical History:   Procedure Laterality Date     BACK SURGERY  08/2010    L4-5, S1 Hemilaminectomy, foraminotomy     [place] REMOVAL OF OVARY/TUBE(S)      Description: Salpingo-oophorectomy Left Side;  Proc Date: 12/06/2004;     [place] SUPRACERV ABD HYSTERECTOMY      Description: Supracervical Hysterectomy;  Proc Date: 12/06/2004;     Social History   Substance Use Topics     Smoking status: Never Smoker     Smokeless tobacco: Never Used     Alcohol use 1.2 oz/week     1 Cans of beer, 1 Glasses of wine per week         Objective:   /80 (Patient Site: Right Arm, Patient Position: Sitting, Cuff Size: Adult Large)  Pulse 60  Temp 98.8  F (37.1  C)  Wt (!) 282 lb 1 oz (127.9 kg)  BMI 44.18 kg/m2    General Appearance:  Alert, cooperative, no distress  Head:  Normocephalic, no obvious abnormality  Ears: TM anatomy normal  Eyes:  PERRL, EOM's intact, conjunctiva and corneas clear  Nose:  Nares symmetrical, septum midline, mucosa pink, no sinus tenderness  Throat:  Lips, tongue, and mucosa are moist, pink, and intact  Neck:  Supple, symmetrical, trachea midline, no adenopathy; thyroid: no enlargement, symmetric,no tenderness/mass/nodules; no carotid bruit, no JVD  Back:  Symmetrical, no curvature, ROM normal, no CVA tenderness  Chest/Breast:  No mass or tenderness  Lungs:  Clear to auscultation bilaterally, respirations unlabored   Heart:  Normal PMI, regular rate & rhythm, S1 and S2 normal, no murmurs, rubs, or gallops  Abdomen:  Soft, non-tender, bowel  sounds active all four quadrants, no mass, or organomegaly  Musculoskeletal:  Tone and strength strong and symmetrical, all extremities  Lymphatic:  No adenopathy  Skin/Hair/Nails:  Skin warm, dry, and intact, no rashes  Neurologic:  Alert and oriented x3, no cranial nerve deficits, normal strength and tone, gait steady  Extremities:  No edema.  Ibrahima's sign negative.    Genitourinary: deferred  Pulses:  Equal bilaterally     The following high BMI interventions were performed this visit: encouragement to exercise      This note has been dictated using voice recognition software. Any grammatical or context distortions are unintentional and inherent to the the software.

## 2021-06-21 NOTE — PROGRESS NOTES
TCM DISCHARGE FOLLOW UP CALL    Discharge Date:  11/13/2018  Reason for hospital stay (discharge diagnosis)::  Chest Pain, HTN  Are you feeling better, the same or worse since your discharge?:  Patient is feeling better (No CP, but still some lightheadedness & sob (not new, sx present past few weeks), & not worse.)  Do you feel like you have a plan in the event of a health emergency?: Yes (Call family, brother is a paramedic.  RN informed pt of 24/7 nurse triage)    As part of your discharge plan, were  home care services ordered for you?: No    Did you receive any new medications, or was there a change to your medications?: Yes    Are you taking those medications, or do you have any established regiment?:  RN reviewed new/changed d/c medications w/pt from S.  Pt states she is taking the new BP medication, losartan, as prescribed, but forgot to take the ASA 81 mg EC this morning.  She will take the ASA when she gets home today, and put the bottle near her other medications so she doesn't forget going forward.  Pt confirmed having stopped taking Bystolic, as instructed.  Do you have any follow up visits scheduled with your PCP or Specialist?:  Yes, with PCP (Dr. Arnold 11/15/18)  (RN) Is PCP appt scheduled soon enough (within 14 days of discharge date)?: Yes    RN NOTES::  Started monitoring BP at home, her BP this morning was 122/66 (before BP medication).  RN encouraged pt to continue monitoring home BP & bring log to appt tomorrow.        Emerita Calderon RN Care Manager, Population Health

## 2021-06-21 NOTE — PROGRESS NOTES
Assessment:     1. Essential hypertension     2. Atypical chest pain         Plan:     1. Essential hypertension  Patient is to continue on her Cozaar 50 mg once daily; recheck her blood pressures twice weekly keep a diary for 6 weeks return at that time he was given 7 more days off of work to stabilize her blood pressure    2. Atypical chest pain  No further workup indicated at this time test results of cardiovascular workup while in hospital were reviewed with patient today      Subjective:   Milana returns here for follow-up she was hospitalized on Monday for chest pain troponins work stress echo were negative ejection fraction 75% patient continues to be a little lightheaded her blood pressure was found to be high she is been on by systolic for many years.  This was switched to Cozaar 50 mg 1 daily and blood pressure is come down.  Patient is not experiencing shortness of breath dyspnea chest pain or angina.  She will needs some time to stabilize his blood pressure we have given her 7 more days off of her work.  She may return at that time without restrictions all medical questions that were asked were answered I did review the patient's hospitalization at this time and all of her medical records she will follow-up with us in 6 weeks time at which time we will repeat and review her situations.25 minute visit post hospital.  Currently she gives a -14 point review of systems except for a little lightheadedness which I feel is due to medication adjustment as her blood pressure is fluctuant at this moment.  Her weight issues we will deal with and have been dealing with    Review of Systems: A complete 14 point review of systems was obtained and is negative or as stated in the history of present illness.    Past Medical History:   Diagnosis Date     Low back pain      Family History   Problem Relation Age of Onset     Hypertension Mother      Cancer Father      Diabetes Father      Hypertension Father      Cancer  Sister      Past Surgical History:   Procedure Laterality Date     BACK SURGERY  08/2010    L4-5, S1 Hemilaminectomy, foraminotomy     [place] REMOVAL OF OVARY/TUBE(S)      Description: Salpingo-oophorectomy Left Side;  Proc Date: 12/06/2004;     [place] SUPRACERV ABD HYSTERECTOMY      Description: Supracervical Hysterectomy;  Proc Date: 12/06/2004;     Social History     Tobacco Use     Smoking status: Never Smoker     Smokeless tobacco: Never Used   Substance Use Topics     Alcohol use: Yes     Alcohol/week: 1.2 oz     Types: 1 Cans of beer, 1 Glasses of wine per week     Drug use: No         Objective:   /68   Pulse 69   Wt (!) 283 lb 3.2 oz (128.5 kg)   SpO2 96%   BMI 44.36 kg/m      General Appearance:  Alert, cooperative, no distress  Head:  Normocephalic, no obvious abnormality  Ears: TM anatomy normal  Eyes:  PERRL, EOM's intact, conjunctiva and corneas clear  Nose:  Nares symmetrical, septum midline, mucosa pink, no sinus tenderness  Throat:  Lips, tongue, and mucosa are moist, pink, and intact  Neck:  Supple, symmetrical, trachea midline, no adenopathy; thyroid: no enlargement, symmetric,no tenderness/mass/nodules; no carotid bruit, no JVD  Back:  Symmetrical, no curvature, ROM normal, no CVA tenderness  Chest/Breast:  No mass or tenderness  Lungs:  Clear to auscultation bilaterally, respirations unlabored   Heart:  Normal PMI, regular rate & rhythm, S1 and S2 normal, no murmurs, rubs, or gallops  Abdomen:  Soft, non-tender, bowel sounds active all four quadrants, no mass, or organomegaly  Musculoskeletal:  Tone and strength strong and symmetrical, all extremities  Lymphatic:  No adenopathy  Skin/Hair/Nails:  Skin warm, dry, and intact, no rashes  Neurologic:  Alert and oriented x3, no cranial nerve deficits, normal strength and tone, gait steady  Extremities:  No edema.  Ibrahima's sign negative.    Genitourinary: deferred  Pulses:  Equal bilaterally     The following high BMI interventions were performed  this visit: weight monitoring      This note has been dictated using voice recognition software. Any grammatical or context distortions are unintentional and inherent to the the software.

## 2021-06-27 NOTE — PROGRESS NOTES
Progress Notes by Mati Theodore DO at 12/19/2018  7:50 AM     Author: Mati Theodore DO Service: -- Author Type: Physician    Filed: 12/19/2018  8:30 AM Encounter Date: 12/19/2018 Status: Signed    : Mati Theodore DO (Physician)           Click to link to [facility] Heart Care     [facility] HEART CARE NOTE    Thank you, Dr. Arnold, for asking the [facility] Heart Care team to see Ms. Milana Blum to evaluate palpitations.      Assessment/Recommendations   Assessment:    1.  Heart palpitations.  They were confirmed to be premature atrial contractions in the emergency department.  Since restarting her beta-blocker therapy they have nearly resolved  2.  Exertional dyspnea likely secondary to elevated blood pressure with underlying left ventricular hypertrophy.  3.  Atypical chest pain symptoms, resolved with improvement in blood pressure.  Negative nuclear stress test recently  4.  Hypertension  5.  History of diastolic dysfunction on prior echo  6.  Obesity, BMI 44 with comorbidities    Plan:  1.  Echocardiogram to assess for left ventricular hypertrophy and pulmonary hypertension.   2.  She had inpatient pulse oximetry which did not demonstrate significant findings for sleep apnea  3.  Continue Bystolic at 5 mg daily  4.  Continue hydrochlorothiazide 25 mg daily could be increased to 50 mg daily based on her weight if further blood pressure control needed.   5.  Patient is at increased risk for development of heart failure with preserved ejection fraction will need close follow-up for her blood pressure.  She does have a plan to increase her activity and possibly lose weight.       History of Present Illness    Ms. Milana Blum is a 58 y.o. female with no prior cardiac history who presents to cardiology clinic in consultation for palpitations and atypical chest pain symptoms associated with dyspnea on exertion.    Patient states a few months ago her blood pressure  regiment was changed from bystolic to carvedilol to improve blood pressure.  After discontinuing her beta-blocker therapy she noted worsening palpitations, shortness of breath, lower extremity edema and atypical chest pain symptoms.  She states her chest pain was pressure-like symptom which occurred in her mid sternum did occur at random times but also with exertion.  Went nuclear stress testing given she was hospitalized in November 2018 (documentation reviewed) she was negative for ischemia.    Recently she was seen by Dr. Alonso in the emergency department on December 6, 2018 which documentation was reviewed.  At that time she had ongoing palpitations and was recommended that her Bystolic be restarted.  After restarting her beta-blocker therapy she noted resolution of her palpitations, near resolution of her dyspnea on exertion and improvement of lower extremity edema.  Her chest pain has completely resolved.  During emergency room visit it was noted that she had symptomatic premature ventricular contractions as documented by Dr. Alonso and reviewed.    ECG (personnaly reviewed): Personally reviewed. Normal sinus rhythm. Normal ECG.     NM STRESS: 11/13/2018     1.The pharmacologic nuclear stress test is negative for inducible myocardial ischemia or infarction.    2.Ejection fraction preserved greater than 75%.    3.The findings of this examination were communicated to the nursing staff at [facility] and Dr. Cady Askew.    4.There is no prior study available.     Physical Examination Review of Systems   Vitals:    12/19/18 0742   BP: 148/80   Pulse: 68   Resp: 16     Body mass index is 44.48 kg/m .  Wt Readings from Last 3 Encounters:   12/19/18 (!) 284 lb (128.8 kg)   12/06/18 (!) 280 lb (127 kg)   11/15/18 (!) 283 lb 3.2 oz (128.5 kg)       General Appearance:   no distress, obese body habitus   ENT/Mouth: membranes moist, no oral lesions or bleeding gums.      EYES:  no scleral icterus, normal conjunctivae    Neck: no carotid bruits or thyromegaly   Chest/Lungs:   lungs are clear to auscultation, no rales or wheezing, sternal scar, equal chest wall expansion    Cardiovascular:   Regular. Normal first and second heart sounds with no murmurs, rubs, or gallops; the carotid, radial and posterior tibial pulses are intact, Jugular venous pressure- unable to assess, mild edema bilaterally    Abdomen:  no tenderness; bowel sounds are present   Extremities: no cyanosis or clubbing   Skin: no xanthelasma, warm.    Neurologic: normal gait, normal  bilateral, no tremors     Psychiatric: alert and oriented x3, calm     General: WNL  Eyes: Visual Distubance  Ears/Nose/Throat: WNL  Lungs: Shortness of Breath  Heart: Chest Pain, Shortness of Breath with activity, Leg Swelling  Stomach: Diarrhea  Bladder: WNL  Muscle/Joints: WNL  Skin: WNL  Nervous System: Daytime Sleepiness, Dizziness  Mental Health: Anxiety, Depression     Blood: WNL     Medical History  Surgical History Family History Social History   Past Medical History:   Diagnosis Date   ? Asthma    ? Hypertension    ? Low back pain     Past Surgical History:   Procedure Laterality Date   ? BACK SURGERY  08/2010    L4-5, S1 Hemilaminectomy, foraminotomy   ? NV REMOVAL OF OVARY/TUBE(S)      Description: Salpingo-oophorectomy Left Side;  Proc Date: 12/06/2004;   ? NV SUPRACERV ABD HYSTERECTOMY      Description: Supracervical Hysterectomy;  Proc Date: 12/06/2004;    Family History   Problem Relation Age of Onset   ? Hypertension Mother    ? Cancer Father    ? Diabetes Father    ? Hypertension Father    ? Cancer Sister     Social History     Socioeconomic History   ? Marital status:      Spouse name: Not on file   ? Number of children: Not on file   ? Years of education: Not on file   ? Highest education level: Not on file   Social Needs   ? Financial resource strain: Not on file   ? Food insecurity - worry: Not on file   ? Food insecurity - inability: Not on file   ?  Transportation needs - medical: Not on file   ? Transportation needs - non-medical: Not on file   Occupational History   ? Not on file   Tobacco Use   ? Smoking status: Never Smoker   ? Smokeless tobacco: Never Used   Substance and Sexual Activity   ? Alcohol use: Yes     Alcohol/week: 1.2 oz     Types: 1 Glasses of wine, 1 Cans of beer per week   ? Drug use: No   ? Sexual activity: Yes     Partners: Male     Birth control/protection: Surgical   Other Topics Concern   ? Not on file   Social History Narrative   ? Not on file          Medications  Allergies   Current Outpatient Medications   Medication Sig Dispense Refill   ? aspirin 81 MG EC tablet Take 1 tablet (81 mg total) by mouth daily.  0   ? b complex vitamins tablet Take 1 tablet by mouth daily.     ? BLACK COHOSH ORAL Take 1 tablet by mouth daily.     ? hydrochlorothiazide (HYDRODIURIL) 25 MG tablet Take 1 tablet (25 mg total) by mouth daily. 90 tablet 3   ? magnesium oxide 250 mg Tab Take 250 mg by mouth daily.     ? nebivolol (BYSTOLIC) 5 MG tablet Take 1 tablet (5 mg total) by mouth daily. 30 tablet 3   ? VENTOLIN HFA 90 mcg/actuation inhaler INHALE 1 TO 2 PUFFS ORALLY EVERY FOUR HOURS AS NEEDED 54 each 1   ? losartan (COZAAR) 50 MG tablet Take 1 tablet (50 mg total) by mouth daily for 90 doses. 90 tablet 3   ? methocarbamol (ROBAXIN) 500 MG tablet Take 1 tablet (500 mg total) by mouth 3 (three) times a day as needed. 45 tablet 1   ? s-adenosylmethionine sul tosyl (NAYELY-E ORAL) Take by mouth daily.       No current facility-administered medications for this visit.       Allergies   Allergen Reactions   ? Other Environmental Allergy Itching         Lab Results    Chemistry/lipid CBC Cardiac Enzymes/BNP/TSH/INR   Lab Results   Component Value Date    CHOL 198 02/29/2016    HDL 40 (L) 02/29/2016    LDLCALC 133 (H) 02/29/2016    TRIG 126 02/29/2016    CREATININE 0.72 12/06/2018    BUN 7 (L) 12/06/2018    K 3.4 (L) 12/06/2018     12/06/2018      12/06/2018    CO2 27 12/06/2018    Lab Results   Component Value Date    WBC 5.9 12/06/2018    HGB 13.2 12/06/2018    HCT 40.5 12/06/2018    MCV 88 12/06/2018     12/06/2018    Lab Results   Component Value Date    TROPONINI <0.01 11/12/2018

## 2021-06-28 ENCOUNTER — HOSPITAL ENCOUNTER (OUTPATIENT)
Dept: ULTRASOUND IMAGING | Facility: CLINIC | Age: 61
Discharge: HOME | End: 2021-06-28
Attending: FAMILY MEDICINE
Payer: COMMERCIAL

## 2021-06-28 ENCOUNTER — OFFICE VISIT - HEALTHEAST (OUTPATIENT)
Dept: FAMILY MEDICINE | Facility: CLINIC | Age: 61
End: 2021-06-28

## 2021-06-28 DIAGNOSIS — I80.9 PHLEBITIS: ICD-10-CM

## 2021-06-28 LAB — D DIMER PPP FEU-MCNC: 0.72 FEU UG/ML

## 2021-06-28 NOTE — PROGRESS NOTES
Progress Notes by Mati Theodore DO at 11/11/2019  1:50 PM     Author: Mati Theodore DO Service: -- Author Type: Physician    Filed: 11/11/2019  2:25 PM Encounter Date: 11/11/2019 Status: Signed    : Mati Theodore DO (Physician)           Click to link to [facility] Heart Care     [facility] HEART CARE NOTE        Assessment/Recommendations   Assessment:    1.  Heart palpitations, likely increased with recent bronchitis.  Confirmed premature atrial contractions in the emergency department in past.  Stable in past with Bystolic  2.  Left ventricular hypertrophy, without heart failure  3.  Hypertension, controlled.   4.  Recent acute bronchitis, asthma exacerbation, improving.   5.  Obesity, BMI 46 with comorbidities    Plan:  1.  Continue Bystolic at current dose of 5 mg daily for palpitations, atrial premature contractions  2.  Continue hydrochlorothiazide 25 mg daily.   3.  Advised to continue to follow primary care's recommendations for acute bronchitis.  She has not started inhaled steroid.       History of Present Illness    Ms. Milana Blum is a 59 y.o. female with no prior cardiac history who presents to cardiology clinic in follow-up for palpitations, premature atrial contractions and hypertension.    Since last evaluation in the clinic the patient has been treated for acute bronchitis.  He has history of asthma in the past.  Currently she is off her prednisone taper.  She did note some lower extremity edema with being on prednisone.  Over the last month she has noticed slightly worsening palpitations which she feels is from her acute illness.  Her wheezing and cough is gradually improving.  She has not started her inhaled steroids after stopping her oral steroids.  She is using her albuterol frequently.  She remains on Bystolic 5 mg daily.  She has noticed occasional lightheadedness with standing recently which she feels is from dehydration.  She is had no syncopal  or near syncopal episodes.  She has not had any exertional related chest pain symptoms.  No Orthopnea or PND symptoms.      ECHO: 12/28/2018    Normal left ventricular size and systolic function.The calculated left ventricular ejection fraction is 65%. This represents a normal ejection fraction. Borderline concentric hypertrophy noted. Grade I (mild) left ventricular diastolic dysfunction.    Normal right ventricular size and systolic function. TAPSE is normal    Left atrial volume is moderately increased    Estimated central venous pressure equal to 3 mmHg.    The ascending aorta is mildly dilated at 40 mm.    No previous study for comparison.       NM STRESS: 11/13/2018     1.The pharmacologic nuclear stress test is negative for inducible myocardial ischemia or infarction.    2.Ejection fraction preserved greater than 75%.    3.The findings of this examination were communicated to the nursing staff at [facility] and Dr. Cady Askew.    4.There is no prior study available.     Physical Examination Review of Systems   Vitals:    11/11/19 1401   BP: 118/76   Pulse: 65   Resp: 18   SpO2: 97%     Body mass index is 46.39 kg/m .  Wt Readings from Last 3 Encounters:   09/23/19 (!) 287 lb 11.2 oz (130.5 kg)   09/17/19 (!) 297 lb 3.2 oz (134.8 kg)   04/24/19 (!) 285 lb 1.6 oz (129.3 kg)       General Appearance:   no distress, obese body habitus   ENT/Mouth: membranes moist, no oral lesions or bleeding gums.      EYES:  no scleral icterus, normal conjunctivae   Neck: no carotid bruits or thyromegaly   Chest/Lungs:   lungs are clear to auscultation, no rales and no  wheezing, sternal scar, equal chest wall expansion    Cardiovascular:   Regular. Normal first and second heart sounds with no murmurs, rubs, or gallops; the carotid, radial and posterior tibial pulses are intact, Jugular venous pressure- unable to assess, no pitting edema bilaterally (compression stockings are in place).     Abdomen:  no tenderness;  bowel sounds are present   Extremities: no cyanosis or clubbing   Skin: no xanthelasma, warm.    Neurologic: normal gait, normal  bilateral, no tremors     Psychiatric: alert and oriented x3, calm     General: WNL  Eyes: WNL  Ears/Nose/Throat: WNL  Lungs: Cough, Wheezing  Heart: Irregular Heartbeat  Stomach: WNL  Bladder: WNL  Muscle/Joints: WNL  Skin: WNL  Nervous System: Dizziness  Mental Health: Depression, Anxiety     Blood: WNL       Medical History  Surgical History Family History Social History   Past Medical History:   Diagnosis Date   ? Asthma    ? Hypertension    ? Low back pain     Past Surgical History:   Procedure Laterality Date   ? BACK SURGERY  08/2010    L4-5, S1 Hemilaminectomy, foraminotomy   ? CT REMOVAL OF OVARY/TUBE(S)      Description: Salpingo-oophorectomy Left Side;  Proc Date: 12/06/2004;   ? CT SUPRACERV ABD HYSTERECTOMY      Description: Supracervical Hysterectomy;  Proc Date: 12/06/2004;    Family History   Problem Relation Age of Onset   ? Hypertension Mother    ? Cancer Father    ? Diabetes Father    ? Hypertension Father    ? Cancer Sister     Social History     Socioeconomic History   ? Marital status:      Spouse name: Not on file   ? Number of children: Not on file   ? Years of education: Not on file   ? Highest education level: Not on file   Occupational History   ? Not on file   Social Needs   ? Financial resource strain: Not on file   ? Food insecurity:     Worry: Not on file     Inability: Not on file   ? Transportation needs:     Medical: Not on file     Non-medical: Not on file   Tobacco Use   ? Smoking status: Never Smoker   ? Smokeless tobacco: Never Used   Substance and Sexual Activity   ? Alcohol use: Yes     Alcohol/week: 2.0 standard drinks     Types: 1 Glasses of wine, 1 Cans of beer per week   ? Drug use: No   ? Sexual activity: Yes     Partners: Male     Birth control/protection: Surgical   Lifestyle   ? Physical activity:     Days per week: Not on file      Minutes per session: Not on file   ? Stress: Not on file   Relationships   ? Social connections:     Talks on phone: Not on file     Gets together: Not on file     Attends Baptism service: Not on file     Active member of club or organization: Not on file     Attends meetings of clubs or organizations: Not on file     Relationship status: Not on file   ? Intimate partner violence:     Fear of current or ex partner: Not on file     Emotionally abused: Not on file     Physically abused: Not on file     Forced sexual activity: Not on file   Other Topics Concern   ? Not on file   Social History Narrative   ? Not on file          Medications  Allergies   Current Outpatient Medications   Medication Sig Dispense Refill   ? albuterol (PROVENTIL) 2.5 mg /3 mL (0.083 %) nebulizer solution Take 3 mL (2.5 mg total) by nebulization every 4 (four) hours as needed for wheezing. 25 vial 2   ? albuterol (VENTOLIN HFA) 90 mcg/actuation inhaler INHALE 1 TO 2 PUFFS ORALLY EVERY FOUR HOURS AS NEEDED 54 each 1   ? aspirin 81 MG EC tablet Take 1 tablet (81 mg total) by mouth daily.  0   ? b complex vitamins tablet Take 1 tablet by mouth daily.     ? BLACK COHOSH ORAL Take 1 tablet by mouth daily.     ? budesonide-formoterol (SYMBICORT) 160-4.5 mcg/actuation inhaler Inhale 2 puffs 2 (two) times a day. 1 Inhaler 3   ? BYSTOLIC 5 mg tablet TAKE 1 TABLET BY MOUTH     DAILY 90 tablet 3   ? hydroCHLOROthiazide (HYDRODIURIL) 25 MG tablet TAKE 1 TABLET BY MOUTH     DAILY 90 tablet 0   ? magnesium oxide 250 mg Tab Take 250 mg by mouth daily.     ? methocarbamol (ROBAXIN) 500 MG tablet Take 1 tablet (500 mg total) by mouth 3 (three) times a day as needed. 45 tablet 1   ? naproxen (NAPROSYN) 500 MG tablet TAKE 1 TABLET BY MOUTH TWICE A DAY WITH MEALS 60 tablet 0   ? omeprazole (PRILOSEC) 20 MG capsule TAKE 1 CAPSULE BY MOUTH EVERY DAY 90 capsule 3   ? predniSONE (DELTASONE) 20 MG tablet Take 3 tablets po for 3 days, then 2 tablets po for 3  days, then 1 tablet po for 2 days,  then 1/2 tablet daily for 2 days then off. 18 tablet 0   ? s-adenosylmethionine sul tosyl (NAYELY-E ORAL) Take by mouth daily.       No current facility-administered medications for this visit.       Allergies   Allergen Reactions   ? House Dust Shortness Of Breath and Wheezing     Pollen    ? Other Environmental Allergy Itching         Lab Results    Chemistry/lipid CBC Cardiac Enzymes/BNP/TSH/INR   Lab Results   Component Value Date    CHOL 198 02/29/2016    HDL 40 (L) 02/29/2016    LDLCALC 133 (H) 02/29/2016    TRIG 126 02/29/2016    CREATININE 0.72 12/06/2018    BUN 7 (L) 12/06/2018    K 3.4 (L) 12/06/2018     12/06/2018     12/06/2018    CO2 27 12/06/2018    Lab Results   Component Value Date    WBC 5.9 12/06/2018    HGB 13.2 12/06/2018    HCT 40.5 12/06/2018    MCV 88 12/06/2018     12/06/2018    Lab Results   Component Value Date    TROPONINI <0.01 11/12/2018

## 2021-06-28 NOTE — PROGRESS NOTES
"Progress Notes by Rhina Quispe AuD at 2/7/2020  3:00 PM     Author: Rhina Quispe AuD Service: -- Author Type: Audiologist    Filed: 2/7/2020  4:04 PM Encounter Date: 2/7/2020 Status: Signed    : Rhina Quispe AuD (Audiologist)       Hearing evaluation in conjunction with ENT exam (scheduled to see Dr. Bustos 2-12-20)    Summary:  Audiology visit completed. Please see audiogram below or under \"media\" tab for history and results.    Possible Tullio phenomenon encountered during word recognition testing in the left ear; presentation level of stimuli was 55 dBHL. Dizziness is typically worse toward the end of the day, and is often accompanied by a headache, per patient (patient's appointment today was at 3:00 PM).    Patient reported that onset of tinnitus coincided with new prescription of Symbicort inhaler for asthma.    Transducer:  Both insert phones and circumaural headphones were used.    Reliability:    Good    Recommendations:  Follow-up with ENT as scheduled 2-12-20; retest hearing annually (to monitor) or per medical management/patient concern.  Wear hearing protection consistently in noise to preserve residual hearing sensitivity and to minimize the effects of tinnitus.  Milana ABARCA Viktoria is a potential binaural amplification candidate, if patient motivation exists and medical clearance is granted.    Tung Gleason, [facility]  [place] Licensed Audiologist [id number]           "

## 2021-06-29 ENCOUNTER — COMMUNICATION - HEALTHEAST (OUTPATIENT)
Dept: FAMILY MEDICINE | Facility: CLINIC | Age: 61
End: 2021-06-29

## 2021-06-29 NOTE — PROGRESS NOTES
"Progress Notes by Rhina Quispe AuD at 10/6/2020  2:00 PM     Author: Rhina Quispe AuD Service: -- Author Type: Audiologist    Filed: 10/6/2020  5:07 PM Encounter Date: 10/6/2020 Status: Signed    : Rhina Quispe AuD (Audiologist)       Hearing evaluation in conjunction with ENT exam (Dr. Bustos)    Summary:  Audiology visit completed. Please see audiogram below or under \"media\" tab for history and results.    Hearing sensitivity and word recognition ability essentially stable, bilaterally, per 2-7-20 audiogram.    Acoustic reflex testing was not repeated today, due to time constraints.    Transducer:  Both insert phones and circumaural headphones were used.    Reliability:    Good    Recommendations:  Follow-up with ENT; retest hearing annually (to monitor) or per medical management/patient concern.  Wear hearing protection consistently in noise to preserve residual hearing sensitivity and to minimize the effects of tinnitus.  Milana Blum is a potential binaural amplification candidate, if patient motivation exists and medical clearance is granted.    Lazaro Geiger, B.S.  Audiology Doctoral Student  [facility]    I was present with the patient for the entire Audiology appointment including all procedures/testing performed by the AuD student, and agree with the student's assessment and plan as documented.    Tung Gleason, [facility]  [place] Licensed Audiologist [id number]           "

## 2021-06-30 NOTE — PROGRESS NOTES
Progress Notes by Mati Theodore DO at 3/16/2021 11:10 AM     Author: Mati Theodore DO Service: -- Author Type: Physician    Filed: 3/16/2021  2:15 PM Encounter Date: 3/16/2021 Status: Signed    : Mati Theodore DO (Physician)           Click to link to [facility]     [facility] HEART CARE NOTE        Assessment/Recommendations   Assessment:    1.  Heart palpitations, confirmed premature atrial contractions in the emergency department in past.  Stable in past with Bystolic  2.  Left ventricular hypertrophy, without heart failure  3.  Hypertension, controlled.   4.   Obesity, BMI 45.7 with comorbidities including hyperglycemic  Lab Results   Component Value Date    HGBA1C 6.2 (H) 02/16/2021   5. Progressive exertional dyspnea. Could be cardiac etiology versus deconditioned state    Plan:  1.  Increase Bystolic to 10 mg daily for palpitations, atrial premature contractions  2.  Continue hydrochlorothiazide 25 mg daily for hypertension.   3.  Complete echocardiogram to evaluate exertional dyspnea and lower extremity edema.       History of Present Illness    Ms. Milana Blum is a 60 y.o. female with history of palpitation related to premature ventricular contractions, hypertension, obesity presents to cardiology clinic in follow-up.    Since last clinical evaluation, patient has noticed worsening palpitations possibly related to increased stress in her life. She currently is on her Bystolic at 5 mg daily which is not improving her palpitations. Her palpitations are similar when she had when she had her PACs in the emergency room department in 2018. She does note with her palpitations she gets occasional discomfort in her chest only with her palpitations. She also notes dyspnea on exertion with her palpitations and also with activities. She had a nuclear stress test in 2018 was negative for ischemia. Past echocardiogram in 2018 demonstrated abnormal left ventricular  function reports reviewed below.    Currently her blood pressure is controlled. Heart rates are 60 to 70 bpm. Plan will be to also evaluate for thyroid disorder. She has heat and cold intolerance. She is noted weight gain. And increased anxiety.      ECHO: 12/28/2018    Normal left ventricular size and systolic function.The calculated left ventricular ejection fraction is 65%. This represents a normal ejection fraction. Borderline concentric hypertrophy noted. Grade I (mild) left ventricular diastolic dysfunction.    Normal right ventricular size and systolic function. TAPSE is normal    Left atrial volume is moderately increased    Estimated central venous pressure equal to 3 mmHg.    The ascending aorta is mildly dilated at 40 mm.    No previous study for comparison.       NM STRESS: 11/13/2018     1.The pharmacologic nuclear stress test is negative for inducible myocardial ischemia or infarction.    2.Ejection fraction preserved greater than 75%.    3.The findings of this examination were communicated to the nursing staff at [facility] and Dr. Cady Askew.    4.There is no prior study available.     Physical Examination Review of Systems   Vitals:    03/16/21 1102   BP: 126/78   Pulse: 68   Resp: 14     Body mass index is 45.73 kg/m .  Wt Readings from Last 3 Encounters:   03/16/21 (!) 292 lb (132.5 kg)   02/16/21 (!) 291 lb 9.6 oz (132.3 kg)   01/31/20 (!) 295 lb 11.2 oz (134.1 kg)       General Appearance:   no distress, obese body habitus   ENT/Mouth: membranes moist, no oral lesions or bleeding gums.      EYES:  no scleral icterus, normal conjunctivae   Neck: no carotid bruits or thyromegaly   Chest/Lungs:   lungs are clear to auscultation, no rales and no  wheezing, sternal scar, equal chest wall expansion    Cardiovascular:   Regular. Normal first and second heart sounds with no murmurs, rubs, or gallops; the carotid, radial and posterior tibial pulses are intact, Jugular venous pressure- unable to  assess, mild edema bilaterally     Abdomen:  no tenderness; bowel sounds are present   Extremities: no cyanosis or clubbing   Skin: no xanthelasma, warm.    Neurologic: normal gait, normal  bilateral, no tremors     Psychiatric: alert and oriented x3, calm     General: WNL  Eyes: WNL  Ears/Nose/Throat: WNL  Lungs: WNL  Heart: Chest Pain, Shortness of Breath with activity, Irregular Heartbeat, Leg Swelling  Stomach: WNL  Bladder: WNL  Muscle/Joints: WNL  Skin: WNL  Nervous System: WNL  Mental Health: Depression, Anxiety     Blood: WNL       Medical History  Surgical History Family History Social History   Past Medical History:   Diagnosis Date   ? Asthma    ? Hypertension    ? Low back pain     Past Surgical History:   Procedure Laterality Date   ? BACK SURGERY  08/2010    L4-5, S1 Hemilaminectomy, foraminotomy   ? MD REMOVAL OF OVARY/TUBE(S)      Description: Salpingo-oophorectomy Left Side;  Proc Date: 12/06/2004;   ? MD SUPRACERV ABD HYSTERECTOMY      Description: Supracervical Hysterectomy;  Proc Date: 12/06/2004;    Family History   Problem Relation Age of Onset   ? Hypertension Mother    ? Cancer Father    ? Diabetes Father    ? Hypertension Father    ? Cancer Sister     Social History     Socioeconomic History   ? Marital status:      Spouse name: Not on file   ? Number of children: Not on file   ? Years of education: Not on file   ? Highest education level: Not on file   Occupational History   ? Not on file   Social Needs   ? Financial resource strain: Not on file   ? Food insecurity     Worry: Not on file     Inability: Not on file   ? Transportation needs     Medical: Not on file     Non-medical: Not on file   Tobacco Use   ? Smoking status: Never Smoker   ? Smokeless tobacco: Never Used   Substance and Sexual Activity   ? Alcohol use: Yes     Alcohol/week: 2.0 standard drinks     Types: 1 Glasses of wine, 1 Cans of beer per week   ? Drug use: No   ? Sexual activity: Yes     Partners: Male      Birth control/protection: Surgical   Lifestyle   ? Physical activity     Days per week: Not on file     Minutes per session: Not on file   ? Stress: Not on file   Relationships   ? Social connections     Talks on phone: Not on file     Gets together: Not on file     Attends [employer] service: Not on file     Active member of club or organization: Not on file     Attends meetings of clubs or organizations: Not on file     Relationship status: Not on file   ? Intimate partner violence     Fear of current or ex partner: Not on file     Emotionally abused: Not on file     Physically abused: Not on file     Forced sexual activity: Not on file   Other Topics Concern   ? Not on file   Social History Narrative   ? Not on file          Medications  Allergies   Current Outpatient Medications   Medication Sig Dispense Refill   ? aspirin 81 MG EC tablet Take 1 tablet (81 mg total) by mouth daily.  0   ? b complex vitamins tablet Take 1 tablet by mouth daily.     ? BYSTOLIC 5 mg tablet TAKE 1 TABLET DAILY 90 tablet 3   ? hydroCHLOROthiazide (HYDRODIURIL) 25 MG tablet TAKE 1 TABLET DAILY 90 tablet 3   ? magnesium oxide 250 mg Tab Take 250 mg by mouth daily.     ? s-adenosylmethionine sul tosyl (NAYELY-E ORAL) Take by mouth daily.     ? SYMBICORT 160-4.5 mcg/actuation inhaler INHALE 2 PUFFS BY MOUTH TWICE A DAY (Patient taking differently: as needed. ) 10.2 Inhaler 3   ? albuterol (VENTOLIN HFA) 90 mcg/actuation inhaler INHALE 1 TO 2 PUFFS ORALLY EVERY FOUR HOURS AS NEEDED 54 each 1   ? BLACK COHOSH ORAL Take 1 tablet by mouth daily.     ? citalopram (CELEXA) 10 MG tablet TAKE 1 TABLET BY MOUTH EVERY DAY 30 tablet 1   ? meclizine (ANTIVERT) 25 mg tablet Take 1 tablet (25 mg total) by mouth 3 (three) times a day as needed for dizziness. 30 tablet 1   ? naproxen (NAPROSYN) 500 MG tablet TAKE 1 TABLET TWICE DAILY  WITH MEALS 60 tablet 0     No current facility-administered medications for this visit.       Allergies   Allergen Reactions    ? House Dust Shortness Of Breath and Wheezing     Pollen    ? Other Environmental Allergy Itching         Lab Results    Chemistry/lipid CBC Cardiac Enzymes/BNP/TSH/INR   Lab Results   Component Value Date    CHOL 177 11/21/2019    HDL 35 (L) 11/21/2019    LDLCALC 126 11/21/2019    TRIG 80 11/21/2019    CREATININE 0.71 02/16/2021    BUN 15 02/16/2021    K 4.5 02/16/2021     02/16/2021     02/16/2021    CO2 28 02/16/2021    Lab Results   Component Value Date    WBC 7.5 02/16/2021    HGB 12.7 02/16/2021    HCT 39.8 02/16/2021    MCV 87 02/16/2021     02/16/2021    Lab Results   Component Value Date    TROPONINI <0.01 11/12/2018

## 2021-07-05 ENCOUNTER — AMBULATORY - HEALTHEAST (OUTPATIENT)
Dept: NURSING | Facility: CLINIC | Age: 61
End: 2021-07-05

## 2021-07-06 VITALS
BODY MASS INDEX: 46.2 KG/M2 | WEIGHT: 293 LBS | SYSTOLIC BLOOD PRESSURE: 114 MMHG | DIASTOLIC BLOOD PRESSURE: 76 MMHG | OXYGEN SATURATION: 97 % | HEART RATE: 66 BPM

## 2021-07-07 NOTE — TELEPHONE ENCOUNTER
Her ultrasound does not show a DVT.  It does confirm extensive superficial thrombophlebitis.  It appears as though she has had this in the past.  Please forward to Dr. Arnold to decide if it is okay to wait for her to see the vascular specialist.  If she has more questions, it might be worth scheduling a virtual appt with Dr. Arnold tomorrow.

## 2021-07-07 NOTE — TELEPHONE ENCOUNTER
Received a call from Nathaniel with [facility].    Patients bilateral leg ultrasound is final.       Per 6/28/2021 US:  IMPRESSION:   1.  Nothing for acute DVT.     2.  Extensive superficial thrombophlebitis left greater saphenous vein within 2 cm of the confluence of the common femoral vein.     3.  Given the extent of the superficial phlebitis we will notify referring physician of this report.       Please advise on results.

## 2021-07-07 NOTE — TELEPHONE ENCOUNTER
Patient is calling to see what the results of her venous ultrasound are?   Per 6/28/2021 venous ultrasound:   IMPRESSION:   1.  Nothing for acute DVT.     2.  Extensive superficial thrombophlebitis left greater saphenous vein within 2 cm of the confluence of the common femoral vein.     3.  Given the extent of the superficial phlebitis we will notify referring physician of this report.       Patient reports she was unable to get an appointment with vascular until August.  Is it ok to wait until August to meet with the vascular specialist?       Please advise on ultrasound results and whether or not it is ok to wait until August to see the vascular specialist.    Phone number: [phone] - [place]

## 2021-07-07 NOTE — PROGRESS NOTES
Assessment:     1. Phlebitis  US Venous Legs Bilateral    Ambulatory referral to Vascular Surgery [facility]    D-dimer, Quantitative       Plan:     1. Phlebitis  Patient will continue her elevation and use of Advil until we rule out a DVT  - US Venous Legs Bilateral; Future  - Ambulatory referral to Vascular Surgery [facility]  - D-dimer, Quantitative      Subjective:   Milana has a past medical history of superficial phlebitis bilaterally today she is having a flare on her left side in the popliteal area which is exquisitely tender also in the mid calf.  Homans' sign is equivocal she is not tender on the right side but is a little bit swollen there.  She has multiple varicosities.  She previously was treated for superficial venous thrombosis at the [facility] vein center 7 or 8 years ago.  Comorbid factor is include being overweight.  Plan here today is to do a D-dimer and ultrasound of her legs.  We may have to anticoagulate her depending on the test results.  She may require an emergency room evaluation and consideration of Lovenox with subsequent warfarin or Eliquis depending on her test results.  If they proved to be negative for DVT she is to elevate it put heat on it and take her Advil all medical questions and possible outcomes were discussed with the patient and she understands.    Review of Systems: A complete 14 point review of systems was obtained and is negative or as stated in the history of present illness.    Past Medical History:   Diagnosis Date     Asthma      Hypertension      Low back pain      Family History   Problem Relation Age of Onset     Hypertension Mother      Cancer Father      Diabetes Father      Hypertension Father      Cancer Sister      Past Surgical History:   Procedure Laterality Date     BACK SURGERY  08/2010    L4-5, S1 Hemilaminectomy, foraminotomy     [place] REMOVAL OF OVARY/TUBE(S)      Description: Salpingo-oophorectomy Left Side;  Proc Date: 12/06/2004;     MA SUPRACERV ABD  HYSTERECTOMY      Description: Supracervical Hysterectomy;  Proc Date: 12/06/2004;     Social History     Tobacco Use     Smoking status: Never Smoker     Smokeless tobacco: Never Used   Substance Use Topics     Alcohol use: Yes     Alcohol/week: 2.0 standard drinks     Types: 1 Glasses of wine, 1 Cans of beer per week     Drug use: No         Objective:   /76   Pulse 66   Wt (!) 295 lb (133.8 kg)   SpO2 97%   BMI 46.20 kg/m      General Appearance:  Normal; overweight body habitus  Head:  Normal  Ears: Normal  Eyes:  Normal  Nose:  Normal  Throat:  Normal  Neck:  Normal  Back:  Normal  Chest/Breast:Normal  Lungs:  Normal  Heart:  Normal  Abdomen:  Normal  Musculoskeletal:  Normal  Lymphatic:  Normal  Skin/Hair/Nails:  Normal  Neurologic:  Normal  Extremities: She is moderately tender in the left popliteal area her superficial veins in her medial knee area on the left side are exquisitely tender Homans' sign is equivocal on the left side genitourinary: Normal  Pulses:  Normal           This note has been dictated using voice recognition software. Any grammatical or context distortions are unintentional and inherent to the the software.

## 2021-07-26 ENCOUNTER — IMMUNIZATION (OUTPATIENT)
Dept: NURSING | Facility: CLINIC | Age: 61
End: 2021-07-26
Attending: PEDIATRICS
Payer: COMMERCIAL

## 2021-07-26 DIAGNOSIS — Z23 ENCOUNTER FOR IMMUNIZATION: ICD-10-CM

## 2021-07-26 PROCEDURE — 0002A PR COVID VAC PFIZER DIL RECON 30 MCG/0.3 ML IM: CPT

## 2021-07-26 PROCEDURE — 91300 PR COVID VAC PFIZER DIL RECON 30 MCG/0.3 ML IM: CPT

## 2021-08-15 ENCOUNTER — HEALTH MAINTENANCE LETTER (OUTPATIENT)
Age: 61
End: 2021-08-15

## 2021-08-24 ENCOUNTER — OFFICE VISIT (OUTPATIENT)
Dept: VASCULAR SURGERY | Facility: CLINIC | Age: 61
End: 2021-08-24
Attending: SPECIALIST
Payer: COMMERCIAL

## 2021-08-24 VITALS
DIASTOLIC BLOOD PRESSURE: 78 MMHG | RESPIRATION RATE: 16 BRPM | TEMPERATURE: 98.7 F | BODY MASS INDEX: 46.36 KG/M2 | WEIGHT: 293 LBS | HEART RATE: 76 BPM | SYSTOLIC BLOOD PRESSURE: 130 MMHG

## 2021-08-24 DIAGNOSIS — I83.893 VARICOSE VEINS OF BILATERAL LOWER EXTREMITIES WITH OTHER COMPLICATIONS: Primary | ICD-10-CM

## 2021-08-24 PROCEDURE — 99203 OFFICE O/P NEW LOW 30 MIN: CPT | Performed by: SPECIALIST

## 2021-08-24 PROCEDURE — G0463 HOSPITAL OUTPT CLINIC VISIT: HCPCS

## 2021-08-24 RX ORDER — ZINC SULFATE 50(220)MG
220 CAPSULE ORAL DAILY
COMMUNITY
End: 2022-06-05

## 2021-08-24 ASSESSMENT — PAIN SCALES - GENERAL: PAINLEVEL: NO PAIN (0)

## 2021-08-27 NOTE — PROGRESS NOTES
[facility] Vein Consult      Assessment:     1. varicose veins, bilateral   2. spider veins, bilateral     Plan:     1. Treatment options of conservative therapy of stockings use, exercise, weight loss, elevating legs when possible.    2. Script for compression stockings 20-30 mm hg  3. Ultrasound to evaluate legs for incompetency of both deep and superficial system .   4. Surgical treatment, disucssed briefly today options  5. Follow up: 3 months.   6. Call for any questions concerns or issues    Subjective:      Milana Blum is a 60 year old female  who was referred by Burak Arnold  for evaluation of varicose veins. Symptoms include pain, aching, fatigue, burning, edema and dermatitis. Patient has history of leg selling, pain and vein issues that have progressed. Pain and symptoms have affected daily living and work activities needing medications. Here for evaluation today. no stocking or compression devic use    Allergies:House dust [dust mite extract] and Other environmental allergy    Past Medical History:   Diagnosis Date     Asthma      Hypertension      Low back pain        Past Surgical History:   Procedure Laterality Date     BACK SURGERY  08/2010    L4-5, S1 Hemilaminectomy, foraminotomy     C SUPRACERV ABD HYSTERECTOMY      Description: Supracervical Hysterectomy;  Proc Date: 12/06/2004;     IR LUMBAR EPIDURAL STEROID INJECTION  3/4/2010     [place] REMOVAL OF OVARY/TUBE(S)      Description: Salpingo-oophorectomy Left Side;  Proc Date: 12/06/2004;         Current Outpatient Medications:      albuterol (VENTOLIN HFA) 90 mcg/actuation inhaler, [ALBUTEROL (VENTOLIN HFA) 90 MCG/ACTUATION INHALER] INHALE 1 TO 2 PUFFS ORALLY EVERY FOUR HOURS AS NEEDED, Disp: 54 each, Rfl: 1     aspirin 81 MG EC tablet, [ASPIRIN 81 MG EC TABLET] Take 1 tablet (81 mg total) by mouth daily., Disp: , Rfl: 0     b complex vitamins tablet, [B COMPLEX VITAMINS TABLET] Take 1 tablet by mouth daily., Disp: , Rfl:       hydroCHLOROthiazide (HYDRODIURIL) 25 MG tablet, [HYDROCHLOROTHIAZIDE (HYDRODIURIL) 25 MG TABLET] TAKE 1 TABLET DAILY, Disp: 90 tablet, Rfl: 3     magnesium oxide 250 mg Tab, [MAGNESIUM OXIDE 250 MG TAB] Take 250 mg by mouth daily., Disp: , Rfl:      nebivoloL (BYSTOLIC) 10 MG tablet, [NEBIVOLOL (BYSTOLIC) 10 MG TABLET] Take 1 tablet (10 mg total) by mouth daily., Disp: 90 tablet, Rfl: 1     SYMBICORT 160-4.5 mcg/actuation inhaler, [SYMBICORT 160-4.5 MCG/ACTUATION INHALER] INHALE 2 PUFFS BY MOUTH TWICE A DAY, Disp: 10.2 Inhaler, Rfl: 3     zinc sulfate (ZINCATE) 220 (50 Zn) MG capsule, Take 220 mg by mouth daily, Disp: , Rfl:      BLACK COHOSH ORAL, [BLACK COHOSH ORAL] Take 1 tablet by mouth daily. (Patient not taking: Reported on 8/24/2021), Disp: , Rfl:      meclizine (ANTIVERT) 25 mg tablet, [MECLIZINE (ANTIVERT) 25 MG TABLET] Take 1 tablet (25 mg total) by mouth 3 (three) times a day as needed for dizziness. (Patient not taking: Reported on 8/24/2021), Disp: 30 tablet, Rfl: 1     naproxen (NAPROSYN) 500 MG tablet, [NAPROXEN (NAPROSYN) 500 MG TABLET] TAKE 1 TABLET TWICE DAILY  WITH MEALS (Patient not taking: Reported on 8/24/2021), Disp: 60 tablet, Rfl: 0     s-adenosylmethionine sul tosyl (NAYELY-E ORAL), [S-ADENOSYLMETHIONINE SUL TOSYL (NAYELY-E ORAL)] Take by mouth daily., Disp: , Rfl:      Family History   Problem Relation Age of Onset     Hypertension Mother      Cancer Father      Diabetes Father      Hypertension Father      Cancer Sister         reports that she has never smoked. She has never used smokeless tobacco. She reports current alcohol use of about 2.0 standard drinks of alcohol per week. She reports that she does not use drugs.      Review of Systems:  Pertinent items are noted in HPI.   Patient has symptomatic veins and changes of bilateral legs. These have progressed to the point of causing symptoms on a daily basis. This causes issues with daily activities and chores such as washing dishes,  vacuuming, outdoor upkeep and standing for long lengths of time       Objective:     Vitals:    08/24/21 1138   BP: 130/78   Pulse: 76   Resp: 16   Temp: 98.7  F (37.1  C)   Weight: 296 lb (134.3 kg)     Body mass index is 46.36 kg/m .    EXAM:  GENERAL: This is a well-developed 60 year old female who appears her stated age  HEAD: normocephalic  HEENT: Pupils equal and reactive bilaterally  MOUTH: mucus membranes intact. Normal dentation  CARDIAC: RRR without murmur  CHEST/LUNG:  Clear to auscultation bilaterally  ABDOMEN: Soft, nontender, nondistended, no masses noted   NEUROLOGIC: Focally intact, nonfocal, alert and oriented x 3  INTEGUMENT: No open lesions or ulcers  VASCULAR: Pulses intact, symmetrical upper and lower extremities. There areskin changes consistent with chronic venous insufficiency. Varicose veins present in bilateral greater saphenous distribution. Spider veins present bilateral.        Imaging: pending      Esteban Early MD  General Surgery [phone]  Vascular Surgery [phone]

## 2021-08-31 ENCOUNTER — ANCILLARY PROCEDURE (OUTPATIENT)
Dept: VASCULAR ULTRASOUND | Facility: CLINIC | Age: 61
End: 2021-08-31
Attending: SPECIALIST
Payer: COMMERCIAL

## 2021-08-31 DIAGNOSIS — I83.893 VARICOSE VEINS OF BILATERAL LOWER EXTREMITIES WITH OTHER COMPLICATIONS: ICD-10-CM

## 2021-08-31 PROCEDURE — 93970 EXTREMITY STUDY: CPT

## 2021-10-01 DIAGNOSIS — R06.2 WHEEZING: ICD-10-CM

## 2021-10-01 DIAGNOSIS — I10 HYPERTENSION: ICD-10-CM

## 2021-10-01 DIAGNOSIS — M25.551 HIP PAIN, RIGHT: ICD-10-CM

## 2021-10-01 DIAGNOSIS — J40 BRONCHITIS: ICD-10-CM

## 2021-10-01 DIAGNOSIS — R00.2 PALPITATIONS: ICD-10-CM

## 2021-10-01 RX ORDER — NEBIVOLOL 10 MG/1
10 TABLET ORAL DAILY
Qty: 90 TABLET | Refills: 1 | Status: SHIPPED | OUTPATIENT
Start: 2021-10-01 | End: 2021-10-04

## 2021-10-02 RX ORDER — HYDROCHLOROTHIAZIDE 25 MG/1
TABLET ORAL
Qty: 90 TABLET | Refills: 0 | Status: SHIPPED | OUTPATIENT
Start: 2021-10-02 | End: 2022-01-07

## 2021-10-02 NOTE — TELEPHONE ENCOUNTER
"Routing refill request to provider for review/approval because:  Failed asthma protocol    Last Written Prescription Date:  10/20/2020  Last Fill Quantity: 10.2 inhaler,  # refills: 3   Last office visit provider:  6/30/2021 Dr. Arnold     SYMBICORT 160-4.5 mcg/actuation inhaler 10.2 Inhaler 3 10/20/2020  No   Sig: INHALE 2 PUFFS BY MOUTH TWICE A DAY   Patient taking differently: as needed.        Sent to pharmacy as: Symbicort 160 mcg-4.5 mcg/actuation HFA aerosol inhaler (budesonide-formoteroL)   E-Prescribing Status: Receipt confirmed by pharmacy (10/20/2020 10:39 AM CDT         Requested Prescriptions   Pending Prescriptions Disp Refills     SYMBICORT 160-4.5 MCG/ACT Inhaler [Pharmacy Med Name: SYMBICORT 160-4.5 MCG INHALER]  1     Sig: INHALE 2 PUFFS BY MOUTH TWICE A DAY       Inhaled Steroids Protocol Failed - 10/1/2021  7:20 AM        Failed - Asthma control assessment score within normal limits in last 6 months     Please review ACT score.           Failed - Recent (6 mo) or future (30 days) visit within the authorizing provider's specialty     Patient had office visit in the last 6 months or has a visit in the next 30 days with authorizing provider or within the authorizing provider's specialty.  See \"Patient Info\" tab in inbasket, or \"Choose Columns\" in Meds & Orders section of the refill encounter.            Passed - Patient is age 12 or older        Passed - Medication is active on med list       Long-Acting Beta Agonist Inhalers Protocol  Failed - 10/1/2021  7:20 AM        Failed - Asthma control assessment score within normal limits in last 6 months     Please review ACT score.           Failed - Recent (6 mo) or future (30 days) visit within the authorizing provider's specialty     Patient had office visit in the last 6 months or has a visit in the next 30 days with authorizing provider or within the authorizing provider's specialty.  See \"Patient Info\" tab in inbasket, or \"Choose Columns\" in Meds & " Orders section of the refill encounter.            Passed - Patient is age 12 or older        Passed - Medication is active on med list             Chuyita Ballard RN 10/02/21 4:08 PM

## 2021-10-02 NOTE — TELEPHONE ENCOUNTER
"Routing refill request to provider for review/approval because:  A break in medication    Last Written Prescription Date:  2/10/2021  Last Fill Quantity: 60,  # refills: 0   Last office visit provider:  6/30/2021 Dr. Arnold     naproxen (NAPROSYN) 500 MG tablet 60 tablet 0 2/10/2021  No   Sig: TAKE 1 TABLET TWICE DAILY  WITH MEALS   Sent to pharmacy as: naproxen 500 mg tablet (NAPROSYN)   E-Prescribing Status: Receipt confirmed by pharmacy (2/10/2021  7:01 PM CST)     Renewals    Renewal provider: Burak Arnold MD            Requested Prescriptions   Pending Prescriptions Disp Refills     naproxen (NAPROSYN) 500 MG tablet [Pharmacy Med Name: NAPROXEN TAB 500MG] 60 tablet 0     Sig: TAKE 1 TABLET TWICE DAILY  WITH MEALS       NSAID Medications Passed - 10/2/2021  3:42 PM        Passed - Blood pressure under 140/90 in past 12 months     BP Readings from Last 3 Encounters:   08/24/21 130/78   06/28/21 114/76   04/15/21 118/68                 Passed - Normal ALT on file in past 12 months     Recent Labs   Lab Test 02/16/21  1034   ALT 24             Passed - Normal AST on file in past 12 months     Recent Labs   Lab Test 02/16/21  1034   AST 26             Passed - Recent (12 mo) or future (30 days) visit within the authorizing provider's specialty     Patient has had an office visit with the authorizing provider or a provider within the authorizing providers department within the previous 12 mos or has a future within next 30 days. See \"Patient Info\" tab in inbasket, or \"Choose Columns\" in Meds & Orders section of the refill encounter.              Passed - Patient is age 6-64 years        Passed - Normal CBC on file in past 12 months     Recent Labs   Lab Test 02/16/21  1034   WBC 7.5   RBC 4.59   HGB 12.7   HCT 39.8                    Passed - Medication is active on med list        Passed - No active pregnancy on record        Passed - Normal serum creatinine on file in past 12 months     Recent Labs   Lab " Test 02/16/21  1034   CR 0.71       Ok to refill medication if creatinine is low          Passed - No positive pregnancy test in past 12 months             Chuyita Ballard RN 10/02/21 4:00 PM

## 2021-10-02 NOTE — TELEPHONE ENCOUNTER
"Last Written Prescription Date:  10/10/2020  Last Fill Quantity: 90,  # refills: 3   Last office visit provider:  6/30/2021 Dr. Arnold    hydroCHLOROthiazide (HYDRODIURIL) 25 MG tablet 90 tablet 3 10/10/2020  No   Sig: TAKE 1 TABLET DAILY   Sent to pharmacy as: hydroCHLOROthiazide 25 mg tablet (HYDRODIURIL)   E-Prescribing Status: Receipt confirmed by pharmacy (10/10/2020 12:16 PM CDT)         Requested Prescriptions   Pending Prescriptions Disp Refills     hydrochlorothiazide (HYDRODIURIL) 25 MG tablet [Pharmacy Med Name: HYDROCHLOROT TAB 25MG] 90 tablet 3     Sig: TAKE 1 TABLET DAILY       Diuretics (Including Combos) Protocol Passed - 10/1/2021  6:43 AM        Passed - Blood pressure under 140/90 in past 12 months     BP Readings from Last 3 Encounters:   08/24/21 130/78   06/28/21 114/76   04/15/21 118/68                 Passed - Recent (12 mo) or future (30 days) visit within the authorizing provider's specialty     Patient has had an office visit with the authorizing provider or a provider within the authorizing providers department within the previous 12 mos or has a future within next 30 days. See \"Patient Info\" tab in inbasket, or \"Choose Columns\" in Meds & Orders section of the refill encounter.              Passed - Medication is active on med list        Passed - Patient is age 18 or older        Passed - No active pregancy on record        Passed - Normal serum creatinine on file in past 12 months     Recent Labs   Lab Test 02/16/21  1034   CR 0.71              Passed - Normal serum potassium on file in past 12 months     Recent Labs   Lab Test 02/16/21  1034   POTASSIUM 4.5                    Passed - Normal serum sodium on file in past 12 months     Recent Labs   Lab Test 02/16/21  1034                 Passed - No positive pregnancy test in past 12 months             Chuyita Ballard RN 10/02/21 4:02 PM  "

## 2021-10-03 RX ORDER — NAPROXEN 500 MG/1
TABLET ORAL
Qty: 60 TABLET | Refills: 0 | Status: SHIPPED | OUTPATIENT
Start: 2021-10-03 | End: 2022-06-05

## 2021-10-03 RX ORDER — BUDESONIDE AND FORMOTEROL FUMARATE DIHYDRATE 160; 4.5 UG/1; UG/1
AEROSOL RESPIRATORY (INHALATION)
Qty: 28 G | Refills: 3 | Status: SHIPPED | OUTPATIENT
Start: 2021-10-03 | End: 2022-08-25

## 2021-10-04 DIAGNOSIS — R00.2 PALPITATIONS: ICD-10-CM

## 2021-10-04 RX ORDER — NEBIVOLOL 10 MG/1
10 TABLET ORAL DAILY
Qty: 90 TABLET | Refills: 1 | Status: SHIPPED | OUTPATIENT
Start: 2021-10-04 | End: 2022-03-07

## 2021-10-10 ENCOUNTER — HEALTH MAINTENANCE LETTER (OUTPATIENT)
Age: 61
End: 2021-10-10

## 2021-10-14 ENCOUNTER — OFFICE VISIT (OUTPATIENT)
Dept: FAMILY MEDICINE | Facility: CLINIC | Age: 61
End: 2021-10-14
Payer: COMMERCIAL

## 2021-10-14 VITALS
OXYGEN SATURATION: 97 % | HEART RATE: 75 BPM | BODY MASS INDEX: 46.2 KG/M2 | DIASTOLIC BLOOD PRESSURE: 76 MMHG | SYSTOLIC BLOOD PRESSURE: 130 MMHG | WEIGHT: 293 LBS

## 2021-10-14 DIAGNOSIS — I10 PRIMARY HYPERTENSION: Primary | ICD-10-CM

## 2021-10-14 PROCEDURE — 99213 OFFICE O/P EST LOW 20 MIN: CPT | Performed by: FAMILY MEDICINE

## 2021-10-14 RX ORDER — NEBIVOLOL 5 MG/1
5 TABLET ORAL DAILY
Qty: 90 TABLET | Refills: 3 | Status: SHIPPED | OUTPATIENT
Start: 2021-10-14 | End: 2022-03-07

## 2021-10-14 NOTE — PROGRESS NOTES
"    Assessment & Plan     Primary hypertension  We will treat the following medication and renew it for 1 year; she is moving to [facility]  - nebivolol (BYSTOLIC) 5 MG tablet  Dispense: 90 tablet; Refill: 3               BMI:   Estimated body mass index is 46.2 kg/m  as calculated from the following:    Height as of 4/16/21: 1.702 m (5' 7\").    Weight as of this encounter: 133.8 kg (295 lb).   Weight management plan: Discussed healthy diet and exercise guidelines        No follow-ups on file.    Burak Arnold MD  [facility]    Gilda Cortés is a 60 year old who presents for the following health issues     HPI     Milana is moving to the [facility] area.  Her blood pressure has been stable.  Her dosage of Bystolic has been corrected to 5 mg we did give her a years worth of pills.  Her venous consult at [facility] was reviewed.  We wish her well with her new provider in the [facility].  She has no complaints at that time and is doing really quite well.  We will miss her she is a wonderful person we will miss all of her family.      Review of Systems   Constitutional, HEENT, cardiovascular, pulmonary, gi and gu systems are negative, except as otherwise noted.      Objective    /76   Pulse 75   Wt 133.8 kg (295 lb)   SpO2 97%   BMI 46.20 kg/m    Body mass index is 46.2 kg/m .  Physical Exam       General Appearance:  Alert, cooperative, no distress  Head:  Normocephalic, no obvious abnormality  Ears: TM anatomy normal  Eyes:  PERRL, EOM's intact, conjunctiva and corneas clear  Nose:  Nares symmetrical, septum midline, mucosa pink, no sinus tenderness  Throat:  Lips, tongue, and mucosa are moist, pink, and intact  Neck:  Supple, symmetrical, trachea midline, no adenopathy; thyroid: no enlargement, symmetric,no tenderness/mass/nodules; no carotid bruit, no JVD  Back:  Symmetrical, no curvature, ROM normal, no CVA tenderness  Chest/Breast:  No mass " or tenderness  Lungs:  Clear to auscultation bilaterally, respirations unlabored   Heart:  Normal PMI, regular rate & rhythm, S1 and S2 normal, no murmurs, rubs, or gallops  Abdomen:  Soft, non-tender, bowel sounds active all four quadrants, no mass, or organomegaly  Musculoskeletal:  Tone and strength strong and symmetrical, all extremities  Lymphatic:  No adenopathy  Skin/Hair/Nails:  Skin warm, dry, and intact, no rashes  Neurologic:  Alert and oriented x3, no cranial nerve deficits, normal strength and tone, gait steady  Extremities:  No edema.  Ibrahima's sign negative.    Genitourinary: deferred  Pulses:  Equal bilaterally

## 2021-11-24 ENCOUNTER — VIRTUAL VISIT (OUTPATIENT)
Dept: VASCULAR SURGERY | Facility: CLINIC | Age: 61
End: 2021-11-24
Attending: SPECIALIST
Payer: COMMERCIAL

## 2021-11-24 DIAGNOSIS — I83.893 VARICOSE VEINS OF BILATERAL LOWER EXTREMITIES WITH OTHER COMPLICATIONS: Primary | ICD-10-CM

## 2021-11-24 PROCEDURE — 99213 OFFICE O/P EST LOW 20 MIN: CPT | Mod: 95 | Performed by: SPECIALIST

## 2021-11-24 NOTE — NURSING NOTE
Patient is a bilateral GSV and right SSV candidate. Insurance changing to [employer] in January 2022. Patient calling to request procedure after insurance change. May wait until spring to pursue procedure.

## 2021-11-24 NOTE — PATIENT INSTRUCTIONS
Please call our clinic with updated insurance information and when you would like to submit the prior authorization request to your insurance for the procedure. [phone].    Home Care Instructions Following Enovenous Closure (venaseal)     Dressing    Wear your compression for 48 hours continuously until your ultrasound after the procedure.  You can remove your stocking to shower in 24 hours but then reapply after.    Wear the stocking for two weeks after the procedure while you are up.     Activity    The day of the procedure make sure to walk around the house for a few minutes each hour until bedtime.    The day after the procedure you should advance activity as tolerated.  NO strenuous activities though for 2 weeks.  In general avoid prolonged standing in place and sitting with your legs down.  Both of these activities will cause blood to pool in your legs resulting in more swelling and discomfort.    Do not drive or operate heavy machinery for 24 hours after the procedure.    Do not take baths or use hot tubs or swimming pools until your incision site is healed.    Do not fly for the Next 3 weeks.     Post Procedure Ultrasound    You will need an ultrasound within 2-3 days following the closure procedure.  Please schedule an ultrasound if you have not already done so.     Post Procedure Signs and Symptoms    There can be a mild amount of bruising and numbness after this procedure.  This will typically take a few weeks to fade.    You may feel pain or tenderness in your inner thigh.  Mild pain medication such as Tylenol or Ibuprofen maybe helpful.    It is normal to have fluid leaking from the injection areas the day of the procedure and it may be blood tinged.     Call if you have    Fever greater than 100.8 degrees F.    Uncontrolled bleeding from the incision site.    Pain that is not relieved by rest or pain medication.    Shortness of breath     Follow -up    Please plan to see your surgeon in the office  two weeks after your procedure    Call [phone] to schedule this appointment if one has not already been made     You will receive a phone call from our staff within 48 hours of your procedure.  Please have these instruction near by so that any questions can be addressed at that time.  If you have any concerns before that time, please do not hesitate to call us al [phone] and ask to speak to a nurse.  We want you to have a smooth recovery.     For emergencies after 4:30pm Monday - Friday, holidays and weekends:  For Dr Esteban Early call [facility] at [phone]  [facility] [phone]                  UNDERSTAND  Varicose veins may be a sign of something more severe-venous reflux disease.  Healthy leg veins have valves that keep blood flowing to the heart. Venous reflux disease develops when the valves stop working properly and allow blood to flow backward (i.e., reflux) and pool in the lower leg veins.   If venous reflux disease is left untreated, symptoms can worsen over time. Your doctor can help you understand if you have this condition.   Superficial venous reflux disease may cause the following signs and symptoms in your legs:  Varicose veins  Aching  Swelling  Cramping  Heaviness or tiredness  Itching  Restlessness  Open skin sores    Treat  Superficial venous reflux disease treatment aims to reduce or stop the backward flow of blood. The following may be prescribed to treat your superficial venous reflux disease. Your doctor can help you decide which treatment is best for you:   Compression stockings   Removing diseased vein   Closing diseased vein (through thermal or non-thermal treatment)       VenaSeal  Closure System  One non-thermal treatment option is the VenaSeal  Closure System, which improves blood flow and relieves symptoms by sealing-or closing-the diseased vein. The system delivers a small amount of a specially formulated medical adhesive to the diseased  vein. The adhesive permanently seals the vein and blood is rerouted through nearby healthy veins.      Demonstrated Outcomes  The VenaSeal  closure system is a safe and effective treatment, providing significant improvements in quality of life.1,2,3  In a [place] study , the VenaSeal  system and thermal radiofrequency ablation treatments had similar clinical results at 3 years; 94.4% closure for the VenaSeal  system and 91.9% for thermal energy.   Side effects were minor and infrequent.   The most common side effect was phlebitis (i.e., inflammation of a vein), and it typically occurred within the first 30 days after the procedure. Phlebitis is a commonly reported side effect in all vein treatments. Phlebitis occurred more frequently in VenaSeal  system-treated subjects than in RFA-treated subjects, though the difference was not statistically significant.   Please see the Potential risks section for more information.    FAQ    What can I expect of the VenaSeal  procedure?  Before the Procedure:  You will have an ultrasound imaging exam of the leg that is to be treated. This exam is important for assessing the diseased superficial vein and planning the procedure.  During the Procedure:  Your doctor can discuss the procedure with you. A brief summary of what to expect is below:   You may feel some minor pain or stinging with a needle stick to numb the site where the doctor will access your vein.   Once the area is numb, your doctor will insert the catheter (i.e., a small hollow tube) into your leg. You may feel some pressure from the placement of the catheter.   The catheter will be placed in specific areas along the diseased vein to deliver small amounts of the medical adhesive. You may feel some mild sensation of pulling. Ultrasound will be used during the procedure to guide and position the catheter.   After treatment, the catheter is removed and a small adhesive bandage placed over the puncture site.    After the  Procedure:   You will be taken to the recovery area to rest.   Your doctor will recommend follow-up care as needed.    Commonly Asked Questions  When will my symptoms improve?  Symptoms are caused by the diseased superficial vein. Thus, symptoms may improve as soon as the diseased vein is closed.  When can I return to normal activity?  The VenaSeal procedure is designed to reduce recovery time. Many patients return to normal activity immediately after the procedure. Your doctor can help you determine when you can return to normal activity.  Is the VenaSeal procedure painful?  Most patients feel little, if any, pain during the outpatient procedure.1  Is there bruising after the VenaSeal  procedure?  Most patients report [id number]-xn-[id number] bruising after the VenaSeal procedure.1  What happens to the VenaSeal  adhesive?  Only a very small amount of VenaSeal  adhesive is used to close the vein. Your body will naturally create scar tissue around the adhesive over time to keep the vessel permanently closed.  How does the VenaSeal  procedure differ from thermal energy procedures?  The VenaSeal  procedure uses an adhesive to close the superficial vein. Thermal energy procedures use heat to close the vein. The intense heat requires a large volume of numbing medicine, which is injected through many needle sticks. The injections may cause pain and bruising after the procedure.  Is the VenaSeal procedure covered by insurance?  As with any procedure, insurance coverage may vary. For more information, please contact your insurance provider.    The VenaSeal  procedure may not be right for everyone    Your doctor can help you decide if the VenaSeal  procedure is right for you. The VenaSeal  procedure is contraindicated for individuals with any of the following conditions:   Thrombophlebitis migrans (i.e., inflammation of a vein caused by a slow moving blood clot)   Acute superficial thrombophlebitis (i.e., inflammation of a vein caused  by a blood clot)   Previous hypersensitivity reactions to the VenaSeal  adhesive or cyanoacrylates   Acute sepsis (i.e., whole-body inflammation caused by an immune response to an infection)    Potential risks  The VenaSeal  procedure is minimally invasive and catheter-based. As such, it may involve the following risks. Your doctor can help you understand these risks.   Allergic reaction to the VenaSeal  adhesive   Arteriovenous fistula (i.e., an abnormal connection between an artery and a vein)   Bleeding from the access site   Deep vein thrombosis (i.e., blood clot in the deep vein system)   Edema (i.e., swelling) in the treated leg   Embolization (i.e., blockage of a vein or artery), including pulmonary embolism (i.e., blockage of an artery in the lungs)   Hematoma (i.e., the collection of blood outside of a vessel)   Hyperpigmentation (i.e., darkening of the skin)   Infection at the access site   Non-specific mild inflammation of the cutaneous and subcutaneous tissue   Pain   Paresthesia (i.e., a feeling of tingling, pricking, numbness or burning)   Phlebitis (i.e., inflammation of a vein)   Superficial thrombophlebitis (i.e., inflammation of a vein caused by a blood clot)   Urticaria (i.e., hives) or ulceration may occur at the site of injection   Vascular rupture and perforation   Visible scarring

## 2022-01-04 DIAGNOSIS — I10 HYPERTENSION: ICD-10-CM

## 2022-01-07 RX ORDER — HYDROCHLOROTHIAZIDE 25 MG/1
25 TABLET ORAL DAILY
Qty: 90 TABLET | Refills: 0 | Status: SHIPPED | OUTPATIENT
Start: 2022-01-07 | End: 2022-03-07

## 2022-01-07 NOTE — TELEPHONE ENCOUNTER
"Last Written Prescription Date:  10/2/21  Last Fill Quantity: 90,  # refills: 0   Last office visit provider:  10/14/21     Requested Prescriptions   Pending Prescriptions Disp Refills     hydrochlorothiazide (HYDRODIURIL) 25 MG tablet [Pharmacy Med Name: HYDROCHLOROT TAB 25MG] 90 tablet 0     Sig: TAKE 1 TABLET DAILY       Diuretics (Including Combos) Protocol Passed - 1/4/2022 11:28 AM        Passed - Blood pressure under 140/90 in past 12 months     BP Readings from Last 3 Encounters:   10/14/21 130/76   08/24/21 130/78   06/28/21 114/76                 Passed - Recent (12 mo) or future (30 days) visit within the authorizing provider's specialty     Patient has had an office visit with the authorizing provider or a provider within the authorizing providers department within the previous 12 mos or has a future within next 30 days. See \"Patient Info\" tab in inbasket, or \"Choose Columns\" in Meds & Orders section of the refill encounter.              Passed - Medication is active on med list        Passed - Patient is age 18 or older        Passed - No active pregancy on record        Passed - Normal serum creatinine on file in past 12 months     Recent Labs   Lab Test 02/16/21  1034   CR 0.71              Passed - Normal serum potassium on file in past 12 months     Recent Labs   Lab Test 02/16/21  1034   POTASSIUM 4.5                    Passed - Normal serum sodium on file in past 12 months     Recent Labs   Lab Test 02/16/21  1034                 Passed - No positive pregnancy test in past 12 months             Cristian Cote RN 01/07/22 7:37 AM  "

## 2022-01-10 ENCOUNTER — MYC REFILL (OUTPATIENT)
Dept: FAMILY MEDICINE | Facility: CLINIC | Age: 62
End: 2022-01-10
Payer: COMMERCIAL

## 2022-01-10 DIAGNOSIS — I10 HYPERTENSION: ICD-10-CM

## 2022-01-12 RX ORDER — HYDROCHLOROTHIAZIDE 25 MG/1
25 TABLET ORAL DAILY
Qty: 90 TABLET | Refills: 0 | OUTPATIENT
Start: 2022-01-12

## 2022-01-12 NOTE — TELEPHONE ENCOUNTER
Last Written Prescription Date:  1/7/22  Last Fill Quantity: 90,  # refills: 0     Refused Prescriptions:                       Disp   Refills    hydrochlorothiazide (HYDRODIURIL) 25 MG ta*90 tab*0        Sig: Take 1 tablet (25 mg) by mouth daily  Refused By: JONI AHUMADA  Reason for Refusal: Duplicate

## 2022-03-07 ENCOUNTER — OFFICE VISIT (OUTPATIENT)
Dept: INTERNAL MEDICINE | Facility: OTHER | Age: 62
End: 2022-03-07
Attending: STUDENT IN AN ORGANIZED HEALTH CARE EDUCATION/TRAINING PROGRAM
Payer: COMMERCIAL

## 2022-03-07 VITALS
OXYGEN SATURATION: 98 % | WEIGHT: 293 LBS | TEMPERATURE: 98 F | BODY MASS INDEX: 47.46 KG/M2 | HEART RATE: 65 BPM | DIASTOLIC BLOOD PRESSURE: 88 MMHG | SYSTOLIC BLOOD PRESSURE: 138 MMHG | RESPIRATION RATE: 18 BRPM

## 2022-03-07 DIAGNOSIS — Z11.4 SCREENING FOR HIV WITHOUT PRESENCE OF RISK FACTORS: ICD-10-CM

## 2022-03-07 DIAGNOSIS — R00.2 PALPITATIONS: Primary | ICD-10-CM

## 2022-03-07 DIAGNOSIS — I10 PRIMARY HYPERTENSION: ICD-10-CM

## 2022-03-07 DIAGNOSIS — Z11.59 NEED FOR HEPATITIS C SCREENING TEST: ICD-10-CM

## 2022-03-07 LAB
ANION GAP SERPL CALCULATED.3IONS-SCNC: 7 MMOL/L (ref 3–14)
BUN SERPL-MCNC: 13 MG/DL (ref 7–25)
CALCIUM SERPL-MCNC: 9.8 MG/DL (ref 8.6–10.3)
CHLORIDE BLD-SCNC: 101 MMOL/L (ref 98–107)
CO2 SERPL-SCNC: 31 MMOL/L (ref 21–31)
CREAT SERPL-MCNC: 0.73 MG/DL (ref 0.6–1.2)
ERYTHROCYTE [DISTWIDTH] IN BLOOD BY AUTOMATED COUNT: 14 % (ref 10–15)
GFR SERPL CREATININE-BSD FRML MDRD: >90 ML/MIN/1.73M2
GLUCOSE BLD-MCNC: 104 MG/DL (ref 70–105)
HCT VFR BLD AUTO: 40.4 % (ref 35–47)
HGB BLD-MCNC: 13.1 G/DL (ref 11.7–15.7)
MAGNESIUM SERPL-MCNC: 1.8 MG/DL (ref 1.9–2.7)
MCH RBC QN AUTO: 28.8 PG (ref 26.5–33)
MCHC RBC AUTO-ENTMCNC: 32.4 G/DL (ref 31.5–36.5)
MCV RBC AUTO: 89 FL (ref 78–100)
PLATELET # BLD AUTO: 277 10E3/UL (ref 150–450)
POTASSIUM BLD-SCNC: 3.8 MMOL/L (ref 3.5–5.1)
RBC # BLD AUTO: 4.55 10E6/UL (ref 3.8–5.2)
SODIUM SERPL-SCNC: 139 MMOL/L (ref 134–144)
WBC # BLD AUTO: 8.5 10E3/UL (ref 4–11)

## 2022-03-07 PROCEDURE — 87389 HIV-1 AG W/HIV-1&-2 AB AG IA: CPT | Mod: ZL | Performed by: STUDENT IN AN ORGANIZED HEALTH CARE EDUCATION/TRAINING PROGRAM

## 2022-03-07 PROCEDURE — 86803 HEPATITIS C AB TEST: CPT | Mod: ZL | Performed by: STUDENT IN AN ORGANIZED HEALTH CARE EDUCATION/TRAINING PROGRAM

## 2022-03-07 PROCEDURE — 83735 ASSAY OF MAGNESIUM: CPT | Mod: ZL | Performed by: STUDENT IN AN ORGANIZED HEALTH CARE EDUCATION/TRAINING PROGRAM

## 2022-03-07 PROCEDURE — 85027 COMPLETE CBC AUTOMATED: CPT | Mod: ZL | Performed by: STUDENT IN AN ORGANIZED HEALTH CARE EDUCATION/TRAINING PROGRAM

## 2022-03-07 PROCEDURE — 99214 OFFICE O/P EST MOD 30 MIN: CPT | Performed by: STUDENT IN AN ORGANIZED HEALTH CARE EDUCATION/TRAINING PROGRAM

## 2022-03-07 PROCEDURE — 80048 BASIC METABOLIC PNL TOTAL CA: CPT | Mod: ZL | Performed by: STUDENT IN AN ORGANIZED HEALTH CARE EDUCATION/TRAINING PROGRAM

## 2022-03-07 PROCEDURE — 36415 COLL VENOUS BLD VENIPUNCTURE: CPT | Mod: ZL | Performed by: STUDENT IN AN ORGANIZED HEALTH CARE EDUCATION/TRAINING PROGRAM

## 2022-03-07 RX ORDER — NEBIVOLOL 5 MG/1
5 TABLET ORAL DAILY
Qty: 90 TABLET | Refills: 3 | Status: SHIPPED | OUTPATIENT
Start: 2022-03-07 | End: 2022-06-02

## 2022-03-07 RX ORDER — HYDROCHLOROTHIAZIDE 25 MG/1
25 TABLET ORAL DAILY
Qty: 90 TABLET | Refills: 3 | Status: SHIPPED | OUTPATIENT
Start: 2022-03-07 | End: 2022-08-25

## 2022-03-07 ASSESSMENT — PAIN SCALES - GENERAL: PAINLEVEL: MODERATE PAIN (4)

## 2022-03-07 NOTE — NURSING NOTE
"Patient comes in for headache, dizziness and heart palpitations.  Katie Talley LPN ....................3/7/2022   2:27 PM  Chief Complaint   Patient presents with     Dizziness     dizziness/ heart palpitations       Initial /88 (BP Location: Right arm, Patient Position: Sitting, Cuff Size: Adult Large)   Pulse 65   Temp 98  F (36.7  C) (Tympanic)   Resp 18   Wt 137.4 kg (303 lb)   SpO2 98%   BMI 47.46 kg/m   Estimated body mass index is 47.46 kg/m  as calculated from the following:    Height as of 4/16/21: 1.702 m (5' 7\").    Weight as of this encounter: 137.4 kg (303 lb).  Medication Reconciliation: complete    Katie Talley LPN  FOOD SECURITY SCREENING QUESTIONS  Hunger Vital Signs:  Within the past 12 months we worried whether our food would run out before we got money to buy more. Never  Within the past 12 months the food we bought just didn't last and we didn't have money to get more. Never  Katie Talley LPN 3/7/2022 2:28 PM    "

## 2022-03-07 NOTE — LETTER
March 7, 2022      Milana Blum  [address] [address]        Dear ,    We are writing to inform you of your test results.      It is nice to meet you today.  Your lab results look good.  Your blood counts, electrolytes and kidney function are normal.  The only abnormality is your magnesium and it is just slightly low as we expected.  I would like you to take your magnesium supplement once daily on a regular basis.  We can recheck at a follow-up visit.  I still want you to get the Zio patch and they will contact you to make that appointment.  I will let you know results when I see them.    Resulted Orders   Magnesium   Result Value Ref Range    Magnesium 1.8 (L) 1.9 - 2.7 mg/dL   CBC W PLT No Diff   Result Value Ref Range    WBC Count 8.5 4.0 - 11.0 10e3/uL    RBC Count 4.55 3.80 - 5.20 10e6/uL    Hemoglobin 13.1 11.7 - 15.7 g/dL    Hematocrit 40.4 35.0 - 47.0 %    MCV 89 78 - 100 fL    MCH 28.8 26.5 - 33.0 pg    MCHC 32.4 31.5 - 36.5 g/dL    RDW 14.0 10.0 - 15.0 %    Platelet Count 277 150 - 450 10e3/uL   Basic Metabolic Panel   Result Value Ref Range    Sodium 139 134 - 144 mmol/L    Potassium 3.8 3.5 - 5.1 mmol/L    Chloride 101 98 - 107 mmol/L    Carbon Dioxide (CO2) 31 21 - 31 mmol/L    Anion Gap 7 3 - 14 mmol/L    Urea Nitrogen 13 7 - 25 mg/dL    Creatinine 0.73 0.60 - 1.20 mg/dL    Calcium 9.8 8.6 - 10.3 mg/dL    Glucose 104 70 - 105 mg/dL    GFR Estimate >90 >60 mL/min/1.73m2      Comment:      Effective December 21, 2021 eGFRcr in adults is calculated using the 2021 CKD-EPI creatinine equation which includes age and gender ( et al., NEJM, DOI: 10.1056/GKBSmm6121303)       If you have any questions or concerns, please call the clinic at the number listed above.       Sincerely,      Wes Gasca MD

## 2022-03-07 NOTE — PROGRESS NOTES
"Ms. Blum is a 61 year old female who presents to the clinic for palpitations and dizziness    HPI  History of palpitations and has not had trouble in 6 months.     Had dizziness, headache and some swelling in her neck felt as if she was choking. Some arm pain but feels she recently injured shoulder.    Yesterday drank a lot of water as she was feeling dehydrated. Took it easy. Still felt poorly this AM.   Felt more anxious. Symptoms since improved.   Currently asymptomatic      Updated medication list and allergies.        Review of Systems     Reviewed and updated as needed this visit by Provider                   EXAM:   Vitals:    03/07/22 1424   BP: 138/88   BP Location: Right arm   Patient Position: Sitting   Cuff Size: Adult Large   Pulse: 65   Resp: 18   Temp: 98  F (36.7  C)   TempSrc: Tympanic   SpO2: 98%   Weight: 137.4 kg (303 lb)         BP Readings from Last 3 Encounters:   03/07/22 138/88   10/14/21 130/76   08/24/21 130/78      Wt Readings from Last 3 Encounters:   03/07/22 137.4 kg (303 lb)   10/14/21 133.8 kg (295 lb)   08/24/21 134.3 kg (296 lb)      Estimated body mass index is 47.46 kg/m  as calculated from the following:    Height as of 4/16/21: 1.702 m (5' 7\").    Weight as of this encounter: 137.4 kg (303 lb).     Physical Exam   General: Pleasant 61 year old year old female sitting in clinic in no acute distress   CV: regular rate and rhythm, no murmur, rubs or peripheral edema appreciated   Pulmonary: Clear to auscultation bilateral    INVESTIGATIONS:  - Labs reviewed in Epic     ASSESSMENT AND PLAN:    ICD-10-CM    1. Palpitations  R00.2 nebivolol (BYSTOLIC) 5 MG tablet     Leadless EKG Monitor 8 to 14 Days     Basic Metabolic Panel     CBC W PLT No Diff     Magnesium     Magnesium     CBC W PLT No Diff     Basic Metabolic Panel   2. Primary hypertension  I10 hydrochlorothiazide (HYDRODIURIL) 25 MG tablet     Basic Metabolic Panel     Basic Metabolic Panel   3. Need for hepatitis C " screening test  Z11.59 Hepatitis C Screen Reflex to HCV RNA Quant and Genotype     Hepatitis C Screen Reflex to HCV RNA Quant and Genotype   4. Screening for HIV without presence of risk factors  Z11.4 HIV Antigen Antibody Combo     HIV Antigen Antibody Combo       Assessment/Plan:     Palpitations: Suspect that the patient was having either an arrhythmia versus PAC/PVCs.  She is on Bystolic which is a beta-blocker.  Refill given.  Also refilled her hydrochlorothiazide for hypertension.  Will obtain  basic labs and electrolytes and magnesium for palpitations and place a Zio patch to monitor for arrhythmia.    Health maintenance: Discussed mammogram and patient declines for now, screen for HIV and hepatitis C.        Follow-up with me in 3 to 6 months for annual exam.        Electronically signed by:  Wes Gasca MD on 3/7/2022  Internal Medicine  [facility]

## 2022-03-09 LAB
HCV AB SERPL QL IA: NONREACTIVE
HIV 1+2 AB+HIV1 P24 AG SERPL QL IA: NONREACTIVE

## 2022-03-14 ENCOUNTER — THERAPY VISIT (OUTPATIENT)
Dept: CHIROPRACTIC MEDICINE | Facility: OTHER | Age: 62
End: 2022-03-14
Attending: CHIROPRACTOR
Payer: COMMERCIAL

## 2022-03-14 ENCOUNTER — OFFICE VISIT (OUTPATIENT)
Dept: FAMILY MEDICINE | Facility: OTHER | Age: 62
End: 2022-03-14
Attending: STUDENT IN AN ORGANIZED HEALTH CARE EDUCATION/TRAINING PROGRAM
Payer: COMMERCIAL

## 2022-03-14 VITALS
OXYGEN SATURATION: 98 % | RESPIRATION RATE: 20 BRPM | DIASTOLIC BLOOD PRESSURE: 88 MMHG | TEMPERATURE: 96.9 F | SYSTOLIC BLOOD PRESSURE: 140 MMHG | HEART RATE: 68 BPM

## 2022-03-14 VITALS
DIASTOLIC BLOOD PRESSURE: 76 MMHG | WEIGHT: 293 LBS | OXYGEN SATURATION: 98 % | BODY MASS INDEX: 46.73 KG/M2 | RESPIRATION RATE: 20 BRPM | TEMPERATURE: 98.8 F | HEART RATE: 74 BPM | SYSTOLIC BLOOD PRESSURE: 138 MMHG

## 2022-03-14 DIAGNOSIS — M53.3 SI (SACROILIAC) JOINT DYSFUNCTION: ICD-10-CM

## 2022-03-14 DIAGNOSIS — H69.92 DYSFUNCTION OF LEFT EUSTACHIAN TUBE: Primary | ICD-10-CM

## 2022-03-14 DIAGNOSIS — M99.04 SEGMENTAL AND SOMATIC DYSFUNCTION OF SACRAL REGION: ICD-10-CM

## 2022-03-14 DIAGNOSIS — L30.9 DERMATITIS: ICD-10-CM

## 2022-03-14 DIAGNOSIS — H93.13 TINNITUS, BILATERAL: ICD-10-CM

## 2022-03-14 DIAGNOSIS — M99.01 SEGMENTAL AND SOMATIC DYSFUNCTION OF CERVICAL REGION: Primary | ICD-10-CM

## 2022-03-14 DIAGNOSIS — M54.2 CERVICALGIA: ICD-10-CM

## 2022-03-14 PROCEDURE — 99213 OFFICE O/P EST LOW 20 MIN: CPT | Performed by: STUDENT IN AN ORGANIZED HEALTH CARE EDUCATION/TRAINING PROGRAM

## 2022-03-14 PROCEDURE — 99213 OFFICE O/P EST LOW 20 MIN: CPT | Mod: 25 | Performed by: CHIROPRACTOR

## 2022-03-14 PROCEDURE — 98940 CHIROPRACT MANJ 1-2 REGIONS: CPT | Mod: AT | Performed by: CHIROPRACTOR

## 2022-03-14 RX ORDER — FLUTICASONE PROPIONATE 50 MCG
1 SPRAY, SUSPENSION (ML) NASAL DAILY
Qty: 16 ML | Refills: 1 | Status: SHIPPED | OUTPATIENT
Start: 2022-03-14 | End: 2022-05-19

## 2022-03-14 RX ORDER — TRIAMCINOLONE ACETONIDE 1 MG/G
CREAM TOPICAL 2 TIMES DAILY
Qty: 80 G | Refills: 3 | Status: SHIPPED | OUTPATIENT
Start: 2022-03-14 | End: 2022-06-05

## 2022-03-14 ASSESSMENT — ASTHMA QUESTIONNAIRES: ACT_TOTALSCORE: 17

## 2022-03-14 ASSESSMENT — PAIN SCALES - GENERAL: PAINLEVEL: MODERATE PAIN (4)

## 2022-03-14 NOTE — NURSING NOTE
Patient presents to clinic experiencing tinnitus, headaches to back of neck/head, dizziness and feels like neck is swollen.    Medication Rec Complete  Freda Mccarty LPN............3/14/2022 2:49 PM

## 2022-03-14 NOTE — PROGRESS NOTES
Assessment & Plan     Dysfunction of left eustachian tube  Clear effusion and retracted TM on the left. Likely cause of the pressure and in turn dizziness. No jaw click but some pain when opening mouth so could have referred ear pain from jaw. Could be due to allergies vs developing viral URI. Discussed starting flonase now, supportive cares at home with humidified air, OTC analgesics.  - fluticasone (FLONASE) 50 MCG/ACT nasal spray; Spray 1 spray into both nostrils daily    Dermatitis  Has a tube of triamcinolone with her. Needs refill for a dermatitis (related to food trigger) that occurs on her back. No current flare  - triamcinolone (KENALOG) 0.1 % external cream; Apply topically 2 times daily    Jason Magdaleno MD  [facility]   Milana is a 61 year old who presents for the following health issues Tinnitus, back of neck/head pain, neck swelling    HPI     Ear pressure   - left side hurts, pressure both sides for the last week  - years ago had ear infections   - went to the chiropractor today, helped her neck that helped her headache  - lymph nodes feels swollen in neck  - dizziness when bending forward. Has a history of dizziness and has meclizine on her med list for this  - no congestion or cough or other cold symptoms.   - no recent cold   - took benadryl one night due to pressure. tylenol, aspirin, all little helped   - head feels 'soupy'  - history of allergies due to dust    Has a history of dermatitis on her back, previously used triamcinolone and helped. Needs refill             Review of Systems   Constitutional, HEENT, cardiovascular, pulmonary, gi and gu systems are negative, except as otherwise noted.      Objective    /76 (BP Location: Right arm, Patient Position: Sitting, Cuff Size: Adult Large)   Pulse 74   Temp 98.8  F (37.1  C) (Tympanic)   Resp 20   Wt 135.3 kg (298 lb 6 oz)   LMP  (LMP Unknown)   SpO2 98%   Breastfeeding No   BMI 46.73  kg/m    Body mass index is 46.73 kg/m .  Physical Exam   GENERAL: healthy, alert and no distress  EYES: Eyes grossly normal to inspection, PERRL and conjunctivae and sclerae normal  HENT: normal cephalic/atraumatic, right ear: normal: no effusions, no erythema, normal landmarks, left ear: clear effusion and retracted TM, nose and mouth without ulcers or lesions, oropharynx clear and oral mucous membranes moist. Some jaw pain with opening mouth  NECK: no adenopathy, no asymmetry, masses, or scars and thyroid normal to palpation  RESP: lungs clear to auscultation - no rales, rhonchi or wheezes  CV: regular rate and rhythm, normal S1 S2, no S3 or S4, no murmur, click or rub, no peripheral edema and peripheral pulses strong  PSYCH: mentation appears normal, affect normal/bright

## 2022-03-14 NOTE — PROGRESS NOTES
Started a week ago Sunday. Neck and head is constant aching, pressure, tightness. Dizzy and light headed when bending over. Pressure and ringing in both ears. Throat feels swollen difficult to swallow.9/10 W24 10/10. Using medications, and ice. Benadryl allows her to sleep.   Faviola Sandhu on 3/14/2022 at 11:44 AM    PATIENT:  Milana Blum is a 61 year old female presenting for neck pain with headaches, right SI joint pain    PROBLEM:   Date of Initial Visit for this Episode:  3/14/2022    Visit #1    SUBJECTIVE / HPI: Patient presents with primary complaints of neck pain with headaches, pressure and ringing in the ears.  Symptoms have been ongoing for about a week.  No specific causative factors.  Patient reports similar symptoms in the past but never to this extent.  Denies any radiculopathy of the upper extremities or lower extremities.  Unsure if she has been experiencing any weakness of the upper extremities.  No loss of bowel or bladder.    Concerned this may be stroke however patient notes that she has not been having any facial droop, or slurring of her speech.  Also duration of time from onset until today makes us less likely.  States that vision although unaffected does seem to be slightly more blurry possibly due to intensity of neck pain and headache symptoms.    Concerned about possibility of sinus issues contributing to symptoms.  Following up with primary provider this afternoon with grand Coulter.    Reports history of anxiety and depression which seems to be slightly increased at this time.    Patient also voices concerns of right hip/low back symptoms.  Has been making it difficult to sleep on her right side which is the site she normally sleeps on.  Reports history of lumbar surgery from several years ago.  Again no traumatic events or overuse injuries associated with onset of symptoms    Description and onset:  Radiation of pain: no  Pain course: Gradually getting worse  Worse with:  Nothing  specific mentioned  Improved by:  Ice  Other Health Care Providers seen for this: Dr. Honeycutt at [facility] in [facility]  Previous treatment: history of chiropractic care, medication  Previous injury:lumbar surgery, past history of similar symptoms but not to this extent    (DVPRS) Pain Rating Score : Can't bear the pain unable to do anything (W24 10/10) (03/14/22 1134)    See flowsheets in chart for details.  3/14/2022    Neck Disability Index (  Brien ARMENDARIZ. and Stefanie WHITTINGTON. 1991. All rights reserved.; used with permission) 3/14/2022   SECTION 1 - PAIN INTENSITY 4   SECTION 2 - PERSONAL CARE 0   SECTION 3 - LIFTING 4   SECTION 4 - READING 0   SECTION 5 - HEADACHES 5   SECTION 6 - CONCENTRATION 2   SECTION 7 - WORK 5   SECTION 8 - DRIVING 4   SECTION 9 - SLEEPING 0   SECTION 10 - RECREATION 5   Count 10   Sum 29   Raw Score: /50 29   Neck Disability Index Score: (%) 58      Oswestry (COLLIN) Questionnaire    No flowsheet data found.     Past D.C. Care: Yes helpful       PAST MEDICAL HISTORY:  Past Medical History:   Diagnosis Date     Asthma      Hypertension      Low back pain        PAST SURGICAL HISTORY:  Past Surgical History:   Procedure Laterality Date     BACK SURGERY  08/2010    L4-5, S1 Hemilaminectomy, foraminotomy     IR LUMBAR EPIDURAL STEROID INJECTION  3/4/2010     RI REMOVAL OF OVARY/TUBE(S)      Description: Salpingo-oophorectomy Left Side;  Proc Date: 12/06/2004;     ZZC SUPRACERV ABD HYSTERECTOMY      Description: Supracervical Hysterectomy;  Proc Date: 12/06/2004;       ALLERGIES:  Allergies   Allergen Reactions     House Dust [Dust Mite Extract] Shortness Of Breath     Pollen      Other Environmental Allergy Itching       CURRENT MEDICATIONS:  Current Outpatient Medications   Medication Sig Dispense Refill     albuterol (VENTOLIN HFA) 90 mcg/actuation inhaler [ALBUTEROL (VENTOLIN HFA) 90 MCG/ACTUATION INHALER] INHALE 1 TO 2 PUFFS ORALLY EVERY FOUR HOURS AS NEEDED 54 each 1     aspirin 81 MG EC  tablet [ASPIRIN 81 MG EC TABLET] Take 1 tablet (81 mg total) by mouth daily.  0     b complex vitamins tablet [B COMPLEX VITAMINS TABLET] Take 1 tablet by mouth daily.       hydrochlorothiazide (HYDRODIURIL) 25 MG tablet Take 1 tablet (25 mg) by mouth daily 90 tablet 3     magnesium oxide 250 mg Tab [MAGNESIUM OXIDE 250 MG TAB] Take 250 mg by mouth daily.       meclizine (ANTIVERT) 25 mg tablet [MECLIZINE (ANTIVERT) 25 MG TABLET] Take 1 tablet (25 mg total) by mouth 3 (three) times a day as needed for dizziness. 30 tablet 1     naproxen (NAPROSYN) 500 MG tablet TAKE 1 TABLET TWICE DAILY  WITH MEALS 60 tablet 0     nebivolol (BYSTOLIC) 5 MG tablet Take 1 tablet (5 mg) by mouth daily 90 tablet 3     SYMBICORT 160-4.5 MCG/ACT Inhaler INHALE 2 PUFFS BY MOUTH TWICE A DAY 28 g 3     zinc sulfate (ZINCATE) 220 (50 Zn) MG capsule Take 220 mg by mouth daily         SOCIAL HISTORY:  Social History     Socioeconomic History     Marital status: Single     Spouse name: Not on file     Number of children: Not on file     Years of education: Not on file     Highest education level: Not on file   Occupational History     Not on file   Tobacco Use     Smoking status: Never Smoker     Smokeless tobacco: Never Used   Substance and Sexual Activity     Alcohol use: Yes     Alcohol/week: 2.0 standard drinks     Comment: weekly beer or wine     Drug use: No     Sexual activity: Yes     Partners: Male     Birth control/protection: Surgical   Other Topics Concern     Not on file   Social History Narrative     Not on file     Social Determinants of Health     Financial Resource Strain: Not on file   Food Insecurity: Not on file   Transportation Needs: Not on file   Physical Activity: Not on file   Stress: Not on file   Social Connections: Not on file   Intimate Partner Violence: Not on file   Housing Stability: Not on file        FAMILY HISTORY:  Family History   Problem Relation Age of Onset     Hypertension Mother      Cancer Father       Diabetes Father      Hypertension Father      Cancer Sister        Patient Active Problem List   Diagnosis     Hypercholesterolemia     Essential hypertension, benign     Hay Fever     Mild persistent asthma     Varicose veins     Degenerative disc disease, lumbar     Edema     Morbid obesity (H)     Gluten intolerance     Numbness     Generalized muscle weakness     Lumbar disc herniation with radiculopathy     Leg pain     Intervertebral disk disease     Exertional dyspnea     Prediabetes         ROS:  The patient denies any fevers, chills, nausea, vomiting, diarrhea, constipation,dysuria, hematuria, or urinary hesitancy or incontinence.  No shortness of breath, chest pain, or rashes.    OBJECTIVE:    DIAGNOSTICS:  No current spinal imaging taken.     PHYSICAL EXAM:   BP (!) 140/88 (BP Location: Right arm, Patient Position: Sitting, Cuff Size: Adult Large)   Pulse 68   Temp 96.9  F (36.1  C) (Tympanic)   Resp 20   SpO2 98%    GENERAL APPEARANCE: healthy, alert, moderate distress and over weight   GAIT: NORMAL  NEURO: Pupils appear equal, upper extremity strength unremarkable bilaterally, DTRs equal and symmetrical of the upper extremities    MUSCULOSKELETAL:   Posture: Anterior head carriage, rounded shoulders.  Low left iliac crest, Low right shoulder, Low right occiput.    Gait:  unremarkable.     Cervical performed actively, measured approximately  ROM:   smooth/halting arc of motion   50/50 flexion    40/45 extension    35/45 RLF  30/45 LLF    70/85 RR         75/85 LR       -Maximal Foraminal Compression: +focal mild neck pain.   -Distraction improves      Thoracic and Lumbar performed actively, measured approximately  ROM:  Not assessed/60 flexion 50/60 extension    40/45 RLF    40/45 LLF       +Kemps: right PSIS  +Gillets:+HALLEY, BIGG   - Straight leg raise  Other:  Ely's: -right, -left    +Tenderness: C1 on left, right PSIS  +Muscle spasm: Moderate/severe of the suboccipitals primarily on the left,  paraspinal musculature cervical region superior aspect left greater than right, spasms right quadratus lumborum  +Joint asymmetry and restriction: C1 left lateral flexion right rotation restriction, sacrum extension right lateral flexion restriction    ASSESSMENT: Milana Blum is a 61 year old female presenting with primary complaints of neck pain along with SI joint dysfunction.  Accompanying tinnitus, dizziness and headache symptoms also present.  Segmental/somatic dysfunction is present of the cervical spine and right SI joint both of which are consistent with patient's symptoms.  Due to time from onset until today's visit as well as physical exam findings less concerned of possible cerebrovascular concern.  It is quite possible that due to stress from anxiety/depression as well as physical pain from aberrant motion of the upper cervical spine and right SI joint this is causing symptoms to become more exacerbated.  Light force chiropractic technique does seem appropriate and safe given patient's history and presentation.  Very pleased to hear that patient will be following up with primary provider after today's visit for further assessment.     1. Segmental and somatic dysfunction of cervical region    2. Cervicalgia    3. Segmental and somatic dysfunction of sacral region    4. SI (sacroiliac) joint dysfunction    5. Tinnitus, bilateral        PLAN    Evaluation and Management:  00187 Moderate exam established patient 20 min    Procedures:  Modalities:  None performed this visit    CMT:  26743 Chiropractic manipulative treatment 1-2 regions performed   Cervical: light diversified, C1 , Supine  Pelvis: Drop Table, Sacrum , Prone    Therapeutic procedures:  None    Response to Treatment  Reduction in symptoms as reported by patient    Prognosis: Excellent    3/14/2022 Plan of Care:  4-6visits of Chiropractic Care including Spinal Adjustments and/or physiotherapy and active rehabilitation, to include exercises  in the office and/or at home to meet care plan goals.     Frequency: 1-2xweek for up to 4 weeks. A reevaluation would be clinically appropriate in 6 visits, to determine progress and further course of care.    POC discussed and patient agreeable to plan of care.      3/14/2022 Goals:      Patient will report improved pain by 75%.   Patient will report able to sleep without painful limits.   Patient will report able to drive without painful limits.   Patient will demonstrate an improved ability to complete Activities of Daily Living  as shown by a reported 10-30% reduced score on neck index.    Patient will demonstrate improved ROM.        INSTRUCTIONS   ice 20 minutes every other hour as needed    Follow-up:  Return to care in 1 week if symptoms persist.

## 2022-03-18 ENCOUNTER — THERAPY VISIT (OUTPATIENT)
Dept: CHIROPRACTIC MEDICINE | Facility: OTHER | Age: 62
End: 2022-03-18
Attending: CHIROPRACTOR
Payer: COMMERCIAL

## 2022-03-18 VITALS
TEMPERATURE: 97.7 F | HEART RATE: 63 BPM | DIASTOLIC BLOOD PRESSURE: 62 MMHG | OXYGEN SATURATION: 96 % | SYSTOLIC BLOOD PRESSURE: 124 MMHG | RESPIRATION RATE: 17 BRPM

## 2022-03-18 DIAGNOSIS — M99.01 SEGMENTAL AND SOMATIC DYSFUNCTION OF CERVICAL REGION: Primary | ICD-10-CM

## 2022-03-18 DIAGNOSIS — M99.04 SEGMENTAL AND SOMATIC DYSFUNCTION OF SACRAL REGION: ICD-10-CM

## 2022-03-18 DIAGNOSIS — M53.3 SI (SACROILIAC) JOINT DYSFUNCTION: ICD-10-CM

## 2022-03-18 DIAGNOSIS — M54.2 CERVICALGIA: ICD-10-CM

## 2022-03-18 PROCEDURE — 98940 CHIROPRACT MANJ 1-2 REGIONS: CPT | Mod: AT | Performed by: CHIROPRACTOR

## 2022-03-18 NOTE — PROGRESS NOTES
Neck feels constant, aching. 3/10 W24 4/10.  Right lower back and hip feel constant, aching and tight. 2/10 W24 5/10.  Maria Del Carmen Acosta on 3/18/2022 at 9:38 AM    Reviewed by EW    Visit #:  2    Subjective:  Milana Blum is a 61 year old female who is seen in f/u up for:        Segmental and somatic dysfunction of cervical region  Cervicalgia  Segmental and somatic dysfunction of sacral region  SI (sacroiliac) joint dysfunction.     Since last visit on 3/14/2022,  Milana Blum reports: Following initial treatment patient symptoms are doing much better.  Was able to follow-up with primary care provider Dr. Magan Magdaleno MD. it was noticed that patient had fluid behind her ears which was believed to be contributing to patient's headaches, dizziness and tinnitus.  Patient still has some residual dizziness and continues to have tinnitus however this has been ongoing prior to patient encounter.    Patient begins acupuncture therapy with Jordyn Torres LAc.next week  (DVPRS) Pain Rating Score : Notice pain, does not interfere with activities (W24 5/10 ) (03/18/22 0935)     Objective:  The following was observed:  /62 (BP Location: Right arm, Patient Position: Sitting)   Pulse 63   Temp 97.7  F (36.5  C) (Tympanic)   Resp 17   LMP  (LMP Unknown)   SpO2 96%      P: palpatory tendernessleft side C1, right PSIS:    A: static palpation demonstrates intersegmental asymmetry , cervical, pelvis  R: motion palpation notes restricted motion, C1  and Sacrum   T: muscle spasm at level(s): Left suboccipitals, right quadratus lumborum    Segmental spinal dysfunction/restrictions found at:  :  C1 Right rotation restricted and Left lateral flexion restricted  Sacrum Right lateral flexion restricted and Extension restriction.      Assessment: Patient symptoms are showing quite a bit of improvement since initial evaluation and treatment.  At this time patient to follow-up with our office as needed for flareup  care.    Diagnoses:      1. Segmental and somatic dysfunction of cervical region    2. Cervicalgia    3. Segmental and somatic dysfunction of sacral region    4. SI (sacroiliac) joint dysfunction        Patient's condition:  Patient had restrictions pre-manipulation and Patient symptoms are gradually improving    Treatment effectiveness:  Post manipulation there is better intersegmental movement, Patient claims to feel looser post manipulation and Patients symptoms are getting better      Procedures:  CMT:  45867 Chiropractic manipulative treatment 1-2 regions performed   Cervical: Diversified, C1 , Supine  Pelvis: Drop Table, Sacrum , Prone    Modalities:  None performed this visit    Therapeutic procedures:  None    Response to Treatment  Reduction in symptoms as reported by patient    Prognosis: Good    Progress towards Goals: Patient is making progress towards the goal.     Recommendations:    Instructions:Monitor symptoms and perform activities as tolerated    Follow-up:  Return to care if symptoms persist.

## 2022-03-21 ENCOUNTER — HOSPITAL ENCOUNTER (OUTPATIENT)
Dept: CARDIOLOGY | Facility: OTHER | Age: 62
Discharge: HOME | End: 2022-03-21
Attending: STUDENT IN AN ORGANIZED HEALTH CARE EDUCATION/TRAINING PROGRAM | Admitting: STUDENT IN AN ORGANIZED HEALTH CARE EDUCATION/TRAINING PROGRAM
Payer: COMMERCIAL

## 2022-03-21 DIAGNOSIS — R00.2 PALPITATIONS: ICD-10-CM

## 2022-03-21 PROCEDURE — 93248 EXT ECG>7D<15D REV&INTERPJ: CPT | Performed by: INTERNAL MEDICINE

## 2022-03-21 PROCEDURE — 93246 EXT ECG>7D<15D RECORDING: CPT

## 2022-04-08 DIAGNOSIS — H69.92 DYSFUNCTION OF LEFT EUSTACHIAN TUBE: ICD-10-CM

## 2022-04-08 RX ORDER — FLUTICASONE PROPIONATE 50 MCG
1 SPRAY, SUSPENSION (ML) NASAL DAILY
Qty: 16 ML | Refills: 1 | OUTPATIENT
Start: 2022-04-08

## 2022-04-08 NOTE — TELEPHONE ENCOUNTER
[facility] sent Rx request for the following:      Requested Prescriptions   Pending Prescriptions Disp Refills     fluticasone (FLONASE) 50 MCG/ACT nasal spray 16 mL 1     Sig: Spray 1 spray into both nostrils daily     Last Prescription Date:   3/14/22  Last Fill Qty/Refills:         16mL, R-1    Last Office Visit:              3/14/22   Future Office visit:           None   Unable to complete prescription refill per RN Medication Refill Policy. Redundant refill request refused: Too soon:  Lor Pagan RN on 4/8/2022 at 1:31 PM

## 2022-04-18 ENCOUNTER — OFFICE VISIT (OUTPATIENT)
Dept: INTERNAL MEDICINE | Facility: OTHER | Age: 62
End: 2022-04-18
Attending: STUDENT IN AN ORGANIZED HEALTH CARE EDUCATION/TRAINING PROGRAM
Payer: COMMERCIAL

## 2022-04-18 VITALS
RESPIRATION RATE: 22 BRPM | TEMPERATURE: 98.1 F | SYSTOLIC BLOOD PRESSURE: 139 MMHG | HEART RATE: 96 BPM | WEIGHT: 293 LBS | BODY MASS INDEX: 47.97 KG/M2 | OXYGEN SATURATION: 95 % | DIASTOLIC BLOOD PRESSURE: 80 MMHG

## 2022-04-18 DIAGNOSIS — I47.10 PAROXYSMAL SUPRAVENTRICULAR TACHYCARDIA (H): ICD-10-CM

## 2022-04-18 DIAGNOSIS — E83.42 HYPOMAGNESEMIA: ICD-10-CM

## 2022-04-18 DIAGNOSIS — E66.01 MORBID OBESITY (H): ICD-10-CM

## 2022-04-18 DIAGNOSIS — I77.810 DILATED AORTIC ROOT (H): Primary | ICD-10-CM

## 2022-04-18 LAB
MAGNESIUM SERPL-MCNC: 1.8 MG/DL (ref 1.9–2.7)
POTASSIUM BLD-SCNC: 3.5 MMOL/L (ref 3.5–5.1)
TSH SERPL DL<=0.005 MIU/L-ACNC: 1.75 MU/L (ref 0.4–4)

## 2022-04-18 PROCEDURE — 83735 ASSAY OF MAGNESIUM: CPT | Mod: ZL | Performed by: STUDENT IN AN ORGANIZED HEALTH CARE EDUCATION/TRAINING PROGRAM

## 2022-04-18 PROCEDURE — 36415 COLL VENOUS BLD VENIPUNCTURE: CPT | Mod: ZL | Performed by: STUDENT IN AN ORGANIZED HEALTH CARE EDUCATION/TRAINING PROGRAM

## 2022-04-18 PROCEDURE — 84443 ASSAY THYROID STIM HORMONE: CPT | Mod: ZL | Performed by: STUDENT IN AN ORGANIZED HEALTH CARE EDUCATION/TRAINING PROGRAM

## 2022-04-18 PROCEDURE — 84132 ASSAY OF SERUM POTASSIUM: CPT | Mod: ZL | Performed by: STUDENT IN AN ORGANIZED HEALTH CARE EDUCATION/TRAINING PROGRAM

## 2022-04-18 PROCEDURE — 99214 OFFICE O/P EST MOD 30 MIN: CPT | Performed by: STUDENT IN AN ORGANIZED HEALTH CARE EDUCATION/TRAINING PROGRAM

## 2022-04-18 ASSESSMENT — PAIN SCALES - GENERAL: PAINLEVEL: MODERATE PAIN (4)

## 2022-04-18 NOTE — LETTER
April 18, 2022      Milana Blum  [address] [facility] [address]        Dear ,    We are writing to inform you of your test results.    Your thyroid is normal, your potassium is on the lower end of normal and your magnesium is just a touch under the normal range.  Continue your magnesium supplement and we will recheck at your annual exam.    Resulted Orders   Potassium   Result Value Ref Range    Potassium 3.5 3.5 - 5.1 mmol/L   TSH Reflex GH   Result Value Ref Range    TSH 1.75 0.40 - 4.00 mU/L   Magnesium   Result Value Ref Range    Magnesium 1.8 (L) 1.9 - 2.7 mg/dL       If you have any questions or concerns, please call the clinic at the number listed above.       Sincerely,      Wes Gasca MD

## 2022-04-18 NOTE — NURSING NOTE
Pt presents to clinic today for a discussion of test results, heart monitor results, and to see if she should see a specialist here instead of where she used to live in the [facility].       FOOD SECURITY SCREENING QUESTIONS:    The next two questions are to help us understand your food security.  If you are feeling you need any assistance in this area, we have resources available to support you today.    Hunger Vital Signs:  Within the past 12 months we worried whether our food would run out before we got money to buy more. Never  Within the past 12 months the food we bought just didn't last and we didn't have money to get more. Never          Medication Reconciliation: complete  Anderson Torres, LOVELY,LPN on 4/18/2022 at 3:07 PM

## 2022-04-18 NOTE — PROGRESS NOTES
Assessment & Plan       ICD-10-CM    1. Dilated aortic root (H)  I77.810 Echocardiogram Complete     Adult Cardiology Eval  Referral   2. Paroxysmal supraventricular tachycardia (H)  I47.1 Magnesium     Potassium     Adult Cardiology Eval  Referral     Potassium     Magnesium   3. Morbid obesity (H)  E66.01 TSH Reflex GH     TSH Reflex GH   4. Hypomagnesemia  E83.42 Magnesium     Magnesium       Dilated aortic root: Aortic root size increased from 4 cm 4.2 cm in 6942-0295, 1 year follow-up recommended, echo ordered today prior to cardiology follow-up.    Paroxysmal SVT: Multiple episodes noted on cardiac monitor from March 2022,, refer to cardiology for consideration of ablation versus medication management.  Patient hesitant to increase her Bystolic at this time and I will defer management to cardiology    Hypomagnesemia: Incidental with prior palpitations, has been supplementing oral magnesium oxide recheck labs today.    Obesity: Recheck TSH today, offered referral to weight management across the street.  She will continue with diet and exercise on her own.    No follow-ups on file.    Wes Gasca MD  [facility]   Milana is a 61 year old who presents for the following health issues     History of Present Illness       Vascular Disease:  She presents for follow up of vascular disease.  She never takes nitroglycerin. She takes daily aspirin.    She eats 2-3 servings of fruits and vegetables daily.She consumes 0 sweetened beverage(s) daily.She exercises with enough effort to increase her heart rate 10 to 19 minutes per day.  She exercises with enough effort to increase her heart rate 3 or less days per week.   She is taking medications regularly.     Reviewed her cardiac monitor reviewed her prior echocardiograms.  Has been on Bystolic for quite a while, did not tolerate likely labetalol in the past.    Discussion of test results, heart monitor      Review of  Systems         Objective    BP (!) 150/82 (BP Location: Right arm, Patient Position: Sitting, Cuff Size: Adult Large)   Pulse 96   Temp 98.1  F (36.7  C) (Tympanic)   Resp 22   Wt 138.9 kg (306 lb 4.8 oz)   LMP  (LMP Unknown)   SpO2 95%   Breastfeeding No   BMI 47.97 kg/m    Body mass index is 47.97 kg/m .  Physical Exam   General: Pleasant 61-year-old woman sitting in clinic no acute distress  CV: Regular rate and rhythm no peripheral edema appreciated  Pulmonary: Clear to auscultation bilaterally

## 2022-05-03 ENCOUNTER — THERAPY VISIT (OUTPATIENT)
Dept: CHIROPRACTIC MEDICINE | Facility: OTHER | Age: 62
End: 2022-05-03
Attending: CHIROPRACTOR
Payer: COMMERCIAL

## 2022-05-03 VITALS — RESPIRATION RATE: 24 BRPM | HEART RATE: 68 BPM | OXYGEN SATURATION: 97 % | TEMPERATURE: 97.3 F

## 2022-05-03 DIAGNOSIS — G44.209 TENSION HEADACHE: ICD-10-CM

## 2022-05-03 DIAGNOSIS — M54.2 CERVICALGIA: ICD-10-CM

## 2022-05-03 DIAGNOSIS — M99.01 SEGMENTAL AND SOMATIC DYSFUNCTION OF CERVICAL REGION: Primary | ICD-10-CM

## 2022-05-03 PROCEDURE — 98940 CHIROPRACT MANJ 1-2 REGIONS: CPT | Mod: AT | Performed by: CHIROPRACTOR

## 2022-05-03 NOTE — PROGRESS NOTES
Neck is intermittent aching. Giving occasional moderate headaches.2/10 W24 6/10.   Faviola Sandhu on 5/3/2022 at 11:37 AM    Reviewed by EW    Visit #:  PRN    Subjective:  Milana Blum is a 61 year old female who is seen in f/u up for:        Segmental and somatic dysfunction of cervical region  Cervicalgia  Tension headache.     Since last visit on 3/18/2022,  Milana Blum reports: after last visit hip flared up. Calmed down after roughly 1 week. Considering injections for SI joint.  Patient has been provided with Dr. Jevon SANTILLAN, radiologist with [facility] as a possible provider to work with     Patient presents with primary complaints of neck pain.  States that neck pain is also accompanied by headache symptoms.  Began recently associated with computer work and sitting in a forward hunched position for extended periods of time.  Denies any traumatic events contributing to neck pain.  No reported radiculopathy of the upper extremities.    (DVPRS) Pain Rating Score : Notice pain, does not interfere with activities (W24 6/10) (05/03/22 1133)     Objective:  The following was observed:  Pulse 68   Temp 97.3  F (36.3  C) (Tympanic)   Resp 24   LMP  (LMP Unknown)   SpO2 97%    Neck Disability Index (  Brien H. [employer] C. 1991. All rights reserved.; used with permission) 5/3/2022   SECTION 1 - PAIN INTENSITY 1   SECTION 2 - PERSONAL CARE 0   SECTION 3 - LIFTING 0   SECTION 4 - READING 0   SECTION 5 - HEADACHES 2   SECTION 6 - CONCENTRATION 1   SECTION 7 - WORK 0   SECTION 8 - DRIVING 1   SECTION 9 - SLEEPING 1   SECTION 10 - RECREATION 0   Count 10   Sum 6   Raw Score: /50 6   Neck Disability Index Score: (%) 12       P: palpatory tendernessLeft side C2, right side C5:    A: static palpation demonstrates intersegmental asymmetry , cervical  R: motion palpation notes restricted motion, C2 , C5 , T4 , T5  and T6   T: muscle spasm at level(s): Cervical paraspinals bilaterally:      Segmental spinal  dysfunction/restrictions found at:  :  C2 Left lateral flexion restricted and Extension restriction  C5 Right lateral flexion restricted and Extension restriction.      Assessment: Flareup of neck pain symptoms.  Segmental/somatic dysfunction present of the cervical spine consistent with patient's report of symptoms.  Brief assessment of thoracic and lumbopelvic spinal regions does not show signs of segmental/somatic dysfunction.  Restriction noted of the mid thoracic spine believed to be compensatory from cervical spine.  Patient was provided with Dr. Jevon SANTILLAN as a possible specialist to work with regarding SI joint injections.  Patient may still benefit from care within the next week or so which will be done on an as-needed basis.    Diagnoses:      1. Segmental and somatic dysfunction of cervical region    2. Cervicalgia    3. Tension headache        Patient's condition:  Patient had restrictions pre-manipulation and Patient symptoms are worsed due to computer work.    Treatment effectiveness:  Post manipulation there is better intersegmental movement      Procedures:  CMT:  00991 Chiropractic manipulative treatment 1-2 regions performed   Cervical: Diversified, C2, C5 , Supine    Modalities:  None performed this visit    Therapeutic procedures:  None    Response to Treatment  Symptoms show some improvement as reported by the patient    Prognosis: Good    Progress towards Goals: Acute exacerbation 5/3/2022  Patient will report improved pain by 75%.              Patient will report able to sleep without painful limits.              Patient will report able to drive without painful limits.              Patient will demonstrate an improved ability to complete Activities of Daily Living   as shown by a reported 10-30% reduced score on neck index.               Patient will demonstrate improved ROM.    Recommendations:    Instructions:ice 20 minutes every other hour as needed    Follow-up:  Continue treatment PRN.

## 2022-05-05 ENCOUNTER — HOSPITAL ENCOUNTER (OUTPATIENT)
Dept: CARDIOLOGY | Facility: OTHER | Age: 62
Discharge: HOME | End: 2022-05-05
Attending: STUDENT IN AN ORGANIZED HEALTH CARE EDUCATION/TRAINING PROGRAM | Admitting: STUDENT IN AN ORGANIZED HEALTH CARE EDUCATION/TRAINING PROGRAM
Payer: COMMERCIAL

## 2022-05-05 ENCOUNTER — E-VISIT (OUTPATIENT)
Dept: URGENT CARE | Facility: URGENT CARE | Age: 62
End: 2022-05-05

## 2022-05-05 DIAGNOSIS — I77.810 AORTIC ROOT DILATATION (H): ICD-10-CM

## 2022-05-05 DIAGNOSIS — I77.810 DILATED AORTIC ROOT (H): ICD-10-CM

## 2022-05-05 DIAGNOSIS — N39.0 ACUTE UTI: Primary | ICD-10-CM

## 2022-05-05 LAB — LVEF ECHO: NORMAL

## 2022-05-05 PROCEDURE — 99421 OL DIG E/M SVC 5-10 MIN: CPT | Performed by: PHYSICIAN ASSISTANT

## 2022-05-05 PROCEDURE — 93306 TTE W/DOPPLER COMPLETE: CPT | Mod: 26 | Performed by: INTERNAL MEDICINE

## 2022-05-05 PROCEDURE — 93306 TTE W/DOPPLER COMPLETE: CPT

## 2022-05-05 RX ORDER — SULFAMETHOXAZOLE/TRIMETHOPRIM 800-160 MG
1 TABLET ORAL 2 TIMES DAILY
Qty: 6 TABLET | Refills: 0 | Status: SHIPPED | OUTPATIENT
Start: 2022-05-05 | End: 2022-05-08

## 2022-05-05 NOTE — PATIENT INSTRUCTIONS
Dear Milana Blum    After reviewing your responses, I've been able to diagnose you with a urinary tract infection, which is a common infection of the bladder with bacteria.  This is not a sexually transmitted infection, though urinating immediately after intercourse can help prevent infections.  Drinking lots of fluids is also helpful to clear your current infection and prevent the next one.      I have sent a prescription for antibiotics to your pharmacy to treat this infection.    It is important that you take all of your prescribed medication even if your symptoms are improving after a few doses.  Taking all of your medicine helps prevent the symptoms from returning.     If your symptoms worsen, you develop pain in your back or stomach, develop fevers, or are not improving in 5 days, please contact your primary care provider for an appointment or visit any of our convenient Walk-in or Urgent Care Centers to be seen, which can be found on our website here.    Thanks again for choosing us as your health care partner,    Jaylin Lepe PA-C    Urinary Tract Infections in Women  Urinary tract infections (UTIs) are most often caused by bacteria. These bacteria enter the urinary tract. The bacteria may come from inside the body. Or they may travel from the skin outside the rectum or vagina into the urethra. Female anatomy makes it easy for bacteria from the bowel to enter a woman s urinary tract, which is the most common source of UTI. This means women develop UTIs more often than men. Pain in or around the urinary tract is a common UTI symptom. But the only way to know for sure if you have a UTI for the healthcare provider to test your urine. The two tests that may be done are the urinalysis and urine culture.     Types of UTIs    Cystitis. A bladder infection (cystitis) is the most common UTI in women. You may have urgent or frequent need to pee. You may also have pain, burning when you pee, and  bloody urine.    Urethritis. This is an inflamed urethra, which is the tube that carries urine from the bladder to outside the body. You may have lower stomach or back pain. You may also have urgent or frequent need to pee.    Pyelonephritis. This is a kidney infection. If not treated, it can be serious and damage your kidneys. In severe cases, you may need to stay in the hospital. You may have a fever and lower back pain.    Medicines to treat a UTI  Most UTIs are treated with antibiotics. These kill the bacteria. The length of time you need to take them depends on the type of infection. It may be as short as 3 days. If you have repeated UTIs, you may need a low-dose antibiotic for several months. Take antibiotics exactly as directed. Don t stop taking them until all of the medicine is gone. If you stop taking the antibiotic too soon, the infection may not go away. You may also develop a resistance to the antibiotic. This can make it much harder to treat.   Lifestyle changes to treat and prevent UTIs   The lifestyle changes below will help get rid of your UTI. They may also help prevent future UTIs.     Drink plenty of fluids. This includes water, juice, or other caffeine-free drinks. Fluids help flush bacteria out of your body.    Empty your bladder. Always empty your bladder when you feel the urge to pee. And always pee before going to sleep. Urine that stays in your bladder can lead to infection. Try to pee before and after sex as well.    Practice good personal hygiene. Wipe yourself from front to back after using the toilet. This helps keep bacteria from getting into the urethra.    Use condoms during sex. These help prevent UTIs caused by sexually transmitted bacteria. Also don't use spermicides during sex. These can increase the risk for UTIs. Choose other forms of birth control instead. For women who tend to get UTIs after sex, a low-dose of a preventive antibiotic may be used. Be sure to discuss this  option with your healthcare provider.    Follow up with your healthcare provider as directed. He or she may test to make sure the infection has cleared. If needed, more treatment may be started.  [employer] last reviewed this educational content on 7/1/2019 2000-2021 [employer]. All rights reserved. This information is not intended as a substitute for professional medical care. Always follow your healthcare professional's instructions.

## 2022-05-09 ENCOUNTER — E-VISIT (OUTPATIENT)
Dept: FAMILY MEDICINE | Facility: OTHER | Age: 62
End: 2022-05-09
Payer: COMMERCIAL

## 2022-05-09 DIAGNOSIS — R30.0 DIFFICULT OR PAINFUL URINATION: Primary | ICD-10-CM

## 2022-05-09 PROCEDURE — 99421 OL DIG E/M SVC 5-10 MIN: CPT | Performed by: STUDENT IN AN ORGANIZED HEALTH CARE EDUCATION/TRAINING PROGRAM

## 2022-05-09 NOTE — PATIENT INSTRUCTIONS
Dear Milana Blum    If you are still having symptoms despite a course of antibiotics, you need to be seen in clinic or [facility] to leave a urine sample to be sure this is a UTI.     Thanks for choosing us as your health care partner,    Jason Magdaleno MD

## 2022-05-09 NOTE — TELEPHONE ENCOUNTER
IR Office Visit / Evaluation    Patient: Alexander Kaur Sr. Date: 2018   : 1955 Attending: Ignacia Eisenberg DO    62 year old male        45   minutes of time was spent with this patient with majority of the time spent counseling, coordinating care and clinical decision-making.  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 Patient does have pain in the LE from knee to toes  Patient does not ambulate due to his amputation, so does not have claudication type symptoms.  Pt tries to elevate but is not very compliant.  Continues to drink alcohol.  He denies rest pain     · Status post right greater saphenous vein endovenous laser ablation on 2015.    · He is in compression 5 -10 mm Hg      Denies diabetes. Reports continued tobacco use, smoking 1/2 PPD.  Reports daily marijuana use. Reports daily alcohol use. Reports continued intermittent pain in RLE and sees  pain management. Uses a slide board to transfer in and out of his wheelchair on the right side. He reports that he continues to spend up to 8-12 consecutive hours per day in his wheelchair.  Does not have a way to elevate his legs in his wheelchair. Reports he sleeps on his back. Reports he is tolerating dressings but has had more weeping and AAH has been coming daily now secondary to drainage. He reports a \"friend\" helps him at home. She has lived in his home since 2017. He reports she continues to have her own place but sleeps at his home most of the time.      Patient Active Problem List    Diagnosis Date Noted   • Multiple open wounds of right lower extremity 2018     Priority: Low  Provider E-Visit time total (minutes): 3   Reviewed mychart questionnaire and previous E-visit. Completed bactrim course yesterday. Advised to go to the clinic or urgent care for UA and to assess symptoms. This may be a resistant bacteria or other diagnosis. Will not Rx anything via this E visit      • Elevated alkaline phosphatase measurement 04/25/2018     Priority: Low   • HTN (hypertension), benign 04/25/2018     Priority: Low   • Alcoholism /alcohol abuse (CMS/[facility]) 04/24/2018     Priority: Low   • Chronic midline low back pain with sciatica 03/26/2018     Priority: Low   • Impaired mobility 03/26/2018     Priority: Low   • Varicose veins of right lower extremity with ulcer (CMS/[facility]) 03/26/2018     Priority: Low   • Cellulitis of right lower leg 03/20/2018     Priority: Low   • Hyperparathyroidism (CMS/[facility]) 10/05/2015     Priority: Low   • Bilateral arm weakness 11/17/2014     Priority: Low   • Lung nodule 02/12/2014     Priority: Low   • Anxiety state, unspecified 10/25/2011     Priority: Low   • Vitamin B12 deficiency 10/25/2011     Priority: Low   • Back pain 10/25/2011     Priority: Low   • GERD (gastroesophageal reflux disease) 10/25/2011     Priority: Low   • HTN (hypertension), benign 10/25/2011     Priority: Low   • Acquired hypothyroidism 10/25/2011     Priority: Low   • Insomnia 10/25/2011     Priority: Low   • Sleep apnea 10/25/2011     Priority: Low     Other and unspecified     • PE (pulmonary embolism) 10/25/2011     Priority: Low   • Depression 10/25/2011     Priority: Low     Situational, brief.      • Vitamin D deficiency 10/25/2011     Priority: Low     Past Medical History:   Diagnosis Date   • Abdominal mass 10/25/2011   • Abdominal pain, other specified site 10/25/2011   • Anxiety    • Azotemia    • Bilateral carpal tunnel syndrome 11/17/2014   • CAD (coronary artery disease)    • Carpal tunnel syndrome 10/25/2011   • Cervical radiculopathy 11/17/2014   • Chest pain, unspecified 10/25/2011   • Chronic venous insufficiency 1/7/2016   • Congestive cardiac failure (CMS/[facility])    • Decubitus ulcer of left thigh, stage 3 (CMS/[facility]) 7/7/2016   • Decubitus ulcer of right upper thigh, stage 3 (CMS/[facility]) 7/7/2016   • Decubitus ulcer, stage 3 (CMS/[facility]) 5/19/2016   • Depression    • Edema 10/25/2011    • Epididymitis    • Esophagitis, reflux 3/26/2018   • Essential (primary) hypertension    • Fracture    • FTT (failure to thrive) in adult 7/7/2016   • Gastroesophageal reflux disease    • Hernia, abdominal 10/25/2011    Specified site NEC.    • History of anemia 3/26/2018   • Hypothyroidism    • Idiopathic urticaria 4/9/2014   • Impotence of organic origin 10/25/2011   • Leg ulcer (CMS/[facility]) 10/25/2011   • Lung nodule    • Morbid obesity (CMS/[facility]) 10/25/2011   • Open fracture of wrist 10/25/2011   • Open wound of right foot 7/7/2016   • Osteomyelitis (CMS/[facility])    • PE (pulmonary embolism)    • Peripheral venous insufficiency 10/25/2011   • Pharyngitis 10/25/2011   • Prostatitis    • PVD (peripheral vascular disease) (CMS/[facility])    • Radiculopathy    • Sciatica    • Sleep apnea     NO CPAP   • Soft tissue abscess 10/25/2011    Soft tissue lesion of lower leg and ankle.     • Substance abuse    • Ulcer of right lower extremity, limited to breakdown of skin (CMS/[facility]) 9/2/2015   • Ulcer of skin (CMS/[facility]) 10/25/2011   • Ulnar neuropathy at elbow 11/17/2014   • UTI (urinary tract infection) 10/25/2011   • Varicose vein of leg    • Venous stasis    • Ventral hernia 10/25/2011   • Vitamin B12 deficiency    • Vitamin D deficiency    • Wound infection 7/17/2016      Past Surgical History:   Procedure Laterality Date   • Bariatric surgery     • Carpal tunnel release      bilateral   • Dexa bone density axial skeleton  01/15/2004   • Fracture surgery     • Gastric bypass     • Hernia repair     • Ir endovenous laser ablation     • Joint replacement     • Knee surgery     • Leg amputation  06/18/2014    left leg above the knee   • Total knee replacement      bilateral     History   Substance Use Topics   • Smoking status: Never Smoker    • Smokeless tobacco: Never Used      Comment: exposed to secondhand smoke   • Alcohol Use: Yes      Comment: everyday 12 - 24 beers      Patient lives in an apartment.  Patient has 2 sons that  are grown and out of the house.  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  Wife is around -- stays with her daughter -- has a female friend help him out daily   Family History   Problem Relation Age of Onset   • High blood pressure Mother    • Cancer Mother         kidney    • Depression Mother    • Kidney disease Mother    • Stroke Mother    • Cancer Father         colon    • COPD Brother    • Diabetes Brother         type 2   • Diabetes Paternal Grandmother         type 2   • Thyroid Neg Hx       Current Outpatient Prescriptions   Medication Sig Dispense Refill   • traZODone (DESYREL) 100 MG tablet TAKE ONE TABLET BY MOUTH AT BEDTIME 30 tablet 5   • levothyroxine (SYNTHROID, LEVOTHROID) 75 MCG tablet TAKE ONE TABLET BY MOUTH DAILY 30 tablet 2   • famotidine (PEPCID) 20 MG tablet TAKE ONE TABLET BY MOUTH TWICE A DAY 60 tablet 5   • DULoxetine (CYMBALTA) 30 MG capsule TAKE ONE CAPSULE BY MOUTH DAILY 90 capsule 2   • XARELTO 20 MG Tab TAKE ONE TABLET BY MOUTH EVERY EVENING 30 tablet 5   • hydrOXYzine (ATARAX) 50 MG tablet TAKE ONE TABLET BY MOUTH EVERY 6 HOURS AS NEEDED 120 tablet 5   • acamprosate (CAMPRAL EC) 333 MG tablet Take 2 tablets by mouth 3 times daily. 180 tablet 0   • cyclobenzaprine (FLEXERIL) 10 MG tablet TAKE ONE TABLET BY MOUTH TWICE A DAY AS NEEDED FOR MUSCLE SPASMS 60 tablet 2   • baclofen (LIORESAL) 20 MG tablet TAKE ONE TABLET BY MOUTH DAILY 30 tablet 3   • gabapentin (NEURONTIN) 600 MG tablet Take 1 tablet by mouth 3 times daily. 90 tablet 3   • levETIRAcetam (KEPPRA) 250 MG tablet Take 1 tablet by mouth daily. 90 tablet 1   • hydrochlorothiazide (MICROZIDE) 12.5 MG capsule TAKE ONE CAPSULE BY MOUTH DAILY 90 capsule 2   • Vitamin D, Ergocalciferol, 25684 units capsule Take 1 capsule by mouth once a week. 12 capsule 1   • buPROPion (WELLBUTRIN SR) 150 MG 12 hr tablet Take 1 tablet by mouth daily. 30 tablet 5   • LORazepam (ATIVAN) 0.5 MG tablet Take 1 tablet by mouth 2 times daily  as needed for Anxiety. DO NOT TAKE WITH ALCOHOL 20 tablet 0   • aspirin 81 MG tablet Take 81 mg by mouth daily.     • diclofenac (VOLTAREN) 1 % gel Apply topically 4 times daily. Use as directed to painful area 4 Tube 11   • miconazole 2 % topical powder Apply topically 2 times daily. 70 g 3   • ibuprofen (MOTRIN) 600 MG tablet Take 1 tablet by mouth every 8 hours as needed for Pain. 90 tablet 1   • cloNIDine (CATAPRES) 0.1 MG tablet Take 0.1 mg by mouth every 4 hours as needed.       No current facility-administered medications for this visit.       ALLERGIES:  No Known Allergies       Review of Systems:  Patient denies nausea and vomiting, tolerating oral intake  No fevers overnight  No chest pain or shortness of breath  Since states that post gastric bypass he got down to 240 pounds, but now his steadily increased weight.    Airway risk:   + Sleep apnea, stridor, snoring, neck arthritis, does not wear c pap       Physical Exam:  General Appearance:    Alert and oriented x3 , cooperative, no distress, appears stated age ,  severely obese , short term memory issues , disheveled,  large pannus    Head:    Normocephalic, without obvious abnormality, atraumatic   Eyes:    PERRL, conjunctiva clear, EOM's intact   Ears:    Decrease hearing both ears   Nose:   Nares normal, septum midline, mucosa normal, no drainage      Throat:   Lips, mucosa, and tongue normal; upper and lower dentures    Neck:   Supple, symmetrical, trachea midline, no carotid    bruit    Back:     Symmetric, no curvature, ROM normal   Lungs:      respirations unlabored, CTA    Heart:    Regular rate and rhythm, no murmur    Extremities:     Left AKA.  Right LE with moderate edema, skin is dry and scaling.  Dome wrap is in place  Skin is hard and with mild erythema     Pulses:   2+ and symmetric upper extremities   Neurologic:   patient is alert and oriented, significant short-term memory difficulties, patient with significant daily heavy drinking               EXTREMITIES:      PULSE  RIGHT  LEFT    Femoral  + LArge pannus +   Popliteal      Dorsalis Pedis  monophasic AKA   Posterior Tibial  triphasic    Graft      Capillary Refill Time 5    Edema ++_    Open wounds / Sores ++    Sensation  Hypersensitive at times                      · Limited US was done in the office today + right GSV is patent with + reflux on calf compression .. Moderate SQ edema is seen on US     Laboratory Results:  Lab Results   Component Value Date    SODIUM 138 04/24/2018    POTASSIUM 4.6 04/24/2018    BUN 15 04/24/2018    CREATININE 0.80 04/24/2018    WBC 8.9 04/24/2018    HCT 38.1 (L) 04/24/2018    HGB 12.0 (L) 04/24/2018     04/24/2018    INR 1.1 05/11/2017    GLUCOSE 98 04/24/2018    TSH 1.990 04/24/2018    MG 2.1 04/24/2018     The above labs were reviewed by the writer.  Diagnostic Results:  ENDOVENOUS LASER ABLATION OF LOWER EXTREMITY VEIN  HISTORY: Significant right great saphenous vein reflux, with varicosities and symptoms of venous insufficiency as well as nonhealing venous  ulcerations.  PROCEDURE: Endovenous laser ablation of the right great saphenous vein utilizing ultrasound guidance  Informed consent for the procedure was obtained. The potential risks discussed included bleeding, infection, thermal injury, and deep vein  Thrombosis. The skin of the right leg was sterilely prepared. The skin below the knee was anesthetized with 1% lidocaine. The greater saphenous vein was accessed just above the ankle (care was taken to not access through the current area of ulceration) with ultrasound guidance and a wire was advanced to the level of the upper thigh, 2 cm proximal to the saphenofemoral junction. A prominent anterolateral branch was noted off the proximal aspect of the saphenous vein. The wire and sheath were navigated into the anterolateral branch. A sheath was advanced over the wire and positioned 2 cm proximal to the saphenofemoral junction. A 1470 nm laser  fiber was inserted to the end of the sheath, and thesheath was withdrawn slightly to the locking position to expose the laser tip. A mixture of tumescent anesthetic containing 0.1% lidocaine and 1:1,000.000 epinephrine was then administered under ultrasound guidance, consisting of multiple injections in the perivenous space surrounding the vein to anesthetize and collapse the vein and to separate the vein from the skin (> 1 cm separation) and surrounding tissues. The patient and staff all donned laser safety goggles. After verifying the proper laser settings and enabling the laser unit, the laser was activated in the continuous mode at 12 murphy of power. An 85 cm segment of vein was treated for 448 seconds during slow withdrawal of the sheath and interlocked laser fiber for a total energy deposition of 4999 J. The laser was deactivated when the aiming light was visualized close to the puncture entry site. The sheath and fiber were then removed. Steri-Strips were then applied to the puncture site with topical Benzoin.  The sites were bandaged appropriately. A 30-40 mmHg compression stocking was immediately placed. There were no  Complications. The patient was allowed to ambulate, and discharge instructions and prescriptions were reviewed with the patient, including the need for follow-up. The patient was discharged in stable condition to be followed as an outpatient.  IMPRESSION: Endovenous laser ablation of the right greater saphenous vein, as detailed above.   Us Lower Extremity Arteries Duplex Right    Result Date: 9/4/2018  DUPLEX SCAN OF LOWER EXTREMITY ARTERIES OR ARTERIAL BYPASS GRAFT; UNILATERAL OR LIMITED STUDY HISTORY: Right lower extremity wounds, history of left above-knee amputation. TECHNIQUE: Grayscale, color-flow, and continuous Doppler analog waveform analysis of the right lower extremity were obtained. COMPARISON: None FINDINGS: Peak Systolic Velocities (cm/s): CFA: 138 PFA: 94  Proximal SFA: 126  Mid SFA: 117 Distal SFA: 80 Popliteal: 94 Posterior Tibial (proximal, mid, distal): 71, 91, 107 Peroneal (proximal, mid, distal): 114, not visualized distally Anterior tibial (proximal, mid): 51, not visualized distally Dorsalis Pedis: 46 Grayscale and Doppler findings: Moderate to severe calcifications affect the infrainguinal arterial supply. Doppler waveforms in the common femoral artery are triphasic. Doppler waveforms in the deep femoral arteries are triphasic. Doppler waveforms in the superficial femoral artery are triphasic. Doppler waveforms in the popliteal artery are triphasic. Doppler waveforms in the posterior tibial artery are triphasic. Doppler waveforms in the proximal peroneal artery are triphasic. Doppler waveforms in the anterior tibial artery proximally are monophasic. The dorsalis pedis artery is patent with monophasic flow.     IMPRESSION: 1.  Atherosclerotic disease more severely affecting the tibial vessels. 2.  The posterior tibial artery is the dominant runoff vessel. There is multifocal disease and possible occlusion of the distal peroneal and anterior tibial artery.     Us Ankle/brachial 1 Or 2 Level Ext Right    Result Date: 9/4/2018  US ANKLE / BRACHIAL 1 OR 2 LEVEL EXTREMITY RIGHT HISTORY:  Peripheral arterial disease, right lower extremity wounds, history of left above-the-knee amputation COMPARISON:  None. TECHNIQUE:  Bilateral brachial arterial pressures were obtained, followed by BILATERAL pressure and waveform analysis at the posterior tibial and dorsalis pedis arteries at the right foot and ankle.  Rest ABIs were obtained. FINDINGS: The brachial pressures were 153 mmHg in the right arm and 159 mmHg in the left arm. RIGHT LEG: Posterior tibial pressure: Noncompressible Dorsalis pedis pressure: 197 mmHg, resulting in TEE of 1.24 Digit: 48 mmHg, resulting in Toe-Brachial index of 0.3 Doppler waveforms at the ankle are triphasic in the posterior tibial artery and monophasic in the  dorsalis pedis artery. Volume pulse recordings are moderately abnormal.     IMPRESSION:  *  Resting ankle-brachial indices are likely falsely elevated in the setting of small vessel disease. *  Toe pressure suggests moderate to severe resting arterial insufficiency. *  Toe pressure is adequate for wound healing potential. TEE:                     LEVEL OF PAD 0.9 - 1.3                No Significant 0.8 - 0.9                Mild PAD 0.5 - 0.8                Moderate PAD < 0.5                    Severe PAD < 0.3                    Critical PAD       Imaging studies were reviewed with the patient  ASSESSMENT/ PLAN  62-year-old nonambulatory,morbidly obese male with pain and weeping of the right lower extremity and recurrent ulcerations.   Wounds are venous in nature and his GSV has reopened. ( pt is on Xarelto)    Arterial duplex indicates runoff disease with TBI of 0,3 .  Doppler waveforms in the posterior tibial artery are triphasic. Doppler waveforms in the proximal peroneal artery are triphasic. Doppler waveforms in the anterior tibial artery proximally are monophasic. The dorsalis pedis artery is patent with monophasic flow.     · Venous ulcerations with runoff PAD  · No more than 10 mm H compression at this time  · Recommend elevation as much as possible   · Repeat venous insufficiency study should be completed  ( after arterial flow evaluated/ intervened on )     · PAD  · Duplex indicates dominate PT vessels with AT and peroneal disease   · Recommend angiogram with possible intervention  · Pt is on ASA, Xarelto  · He may benefit from statin     · Pt is on CAMPRAL for etoh abuse -- he is unsure if he is taking this or not   · Would require PCP H&P and anesthesia care for angiogram ( daily etoh and marijuana use )

## 2022-05-10 ENCOUNTER — APPOINTMENT (OUTPATIENT)
Dept: CT IMAGING | Facility: OTHER | Age: 62
End: 2022-05-10
Attending: STUDENT IN AN ORGANIZED HEALTH CARE EDUCATION/TRAINING PROGRAM
Payer: COMMERCIAL

## 2022-05-10 ENCOUNTER — HOSPITAL ENCOUNTER (EMERGENCY)
Facility: OTHER | Age: 62
Discharge: HOME | End: 2022-05-10
Attending: STUDENT IN AN ORGANIZED HEALTH CARE EDUCATION/TRAINING PROGRAM | Admitting: STUDENT IN AN ORGANIZED HEALTH CARE EDUCATION/TRAINING PROGRAM
Payer: COMMERCIAL

## 2022-05-10 VITALS
SYSTOLIC BLOOD PRESSURE: 134 MMHG | DIASTOLIC BLOOD PRESSURE: 46 MMHG | RESPIRATION RATE: 15 BRPM | HEIGHT: 67 IN | TEMPERATURE: 98.6 F | BODY MASS INDEX: 45.99 KG/M2 | HEART RATE: 62 BPM | OXYGEN SATURATION: 96 % | WEIGHT: 293 LBS

## 2022-05-10 DIAGNOSIS — N12 PYELONEPHRITIS: ICD-10-CM

## 2022-05-10 DIAGNOSIS — E87.6 HYPOKALEMIA: ICD-10-CM

## 2022-05-10 LAB
ALBUMIN UR-MCNC: 70 MG/DL
ANION GAP SERPL CALCULATED.3IONS-SCNC: 8 MMOL/L (ref 3–14)
APPEARANCE UR: ABNORMAL
BASOPHILS # BLD AUTO: 0 10E3/UL (ref 0–0.2)
BASOPHILS NFR BLD AUTO: 1 %
BILIRUB UR QL STRIP: NEGATIVE
BUN SERPL-MCNC: 10 MG/DL (ref 7–25)
CALCIUM SERPL-MCNC: 9.3 MG/DL (ref 8.6–10.3)
CHLORIDE BLD-SCNC: 102 MMOL/L (ref 98–107)
CO2 SERPL-SCNC: 30 MMOL/L (ref 21–31)
COLOR UR AUTO: ABNORMAL
CREAT SERPL-MCNC: 0.75 MG/DL (ref 0.6–1.2)
EOSINOPHIL # BLD AUTO: 0.1 10E3/UL (ref 0–0.7)
EOSINOPHIL NFR BLD AUTO: 2 %
ERYTHROCYTE [DISTWIDTH] IN BLOOD BY AUTOMATED COUNT: 13.7 % (ref 10–15)
GFR SERPL CREATININE-BSD FRML MDRD: 90 ML/MIN/1.73M2
GLUCOSE BLD-MCNC: 127 MG/DL (ref 70–105)
GLUCOSE UR STRIP-MCNC: NEGATIVE MG/DL
HCT VFR BLD AUTO: 37.7 % (ref 35–47)
HGB BLD-MCNC: 12.1 G/DL (ref 11.7–15.7)
HGB UR QL STRIP: ABNORMAL
HOLD SPECIMEN: NORMAL
IMM GRANULOCYTES # BLD: 0 10E3/UL
IMM GRANULOCYTES NFR BLD: 0 %
KETONES UR STRIP-MCNC: NEGATIVE MG/DL
LEUKOCYTE ESTERASE UR QL STRIP: ABNORMAL
LYMPHOCYTES # BLD AUTO: 3.9 10E3/UL (ref 0.8–5.3)
LYMPHOCYTES NFR BLD AUTO: 47 %
MAGNESIUM SERPL-MCNC: 2 MG/DL (ref 1.9–2.7)
MCH RBC QN AUTO: 27.7 PG (ref 26.5–33)
MCHC RBC AUTO-ENTMCNC: 32.1 G/DL (ref 31.5–36.5)
MCV RBC AUTO: 86 FL (ref 78–100)
MONOCYTES # BLD AUTO: 0.5 10E3/UL (ref 0–1.3)
MONOCYTES NFR BLD AUTO: 6 %
MUCOUS THREADS #/AREA URNS LPF: PRESENT /LPF
NEUTROPHILS # BLD AUTO: 3.7 10E3/UL (ref 1.6–8.3)
NEUTROPHILS NFR BLD AUTO: 44 %
NITRATE UR QL: NEGATIVE
NRBC # BLD AUTO: 0 10E3/UL
NRBC BLD AUTO-RTO: 0 /100
PH UR STRIP: 7.5 [PH] (ref 5–9)
PLATELET # BLD AUTO: 284 10E3/UL (ref 150–450)
POTASSIUM BLD-SCNC: 3.3 MMOL/L (ref 3.5–5.1)
RBC # BLD AUTO: 4.37 10E6/UL (ref 3.8–5.2)
RBC URINE: 25 /HPF
RENAL TUB EPI: 1 /HPF
SODIUM SERPL-SCNC: 140 MMOL/L (ref 134–144)
SP GR UR STRIP: 1.01 (ref 1–1.03)
TRANSITIONAL EPI: <1 /HPF
UROBILINOGEN UR STRIP-MCNC: NORMAL MG/DL
WBC # BLD AUTO: 8.2 10E3/UL (ref 4–11)
WBC CLUMPS #/AREA URNS HPF: PRESENT /HPF
WBC URINE: >182 /HPF

## 2022-05-10 PROCEDURE — 81001 URINALYSIS AUTO W/SCOPE: CPT | Performed by: STUDENT IN AN ORGANIZED HEALTH CARE EDUCATION/TRAINING PROGRAM

## 2022-05-10 PROCEDURE — 36569 INSJ PICC 5 YR+ W/O IMAGING: CPT | Performed by: STUDENT IN AN ORGANIZED HEALTH CARE EDUCATION/TRAINING PROGRAM

## 2022-05-10 PROCEDURE — 96365 THER/PROPH/DIAG IV INF INIT: CPT | Mod: XU | Performed by: STUDENT IN AN ORGANIZED HEALTH CARE EDUCATION/TRAINING PROGRAM

## 2022-05-10 PROCEDURE — 80048 BASIC METABOLIC PNL TOTAL CA: CPT | Performed by: STUDENT IN AN ORGANIZED HEALTH CARE EDUCATION/TRAINING PROGRAM

## 2022-05-10 PROCEDURE — 250N000013 HC RX MED GY IP 250 OP 250 PS 637: Performed by: STUDENT IN AN ORGANIZED HEALTH CARE EDUCATION/TRAINING PROGRAM

## 2022-05-10 PROCEDURE — 258N000003 HC RX IP 258 OP 636: Performed by: STUDENT IN AN ORGANIZED HEALTH CARE EDUCATION/TRAINING PROGRAM

## 2022-05-10 PROCEDURE — 96375 TX/PRO/DX INJ NEW DRUG ADDON: CPT | Mod: XU | Performed by: STUDENT IN AN ORGANIZED HEALTH CARE EDUCATION/TRAINING PROGRAM

## 2022-05-10 PROCEDURE — 250N000011 HC RX IP 250 OP 636: Performed by: STUDENT IN AN ORGANIZED HEALTH CARE EDUCATION/TRAINING PROGRAM

## 2022-05-10 PROCEDURE — 74176 CT ABD & PELVIS W/O CONTRAST: CPT | Mod: TC

## 2022-05-10 PROCEDURE — 87086 URINE CULTURE/COLONY COUNT: CPT | Performed by: STUDENT IN AN ORGANIZED HEALTH CARE EDUCATION/TRAINING PROGRAM

## 2022-05-10 PROCEDURE — 36415 COLL VENOUS BLD VENIPUNCTURE: CPT | Performed by: STUDENT IN AN ORGANIZED HEALTH CARE EDUCATION/TRAINING PROGRAM

## 2022-05-10 PROCEDURE — 83735 ASSAY OF MAGNESIUM: CPT | Performed by: STUDENT IN AN ORGANIZED HEALTH CARE EDUCATION/TRAINING PROGRAM

## 2022-05-10 PROCEDURE — 99283 EMERGENCY DEPT VISIT LOW MDM: CPT | Mod: 25 | Performed by: STUDENT IN AN ORGANIZED HEALTH CARE EDUCATION/TRAINING PROGRAM

## 2022-05-10 PROCEDURE — 99284 EMERGENCY DEPT VISIT MOD MDM: CPT | Mod: 25 | Performed by: STUDENT IN AN ORGANIZED HEALTH CARE EDUCATION/TRAINING PROGRAM

## 2022-05-10 PROCEDURE — 85025 COMPLETE CBC W/AUTO DIFF WBC: CPT | Performed by: STUDENT IN AN ORGANIZED HEALTH CARE EDUCATION/TRAINING PROGRAM

## 2022-05-10 RX ORDER — POTASSIUM CHLORIDE 1500 MG/1
20 TABLET, EXTENDED RELEASE ORAL ONCE
Status: COMPLETED | OUTPATIENT
Start: 2022-05-10 | End: 2022-05-10

## 2022-05-10 RX ORDER — CEFPODOXIME PROXETIL 200 MG/1
200 TABLET, FILM COATED ORAL 2 TIMES DAILY
Qty: 18 TABLET | Refills: 0 | Status: SHIPPED | OUTPATIENT
Start: 2022-05-11 | End: 2022-05-20

## 2022-05-10 RX ORDER — KETOROLAC TROMETHAMINE 15 MG/ML
15 INJECTION, SOLUTION INTRAMUSCULAR; INTRAVENOUS ONCE
Status: COMPLETED | OUTPATIENT
Start: 2022-05-10 | End: 2022-05-10

## 2022-05-10 RX ORDER — ONDANSETRON 4 MG/1
4 TABLET, ORALLY DISINTEGRATING ORAL EVERY 6 HOURS PRN
Qty: 5 TABLET | Refills: 0 | Status: SHIPPED | OUTPATIENT
Start: 2022-05-10 | End: 2022-06-05

## 2022-05-10 RX ORDER — ONDANSETRON 2 MG/ML
4 INJECTION INTRAMUSCULAR; INTRAVENOUS ONCE
Status: COMPLETED | OUTPATIENT
Start: 2022-05-10 | End: 2022-05-10

## 2022-05-10 RX ORDER — CEFTRIAXONE SODIUM 1 G/50ML
1 INJECTION, SOLUTION INTRAVENOUS ONCE
Status: COMPLETED | OUTPATIENT
Start: 2022-05-10 | End: 2022-05-10

## 2022-05-10 RX ADMIN — ONDANSETRON 4 MG: 2 INJECTION INTRAMUSCULAR; INTRAVENOUS at 02:49

## 2022-05-10 RX ADMIN — CEFTRIAXONE SODIUM 1 G: 1 INJECTION, SOLUTION INTRAVENOUS at 02:54

## 2022-05-10 RX ADMIN — KETOROLAC TROMETHAMINE 15 MG: 15 INJECTION, SOLUTION INTRAMUSCULAR; INTRAVENOUS at 02:41

## 2022-05-10 RX ADMIN — POTASSIUM CHLORIDE 20 MEQ: 1500 TABLET, EXTENDED RELEASE ORAL at 03:38

## 2022-05-10 RX ADMIN — SODIUM CHLORIDE 1000 ML: 9 INJECTION, SOLUTION INTRAVENOUS at 02:53

## 2022-05-10 NOTE — DISCHARGE INSTRUCTIONS
You were found to have a serious kidney infection called pyelonephritis.  This was caused by an infection in your bladder spreading up to your kidney on the left side.  You were given a dose of a strong antibiotic called ceftriaxone in the emergency department.  You will need to continue a new antibiotic starting tomorrow morning (Wednesday, May 11th) called cefpodoxime, 200 mg twice daily, to complete a total 10-day course of treatment.    Stop taking your current antibiotic (Bactrim).    Drink plenty of water to stay hydrated and eat a well-balanced diet.    For pain you can take ibuprofen 400 mg every 6 hours, as well as Tylenol 1,000 mg (2 extra strength tablets) up to 3 times daily as needed.    For nausea, take Zofran 4 mg every 6 hours as needed (new prescription).    Follow-up with your doctor in clinic for recheck in 2 to 3 days.    Come back to the emergency department immediately or call 911 if you have worsening severe pain not controlled at home, new fevers greater than 101  F, vomiting and inability to eat or drink, or otherwise feeling sicker or have any other acute concerns.

## 2022-05-10 NOTE — ED TRIAGE NOTES
Pt states she started getting a UTI and did an E-visit and got a antibiotic.  Pt states this didn't work and symptoms are still there and getting worse.  Pt states she has urinary frequency, pt has LLQ pain that wraps to her left flank area.  Pain is a 8/10 right now.     Triage Assessment     Row Name 05/10/22 0146       Triage Assessment (Adult)    Airway WDL WDL       Respiratory WDL    Respiratory WDL WDL       Skin Circulation/Temperature WDL    Skin Circulation/Temperature WDL WDL       Cardiac WDL    Cardiac WDL WDL       Peripheral/Neurovascular WDL    Peripheral Neurovascular WDL WDL       Cognitive/Neuro/Behavioral WDL    Cognitive/Neuro/Behavioral WDL WDL

## 2022-05-10 NOTE — ED PROVIDER NOTES
History     Chief Complaint   Patient presents with     Flank Pain     HPI  Milana Blum is a 61 year old woman with history of obesity, mild persistent asthma, hypertension, hypercholesterolemia, aortic root dilatation who presents for evaluation of burning with urination and concern for bladder infection as well as left flank pain that started this evening.  Bladder symptoms started 5 days ago, with burning with urination as well as urinary urgency and frequency.  She had an ED visit and was prescribed Bactrim for suspected UTI, she started taking this and initially had some improvement but then after a day or 2 symptoms started to worsen again.  This evening she developed worsening pain extending across the left side of her abdomen and into her left flank.  Pain has gotten progressively worse so she came in for evaluation.  No history of kidney stones, has not noted any blood in her urine.  No fevers or chills but has had a bit of nausea, no vomiting.  She has not yet taken any medication for the pain.  She rates pain 8 out of 10 in severity.    Allergies:  Allergies   Allergen Reactions     House Dust [Dust Mite Extract] Shortness Of Breath     Pollen      Other Environmental Allergy Itching       Problem List:    Patient Active Problem List    Diagnosis Date Noted     Aortic root dilatation (H) 05/05/2022     Priority: Medium     Last echocardiogram May 2022 measurement 4.1 cm.  Slowly progressively getting bigger continue annual exams unless recommended otherwise by cardiology       Paroxysmal supraventricular tachycardia (H) 04/18/2022     Priority: Medium     Prediabetes 02/16/2021     Priority: Medium     Exertional dyspnea 12/19/2018     Priority: Medium     Hypercholesterolemia      Priority: Medium     Created by Conversion         Essential hypertension, benign      Priority: Medium     Created by Conversion  Replacement Utility updated for latest IMO load         Mild persistent asthma       Priority: Medium     Created by Conversion         Hay Fever      Priority: Medium     Created by Conversion  Replacement Utility updated for latest IMO load         Varicose veins 06/28/2016     Priority: Medium     Degenerative disc disease, lumbar 06/28/2016     Priority: Medium     Edema 06/28/2016     Priority: Medium     Morbid obesity (H) 06/28/2016     Priority: Medium     Gluten intolerance 06/28/2016     Priority: Medium     Lumbar disc herniation with radiculopathy 08/20/2010     Priority: Medium     Numbness 06/02/2010     Priority: Medium     Generalized muscle weakness 06/02/2010     Priority: Medium     Leg pain 06/02/2010     Priority: Medium     Intervertebral disk disease 06/02/2010     Priority: Medium        Past Medical History:    Past Medical History:   Diagnosis Date     Asthma      Hypertension      Low back pain        Past Surgical History:    Past Surgical History:   Procedure Laterality Date     BACK SURGERY  08/2010    L4-5, S1 Hemilaminectomy, foraminotomy     IR LUMBAR EPIDURAL STEROID INJECTION  3/4/2010     ND REMOVAL OF OVARY/TUBE(S)      Description: Salpingo-oophorectomy Left Side;  Proc Date: 12/06/2004;     [facility] SUPRACERV ABD HYSTERECTOMY      Description: Supracervical Hysterectomy;  Proc Date: 12/06/2004;       Family History:    Family History   Problem Relation Age of Onset     Hypertension Mother      Cancer Father      Diabetes Father      Hypertension Father      Cancer Sister        Social History:  Marital Status:  Single [1]  Social History     Tobacco Use     Smoking status: Never Smoker     Smokeless tobacco: Never Used   Vaping Use     Vaping Use: Never used   Substance Use Topics     Alcohol use: Yes     Alcohol/week: 2.0 standard drinks     Comment: weekly beer or wine     Drug use: No        Medications:    [START ON 5/11/2022] cefpodoxime (VANTIN) 200 MG tablet  ondansetron (ZOFRAN ODT) 4 MG ODT tab  albuterol (VENTOLIN HFA) 90 mcg/actuation inhaler  aspirin 81  "MG EC tablet  b complex vitamins tablet  fluticasone (FLONASE) 50 MCG/ACT nasal spray  hydrochlorothiazide (HYDRODIURIL) 25 MG tablet  magnesium oxide 250 mg Tab  meclizine (ANTIVERT) 25 mg tablet  naproxen (NAPROSYN) 500 MG tablet  nebivolol (BYSTOLIC) 5 MG tablet  SYMBICORT 160-4.5 MCG/ACT Inhaler  triamcinolone (KENALOG) 0.1 % external cream  zinc sulfate (ZINCATE) 220 (50 Zn) MG capsule          Review of Systems  Please see HPI above for pertinent positives and negatives.  All other systems reviewed and found to be negative.    Physical Exam   BP: 133/44  Pulse: 74  Temp: 98.6  F (37  C)  Resp: 15  Height: 170.2 cm (5' 7\")  Weight: 134.7 kg (297 lb)  SpO2: 97 %      Physical Exam  Gen:, Alert, nontoxic, appears uncomfortable but in no acute distress  HEENT: Normocephalic and atraumatic, slightly dry mucous membranes, conjunctiva clear  CV: Regular rate and rhythm, appears warm and well-perfused  Pulm: Normal respiratory effort, no audible wheezing or stridor  Abd: Soft, nondistended, mild tenderness to palpation left lower quadrant and left flank  Skin: No rash or other lesions  MSK: No gross deformities or swelling  Neuro: Alert and oriented x4, no focal deficits    ED Course              ED Course as of 05/10/22 0538   Tue May 10, 2022   0205 Patient evaluated, here with bladder infection that started about 5 days ago, was started on bactrim and had 1 day of improvement but then worsening since then. Describes burning with urination and urinary frequency/urgency, then tonight worsening pain wrapping around her left side and into her left flank that has progressively worsened.  Not colicky in nature, lower suspicion for stone but will obtain CT of the abdomen to rule this out, will obtain urinalysis but may be sterilized given recent antibiotic use, will plan for IV ceftriaxone given high suspicion for pyelonephritis as well as basic labs.  Patient is afebrile here and not tachycardic or tachypneic, low " suspicion for sepsis, she is nontoxic on exam and other than left CVA tenderness unremarkable exam.  Seems that she is likely emptying her bladder appropriately but CT will help rule out obstruction as well, will give Toradol for pain, Zofran for nausea, and a fluid bolus.  Ultimately pending results anticipate discharge if no infected stone with close outpatient follow-up, will need better antibiotic regimen that covers for pyelonephritis   0216 Urinalysis highly consistent with UTI, negative nitrites but large leukocyte esterase, >182 WBC's; does have some hematuria so proceeding with CT to rule out concomitant ureterolithiasis   0248 18-gauge PIV placed in right cephalic vein under ultrasound guidance due to difficult access   0248 CBC with normal white count, no left-shift, normal hemoglobin and platelets   0305 BMP with mild hypokalemia, normal creatinine, otherwise unremarkable   0342 Magnesium normal   0352 CT abdomen final report:  IMPRESSION:   Stranding/edema around the left ureter, slight distension of the left   intrarenal collecting system and trace left perinephric edema.  Left-sided   pyelonephritis and a recently passed stone are both considerations.  There is   no nephrolithiasis ureterolithiasis or bladder calculus on either side.  Bladder wall uniformly prominent--this may be due to underdistention, but   cystitis would not be excluded.    0352 Patient's pain is much improved after Toradol, findings most consistent with pyelonephritis, without severe features patient is appropriate for discharge with conservative management, will upgrade antibiotic plan for close outpatient follow-up in a few days     Procedures              Critical Care time:  none               Results for orders placed or performed during the hospital encounter of 05/10/22 (from the past 24 hour(s))   UA with Microscopic reflex to Culture    Specimen: Urine, Clean Catch   Result Value Ref Range    Color Urine Light Yellow  Colorless, Straw, Light Yellow, Yellow    Appearance Urine Slightly Cloudy (A) Clear    Glucose Urine Negative Negative mg/dL    Bilirubin Urine Negative Negative    Ketones Urine Negative Negative mg/dL    Specific Gravity Urine 1.011 1.000 - 1.030    Blood Urine Moderate (A) Negative    pH Urine 7.5 5.0 - 9.0    Protein Albumin Urine 70  (A) Negative mg/dL    Urobilinogen Urine Normal Normal, 2.0 mg/dL    Nitrite Urine Negative Negative    Leukocyte Esterase Urine Large (A) Negative    WBC Clumps Urine Present (A) None Seen /HPF    Mucus Urine Present (A) None Seen /LPF    RBC Urine 25 (H) <=2 /HPF    WBC Urine >182 (H) <=5 /HPF    Transitional Epithelials Urine <1 <=1 /HPF    Renal Tubular Epithelials Urine 1 (H) None Seen /HPF    Narrative    Urine Culture ordered based on laboratory criteria   CBC with platelets differential    Narrative    The following orders were created for panel order CBC with platelets differential.  Procedure                               Abnormality         Status                     ---------                               -----------         ------                     CBC with platelets and d...[[id number]]                      Final result                 Please view results for these tests on the individual orders.   Basic metabolic panel   Result Value Ref Range    Sodium 140 134 - 144 mmol/L    Potassium 3.3 (L) 3.5 - 5.1 mmol/L    Chloride 102 98 - 107 mmol/L    Carbon Dioxide (CO2) 30 21 - 31 mmol/L    Anion Gap 8 3 - 14 mmol/L    Urea Nitrogen 10 7 - 25 mg/dL    Creatinine 0.75 0.60 - 1.20 mg/dL    Calcium 9.3 8.6 - 10.3 mg/dL    Glucose 127 (H) 70 - 105 mg/dL    GFR Estimate 90 >60 mL/min/1.73m2   Extra Tube    Narrative    The following orders were created for panel order Extra Tube.  Procedure                               Abnormality         Status                     ---------                               -----------         ------                     Extra Blood Culture  Bottle[[id number]]                       Final result               Extra Blue Top Tube[[id number]]                              Final result               Extra Serum Separator Tu...[[id number]]                      Final result                 Please view results for these tests on the individual orders.   Extra Blood Culture Bottle   Result Value Ref Range    Hold Specimen     Extra Blue Top Tube   Result Value Ref Range    Hold Specimen     Extra Serum Separator Tube (SST)   Result Value Ref Range    Hold Specimen     CBC with platelets and differential   Result Value Ref Range    WBC Count 8.2 4.0 - 11.0 10e3/uL    RBC Count 4.37 3.80 - 5.20 10e6/uL    Hemoglobin 12.1 11.7 - 15.7 g/dL    Hematocrit 37.7 35.0 - 47.0 %    MCV 86 78 - 100 fL    MCH 27.7 26.5 - 33.0 pg    MCHC 32.1 31.5 - 36.5 g/dL    RDW 13.7 10.0 - 15.0 %    Platelet Count 284 150 - 450 10e3/uL    % Neutrophils 44 %    % Lymphocytes 47 %    % Monocytes 6 %    % Eosinophils 2 %    % Basophils 1 %    % Immature Granulocytes 0 %    NRBCs per 100 WBC 0 <1 /100    Absolute Neutrophils 3.7 1.6 - 8.3 10e3/uL    Absolute Lymphocytes 3.9 0.8 - 5.3 10e3/uL    Absolute Monocytes 0.5 0.0 - 1.3 10e3/uL    Absolute Eosinophils 0.1 0.0 - 0.7 10e3/uL    Absolute Basophils 0.0 0.0 - 0.2 10e3/uL    Absolute Immature Granulocytes 0.0 <=0.4 10e3/uL    Absolute NRBCs 0.0 10e3/uL   Magnesium   Result Value Ref Range    Magnesium 2.0 1.9 - 2.7 mg/dL   Extra Tube    Narrative    The following orders were created for panel order Extra Tube.  Procedure                               Abnormality         Status                     ---------                               -----------         ------                     Extra Green Top (Lithium...[[id number]]                      Final result                 Please view results for these tests on the individual orders.   Extra Green Top (Lithium Heparin) ON ICE   Result Value Ref Range    Hold Specimen [facility]    CT Abdomen Pelvis w/o  Contrast    Narrative    PROCEDURE INFORMATION:   Exam: CT Abdomen And Pelvis Without Contrast   Exam date and time: 5/10/2022 2:55 AM   Age: 61 years old   Clinical indication: Abdominal pain; Flank; Left; Prior surgery; Surgery date:   6+ months; Surgery type: Hysterectomy 14 yrs ago     TECHNIQUE:   Imaging protocol: Computed tomography of the abdomen and pelvis without   contrast.   Radiation optimization: All CT scans at this facility use at least one of these   dose optimization techniques: automated exposure control; mA and/or kV   adjustment per patient size (includes targeted exams where dose is matched to   clinical indication); or iterative reconstruction.     COMPARISON:   CT ABDOMEN PELVIS W ORAL W IV CONTRAST 9/5/2018 4:47 PM     FINDINGS:   Limitations: Without IV contrast, sensitivity is decreased particularly for   neoplasm, vascular pathology and inflammation.     Heart: Mild coronary calcification, normal heart size no pericardial effusion.   Liver: There is mild to moderate hepatic steatosis, liver otherwise   unremarkable.   Gallbladder and bile ducts: No calcific cholelithiasis or substantial biliary   dilatation. Sensitivity of CT for gallstones approximately 75%.   Pancreas: Normal morphology.   Spleen: Normal size and morphology.   Adrenal glands: Normal.   Kidneys and ureters: There is stranding/edema around the left ureter, slight   distension of the left intrarenal collecting system and trace left perinephric   edema. Left-sided pyelonephritis and a recently passed stone are both   considerations. There is no nephrolithiasis ureterolithiasis or bladder   calculus on either side.   Stomach and bowel: Scattered diverticula seen in the colon no diverticulitis.    Negative for bowel obstruction  Appendix: No sign of appendicitis.   Intraperitoneal space: Negative for pneumoperitoneum, ascites and abscess.   Vasculature: No abdominal aortic aneurysm.   Lymph nodes: No substantial adenopathy.    Urinary bladder: Bladder wall uniformly prominent--this may be due to   underdistention, but cystitis would not be excluded.   Reproductive: Post hysterectomy, no cystic adnexal mass.   Bones/joints: Moderate bilateral hip osteoarthrosis. No sign of acute or   destructive osseous abnormality. Moderate lower lumbar facet arthropathy and   mild to moderate lower lumbar disc degenerative changes.         Impression    IMPRESSION:   stranding/edema around the left ureter, slight distension of the left   intrarenal collecting system and trace left perinephric edema.  Left-sided   pyelonephritis and a recently passed stone are both considerations.  There is   no nephrolithiasis ureterolithiasis or bladder calculus on either side.  Bladder wall uniformly prominent--this may be due to underdistention, but   cystitis would not be excluded.     THIS DOCUMENT HAS BEEN ELECTRONICALLY SIGNED BY AGATHA MUHAMMAD MD       Medications   0.9% sodium chloride BOLUS (0 mLs Intravenous Stopped 5/10/22 0415)   cefTRIAXone in d5w (ROCEPHIN) intermittent infusion 1 g (0 g Intravenous Stopped 5/10/22 0333)   ketorolac (TORADOL) injection 15 mg (15 mg Intravenous Given 5/10/22 0241)   ondansetron (ZOFRAN) injection 4 mg (4 mg Intravenous Given 5/10/22 0249)   potassium chloride ER (KLOR-CON M) CR tablet 20 mEq (20 mEq Oral Given 5/10/22 0338)       Assessments & Plan (with Medical Decision Making)   61 year old woman with history of obesity, mild persistent asthma, hypertension, hypercholesterolemia, aortic root dilatation who presents for evaluation of burning with urination and concern for bladder infection as well as left flank pain that started this evening, found to have left-sided pyelonephritis both clinically and by CT findings, with gross pyuria on urinalysis and large leukocyte esterase consistent with clinically history of UTI. Vitally stable here, afebrile, normal white count. Pain controlled with toradol and nausea with  zofran, given fluid bolus. Mild hypokalemia likely due to poor oral intake tonight, replaced. Started on IV ceftriaxone and given no severe features at this time, appropriate for discharge with course of cefpodoxime for 9 more days to complete 10 day course; will stop bactrim. Close outpatient follow-up, strict return precautions reviewed.    I have reviewed the nursing notes.    I have reviewed the findings, diagnosis, plan and need for follow up with the patient.      From ED discharge instructions:  You were found to have a serious kidney infection called pyelonephritis.  This was caused by an infection in your bladder spreading up to your kidney on the left side.  You were given a dose of a strong antibiotic called ceftriaxone in the emergency department.  You will need to continue a new antibiotic starting tomorrow morning (Wednesday, May 11th) called cefpodoxime, 200 mg twice daily, to complete a total 10-day course of treatment.    Stop taking your current antibiotic (Bactrim).    Drink plenty of water to stay hydrated and eat a well-balanced diet.    For pain you can take ibuprofen 400 mg every 6 hours, as well as Tylenol 1,000 mg (2 extra strength tablets) up to 3 times daily as needed.    For nausea, take Zofran 4 mg every 6 hours as needed (new prescription).    Follow-up with your doctor in clinic for recheck in 2 to 3 days.    Come back to the emergency department immediately or call 911 if you have worsening severe pain not controlled at home, new fevers greater than 101  F, vomiting and inability to eat or drink, or otherwise feeling sicker or have any other acute concerns.           Discharge Medication List as of 5/10/2022  4:15 AM      START taking these medications    Details   cefpodoxime (VANTIN) 200 MG tablet Take 1 tablet (200 mg) by mouth 2 times daily for 9 days, Disp-18 tablet, R-0, E-Prescribe      ondansetron (ZOFRAN ODT) 4 MG ODT tab Take 1 tablet (4 mg) by mouth every 6 hours as needed  for nausea or vomiting, Disp-5 tablet, R-0, InstyMeds             Final diagnoses:   Pyelonephritis   Hypokalemia       5/10/2022   [facility]  05/12/22 0771

## 2022-05-11 NOTE — RESULT ENCOUNTER NOTE
[facility] Emergency Dept discharge antibiotic (if prescribed): Cefpodoxime (Vantin) 200 MG tablet, 1 tablet (200 mg) by mouth 2 times daily for 9 days   Date of Rx (if applicable):  5/11/22  No changes in treatment per [facility] ED Lab Result Urine culture protocol.

## 2022-05-12 ENCOUNTER — TELEPHONE (OUTPATIENT)
Dept: INTERNAL MEDICINE | Facility: OTHER | Age: 62
End: 2022-05-12
Payer: COMMERCIAL

## 2022-05-12 ENCOUNTER — VIRTUAL VISIT (OUTPATIENT)
Dept: FAMILY MEDICINE | Facility: OTHER | Age: 62
End: 2022-05-12
Attending: STUDENT IN AN ORGANIZED HEALTH CARE EDUCATION/TRAINING PROGRAM
Payer: COMMERCIAL

## 2022-05-12 DIAGNOSIS — N12 PYELONEPHRITIS: Primary | ICD-10-CM

## 2022-05-12 LAB — BACTERIA UR CULT: ABNORMAL

## 2022-05-12 PROCEDURE — 99213 OFFICE O/P EST LOW 20 MIN: CPT | Mod: 95 | Performed by: STUDENT IN AN ORGANIZED HEALTH CARE EDUCATION/TRAINING PROGRAM

## 2022-05-12 ASSESSMENT — PAIN SCALES - GENERAL: PAINLEVEL: MODERATE PAIN (5)

## 2022-05-12 NOTE — TELEPHONE ENCOUNTER
After verifying the patients name and date of birth. Spoke with patient about her vehicle struggles with getting to her appointment today for her follow up with the ED appointment from 5/10/2022.    Patient reports having bloating, 5 lb increase, increase in urination, exhausted, no pain with urination.    Spoke with the patients provider that she was scheduled to see and was able to confirm telephone appointment would be appropriate to follow up with the patients questions and if she needs to come in for labs or anything additional than she can come in after the telephone appointment. Providers nurse will call patient and scheduling to have patient placed in scheduling for telephone visit.  Kristal Holliday RN  ....................  5/12/2022   1:23 PM

## 2022-05-12 NOTE — PROGRESS NOTES
Milana is a 61 year old who is being evaluated via a billable telephone visit.      What phone number would you like to be contacted at?   How would you like to obtain your AVS? MyChart    Assessment & Plan     Pyelonephritis  Follow up ED visit for pyelonephritis. Reassured she is on an antibiotic that is susceptible. Started oral abx yesterday so would give it a full 48 hours before considering it an abx failure. Also is starting to feel better today so am reassured. Discussed supportive cares at home including tylenol as needed. Would avoid ibuprofen for now due to pyelo. Follow up next week if not improved. [facility] this weekend if worsening or new symptoms     Jason Magdaleno MD  [facility]    Subjective   Milana is a 61 year old who presents for the following health issues     HPI     Start now: 1:19 PM  Follow up ED visit: seen 2 days ago for on going UTI symptoms despite abx and was diagnosed with pyelonephritis. Given rocephin in the ED and started Rx yesterday for cefpodoxime  (which is susceptible based on Urine culture)  Feels like she was retaining water earlier, weighed 5 lbs more than normal. Took a sauna and now urinating and feels better  drinking lots of fluids and did get some IVF fluids in the ED  Overall still fatigued, headache, and abdominal pain but feels better  End time: 1:24 PM              Review of Systems   Constitutional, HEENT, cardiovascular, pulmonary, gi and gu systems are negative, except as otherwise noted.      Objective    Vitals - Patient Reported  Pain Score: Moderate Pain (5)  Pain Loc: Abdomen        Physical Exam   healthy, alert and no distress  PSYCH: Alert and oriented times 3; coherent speech, normal   rate and volume, able to articulate logical thoughts, able   to abstract reason, no tangential thoughts, no hallucinations   or delusions  Her affect is normal  RESP: No cough, no audible wheezing, able to talk in full sentences  Remainder  of exam unable to be completed due to telephone visits                Phone call duration: 5 minutes

## 2022-05-12 NOTE — NURSING NOTE
Patient is following up on her ER visit.   Med rec complete.  Radha De La Cruz LPN.................. 5/12/2022 1:18 PM

## 2022-05-19 ENCOUNTER — OFFICE VISIT (OUTPATIENT)
Dept: INTERNAL MEDICINE | Facility: OTHER | Age: 62
End: 2022-05-19
Attending: STUDENT IN AN ORGANIZED HEALTH CARE EDUCATION/TRAINING PROGRAM
Payer: COMMERCIAL

## 2022-05-19 VITALS
OXYGEN SATURATION: 94 % | HEART RATE: 62 BPM | HEIGHT: 67 IN | DIASTOLIC BLOOD PRESSURE: 86 MMHG | TEMPERATURE: 97.6 F | RESPIRATION RATE: 16 BRPM | BODY MASS INDEX: 45.99 KG/M2 | SYSTOLIC BLOOD PRESSURE: 139 MMHG | WEIGHT: 293 LBS

## 2022-05-19 DIAGNOSIS — R30.0 DYSURIA: Primary | ICD-10-CM

## 2022-05-19 DIAGNOSIS — E87.6 HYPOKALEMIA: ICD-10-CM

## 2022-05-19 DIAGNOSIS — N10 PYELONEPHRITIS, ACUTE: ICD-10-CM

## 2022-05-19 LAB
ALBUMIN UR-MCNC: NEGATIVE MG/DL
ANION GAP SERPL CALCULATED.3IONS-SCNC: 7 MMOL/L (ref 3–14)
APPEARANCE UR: CLEAR
BILIRUB UR QL STRIP: NEGATIVE
BUN SERPL-MCNC: 12 MG/DL (ref 7–25)
CALCIUM SERPL-MCNC: 9.8 MG/DL (ref 8.6–10.3)
CHLORIDE BLD-SCNC: 101 MMOL/L (ref 98–107)
CO2 SERPL-SCNC: 31 MMOL/L (ref 21–31)
COLOR UR AUTO: NORMAL
CREAT SERPL-MCNC: 0.71 MG/DL (ref 0.6–1.2)
GFR SERPL CREATININE-BSD FRML MDRD: >90 ML/MIN/1.73M2
GLUCOSE BLD-MCNC: 97 MG/DL (ref 70–105)
GLUCOSE UR STRIP-MCNC: NEGATIVE MG/DL
HGB UR QL STRIP: NEGATIVE
HYALINE CASTS: 1 /LPF
KETONES UR STRIP-MCNC: NEGATIVE MG/DL
LEUKOCYTE ESTERASE UR QL STRIP: NEGATIVE
NITRATE UR QL: NEGATIVE
PH UR STRIP: 7.5 [PH] (ref 5–9)
POTASSIUM BLD-SCNC: 3.9 MMOL/L (ref 3.5–5.1)
RBC URINE: 1 /HPF
SODIUM SERPL-SCNC: 139 MMOL/L (ref 134–144)
SP GR UR STRIP: 1.01 (ref 1–1.03)
SQUAMOUS EPITHELIAL: <1 /HPF
UROBILINOGEN UR STRIP-MCNC: NORMAL MG/DL
WBC URINE: <1 /HPF

## 2022-05-19 PROCEDURE — 36415 COLL VENOUS BLD VENIPUNCTURE: CPT | Mod: ZL | Performed by: STUDENT IN AN ORGANIZED HEALTH CARE EDUCATION/TRAINING PROGRAM

## 2022-05-19 PROCEDURE — 80048 BASIC METABOLIC PNL TOTAL CA: CPT | Mod: ZL | Performed by: STUDENT IN AN ORGANIZED HEALTH CARE EDUCATION/TRAINING PROGRAM

## 2022-05-19 PROCEDURE — 99214 OFFICE O/P EST MOD 30 MIN: CPT | Performed by: STUDENT IN AN ORGANIZED HEALTH CARE EDUCATION/TRAINING PROGRAM

## 2022-05-19 PROCEDURE — 81001 URINALYSIS AUTO W/SCOPE: CPT | Mod: ZL | Performed by: STUDENT IN AN ORGANIZED HEALTH CARE EDUCATION/TRAINING PROGRAM

## 2022-05-19 ASSESSMENT — PAIN SCALES - GENERAL: PAINLEVEL: NO PAIN (0)

## 2022-05-19 NOTE — LETTER
May 19, 2022      Milana Blum  [address] [facility] [address]        Dear ,    We are writing to inform you of your test results.    Your kidney function is normal and your urine looks much better.  It does not appear that you have a UTI any longer.  Please complete your course of antibiotics.  If you are still having some burning with urination I recommend picking up some over-the-counter AZO and trying that for a few days to see if that resolves your symptoms.    Resulted Orders   Basic Metabolic Panel   Result Value Ref Range    Sodium 139 134 - 144 mmol/L    Potassium 3.9 3.5 - 5.1 mmol/L    Chloride 101 98 - 107 mmol/L    Carbon Dioxide (CO2) 31 21 - 31 mmol/L    Anion Gap 7 3 - 14 mmol/L    Urea Nitrogen 12 7 - 25 mg/dL    Creatinine 0.71 0.60 - 1.20 mg/dL    Calcium 9.8 8.6 - 10.3 mg/dL    Glucose 97 70 - 105 mg/dL    GFR Estimate >90 >60 mL/min/1.73m2      Comment:      Effective December 21, 2021 eGFRcr in adults is calculated using the 2021 CKD-EPI creatinine equation which includes age and gender (Donald et al., NEJM, DOI: 10.1056/TAWBkx3684766)   UA with Microscopic reflex to Culture   Result Value Ref Range    Color Urine Light Yellow Colorless, Straw, Light Yellow, Yellow    Appearance Urine Clear Clear    Glucose Urine Negative Negative mg/dL    Bilirubin Urine Negative Negative    Ketones Urine Negative Negative mg/dL    Specific Gravity Urine 1.009 1.000 - 1.030    Blood Urine Negative Negative    pH Urine 7.5 5.0 - 9.0    Protein Albumin Urine Negative Negative mg/dL    Urobilinogen Urine Normal Normal, 2.0 mg/dL    Nitrite Urine Negative Negative    Leukocyte Esterase Urine Negative Negative    RBC Urine 1 <=2 /HPF    WBC Urine <1 <=5 /HPF    Squamous Epithelials Urine <1 <=1 /HPF    Hyaline Casts Urine 1 <=2 /LPF    Narrative    Urine Culture not indicated       If you have any questions or concerns, please call the clinic at the number listed above.        Sincerely,      Wes Gasca MD

## 2022-05-19 NOTE — PROGRESS NOTES
"  {PROVIDER CHARTING PREFERENCE:477561}    Gilda Cortés is a 61 year old who presents for the following health issues     History of Present Illness       Reason for visit:  Follow up kidney infection  Symptom onset:  3-7 days ago  Symptoms include:  Increase urination, burning, pain  Symptom intensity:  Moderate  Symptom progression:  Improving  Had these symptoms before:  No  What makes it worse:  No  What makes it better:  Getting antibiotics    She eats 2-3 servings of fruits and vegetables daily.She consumes 0 sweetened beverage(s) daily.She exercises with enough effort to increase her heart rate 10 to 19 minutes per day.  She exercises with enough effort to increase her heart rate 4 days per week.   She is taking medications regularly.       {SUPERLIST (Optional):130644}  {additonal problems for provider to add (Optional):121523}    Review of Systems   {ROS COMP (Optional):074131}      Objective    /86 (BP Location: Right arm, Patient Position: Sitting, Cuff Size: Adult Large)   Pulse 62   Temp 97.6  F (36.4  C) (Temporal)   Resp 16   Ht 1.702 m (5' 7\")   Wt 136 kg (299 lb 12.8 oz)   LMP  (LMP Unknown)   SpO2 94%   Breastfeeding No   BMI 46.96 kg/m    Body mass index is 46.96 kg/m .  Physical Exam   {Exam List (Optional):247153}    {Diagnostic Test Results (Optional):795218}    {AMBULATORY ATTESTATION (Optional):218504}        "

## 2022-05-19 NOTE — PROGRESS NOTES
Ms. Blum is a 61 year old female who presents to the clinic for ED follow-up pyelonephritis    History of Present Illness       Reason for visit:  Follow up kidney infection  Symptom onset:  3-7 days ago  Symptoms include:  Increase urination, burning, pain  Symptom intensity:  Moderate  Symptom progression:  Improving  Had these symptoms before:  No  What makes it worse:  No  What makes it better:  Getting antibiotics    She eats 2-3 servings of fruits and vegetables daily.She consumes 0 sweetened beverage(s) daily.She exercises with enough effort to increase her heart rate 10 to 19 minutes per day.  She exercises with enough effort to increase her heart rate 4 days per week.   She is taking medications regularly.      Patient developed symptoms on 5-5-2022 of UTI.  She had a urgent care appointment virtually on 5-5 and was prescribed Bactrim empirically.    She still had dysuria on 5-9-2022 and had completed her course of antibiotics.  Recommended that she obtain a urine analysis.    She presented the ED on 5- for dysuria.  She had left flank pain as well and worsening symptoms of her UTI.  CT of the abdomen was performed and showed some stranding of the ureter with possible recently passed stone versus pyelonephritis.  She was discharged with a course of cefpodoxime.  Labs were also slightly remarkable for a potassium of 3.3.      On follow-up today I reviewed the urine cultures which show resistant E. coli to Bactrim likely is the reason why her UTI did not improve.  She was prescribed a 10-day course and is nearly complete with it.  She states over the last day or 2 she has developed some further dysuria and would like a repeat UA.      She has been working on weight loss and has lost up to 10 pounds since her last visit.      Review of Systems     Reviewed and updated as needed this visit by Provider                     EXAM:   Vitals:    05/19/22 1253   BP: 139/86   BP Location: Right arm   Patient  "Position: Sitting   Cuff Size: Adult Large   Pulse: 62   Resp: 16   Temp: 97.6  F (36.4  C)   TempSrc: Temporal   SpO2: 94%   Weight: 136 kg (299 lb 12.8 oz)   Height: 1.702 m (5' 7\")         BP Readings from Last 3 Encounters:   05/19/22 139/86   05/10/22 134/46   04/18/22 139/80      Wt Readings from Last 3 Encounters:   05/19/22 136 kg (299 lb 12.8 oz)   05/10/22 134.7 kg (297 lb)   04/18/22 138.9 kg (306 lb 4.8 oz)      Estimated body mass index is 46.96 kg/m  as calculated from the following:    Height as of this encounter: 1.702 m (5' 7\").    Weight as of this encounter: 136 kg (299 lb 12.8 oz).     Physical Exam   General: Pleasant 61-year-old woman sitting in clinic no acute distress  MSK: Pitting edema to the knee bilaterally compression stockings in place    INVESTIGATIONS:  - Labs reviewed in [facility]     ASSESSMENT AND PLAN:    ICD-10-CM    1. Dysuria  R30.0 UA with Microscopic reflex to Culture     UA with Microscopic reflex to Culture   2. Pyelonephritis, acute  N10    3. Hypokalemia  E87.6 Basic Metabolic Panel     Basic Metabolic Panel       Assessment/Plan:   Dysuria, pyelonephritis: Discharged with 10 days of cefpodoxime has nearly completed course already, but has developed dysuria.  Repeat UA, push oral fluids for hydration for the short-term, limit in the long-term due to edema.  Reflex culture. complete course of cefpodoxime await culture results before prescribing any further antibiotics    Hypokalemia: Incidental hypokalemia in the ED, recheck BMP today to ensure improving and creatinine with recent pyelonephritis      Follow-up with me as needed        Electronically signed by:  Wes Gasca MD on 5/19/2022  Internal Medicine  [facility]    Total time spent on this visit 31 minutes including precharting, review of ED notes and CT scan and culture results as well as discussion and counseling with patient, physical exam and documentation.    "

## 2022-05-19 NOTE — NURSING NOTE
"Chief Complaint   Patient presents with     RECHECK     Regarding kidney infection       Medication reconciliation completed.    FOOD SECURITY SCREENING QUESTIONS:    The next two questions are to help us understand your food security.  If you are feeling you need any assistance in this area, we have resources available to support you today.    Hunger Vital Signs:  Within the past 12 months we worried whether our food would run out before we got money to buy more. Never  Within the past 12 months the food we bought just didn't last and we didn't have money to get more. Never    Initial /86 (BP Location: Right arm, Patient Position: Sitting, Cuff Size: Adult Large)   Pulse 62   Temp 97.6  F (36.4  C) (Temporal)   Resp 16   Ht 1.702 m (5' 7\")   Wt 136 kg (299 lb 12.8 oz)   LMP  (LMP Unknown)   SpO2 94%   Breastfeeding No   BMI 46.96 kg/m   Estimated body mass index is 46.96 kg/m  as calculated from the following:    Height as of this encounter: 1.702 m (5' 7\").    Weight as of this encounter: 136 kg (299 lb 12.8 oz).       Jacy Iraheta LPN .......  5/19/2022  12:56 PM  "

## 2022-05-23 ENCOUNTER — HOSPITAL ENCOUNTER (OUTPATIENT)
Dept: GENERAL RADIOLOGY | Facility: OTHER | Age: 62
Discharge: HOME | End: 2022-05-23
Attending: STUDENT IN AN ORGANIZED HEALTH CARE EDUCATION/TRAINING PROGRAM
Payer: COMMERCIAL

## 2022-05-23 ENCOUNTER — OFFICE VISIT (OUTPATIENT)
Dept: INTERNAL MEDICINE | Facility: OTHER | Age: 62
End: 2022-05-23
Attending: STUDENT IN AN ORGANIZED HEALTH CARE EDUCATION/TRAINING PROGRAM
Payer: COMMERCIAL

## 2022-05-23 VITALS
HEIGHT: 67 IN | TEMPERATURE: 97 F | SYSTOLIC BLOOD PRESSURE: 148 MMHG | HEART RATE: 66 BPM | DIASTOLIC BLOOD PRESSURE: 90 MMHG | OXYGEN SATURATION: 95 % | BODY MASS INDEX: 45.99 KG/M2 | WEIGHT: 293 LBS | RESPIRATION RATE: 20 BRPM

## 2022-05-23 DIAGNOSIS — S49.91XD INJURY OF RIGHT SHOULDER, SUBSEQUENT ENCOUNTER: ICD-10-CM
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
## 2022-05-23 DIAGNOSIS — R07.81 RIB PAIN: ICD-10-CM
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
## 2022-05-23 PROCEDURE — 99213 OFFICE O/P EST LOW 20 MIN: CPT | Performed by: STUDENT IN AN ORGANIZED HEALTH CARE EDUCATION/TRAINING PROGRAM

## 2022-05-23 PROCEDURE — 71101 X-RAY EXAM UNILAT RIBS/CHEST: CPT | Mod: LT

## 2022-05-23 RX ORDER — HYDROCODONE BITARTRATE AND ACETAMINOPHEN 5; 325 MG/1; MG/1
TABLET ORAL
COMMUNITY
Start: 2022-05-21 | End: 2022-08-25

## 2022-05-23 ASSESSMENT — PAIN SCALES - GENERAL: PAINLEVEL: EXTREME PAIN (8)

## 2022-05-23 NOTE — PROGRESS NOTES
"  Gilda Cortés is a 61 year old who presents for the following health issues     History of Present Illness       Reason for visit:  Fell, injured left side and shoulder, much pain  Symptom onset:  1-3 days ago  Symptoms include:  Pain, cannot move shoulder  Symptom intensity:  Moderate  Symptom progression:  Staying the same  Had these symptoms before:  No  What makes it worse:  Any movement of right arm, left side hurts all the time  What makes it better:  Sleep, pain meds    She eats 2-3 servings of fruits and vegetables daily.She consumes 0 sweetened beverage(s) daily.She exercises with enough effort to increase her heart rate 10 to 19 minutes per day.  She exercises with enough effort to increase her heart rate 4 days per week.   She is taking medications regularly.       Right shoulder, knee and left side pain due to fall 5-21-22.    Fell over dog leash. Hit left side. Thinks she tried to catch herself. Felt her shoulder pop after getting up.     Has ortho appointment on Wednesday in [place]. Feels like she has fullness in her left ribs and flank. Bra line down.           Review of Systems         Objective    BP (!) 148/90 (BP Location: Right arm, Patient Position: Sitting, Cuff Size: Adult Large)   Pulse 66   Temp 97  F (36.1  C) (Temporal)   Resp 20   Ht 1.689 m (5' 6.5\")   Wt 137.3 kg (302 lb 12.8 oz)   LMP  (LMP Unknown)   SpO2 95%   Breastfeeding No   BMI 48.14 kg/m    Body mass index is 48.14 kg/m .  Physical Exam   General: Pleasant 61-year-old woman sitting clinic no acute distress  Skin: No rashes or sores  MSK: Right arm with positive lift off test, negative empty can test but difficulty with full extension right elbow.  Has a palpable lesion painful to palpation over the left anterior ribs, consistent with a hematoma    Xray - Reviewed and interpreted by me.  No evidence of intra-abdominal free air, rib fractures appreciated            Assessment & Plan       ICD-10-CM    1. Fall, " subsequent encounter  W19.XXXD XR Ribs & Chest Lt 3v   2. Injury of right shoulder, subsequent encounter  S49.91XD XR Ribs & Chest Lt 3v   3. Rib pain  R07.81 XR Ribs & Chest Lt 3v   Fall, shoulder pain, rib pain: Patient has evaluation with a orthopedist this Wednesday otherwise would recommend MRI of her right shoulder, defer to orthopedics.  Her x-ray of her left ribs which she fell onto the stairs is consistent with no rib fractures, appears that there is a palpable hematoma under the skin in the soft tissue as well.  Follow-up with orthopedics for her shoulder pain defer additional imaging to them.    Encouraged weight loss and regular exercise.     No follow-ups on file.    Wes Gasca MD  [facility] [place]

## 2022-05-23 NOTE — NURSING NOTE
"Chief Complaint   Patient presents with     Shoulder Injury     Right shoulder pain   left side rib pain  right knee pain   fell on 5-21-22       Medication reconciliation completed.    FOOD SECURITY SCREENING QUESTIONS:    The next two questions are to help us understand your food security.  If you are feeling you need any assistance in this area, we have resources available to support you today.    Hunger Vital Signs:  Within the past 12 months we worried whether our food would run out before we got money to buy more. Never  Within the past 12 months the food we bought just didn't last and we didn't have money to get more. Never    Initial BP (!) 148/90 (BP Location: Right arm, Patient Position: Sitting, Cuff Size: Adult Large)   Pulse 66   Temp 97  F (36.1  C) (Temporal)   Resp 20   Ht 1.689 m (5' 6.5\")   Wt 137.3 kg (302 lb 12.8 oz)   LMP  (LMP Unknown)   SpO2 95%   Breastfeeding No   BMI 48.14 kg/m   Estimated body mass index is 48.14 kg/m  as calculated from the following:    Height as of this encounter: 1.689 m (5' 6.5\").    Weight as of this encounter: 137.3 kg (302 lb 12.8 oz).       Jacy Iraheta LPN .......  5/23/2022  2:05 PM  "

## 2022-06-01 ENCOUNTER — HOSPITAL ENCOUNTER (OUTPATIENT)
Dept: MRI IMAGING | Facility: OTHER | Age: 62
Discharge: HOME | End: 2022-06-01
Attending: ORTHOPAEDIC SURGERY | Admitting: ORTHOPAEDIC SURGERY
Payer: COMMERCIAL

## 2022-06-01 DIAGNOSIS — M25.511 RIGHT SHOULDER PAIN: ICD-10-CM

## 2022-06-01 DIAGNOSIS — M75.100 ROTATOR CUFF TEAR: ICD-10-CM

## 2022-06-01 DIAGNOSIS — S49.91XA INJURY OF RIGHT UPPER ARM: ICD-10-CM

## 2022-06-01 PROCEDURE — 73221 MRI JOINT UPR EXTREM W/O DYE: CPT | Mod: RT

## 2022-06-02 ENCOUNTER — OFFICE VISIT (OUTPATIENT)
Dept: CARDIOLOGY | Facility: OTHER | Age: 62
End: 2022-06-02
Attending: STUDENT IN AN ORGANIZED HEALTH CARE EDUCATION/TRAINING PROGRAM
Payer: COMMERCIAL

## 2022-06-02 VITALS
RESPIRATION RATE: 18 BRPM | HEIGHT: 67 IN | BODY MASS INDEX: 45.99 KG/M2 | SYSTOLIC BLOOD PRESSURE: 158 MMHG | WEIGHT: 293 LBS | DIASTOLIC BLOOD PRESSURE: 94 MMHG | HEART RATE: 69 BPM | OXYGEN SATURATION: 97 % | TEMPERATURE: 98.8 F

## 2022-06-02 DIAGNOSIS — I47.10 PAROXYSMAL SUPRAVENTRICULAR TACHYCARDIA (H): ICD-10-CM

## 2022-06-02 DIAGNOSIS — R06.09 DOE (DYSPNEA ON EXERTION): ICD-10-CM

## 2022-06-02 DIAGNOSIS — E78.00 HYPERCHOLESTEROLEMIA: ICD-10-CM

## 2022-06-02 DIAGNOSIS — G47.33 OSA ON CPAP: ICD-10-CM

## 2022-06-02 DIAGNOSIS — R60.0 PERIPHERAL EDEMA: ICD-10-CM

## 2022-06-02 DIAGNOSIS — J45.30 MILD PERSISTENT ASTHMA WITHOUT COMPLICATION: ICD-10-CM

## 2022-06-02 DIAGNOSIS — K57.30 DIVERTICULA OF COLON: ICD-10-CM

## 2022-06-02 DIAGNOSIS — R00.2 PALPITATIONS: ICD-10-CM

## 2022-06-02 DIAGNOSIS — R73.03 PREDIABETES: ICD-10-CM

## 2022-06-02 DIAGNOSIS — R07.89 ATYPICAL CHEST PAIN: ICD-10-CM

## 2022-06-02 DIAGNOSIS — E66.01 MORBID OBESITY (H): ICD-10-CM

## 2022-06-02 DIAGNOSIS — I50.32 DIASTOLIC DYSFUNCTION WITH CHRONIC HEART FAILURE (H): ICD-10-CM

## 2022-06-02 DIAGNOSIS — I10 ESSENTIAL HYPERTENSION, BENIGN: ICD-10-CM

## 2022-06-02 DIAGNOSIS — I71.21 ASCENDING AORTIC ANEURYSM (H): Primary | ICD-10-CM

## 2022-06-02 PROBLEM — I77.810 AORTIC ROOT DILATATION (H): Status: RESOLVED | Noted: 2022-05-05 | Resolved: 2022-06-02

## 2022-06-02 LAB
ATRIAL RATE - MUSE: 64 BPM
DIASTOLIC BLOOD PRESSURE - MUSE: NORMAL MMHG
INTERPRETATION ECG - MUSE: NORMAL
P AXIS - MUSE: 25 DEGREES
PR INTERVAL - MUSE: 136 MS
QRS DURATION - MUSE: 104 MS
QT - MUSE: 424 MS
QTC - MUSE: 437 MS
R AXIS - MUSE: 3 DEGREES
SYSTOLIC BLOOD PRESSURE - MUSE: NORMAL MMHG
T AXIS - MUSE: 30 DEGREES
VENTRICULAR RATE- MUSE: 64 BPM

## 2022-06-02 PROCEDURE — 99215 OFFICE O/P EST HI 40 MIN: CPT | Performed by: INTERNAL MEDICINE

## 2022-06-02 PROCEDURE — 93000 ELECTROCARDIOGRAM COMPLETE: CPT | Performed by: INTERNAL MEDICINE

## 2022-06-02 RX ORDER — NEBIVOLOL 10 MG/1
10 TABLET ORAL DAILY
Qty: 90 TABLET | Refills: 3
Start: 2022-06-02 | End: 2022-10-04

## 2022-06-02 ASSESSMENT — PAIN SCALES - GENERAL: PAINLEVEL: EXTREME PAIN (8)

## 2022-06-02 NOTE — NURSING NOTE
"Patient comes in for consult for Dilated aortic root,tachycardia.  Katie Talley LPN ....................6/2/2022   3:00 PM  Chief Complaint   Patient presents with     Consult For     Dilated aortic root,tachycardia       Initial BP (!) 158/94 (BP Location: Right arm, Patient Position: Sitting, Cuff Size: Adult Large)   Pulse 69   Temp 98.8  F (37.1  C) (Tympanic)   Resp 18   Ht 1.689 m (5' 6.5\")   Wt 135.2 kg (298 lb)   LMP  (LMP Unknown)   SpO2 97%   BMI 47.38 kg/m   Estimated body mass index is 47.38 kg/m  as calculated from the following:    Height as of this encounter: 1.689 m (5' 6.5\").    Weight as of this encounter: 135.2 kg (298 lb).  Meds Reconciled: complete  Pt is on Aspirin  Pt is not on a Statin  PHQ and/or DOYLE reviewed. Pt referred to PCP/MH Provider as appropriate.    Katie Talley LPN    FOOD SECURITY SCREENING QUESTIONS  Hunger Vital Signs:  Within the past 12 months we worried whether our food would run out before we got money to buy more. Never  Within the past 12 months the food we bought just didn't last and we didn't have money to get more. Never  Katie Talley LPN 6/2/2022 3:00 PM    "

## 2022-06-02 NOTE — PATIENT INSTRUCTIONS
You were seen by  Dustin Halmlan, DO     1. Increase your Bystolic medication to the following:  nebivolol (BYSTOLIC) 10 MG tablet        Sig - Route: Take 1 tablet (10 mg) by mouth daily     2. Echocardiogram has been ordered to be done in 1 year. You will be called to schedule this.  You will receive instructions for testing at that time.  You will be contacted with results.        You will follow up with [facility] in 1 year after your echocardiogram, sooner if needed.     Please call the cardiology office with problems, questions, or concerns at [phone].    If you experience chest pain, chest pressure, chest tightness, shortness of breath, fainting, lightheadedness, nausea, vomiting, or other concerning symptoms, please report to the Emergency Department or call 911. These symptoms may be emergent, and best treated in the Emergency Department.     Cardiology Nurses  KHALIF Quiñones, LOVELY Cobb LPN  [facility] ([facility])  [phone]

## 2022-06-02 NOTE — PROGRESS NOTES
[facility] HEART CARE   CARDIOLOGY CONSULT     Milana Blum   1960  [id number]    Wes Gasca     Chief Complaint   Patient presents with     Consult For     Dilated aortic root,tachycardia          HPI:   Mrs. Blum is a 61-year-old female who is being seen by cardiology to establish care.  She has been having weekly palpitations lasting 10 minutes to a full day, sharp/atypical pain to her chest, uncontrolled hypertension, and was following with cardiology in [place] related to a mild ascending aortic aneurysm.    Zio patch from 3/21/2022 showed SVT up to 16.2 seconds, DORMAN with history of mild asthma, obesity with a BMI of 47, prediabetes-controlled, hypercholesterolemia-controlled, diastolic dysfunction grade 1 on 12/27/18, KIM on CPAP, and diverticula.    She had been seeing cardiology.  She moved to [place] in October, 2021.  Her job allows her to work remotely if she lives an hour or greater away.  She lives less than an hour from her job as she lives in downtown [place].  She is feeling less safe.  She decided to be closer to her brother and increase her distance to allow her to work remotely.  She was seeing cardiology in [place].  She saw Dr. Theodore in cardiology related to an ascending aortic aneurysm.  It was 4.0 cm on 12/27/18 and increased to 4.2 cm on 5/5/2022.  Discussed this is a mild finding and does not require surgery.  Surgery is typically performed at 5.5 cm.    She also has been having palpitations.  She describes them happening on a weekly basis.  They can last from anywhere from 10 minutes to a full day.  She has symptoms while wearing her Zio patch which was completed 3/21/2022.  This monitor showed x7 episodes of SVT lasting up to 16.2 seconds, SVE, and VE.  No A. fib, heart block, or VT.  Patient gets mildly dizzy but that has not had syncope or near syncope.  Currently on Bystolic and was increased.  If found to have extended episodes lasting hours, may need to have an  ablation.  We will treat with medications.    She is having atypical chest pain.  She describes a sharp, substernal pain that is not exertional.  Lasts seconds.  It is random.  It is tender to palpation and reproducible.  Patient reassured.  No history of CAD or stenting.    She has a history of hypertension.  Blood pressure on 6/2/2022 was 158/94.  Blood pressures have been in the 130s systolically at home.  Discussed the benefits of tighter control blood pressure.  We will increase Bystolic from 5 mg daily to 10 mg daily for better blood pressure control and help manage palpitations.    She has diastolic dysfunction.  She has peripheral edema.  She currently on hydrochlorothiazide 25 mg daily.  Echo from 12/27/2018 showed grade 1.    IMAGING RESULTS:   Echocardiogram on 5/5/2022:  Left ventricular size, wall motion and function are normal. The ejection fraction is 60-65%.  Right ventricular function, chamber size, wall motion, and thickness are normal.  Ascending aorta 4.2 cm, indexed at 1.75 cm/m2.  The inferior vena cava was normal in size with preserved respiratory variability. No pericardial effusion is present.    Zio patch 3/21/2022:  Patient's monitor was in place for 11 days and 21-hour.  Minimum heart rate 51 bpm, average heart rate 72 bpm, maximum heart rate 160 bpm. Rhythm/s present include sinus rhythm, supraventricular tachycardia.  There were rare ventricular ectopic beats accounting for less than 1% of all beats. There were 0 ventricular triplets and 0 couplets. There were rare supraventricular ectopic beats.  There were 10 triggered events during which time the noted rhythms were supraventricular tachycardia and sinus rhythm.  There were 9 diary entries during which time the noted rhythms were supraventricular tachycardia and sinus rhythm.  There were 7 episodes of supraventricular tachycardia with the fastest interval lasting 5 beats with a max rate of 169 and the longest lasting 16.2 seconds.   Review of actual tracings showed no other abnormality.    Echocardiogram on 4/16/2021:  Left ventricular ejection fraction is normal. The calculated left ventricular ejection fraction is 59%.    Normal left ventricular cavity size and wall thickness.    Diastolic function is normal.    Normal right ventricular size and systolic function.    No hemodynamically significant valvular heart abnormalities.    The ascending aorta is mildly dilated measuring 4.2 cm.    When compared to the previous study dated 12/27/2018, there has been no significant change.    Echocardiogram on 12/27/2018:  Normal left ventricular size and systolic function.The calculated left ventricular ejection fraction is 65%. This represents a normal ejection fraction. Borderline concentric hypertrophy noted. Grade I (mild) left ventricular diastolic dysfunction.    Normal right ventricular size and systolic function. TAPSE is normal    Left atrial volume is moderately increased    Estimated central venous pressure equal to 3 mmHg.    The ascending aorta is mildly dilated at 40 mm.    No previous study for comparison.       CURRENT MEDICATIONS:   Prior to Admission medications    Medication Sig Start Date End Date Taking? Authorizing Provider   albuterol (VENTOLIN HFA) 90 mcg/actuation inhaler [ALBUTEROL (VENTOLIN HFA) 90 MCG/ACTUATION INHALER] INHALE 1 TO 2 PUFFS ORALLY EVERY FOUR HOURS AS NEEDED 11/5/19   Burak Arnold MD   aspirin 81 MG EC tablet [ASPIRIN 81 MG EC TABLET] Take 1 tablet (81 mg total) by mouth daily. 11/14/18   Gary Addison MBBS   b complex vitamins tablet [B COMPLEX VITAMINS TABLET] Take 1 tablet by mouth daily. 6/28/16   Provider, Historical   hydrochlorothiazide (HYDRODIURIL) 25 MG tablet Take 1 tablet (25 mg) by mouth daily 3/7/22   Wes Gasca MD   HYDROcodone-acetaminophen (NORCO) 5-325 MG tablet  5/21/22   Reported, Patient   magnesium oxide 250 mg Tab [MAGNESIUM OXIDE 250 MG TAB] Take 250 mg by mouth daily.  11/21/18   Provider, Historical   meclizine (ANTIVERT) 25 mg tablet [MECLIZINE (ANTIVERT) 25 MG TABLET] Take 1 tablet (25 mg total) by mouth 3 (three) times a day as needed for dizziness. 2/5/20   Rhina Maurer MD   naproxen (NAPROSYN) 500 MG tablet TAKE 1 TABLET TWICE DAILY  WITH MEALS 10/3/21   Burak Arnold MD   nebivolol (BYSTOLIC) 5 MG tablet Take 1 tablet (5 mg) by mouth daily 3/7/22   Wes Gasca MD   ondansetron (ZOFRAN ODT) 4 MG ODT tab Take 1 tablet (4 mg) by mouth every 6 hours as needed for nausea or vomiting 5/10/22   Arthur Collazo MD   SYMBICORT 160-4.5 MCG/ACT Inhaler INHALE 2 PUFFS BY MOUTH TWICE A DAY 10/3/21   Bruak Arnold MD   triamcinolone (KENALOG) 0.1 % external cream Apply topically 2 times daily 3/14/22   Jason Alvares MD   zinc sulfate (ZINCATE) 220 (50 Zn) MG capsule Take 220 mg by mouth daily    Reported, Patient       ALLERGIES:   Allergies   Allergen Reactions     House Dust [Dust Mite Extract] Shortness Of Breath     Pollen      Other Environmental Allergy Itching        PAST MEDICAL HISTORY:   Past Medical History:   Diagnosis Date     Aortic root dilatation (H) 5/5/2022    Last echocardiogram May 2022 measurement 4.1 cm.  Slowly progressively getting bigger continue annual exams unless recommended otherwise by cardiology     Asthma      Hypertension      Low back pain         PAST SURGICAL HISTORY:   Past Surgical History:   Procedure Laterality Date     BACK SURGERY  08/2010    L4-5, S1 Hemilaminectomy, foraminotomy     IR LUMBAR EPIDURAL STEROID INJECTION  3/4/2010     [place] REMOVAL OF OVARY/TUBE(S)      Description: Salpingo-oophorectomy Left Side;  Proc Date: 12/06/2004;     [facility] SUPRACERV ABD HYSTERECTOMY      Description: Supracervical Hysterectomy;  Proc Date: 12/06/2004;        FAMILY HISTORY:   Family History   Problem Relation Age of Onset     Hypertension Mother      Cancer Father      Diabetes Father      Hypertension Father      Cancer  Sister         SOCIAL HISTORY:   Social History     Socioeconomic History     Marital status: Single   Tobacco Use     Smoking status: Never Smoker     Smokeless tobacco: Never Used   Vaping Use     Vaping Use: Never used   Substance and Sexual Activity     Alcohol use: Yes     Alcohol/week: 2.0 standard drinks     Comment: weekly beer or wine     Drug use: No     Sexual activity: Yes     Partners: Male     Birth control/protection: Surgical          ROS:   CONSTITUTIONAL: No weight loss, fever, chills, weakness or fatigue.   HEENT: Eyes: No visual changes. Ears, Nose, Throat: No hearing loss, congestion or difficulty swallowing.   CARDIOVASCULAR: (+) chest pain with chest discomfort but no chest pressure.  Frequent palpitations with some mild to moderate lower extremity edema.   RESPIRATORY: (+) shortness of breath with dyspnea upon exertion, no cough or sputum production.   GASTROINTESTINAL: No abdominal pain. No anorexia, nausea, vomiting or diarrhea.   NEUROLOGICAL: No headache, lightheadedness, dizziness, syncope, ataxia or weakness.   HEMATOLOGIC: No anemia, bleeding or bruising.   PSYCHIATRIC: No history of depression or anxiety.   ENDOCRINOLOGIC: No reports of sweating, cold or heat intolerance. No polyuria or polydipsia.   SKIN: No abnormal rashes or itching.       PHYSICAL EXAM:   GENERAL: The patient is a well-developed, well-nourished, in no apparent distress. Alert and oriented x3.   HEENT: Head is normocephalic and atraumatic. Eyes are symmetrical with normal visual tracking.  HEART: Regular rate and rhythm, S1S2 present without murmur, rub or gallop.   LUNGS: Respirations regular and unlabored. Clear to auscultation.  Reduced air sounds.  EXTREMITIES: Mild peripheral edema present.   NEUROLOGIC: Alert and oriented X3.    SKIN: No jaundice. No rashes or visible skin lesions present.        LAB RESULTS:   Office Visit on 05/19/2022   Component Date Value Ref Range Status     Sodium 05/19/2022 139  134 -  144 mmol/L Final     Potassium 05/19/2022 3.9  3.5 - 5.1 mmol/L Final     Chloride 05/19/2022 101  98 - 107 mmol/L Final     Carbon Dioxide (CO2) 05/19/2022 31  21 - 31 mmol/L Final     Anion Gap 05/19/2022 7  3 - 14 mmol/L Final     Urea Nitrogen 05/19/2022 12  7 - 25 mg/dL Final     Creatinine 05/19/2022 0.71  0.60 - 1.20 mg/dL Final     Calcium 05/19/2022 9.8  8.6 - 10.3 mg/dL Final     Glucose 05/19/2022 97  70 - 105 mg/dL Final     GFR Estimate 05/19/2022 >90  >60 mL/min/1.73m2 Final     Color Urine 05/19/2022 Light Yellow  Colorless, Straw, Light Yellow, Yellow Final     Appearance Urine 05/19/2022 Clear  Clear Final     Glucose Urine 05/19/2022 Negative  Negative mg/dL Final     Bilirubin Urine 05/19/2022 Negative  Negative Final     Ketones Urine 05/19/2022 Negative  Negative mg/dL Final     Specific Gravity Urine 05/19/2022 1.009  1.000 - 1.030 Final     Blood Urine 05/19/2022 Negative  Negative Final     pH Urine 05/19/2022 7.5  5.0 - 9.0 Final     Protein Albumin Urine 05/19/2022 Negative  Negative mg/dL Final     Urobilinogen Urine 05/19/2022 Normal  Normal, 2.0 mg/dL Final     Nitrite Urine 05/19/2022 Negative  Negative Final     Leukocyte Esterase Urine 05/19/2022 Negative  Negative Final     RBC Urine 05/19/2022 1  <=2 /HPF Final     WBC Urine 05/19/2022 <1  <=5 /HPF Final     Squamous Epithelials Urine 05/19/2022 <1  <=1 /HPF Final     Hyaline Casts Urine 05/19/2022 1  <=2 /LPF Final   Office Visit on 04/18/2022   Component Date Value Ref Range Status     Potassium 04/18/2022 3.5  3.5 - 5.1 mmol/L Final     TSH 04/18/2022 1.75  0.40 - 4.00 mU/L Final     Magnesium 04/18/2022 1.8 (A) 1.9 - 2.7 mg/dL Final          ASSESSMENT:       ICD-10-CM    1. Ascending aortic aneurysm (H)  I71.2 Echocardiogram Complete     nebivolol (BYSTOLIC) 10 MG tablet   2. Palpitations  R00.2 nebivolol (BYSTOLIC) 10 MG tablet   3. Paroxysmal supraventricular tachycardia (H)  I47.1 Adult Cardiology [facility] Referral      EKG 12-lead, tracing only     nebivolol (BYSTOLIC) 10 MG tablet   4. DORMAN (dyspnea on exertion)  R06.00 Echocardiogram Complete   5. Mild persistent asthma without complication  J45.30    6. Morbid obesity (H)  E66.01    7. Prediabetes  R73.03    8. Hypercholesterolemia  E78.00    9. Essential hypertension, benign  I10 nebivolol (BYSTOLIC) 10 MG tablet   10. Diastolic dysfunction with chronic heart failure (H)  I50.32 Echocardiogram Complete   11. Peripheral edema  R60.9 Echocardiogram Complete   12. KIM on CPAP  G47.33     Z99.89    13. Diverticula of colon  K57.30    14. Atypical chest pain  R07.89          PLAN:   1.  TAAA: 4.0 cm on 12/27/2018 and 4.2 cm on 4/16/2021.  Discussed need for surgery at 5.5 cm or increase of 5 mm in a year.  Surgery not indicated at this time.  We will follow.  Plan for repeat echocardiogram in 1 year.  2.  Palpitations: Lasting 10 minutes to a full day.  Had symptoms while wearing Zio patch on 3/21/2022.  Had x7 episodes of SVT lasting up to 16.2 seconds.  Currently on Bystolic.  Will increase from 5 mg to 10 mg daily.  If has SVT for hours, discussed potential need for ablation.  For now, we will use medication..  Somewhat setback by the idea of an ablation.  3.  Atypical chest pain: Describes a sharp pain to her chest lasting seconds.  It is tender to palpation.  Is not exertional.  Patient reassured.  4.  Hypertension: Uncontrolled. Currently on hydrochlorothiazide 25 mg daily.  Will increase Bystolic from 5 mg to 10 mg daily.  5.  Hyperlipidemia: Controlled.  Not currently on statin or anticholesterol medication.  6.  CHF: Diastolic dysfunction grade 1 on 12/27/2018.  Has some mild peripheral edema with compression stockings.  Is on hydrochlorothiazide 25 mg daily.  Plan for repeat echocardiogram in 1 year.  7.  KIM: On CPAP.  8.  Follow-up in 1 year after completion of echocardiogram.    Total time spent on day of visit, including review of tests, obtaining/reviewing separately  obtained history, ordering medications/tests/procedures, communicating with PCP/consultants, and documenting in electronic medical record: 60 minutes.         Thank you for allowing me to participate in the care of your patient. Please do not hesitate to contact me if you have any questions.     Dustin Hallman, DO

## 2022-06-05 ENCOUNTER — VIRTUAL VISIT (OUTPATIENT)
Dept: URGENT CARE | Facility: CLINIC | Age: 62
End: 2022-06-05
Payer: COMMERCIAL

## 2022-06-05 DIAGNOSIS — E66.01 MORBID OBESITY (H): ICD-10-CM

## 2022-06-05 DIAGNOSIS — I50.32 DIASTOLIC DYSFUNCTION WITH CHRONIC HEART FAILURE (H): ICD-10-CM

## 2022-06-05 DIAGNOSIS — U07.1 CLINICAL DIAGNOSIS OF COVID-19: Primary | ICD-10-CM

## 2022-06-05 PROBLEM — J40 BRONCHITIS: Status: ACTIVE | Noted: 2022-06-05

## 2022-06-05 PROCEDURE — 99214 OFFICE O/P EST MOD 30 MIN: CPT | Mod: 95

## 2022-06-05 NOTE — LETTER
June 5, 2022      Milana Blum  [address]  [facility] [address]        To Whom It May Concern:    Milana Blum  was seen on 6/5/22 for illness.  She may return to work on 6/12/22 with no restrictions        Sincerely,    Emerita [facility]

## 2022-06-05 NOTE — PROGRESS NOTES
"Milana is a 61 year old who is being evaluated via a billable video visit.        Video Start Time: 1450    Assessment & Plan     Clinical diagnosis of COVID-19    - molnupiravir (LAGEVRIO) 200 MG capsule; Take 4 capsules (800 mg) by mouth every 12 hours for 5 days    Morbid obesity (H)      Diastolic dysfunction with chronic heart failure (H)      30 minutes spent on the date of the encounter doing chart review, patient visit and documentation        BMI:   Estimated body mass index is 47.38 kg/m  as calculated from the following:    Height as of 6/2/22: 1.689 m (5' 6.5\").    Weight as of 6/2/22: 135.2 kg (298 lb).     COVID-19 positive patient.  Encounter for consideration of medication intervention. Patient does qualify for a prescription. Full discussion with patient including medication options, risks and benefits. Potential drug interactions reviewed with patient.     Treatment Planned Molnupiravir (patient is aware that effectiveness is less for this medication), Rx sent to [place] pharmacy  [facility]    [phone] [address] [place] [address]    Hours  Monday-Thursday 8:00 AM-5:30 PM  Friday 8:00 AM-4:30 PM  Temporary change to home medications:  None     Estimated body mass index is 47.38 kg/m  as calculated from the following:    Height as of 6/2/22: 1.689 m (5' 6.5\").    Weight as of 6/2/22: 135.2 kg (298 lb).  GFR Estimate   Date Value Ref Range Status   05/19/2022 >90 >60 mL/min/1.73m2 Final     Comment:     Effective December 21, 2021 eGFRcr in adults is calculated using the 2021 CKD-EPI creatinine equation which includes age and gender (Donald watson al., NEJM, DOI: 10.1056/YFFIaj4559292)   02/16/2021 >60 >60 mL/min/1.73m2 Final     No results found for: [id number]    Return in about 1 week (around 6/12/2022), or if symptoms worsen or fail to improve.    Virtual Urgent Care  [facility] URG[facility]    Subjective   Milana is a 61 year old who presents for the " following health issues     HPI       COVID-19 Symptom Review  How many days ago did these symptoms start? 2 days ago    Are any of the following symptoms significant for you?    New or worsening difficulty breathing? No    Worsening cough? Yes, it's a dry cough.     Fever or chills? Yes, the highest temperature was 99.4    Headache: YES    Sore throat: YES    Chest pain: no    Diarrhea: no    Body aches? no    What treatments has patient tried? Vitamin d and c   Does patient live in a nursing home, group home, or shelter? no  Does patient have a way to get food/medications during quarantined? Yes, I have a friend or family member who can help me.        Review of Systems   Constitutional, HEENT, cardiovascular, pulmonary, gi and gu systems are negative, except as otherwise noted.      Objective           Vitals:  No vitals were obtained today due to virtual visit.    Physical Exam   GENERAL: Healthy, alert and no distress  RESP: No audible wheeze, cough, or visible cyanosis.  No visible retractions or increased work of breathing.    SKIN: Visible skin clear. No significant rash, abnormal pigmentation or lesions.  PSYCH: Mentation appears normal, affect normal/bright, judgement and insight intact, normal speech and appearance well-groomed.            Video-Visit Details    Type of service:  Video Visit    Video End Time:3:03 PM    Originating Location (pt. Location): Home    Distant Location (provider location):  [facility]     Platform used for Video Visit: [employer]

## 2022-06-05 NOTE — PATIENT INSTRUCTIONS
"  Instructions for Patients  Your COVID-19 test was positive. This means you have the virus. Please follow the \"How can I take care of myself\" and \"How do I self-isolate?\" guidelines in these instructions.    What treatments are available?  Over-the-counter medicines may help with your symptoms such as runny or stuffy nose, cough, chills, and fever. Talk to your care team about your options.     Some people are at high risk for severe illness (for example if you have a weak immune system, you're 65 or older, or you have certain medical problems). If your risk it high and your symptoms started in the last 5 to 7 days, we strongly recommend for you to get COVID treatment as soon as possible before you get really sick. Paxlovid, Molnupiravir and the monoclonal antibody treatments are proven safe and effective, make you feel better faster, and prevent hospitalization and death.       You can schedule an appointment to discuss COVID treatment by requesting an appointment on [facility] by selecting \"schedule COVID-19 Treatment\" or by calling [facility] ([phone]) and pressing 7.    What are the symptoms of COVID-19?  Symptoms can include fever, cough, shortness of breath, chills, headache, muscle pain sore throat, fatigue, runny or stuffy nose, and loss of taste and smell. Other less common symptoms include nausea, vomiting, or diarrhea (watery stools).    Know when to call 911. Emergency warning signs include:    Trouble breathing or shortness of breath    Pain or pressure in the chest that doesn't go away    Feeling confused like you haven't felt before, or not being able to wake up    Bluish-colored lips or face    How can I take care of myself?  1. Get lots of rest. Drink extra fluids (unless a doctor has told you not to).  2. Take Tylenol (acetaminophen) for fever or pain. If you have liver or kidney problems, ask your family doctor if it's okay to take Tylenol   Adults:   650 mg (two 325 mg pills or tablets) " every 4 to 6 hours, or...   1,000 mg (two 500 mg pills or tablets) every 8 hours as needed.  Note: Don't take more than 3,000 mg in one day. Acetaminophen is found in many medicines (both prescribed and over the counter). Read all labels to be sure you don't take too much.  For children, check the Tylenol bottle for the right dose. The dose is based on the child's age or weight.  3. Take over the counter medicines for your symptoms as needed. Talk to your pharmacist.  4. If you have other health problems (like cancer, heart failure, an organ transplant, or severe kidney disease): Call your specialty clinic if you don't feel better in the next 2 days.    These guidelines are for isolating and quarantining before returning to work, school or .     For employers, schools and day cares: This is an official notice for this person s medical guidelines for returning in-person.     For health care sites: The CDC gives different isolation and quarantine guidelines for healthcare sites, please check with these sites before arriving.     How do I self-isolate?  You isolate when you have symptoms of COVID or a test shows you have COVID, even if you don t have symptoms.     If you DO have symptoms:  o Stay home and away from others  - For at least 5 days after your symptoms started, AND   - You are fever free for 24 hours (with no medicine that reduces fever), AND  - Your other symptoms are better.  o Wear a mask for 10 full days any time you are around others.    If you DON T have symptoms:  o Stay at home and away from others for at least 5 days after your positive test.  o Wear a mask for 10 full days any time you are around others.    How and when do I quarantine?  You quarantine when you may have been exposed to the virus and DON T have symptoms.     Stay home and away from others.     You must quarantine for 5 days after your last contact with a person who has COVID.  o Note: If you are fully vaccinated, you don t  need to quarantine. You should still follow the steps below.     Wear a mask for 10 full days any time you re around others.    Get tested at least 5 days after you were exposed, even if you don t have symptoms.     If you start to have symptoms, isolate right away and get tested.    Where can I get more information?    [facility] COVID-19 Resource Hub: www.[email or web address]/covid19/     CDC Quarantine & Isolation: https://www.cdc.gov/coronavirus/2019-ncov/your-health/quarantine-isolation.html     CDC - What to Do If You're Sick: https://www.cdc.gov/coronavirus/2019-ncov/if-you-are-sick/index.html    [facility] clinical trials (COVID-19 research studies): [email or web address]    [place] Department of Health COVID-19 Public Hotline: 1-491.513.3586

## 2022-06-06 ENCOUNTER — TELEPHONE (OUTPATIENT)
Dept: INTERNAL MEDICINE | Facility: OTHER | Age: 62
End: 2022-06-06

## 2022-06-06 NOTE — TELEPHONE ENCOUNTER
Patient called to talk to Dr. Plata. She says he called her and recommended starting physical therapy. She is wondering what he has in mind. She needed to reschedule her appointment today due to testing positive for COVID. She is wondering if he wants to start therapy after her injection. She also states she only has pain medication 1 time per day and if she starts therapy she will probably need more. Please advise.    Tita Senior on 6/6/2022 at 8:51 AM

## 2022-06-14 ENCOUNTER — TELEPHONE (OUTPATIENT)
Dept: CARDIOLOGY | Facility: OTHER | Age: 62
End: 2022-06-14

## 2022-06-14 NOTE — TELEPHONE ENCOUNTER
"Providers response:     \"As this is a [facility] patient and not urgent, he can review the denial when he returns form vacation.     Thanks,   Kirti Lopez, YOLIS CNP \"    Will wait until providers return to review message. Kristal Holliday RN  ....................  6/14/2022   3:16 PM      "

## 2022-06-14 NOTE — TELEPHONE ENCOUNTER
Mil[facility] on behalf of [facility] has denied services for ECHOCARDIOGRAM COMPLETE - Order ID: [id number]  Denial Reason:   Your doctor is checking you for a widening of the largest blood vessel in the body. Your doctor ordered an ultrasound picture of your heart. We reviewed the notes we have. The notes show that this is a duplicate request. The same or similar test was approved for you within the past 60 days. The notes do not show why you need to repeat this test. Based on the information we have, this second request is not medically necessary. We used [facility] Guideline titled Imaging of the Heart, Resting Transthoracic Echocardiography to make this decision. You may view this guideline at [email or web address].

## 2022-06-23 DIAGNOSIS — I71.21 ASCENDING AORTIC ANEURYSM (H): ICD-10-CM

## 2022-06-23 DIAGNOSIS — R60.0 PERIPHERAL EDEMA: ICD-10-CM

## 2022-06-23 DIAGNOSIS — R07.89 ATYPICAL CHEST PAIN: Primary | ICD-10-CM

## 2022-06-23 DIAGNOSIS — I50.32 DIASTOLIC DYSFUNCTION WITH CHRONIC HEART FAILURE (H): ICD-10-CM

## 2022-06-23 DIAGNOSIS — R06.09 DOE (DYSPNEA ON EXERTION): ICD-10-CM

## 2022-06-23 NOTE — TELEPHONE ENCOUNTER
Spoke with patient and informed her that she can have to echocardiogram done again next year per Dustin Hallman DO.    Christoph Person RN, BSN  ....................  6/23/2022   3:37 PM

## 2022-06-27 ENCOUNTER — HOSPITAL ENCOUNTER (OUTPATIENT)
Dept: PHYSICAL THERAPY | Facility: OTHER | Age: 62
Setting detail: THERAPIES SERIES
Discharge: HOME | End: 2022-06-27
Attending: ORTHOPAEDIC SURGERY
Payer: COMMERCIAL

## 2022-06-27 DIAGNOSIS — M25.511 RIGHT SHOULDER PAIN: ICD-10-CM

## 2022-06-27 PROCEDURE — 97110 THERAPEUTIC EXERCISES: CPT | Mod: GP

## 2022-06-27 PROCEDURE — 97016 VASOPNEUMATIC DEVICE THERAPY: CPT | Mod: GP

## 2022-06-27 PROCEDURE — 97161 PT EVAL LOW COMPLEX 20 MIN: CPT | Mod: GP

## 2022-07-01 NOTE — PROGRESS NOTES
06/27/22 0900   General Information   Type of Visit Initial OP Ortho PT Evaluation   Start of Care Date 06/27/22   Referring Physician Bnoi   Patient/Family Goals Statement improve her shoulder pain   Orders Evaluate and Treat   Date of Order 06/15/22   Certification Required? No   Medical Diagnosis R shoulder pain   Surgical/Medical history reviewed Yes   General Information Comments Milana is a 61-year old female that presents to therapy with R arm/shoulder pain following a fall last month. She tripped over a dog leash, fell on her L side, and tried to catch herself with her R arm. She initially saw Dr. Plata and he thought she may need surgery and had Milana get an MRI. This revealed no tearing but instead RTC tendonitis. She is eager to start therapy as she feels it has helped her so much with other injuries. She did also receive a CSI as well with minimal relief. Her biggest complaints at the moment are inability to lift her arm past 90 degrees, brushing her hair, ADLs, and sleeping has been challenging.   Body Part(s)   Body Part(s) Shoulder   Presentation and Etiology   Pertinent history of current problem (include personal factors and/or comorbidities that impact the POC) fall 5/21   Impairments A. Pain;C. Swelling;D. Decreased ROM;E. Decreased flexibility;F. Decreased strength and endurance   Functional Limitations perform activities of daily living;perform desired leisure / sports activities   Symptom Location anterior shoulder, inferior to AC joint   How/Where did it occur With a fall   Onset date of current episode/exacerbation 05/21/22   Chronicity New   Pain rating (0-10 point scale) Worst (/10);Best (/10)   Best (/10) 5   Worst (/10) 10   Pain quality D. Burning;F. Stabbing   Frequency of pain/symptoms A. Constant   Pain/symptoms are: Worse during the night   Pain/symptoms exacerbated by C. Lifting;G. Certain positions;H. Overhead reach;J. ADL;K. Home tasks;L. Work tasks   Pain/symptoms eased by  H. Cold;G. Heat   Progression of symptoms since onset: Improved   Current / Previous Interventions   Diagnostic Tests: MRI   MRI Results Results   MRI results Tendinosis in the supraspinatus tendon without full or  partial-thickness tear.   Prior Level of Function   Prior Level of Function-ADLs independent   Current Level of Function   Patient role/employment history A. Employed   Living environment [facility]   Fall Risk Screen   Fall screen completed by PT   Have you fallen 2 or more times in the past year? No   Have you fallen and had an injury in the past year? Yes   Is patient a fall risk? No   Abuse Screen (yes response referral indicated)   Feels Unsafe at Home or Work/School no   Feels Threatened by Someone no   Does Anyone Try to Keep You From Having Contact with Others or Doing Things Outside Your Home? no   Physical Signs of Abuse Present no   Shoulder Objective Findings   Side (if bilateral, select both right and left) Right   Cervical Screen (ROM, quadrant) negative screening   Thoracic Mobility Screen limited extension   Scapulothoracic Rhythm normal   Neer's Test +   Bobby-Yaniv Test +   Shoulder Special Tests Comments pt painful with most testing today   Palpation tender to palpation of supraspinatus. minimal tenderness to biceps tendon,   Observation no acute distress   Integumentary  unremarkable   Posture Forward head, rounded shoulders   Right Shoulder Flexion AROM 110   Right Shoulder Abduction AROM 85   Right Shoulder ER AROM WNLs   Right Shoulder IR AROM Greater throchanter   Right Shoulder Flexion Strength impaired impaired   Right Shoulder Abduction Strength impaired due to pain   Right Shoulder ER Strength 4-/5   Right Shoulder IR Strength 4+/5   Planned Therapy Interventions   Planned Therapy Interventions ROM;strengthening;stretching;neuromuscular re-education;joint mobilization   Planned Modality Interventions   Planned Modality Interventions Cryotherapy;Ultrasound;Hot packs    Clinical Impression   Criteria for Skilled Therapeutic Interventions Met yes, treatment indicated   PT Diagnosis impaired shoulder function, impaired lifting, impaired sleeping   Influenced by the following impairments Weakness, ROM loss, pain   Functional limitations due to impairments limited activity tolerance, impaired overhead reach   Clinical Presentation Stable/Uncomplicated   Clinical Decision Making (Complexity) Low complexity   Therapy Frequency   (1-2x/week)   Predicted Duration of Therapy Intervention (days/wks) 6 weeks   Risk & Benefits of therapy have been explained Yes   Patient, Family & other staff in agreement with plan of care Yes   Clinical Impression Comments Pt presents to therapy with signs and symptoms of R shoulder pain and RTC tendonitis. She has limited shoulder motion secondary to pain that is improved with AAROM. Pt would benefit from skilled therapy services to normalize shoulder mechanics, strengthening, and improving her overhead reach.   Education Assessment   Preferred Learning Style Demonstration;Pictures/video   Barriers to Learning No barriers   ORTHO GOALS   PT Ortho Eval Goals 1;2;3;4   Ortho Goal 1   Goal Identifier ROM   Goal Description Milana will demonstrate 130 degrees of active shoulder flexion with pain less than 4 for improved activity tolerance and strengthening.   Target Date 08/05/22   Ortho Goal 2   Goal Identifier Strength   Goal Description Pt will be able to lift 3 lbs overhead for ability to complete overhead lifting task to put objects in high shelves.   Target Date 08/05/22   Ortho Goal 3   Goal Identifier ADLs   Goal Description Milana will be able to reach behing her head and behind her back in order to perform ADLs like getting dressed and washing her hair.   Target Date 08/05/22   Ortho Goal 4   Goal Identifier Sleeping   Goal Description Pt will be able to get 6 hours of sleep and not be woken up due to pain.   Target Date 08/12/22   Total Evaluation  Time   PT Eval, Low Complexity Minutes (06454) 15

## 2022-07-07 ENCOUNTER — HOSPITAL ENCOUNTER (OUTPATIENT)
Dept: PHYSICAL THERAPY | Facility: OTHER | Age: 62
Setting detail: THERAPIES SERIES
Discharge: HOME | End: 2022-07-07
Attending: ORTHOPAEDIC SURGERY
Payer: COMMERCIAL

## 2022-07-07 PROCEDURE — 97110 THERAPEUTIC EXERCISES: CPT | Mod: GP

## 2022-07-11 ENCOUNTER — HOSPITAL ENCOUNTER (OUTPATIENT)
Dept: PHYSICAL THERAPY | Facility: OTHER | Age: 62
Setting detail: THERAPIES SERIES
Discharge: HOME | End: 2022-07-11
Attending: ORTHOPAEDIC SURGERY
Payer: COMMERCIAL

## 2022-07-11 PROCEDURE — 97110 THERAPEUTIC EXERCISES: CPT | Mod: GP

## 2022-07-20 ENCOUNTER — HOSPITAL ENCOUNTER (OUTPATIENT)
Dept: PHYSICAL THERAPY | Facility: OTHER | Age: 62
Setting detail: THERAPIES SERIES
Discharge: HOME | End: 2022-07-20
Attending: ORTHOPAEDIC SURGERY
Payer: COMMERCIAL

## 2022-07-20 PROCEDURE — 97016 VASOPNEUMATIC DEVICE THERAPY: CPT | Mod: GP

## 2022-07-20 PROCEDURE — 97110 THERAPEUTIC EXERCISES: CPT | Mod: GP

## 2022-07-25 ENCOUNTER — HOSPITAL ENCOUNTER (OUTPATIENT)
Dept: PHYSICAL THERAPY | Facility: OTHER | Age: 62
Setting detail: THERAPIES SERIES
Discharge: HOME | End: 2022-07-25
Attending: ORTHOPAEDIC SURGERY
Payer: COMMERCIAL

## 2022-07-25 PROCEDURE — 97140 MANUAL THERAPY 1/> REGIONS: CPT | Mod: GP

## 2022-07-25 PROCEDURE — 97016 VASOPNEUMATIC DEVICE THERAPY: CPT | Mod: GP

## 2022-07-25 PROCEDURE — 97110 THERAPEUTIC EXERCISES: CPT | Mod: GP

## 2022-07-27 ENCOUNTER — THERAPY VISIT (OUTPATIENT)
Dept: CHIROPRACTIC MEDICINE | Facility: OTHER | Age: 62
End: 2022-07-27
Attending: CHIROPRACTOR
Payer: COMMERCIAL

## 2022-07-27 VITALS — RESPIRATION RATE: 24 BRPM | HEART RATE: 64 BPM | TEMPERATURE: 98 F | OXYGEN SATURATION: 97 %

## 2022-07-27 DIAGNOSIS — M25.511 RIGHT SHOULDER PAIN, UNSPECIFIED CHRONICITY: Primary | ICD-10-CM

## 2022-07-27 PROCEDURE — 97810 ACUP 1/> WO ESTIM 1ST 15 MIN: CPT | Performed by: CHIROPRACTOR

## 2022-07-27 PROCEDURE — 99212 OFFICE O/P EST SF 10 MIN: CPT | Mod: 25 | Performed by: CHIROPRACTOR

## 2022-07-27 NOTE — PROGRESS NOTES
Ongoing right shoulder is constant dull aching and burning. Sometimes radiates down right arm. 7/10 W24 10/10. Using ice and doing stretches with no change in pain.   Faviola Sandhu on 7/27/2022 at 11:10 AM    Reviewed by EW    Visit #:  1/6  New episode 7/27/2022  Subjective:  Milana Blum is a 61 year old female who is seen in f/u up for:     Right shoulder pain, unspecified chronicity.     Since last visit on 5/3/2022,  Milana Blum reports: Has been dealing with right shoulder pain which radiates into the right biceps and following the C6 dermatome.  Original injury occurred in late May when she was at home and tripped over her dog's leash.  Has been working with physical therapy with limited results.  Patient has had great success working with acupuncture in the past and it was believed that this would be a good additional treatment.  No weakness of the upper extremity.    X-rays have been taken of this region as an MRI, these are available for my review but are quite limited and provide little insight into patient's right shoulder.          (DVPRS) Pain Rating Score : Focus of attention, prevents doing daily activities (W24 10/10) (07/27/22 1106)     Objective:  The following was observed:  Pulse 64   Temp 98  F (36.7  C) (Tympanic)   Resp 24   LMP  (LMP Unknown)   SpO2 97%    Neck Disability Index (  Brien H. and Stefanie C. 1991. All rights reserved.; used with permission) 7/27/2022   SECTION 1 - PAIN INTENSITY 1   SECTION 2 - PERSONAL CARE 2   SECTION 3 - LIFTING 2   SECTION 4 - READING 2   SECTION 5 - HEADACHES 4   SECTION 6 - CONCENTRATION 2   SECTION 7 - WORK 2   SECTION 8 - DRIVING 1   SECTION 9 - SLEEPING 2   SECTION 10 - RECREATION 3   Count 10   Sum 21   Raw Score: /50 21   Neck Disability Index Score: (%) 42     Upper Extremity Functional Index (  1996 PW Courtney) 7/27/2022   a.  Any of your usual work, housework or school activities 1-Quite a bit of Difficulty   b.  Your usual hobbies,  recreational or sporting activities 1-Quite a bit of Difficulty   c.  Lifting a bag of groceries to waist level 1-Quite a bit of Difficulty   d.  Placing an object onto, or removing it from an overhead shelf 0-Extreme Difficulty   e.  Washing your hair or scalp 1-Quite a bit of Difficulty   f.   Pushing up on your hands (e.g., from bathtub or chair) 0-Extreme Difficulty   g.  Preparing food (e.g., peeling, cutting) 2-Moderate Difficulty   h.  Driving 2-Moderate Difficulty   I.   Vacuuming, sweeping, or raking 0-Extreme Difficulty   j.   Dressing 1-Quite a bit of Difficulty   k.  Doing up buttons 4-No Difficulty   l.   Using tools or appliances 4-No Difficulty   m. Opening doors 4-No Difficulty   n.  Cleaning 1-Quite a bit of Difficulty   o.  Tying or lacing shoes 4-No Difficulty   p.  Sleeping 1-Quite a bit of Difficulty   q.  Laundering clothes. (e.g., washing, ironing, folding) 4-No Difficulty   r.   Opening a jar 4-No Difficulty   s.  Throwing a ball 0-Extreme Difficulty   t.   Carrying a small suitcase with your affected limb  0-Extreme Difficulty   Column Totals: /80 35     Limited shoulder flexion, internal rotation limited  Positive lift off on right  Positive supraspinatus press test on right  Study Result    Narrative & Impression   Exam: MR SHOULDER RIGHT W/O CONTRAST     HISTORY:  Right shoulder pain; Injury of right upper arm; Rotator cuff  tear.      Technique: Axial, coronal and sagittal images were obtained with  combination of proton density and T2-weighted images. images. The  final T2 sagittal sequence was not obtained due to extreme pain.     Findings: There is mild to moderate hypertrophic change in the  acromioclavicular joint with mild mass effect on the musculotendinous  junction of the supraspinatus.     No full-thickness tear of the rotator cuff is seen. There is some  thickening of and increased signal intensity within the supraspinatus  tendon consistent with tendinosis. There is  tendinosis in the  subscapularis tendon as well.     Biceps tendon is visualized in the bicipital groove. The  intra-articular portion is less well seen but probably intact. Labrum  is somewhat small and truncated, probably due to degenerative tearing.  Bony glenoid appears to be intact.                                                                           Impression: Tendinosis in the supraspinatus tendon without full or  partial-thickness tear.     Mild to moderate hypertrophic change in AC joint.     BESSY PATINO MD         SYSTEM ID:  [id number]     Study Result    Narrative & Impression   PROCEDURE:  XR RIBS & CHEST LT 3VW     HISTORY: Fall, subsequent encounter; Injury of right shoulder,  subsequent encounter; Rib pain, .     COMPARISON:  9/23/2019     FINDINGS:  Stable heart size with no failure. No focal consolidation, effusion or  pneumothorax.    No suspicious osseous lesion or subdiaphragmatic free air.       Left RIBS: No fracture                                                                      IMPRESSION:    1. No acute cardiopulmonary disease seen.  2. Left ribs demonstrate no fractures.     DONY EDWARDS MD         SYSTEM ID:  [id number]         P: palpatory tenderness Right upper trapezius, right anterior deltoid and biceps tendon, teres muscle group on right:    A: asymmetric joints, T4 on right  R: restricted motion , none apparent  T: Moderate spasms throughout deltoid musculature on right, right upper trapezius, supraspinatus, biceps, brachial radialis and brachialis on right    Segmental spinal dysfunction/restrictions found at:  None apparent.      Assessment: Patient presents with primary complaints of right shoulder pain which radiates into the right biceps and C6 dermatome.  Severe limitation is noted with patient's shoulder motion most likely this is guarded due to level of pain.  Chiropractic assessment does show asymmetry of the T4 vertebra on right however when challenged  this joint does move.  Therefore I am not in favor of performing chiropractic adjustment on today's visit however this could be pursued on follow-up visits with patient also consider extremity adjustments to the right shoulder.  However at this time due to level of soft tissue involvement I do not believe that any adjusting would be advisable and would likely result in further pain for the patient.  Acupuncture seems extremely appropriate and beneficial.  At this time plan for up to 6 acupuncture visits as long as patient is showing signs of positive improvement.    Diagnoses:      1. Right shoulder pain, unspecified chronicity            Procedures:  E/M 67536    CMT:  None performed    Modalities:  00084: Acupuncture, for 15 minutes:  Points: SI 9, TW 9, 14, Li 15 P 2, GB 4, LI 11, Lv 4 (right sided only)  For 15 minutes    Therapeutic procedures:  None  Deferred to physical therapy    Response to Treatment  Reduction in symptoms as reported by patient    Prognosis: Excellent     Goals: Reduce right shoulder pain 75%  Improve right shoulder AROM beyond 90 degrees  Reduce positive Ortho neuro findings  Improved upper extremity functional index score 50%     Recommendations:    Instructions:Monitor symptoms and perform activities as tolerated    Follow-up:  Return to care in 5 days.

## 2022-08-01 ENCOUNTER — THERAPY VISIT (OUTPATIENT)
Dept: CHIROPRACTIC MEDICINE | Facility: OTHER | Age: 62
End: 2022-08-01
Attending: CHIROPRACTOR
Payer: COMMERCIAL

## 2022-08-01 VITALS
RESPIRATION RATE: 20 BRPM | TEMPERATURE: 98 F | SYSTOLIC BLOOD PRESSURE: 132 MMHG | DIASTOLIC BLOOD PRESSURE: 82 MMHG | OXYGEN SATURATION: 97 % | HEART RATE: 64 BPM

## 2022-08-01 DIAGNOSIS — M25.511 RIGHT SHOULDER PAIN, UNSPECIFIED CHRONICITY: Primary | ICD-10-CM

## 2022-08-01 PROCEDURE — 97810 ACUP 1/> WO ESTIM 1ST 15 MIN: CPT | Performed by: CHIROPRACTOR

## 2022-08-01 NOTE — PROGRESS NOTES
Right shoulder is constant dull aching. Sometimes burning and sharp radiating down arm and into neck. Rates 7/10 W24 10/10. Using Advil with no change.   Armin Olsen on 8/1/2022 at 9:10 AM    Reviewed by EW    Visit #:  2/6    Subjective:  Milana Blum is a 61 year old female who is seen in f/u up for:     Right shoulder pain, unspecified chronicity.     Since last visit on 7/27/2022,  Milana Blum reports: Symptoms doing well until recently.  Notes that she did quite a bit of yard work this weekend which seem to reaggravated neck and arm/shoulder symptoms.  Acupuncture did provide some level of help prior to this.        (DVPRS) Pain Rating Score : Focus of attention, prevents doing daily activities (W24 10/10) (08/01/22 0902)     Objective:  The following was observed:  /82 (BP Location: Right arm, Patient Position: Sitting)   Pulse 64   Temp 98  F (36.7  C) (Tympanic)   Resp 20   LMP  (LMP Unknown)   SpO2 97%      P: palpatory tendernessCommon extensor tendon and webbing of right thumb:  None reported of the cervical, upper thoracic or shoulder region  A: asymmetric joints, not apparent  R: restricted motion , none apparent  T: Moderate spasms throughout deltoid musculature on right, right upper trapezius, supraspinatus, biceps, brachial radialis and brachialis on right     Segmental spinal dysfunction/restrictions found at:  None apparent.    Assessment: Symptoms do appear to be quite agitated most notably muscle spasms today.  Suspect this is from overuse as reported by patient history.  Recommend following up again with patient later this week due to flareup.  Anticipating patient care to begin tapering next week.    Chiropractic assessment does not show signs of segmental/somatic dysfunction of the cervical or upper thoracic region or shoulder today.  However we will continue to monitor for this.    Diagnoses:      1. Right shoulder pain, unspecified chronicity        Patient's  condition:  Patient had restrictions pre-manipulation and Patient symptoms are worsed due to yard work.    Treatment effectiveness:  Temporary improvement post acupuncture      Procedures:  CMT:  None performed    Modalities:  16007: Acupuncture, for 15 minutes:  Points: SI 9, 11 TW 9, 14, Li 15, GB 4, LI 11, Lv 4 (right sided only)  For 15 minutes    Therapeutic procedures:  None  Deferred to physical therapy    Response to Treatment  Reduction in symptoms as reported by patient    Prognosis: Good    Progress towards Goals: Patient is making progress towards the goal.     Recommendations:    Instructions:monitor symptoms and perform activities as tolerated    Follow-up:  Return to care in 4 days.

## 2022-08-24 ENCOUNTER — HOSPITAL ENCOUNTER (OUTPATIENT)
Dept: PHYSICAL THERAPY | Facility: OTHER | Age: 62
Setting detail: THERAPIES SERIES
Discharge: HOME | End: 2022-08-24
Attending: ORTHOPAEDIC SURGERY
Payer: COMMERCIAL

## 2022-08-24 PROCEDURE — 97140 MANUAL THERAPY 1/> REGIONS: CPT | Mod: GP,PO

## 2022-08-24 PROCEDURE — 97110 THERAPEUTIC EXERCISES: CPT | Mod: GP

## 2022-08-25 ENCOUNTER — OFFICE VISIT (OUTPATIENT)
Dept: INTERNAL MEDICINE | Facility: OTHER | Age: 62
End: 2022-08-25
Attending: STUDENT IN AN ORGANIZED HEALTH CARE EDUCATION/TRAINING PROGRAM
Payer: COMMERCIAL

## 2022-08-25 VITALS
TEMPERATURE: 98.2 F | WEIGHT: 293 LBS | SYSTOLIC BLOOD PRESSURE: 134 MMHG | BODY MASS INDEX: 44.41 KG/M2 | OXYGEN SATURATION: 97 % | HEIGHT: 68 IN | HEART RATE: 67 BPM | RESPIRATION RATE: 16 BRPM | DIASTOLIC BLOOD PRESSURE: 86 MMHG

## 2022-08-25 DIAGNOSIS — Z13.220 LIPID SCREENING: ICD-10-CM

## 2022-08-25 DIAGNOSIS — J40 BRONCHITIS: ICD-10-CM

## 2022-08-25 DIAGNOSIS — R73.01 ELEVATED FASTING BLOOD SUGAR: ICD-10-CM

## 2022-08-25 DIAGNOSIS — Z12.31 ENCOUNTER FOR SCREENING MAMMOGRAM FOR BREAST CANCER: ICD-10-CM

## 2022-08-25 DIAGNOSIS — R06.2 WHEEZING: ICD-10-CM

## 2022-08-25 DIAGNOSIS — I10 PRIMARY HYPERTENSION: ICD-10-CM

## 2022-08-25 DIAGNOSIS — Z00.00 ANNUAL PHYSICAL EXAM: Primary | ICD-10-CM

## 2022-08-25 DIAGNOSIS — L82.1 SEBORRHEIC KERATOSIS: ICD-10-CM

## 2022-08-25 DIAGNOSIS — J45.30 MILD PERSISTENT ASTHMA WITHOUT COMPLICATION: ICD-10-CM

## 2022-08-25 DIAGNOSIS — Z12.4 CERVICAL CANCER SCREENING: ICD-10-CM

## 2022-08-25 LAB
ALBUMIN SERPL-MCNC: 4.1 G/DL (ref 3.5–5.7)
ALP SERPL-CCNC: 60 U/L (ref 34–104)
ALT SERPL W P-5'-P-CCNC: 19 U/L (ref 7–52)
ANION GAP SERPL CALCULATED.3IONS-SCNC: 7 MMOL/L (ref 3–14)
AST SERPL W P-5'-P-CCNC: 22 U/L (ref 13–39)
BILIRUB SERPL-MCNC: 0.6 MG/DL (ref 0.3–1)
BUN SERPL-MCNC: 7 MG/DL (ref 7–25)
CALCIUM SERPL-MCNC: 9.4 MG/DL (ref 8.6–10.3)
CHLORIDE BLD-SCNC: 103 MMOL/L (ref 98–107)
CHOLEST SERPL-MCNC: 202 MG/DL
CO2 SERPL-SCNC: 30 MMOL/L (ref 21–31)
CREAT SERPL-MCNC: 0.69 MG/DL (ref 0.6–1.2)
ERYTHROCYTE [DISTWIDTH] IN BLOOD BY AUTOMATED COUNT: 15.3 % (ref 10–15)
FASTING STATUS PATIENT QL REPORTED: YES
GFR SERPL CREATININE-BSD FRML MDRD: >90 ML/MIN/1.73M2
GLUCOSE BLD-MCNC: 120 MG/DL (ref 70–105)
HBA1C MFR BLD: 6.3 % (ref 4–6.2)
HCT VFR BLD AUTO: 39.5 % (ref 35–47)
HDLC SERPL-MCNC: 38 MG/DL (ref 23–92)
HGB BLD-MCNC: 12.8 G/DL (ref 11.7–15.7)
LDLC SERPL CALC-MCNC: 139 MG/DL
MCH RBC QN AUTO: 27.6 PG (ref 26.5–33)
MCHC RBC AUTO-ENTMCNC: 32.4 G/DL (ref 31.5–36.5)
MCV RBC AUTO: 85 FL (ref 78–100)
NONHDLC SERPL-MCNC: 164 MG/DL
PLATELET # BLD AUTO: 254 10E3/UL (ref 150–450)
POTASSIUM BLD-SCNC: 3.7 MMOL/L (ref 3.5–5.1)
PROT SERPL-MCNC: 6.8 G/DL (ref 6.4–8.9)
RBC # BLD AUTO: 4.64 10E6/UL (ref 3.8–5.2)
SODIUM SERPL-SCNC: 140 MMOL/L (ref 134–144)
TRIGL SERPL-MCNC: 125 MG/DL
WBC # BLD AUTO: 7.3 10E3/UL (ref 4–11)

## 2022-08-25 PROCEDURE — 83036 HEMOGLOBIN GLYCOSYLATED A1C: CPT | Mod: ZL | Performed by: STUDENT IN AN ORGANIZED HEALTH CARE EDUCATION/TRAINING PROGRAM

## 2022-08-25 PROCEDURE — 90471 IMMUNIZATION ADMIN: CPT | Performed by: STUDENT IN AN ORGANIZED HEALTH CARE EDUCATION/TRAINING PROGRAM

## 2022-08-25 PROCEDURE — 36415 COLL VENOUS BLD VENIPUNCTURE: CPT | Mod: ZL | Performed by: STUDENT IN AN ORGANIZED HEALTH CARE EDUCATION/TRAINING PROGRAM

## 2022-08-25 PROCEDURE — 17110 DESTRUCTION B9 LES UP TO 14: CPT | Performed by: STUDENT IN AN ORGANIZED HEALTH CARE EDUCATION/TRAINING PROGRAM

## 2022-08-25 PROCEDURE — 82040 ASSAY OF SERUM ALBUMIN: CPT | Mod: ZL | Performed by: STUDENT IN AN ORGANIZED HEALTH CARE EDUCATION/TRAINING PROGRAM

## 2022-08-25 PROCEDURE — 80053 COMPREHEN METABOLIC PANEL: CPT | Mod: ZL | Performed by: STUDENT IN AN ORGANIZED HEALTH CARE EDUCATION/TRAINING PROGRAM

## 2022-08-25 PROCEDURE — 90715 TDAP VACCINE 7 YRS/> IM: CPT | Performed by: STUDENT IN AN ORGANIZED HEALTH CARE EDUCATION/TRAINING PROGRAM

## 2022-08-25 PROCEDURE — 80061 LIPID PANEL: CPT | Mod: ZL | Performed by: STUDENT IN AN ORGANIZED HEALTH CARE EDUCATION/TRAINING PROGRAM

## 2022-08-25 PROCEDURE — 99396 PREV VISIT EST AGE 40-64: CPT | Mod: 25 | Performed by: STUDENT IN AN ORGANIZED HEALTH CARE EDUCATION/TRAINING PROGRAM

## 2022-08-25 PROCEDURE — 85014 HEMATOCRIT: CPT | Mod: ZL | Performed by: STUDENT IN AN ORGANIZED HEALTH CARE EDUCATION/TRAINING PROGRAM

## 2022-08-25 RX ORDER — ALBUTEROL SULFATE 90 UG/1
AEROSOL, METERED RESPIRATORY (INHALATION)
Qty: 54 G | Refills: 3 | Status: SHIPPED | OUTPATIENT
Start: 2022-08-25 | End: 2024-08-07

## 2022-08-25 RX ORDER — HYDROCHLOROTHIAZIDE 25 MG/1
25 TABLET ORAL DAILY
Qty: 90 TABLET | Refills: 3 | Status: SHIPPED | OUTPATIENT
Start: 2022-08-25 | End: 2023-04-13

## 2022-08-25 RX ORDER — BUDESONIDE AND FORMOTEROL FUMARATE DIHYDRATE 160; 4.5 UG/1; UG/1
AEROSOL RESPIRATORY (INHALATION)
Qty: 28 G | Refills: 3 | Status: SHIPPED | OUTPATIENT
Start: 2022-08-25 | End: 2024-08-07

## 2022-08-25 ASSESSMENT — PAIN SCALES - GENERAL: PAINLEVEL: NO PAIN (0)

## 2022-08-25 NOTE — PROGRESS NOTES
Assessment & Plan         ICD-10-CM    1. Annual physical exam  Z00.00 Comprehensive Metabolic Panel     CBC W PLT No Diff     CBC W PLT No Diff     Comprehensive Metabolic Panel   2. Mild persistent asthma without complication  J45.30 albuterol (PROAIR HFA/PROVENTIL HFA/VENTOLIN HFA) 108 (90 Base) MCG/ACT inhaler   3. Bronchitis  J40 budesonide-formoterol (SYMBICORT) 160-4.5 MCG/ACT Inhaler   4. Wheezing  R06.2 budesonide-formoterol (SYMBICORT) 160-4.5 MCG/ACT Inhaler   5. Primary hypertension  I10 hydrochlorothiazide (HYDRODIURIL) 25 MG tablet   6. Lipid screening  Z13.220 Lipid Panel     Lipid Panel   7. Encounter for screening mammogram for breast cancer  Z12.31 MA Screen Bilateral w/Chris   8. Cervical cancer screening  Z12.4 Ob/Gyn Referral   9. Seborrheic keratosis  L82.1      physical exam: Patient needs a work form filled out which was provided.  She will fill out her lipid levels as she needs to submit the form electronically.    Asthma: Under good control, had COVID recently and did well.  Refilled her albuterol inhaler, Symbicort.    Hypertension: Blood pressure under good control 134/86 today.  Continue Bystolic and hydrochlorothiazide    Lipid screen, cervical cancer screening, breast cancer screening, colon cancer screening: Obtain lipids today, ordered mammogram.  Patient declines colonoscopy and she would like an OB appointment to have cervical cancer screening.  She has a history of a hysterectomy in 2004 and has not had a Pap smear since.  She reports that she still has a cervix.    Seborrheic keratoses: Procedure: 3 rounds of cryotherapy were applied to left hand to two seborrheic keratoses.  Instructed patient to cover the lesion with bandage with Vaseline and/or antibiotic ointment until healed.    Follow-up as needed.    Wes Gasca MD  [facility] AND [facility]   Milana is a 61 year old, presenting for the following health issues:  Follow Up (Patient is requesting  "blood work and paper work for work /)      History of Present Illness       Reason for visit:  Need blood tests, papers filled out for work wellness check    She eats 2-3 servings of fruits and vegetables daily.She consumes 0 sweetened beverage(s) daily.She exercises with enough effort to increase her heart rate 10 to 19 minutes per day.  She exercises with enough effort to increase her heart rate 3 or less days per week.   She is taking medications regularly.       Has not had colonoscopy or mammogram.   Declines colonoscopy, would like mammogram    Has hysterectomy with cervix still in place, 2004.       Needs insurance form filled out with BP, weight, height, waist circumference and lipids.     Saw cardiology, palipations and bistolic increased. Palpitations improved.     Has two lesions on back of hand she would like frozen off. Seborrheic keratosis    Due for tetanus. Would like a shingles shot, she will contact her insurance    Review of Systems   Comprehensive 12 point review of systems other than those listed in the HPI are otherwise negative.         Objective    /86 (BP Location: Right arm, Patient Position: Sitting, Cuff Size: Adult Large)   Pulse 67   Temp 98.2  F (36.8  C) (Tympanic)   Resp 16   Ht 1.727 m (5' 8\")   Wt 137.3 kg (302 lb 12.8 oz)   LMP  (LMP Unknown)   SpO2 97%   BMI 46.04 kg/m    Body mass index is 46.04 kg/m .  Physical Exam  Vitals and nursing note reviewed.   Constitutional:       General: She is not in acute distress.     Appearance: She is well-developed. She is obese. She is not diaphoretic.   HENT:      Head: Normocephalic and atraumatic.   Eyes:      General: No scleral icterus.     Conjunctiva/sclera: Conjunctivae normal.   Cardiovascular:      Rate and Rhythm: Normal rate and regular rhythm.   Pulmonary:      Effort: Pulmonary effort is normal.      Breath sounds: Normal breath sounds.   Abdominal:      Palpations: Abdomen is soft.      Tenderness: There is no " abdominal tenderness.   Musculoskeletal:         General: No deformity.      Cervical back: Neck supple.   Lymphadenopathy:      Cervical: No cervical adenopathy.   Skin:     General: Skin is warm and dry.      Coloration: Skin is not jaundiced.      Findings: No rash.   Neurological:      Mental Status: She is alert and oriented to person, place, and time. Mental status is at baseline.   Psychiatric:         Mood and Affect: Mood normal.         Behavior: Behavior normal.            No results found for this or any previous visit (from the past 24 hour(s)).                .  ..

## 2022-08-25 NOTE — LETTER
August 25, 2022      Milana Blum  [address] [facility] [address]        Dear ,    We are writing to inform you of your test results.    Below are your results.  Your hemoglobin A1c came back at 6.3.  Which indicates that you have fairly significant prediabetes and we will have to keep an eye on this in the future.  No need to start medication at this time but we will need to follow your cholesterol as it is quite elevated and a statin may be of benefit in the future.    Resulted Orders   CBC W PLT No Diff   Result Value Ref Range    WBC Count 7.3 4.0 - 11.0 10e3/uL    RBC Count 4.64 3.80 - 5.20 10e6/uL    Hemoglobin 12.8 11.7 - 15.7 g/dL    Hematocrit 39.5 35.0 - 47.0 %    MCV 85 78 - 100 fL    MCH 27.6 26.5 - 33.0 pg    MCHC 32.4 31.5 - 36.5 g/dL    RDW 15.3 (H) 10.0 - 15.0 %    Platelet Count 254 150 - 450 10e3/uL   Comprehensive Metabolic Panel   Result Value Ref Range    Sodium 140 134 - 144 mmol/L    Potassium 3.7 3.5 - 5.1 mmol/L    Chloride 103 98 - 107 mmol/L    Carbon Dioxide (CO2) 30 21 - 31 mmol/L    Anion Gap 7 3 - 14 mmol/L    Urea Nitrogen 7 7 - 25 mg/dL    Creatinine 0.69 0.60 - 1.20 mg/dL    Calcium 9.4 8.6 - 10.3 mg/dL    Glucose 120 (H) 70 - 105 mg/dL    Alkaline Phosphatase 60 34 - 104 U/L    AST 22 13 - 39 U/L    ALT 19 7 - 52 U/L    Protein Total 6.8 6.4 - 8.9 g/dL    Albumin 4.1 3.5 - 5.7 g/dL    Bilirubin Total 0.6 0.3 - 1.0 mg/dL    GFR Estimate >90 >60 mL/min/1.73m2      Comment:      Effective December 21, 2021 eGFRcr in adults is calculated using the 2021 CKD-EPI creatinine equation which includes age and gender (Donald watson al., NEJM, DOI: 10.1056/GBPOyk8770036)   Lipid Panel   Result Value Ref Range    Cholesterol 202 (H) <200 mg/dL    Triglycerides 125 <150 mg/dL    Direct Measure HDL 38 23 - 92 mg/dL    LDL Cholesterol Calculated 139 (H) <=100 mg/dL    Non HDL Cholesterol 164 (H) <130 mg/dL    Patient Fasting > 8hrs? Yes     Narrative    Cholesterol  Desirable:   <200 mg/dL    Triglycerides  Normal:  Less than 150 mg/dL  Borderline High:  150-199 mg/dL  High:  200-499 mg/dL  Very High:  Greater than or equal to 500 mg/dL    Direct Measure HDL  Female:  Greater than or equal to 50 mg/dL   Male:  Greater than or equal to 40 mg/dL    LDL Cholesterol  Desirable:  <100mg/dL  Above Desirable:  100-129 mg/dL   Borderline High:  130-159 mg/dL   High:  160-189 mg/dL   Very High:  >= 190 mg/dL    Non HDL Cholesterol  Desirable:  130 mg/dL  Above Desirable:  130-159 mg/dL  Borderline High:  160-189 mg/dL  High:  190-219 mg/dL  Very High:  Greater than or equal to 220 mg/dL   Hemoglobin A1c   Result Value Ref Range    Hemoglobin A1C 6.3 (H) 4.0 - 6.2 %       If you have any questions or concerns, please call the clinic at the number listed above.       Sincerely,      Wes Gasca MD

## 2022-08-25 NOTE — NURSING NOTE
"Chief Complaint   Patient presents with     Follow Up     Patient is requesting blood work and paper work for work          FOOD SECURITY SCREENING QUESTIONS  Hunger Vital Signs:  Within the past 12 months we worried whether our food would run out before we got money to buy more. Never  Within the past 12 months the food we bought just didn't last and we didn't have money to get more. Never  Nupur Chawla LPN 8/25/2022 8:44 AM    Immunization Documentation    Prior to Immunization administration, verified patients identity using patient's name and date of birth. Please see IMMUNIZATIONS  and order for additional information.  Patient / Parent instructed to remain in clinic for 15 minutes and report any adverse reaction to staff immediately.    Was the entire amount of vaccines given used? Yes    Nupur Chawla LPN  8/25/2022   8:44 AM    Initial /86 (BP Location: Right arm, Patient Position: Sitting, Cuff Size: Adult Large)   Pulse 67   Temp 98.2  F (36.8  C) (Tympanic)   Resp 16   Ht 1.727 m (5' 8\")   Wt 137.3 kg (302 lb 12.8 oz)   LMP  (LMP Unknown)   SpO2 97%   BMI 46.04 kg/m   Estimated body mass index is 46.04 kg/m  as calculated from the following:    Height as of this encounter: 1.727 m (5' 8\").    Weight as of this encounter: 137.3 kg (302 lb 12.8 oz).  Medication Reconciliation: complete    Nupur Chawla LPN  "

## 2022-08-25 NOTE — LETTER
August 25, 2022      Milana Blum  [address] [address]        Dear ,    We are writing to inform you of your test results.    Your lab results are as below.  Your kidney function electrolytes and blood count are all good.  Your glucose is slightly elevated at 120 which indicates that you have prediabetes.  I have added on a hemoglobin A1c to ensure that you have not progressed to diabetes.  Your cholesterol is also elevated at 202.  We should discuss starting a cholesterol pill at your next visit as he meets the requirement of a total cholesterol over 200.  Continue working on weight loss and healthy eating to get the cholesterol down.  We will discuss this further at your next visit.    Resulted Orders   CBC W PLT No Diff   Result Value Ref Range    WBC Count 7.3 4.0 - 11.0 10e3/uL    RBC Count 4.64 3.80 - 5.20 10e6/uL    Hemoglobin 12.8 11.7 - 15.7 g/dL    Hematocrit 39.5 35.0 - 47.0 %    MCV 85 78 - 100 fL    MCH 27.6 26.5 - 33.0 pg    MCHC 32.4 31.5 - 36.5 g/dL    RDW 15.3 (H) 10.0 - 15.0 %    Platelet Count 254 150 - 450 10e3/uL   Comprehensive Metabolic Panel   Result Value Ref Range    Sodium 140 134 - 144 mmol/L    Potassium 3.7 3.5 - 5.1 mmol/L    Chloride 103 98 - 107 mmol/L    Carbon Dioxide (CO2) 30 21 - 31 mmol/L    Anion Gap 7 3 - 14 mmol/L    Urea Nitrogen 7 7 - 25 mg/dL    Creatinine 0.69 0.60 - 1.20 mg/dL    Calcium 9.4 8.6 - 10.3 mg/dL    Glucose 120 (H) 70 - 105 mg/dL    Alkaline Phosphatase 60 34 - 104 U/L    AST 22 13 - 39 U/L    ALT 19 7 - 52 U/L    Protein Total 6.8 6.4 - 8.9 g/dL    Albumin 4.1 3.5 - 5.7 g/dL    Bilirubin Total 0.6 0.3 - 1.0 mg/dL    GFR Estimate >90 >60 mL/min/1.73m2      Comment:      Effective December 21, 2021 eGFRcr in adults is calculated using the 2021 CKD-EPI creatinine equation which includes age and gender (Donald et al., NEJM, DOI: 10.1056/UZUPeh6199157)   Lipid Panel   Result Value Ref Range    Cholesterol 202 (H) <200 mg/dL     Triglycerides 125 <150 mg/dL    Direct Measure HDL 38 23 - 92 mg/dL    LDL Cholesterol Calculated 139 (H) <=100 mg/dL    Non HDL Cholesterol 164 (H) <130 mg/dL    Patient Fasting > 8hrs? Yes     Narrative    Cholesterol  Desirable:  <200 mg/dL    Triglycerides  Normal:  Less than 150 mg/dL  Borderline High:  150-199 mg/dL  High:  200-499 mg/dL  Very High:  Greater than or equal to 500 mg/dL    Direct Measure HDL  Female:  Greater than or equal to 50 mg/dL   Male:  Greater than or equal to 40 mg/dL    LDL Cholesterol  Desirable:  <100mg/dL  Above Desirable:  100-129 mg/dL   Borderline High:  130-159 mg/dL   High:  160-189 mg/dL   Very High:  >= 190 mg/dL    Non HDL Cholesterol  Desirable:  130 mg/dL  Above Desirable:  130-159 mg/dL  Borderline High:  160-189 mg/dL  High:  190-219 mg/dL  Very High:  Greater than or equal to 220 mg/dL       If you have any questions or concerns, please call the clinic at the number listed above.       Sincerely,      Wes Gasca MD

## 2022-09-14 ENCOUNTER — HOSPITAL ENCOUNTER (OUTPATIENT)
Dept: MAMMOGRAPHY | Facility: OTHER | Age: 62
Discharge: HOME | End: 2022-09-14
Attending: STUDENT IN AN ORGANIZED HEALTH CARE EDUCATION/TRAINING PROGRAM | Admitting: STUDENT IN AN ORGANIZED HEALTH CARE EDUCATION/TRAINING PROGRAM
Payer: COMMERCIAL

## 2022-09-14 DIAGNOSIS — Z12.31 ENCOUNTER FOR SCREENING MAMMOGRAM FOR BREAST CANCER: ICD-10-CM

## 2022-09-14 PROCEDURE — 77067 SCR MAMMO BI INCL CAD: CPT

## 2022-09-18 ENCOUNTER — HEALTH MAINTENANCE LETTER (OUTPATIENT)
Age: 62
End: 2022-09-18

## 2022-09-19 ENCOUNTER — OFFICE VISIT (OUTPATIENT)
Dept: ORTHOPEDICS | Facility: OTHER | Age: 62
End: 2022-09-19
Attending: ORTHOPAEDIC SURGERY
Payer: COMMERCIAL

## 2022-09-19 DIAGNOSIS — M25.511 CHRONIC RIGHT SHOULDER PAIN: Primary | ICD-10-CM

## 2022-09-19 DIAGNOSIS — G89.29 CHRONIC RIGHT SHOULDER PAIN: Primary | ICD-10-CM

## 2022-09-19 DIAGNOSIS — M25.511 ACUTE PAIN OF RIGHT SHOULDER: ICD-10-CM

## 2022-09-19 PROCEDURE — 250N000009 HC RX 250: Performed by: ORTHOPAEDIC SURGERY

## 2022-09-19 PROCEDURE — 250N000011 HC RX IP 250 OP 636: Performed by: ORTHOPAEDIC SURGERY

## 2022-09-19 PROCEDURE — 99213 OFFICE O/P EST LOW 20 MIN: CPT | Mod: 25 | Performed by: ORTHOPAEDIC SURGERY

## 2022-09-19 PROCEDURE — 20610 DRAIN/INJ JOINT/BURSA W/O US: CPT | Mod: RT | Performed by: ORTHOPAEDIC SURGERY

## 2022-09-19 RX ORDER — TRIAMCINOLONE ACETONIDE 40 MG/ML
20 INJECTION, SUSPENSION INTRA-ARTICULAR; INTRAMUSCULAR ONCE
Status: COMPLETED | OUTPATIENT
Start: 2022-09-19 | End: 2022-09-19

## 2022-09-19 RX ORDER — LIDOCAINE HYDROCHLORIDE 10 MG/ML
4 INJECTION, SOLUTION EPIDURAL; INFILTRATION; INTRACAUDAL; PERINEURAL ONCE
Status: CANCELLED | OUTPATIENT
Start: 2022-09-19 | End: 2022-09-19

## 2022-09-19 RX ORDER — LIDOCAINE HYDROCHLORIDE 10 MG/ML
0.5 INJECTION, SOLUTION EPIDURAL; INFILTRATION; INTRACAUDAL; PERINEURAL ONCE
Status: COMPLETED | OUTPATIENT
Start: 2022-09-19 | End: 2022-09-19

## 2022-09-19 RX ADMIN — LIDOCAINE HYDROCHLORIDE 0.5 ML: 10 INJECTION, SOLUTION EPIDURAL; INFILTRATION; INTRACAUDAL; PERINEURAL at 10:04

## 2022-09-19 RX ADMIN — TRIAMCINOLONE ACETONIDE 20 MG: 40 INJECTION, SUSPENSION INTRA-ARTICULAR; INTRAMUSCULAR at 10:04

## 2022-09-19 NOTE — PROGRESS NOTES
Visit Date: 2022    HISTORY OF PRESENT ILLNESS:  A 61-year-old female with rotator cuff tendinitis.  We gave her an injection into her subacromial space the last time we saw her.  Sent her to physical therapy.  This was on 06/15/2022.  She said the injection probably relieved about half of her pain and her shoulder has been getting a little bit better with the physical therapy as well, but not 100%.  She said  at this point about half of the pain that she was having previously is gone, but she is still having pain kind over the top of the shoulder as well as down the front of her shoulder and it has not completely resolved at this point.    PHYSICAL EXAMINATION:  She has full range of motion of her shoulder, pain with anything above 90 degrees.  She is tender to palpation at the AC joint, tender to palpation of the bicipital groove.  Testing of the rotator cuff reveals full strength in all planes.  A little bit of pain with loading otherwise.  Otherwise, she is neuro and vascularly intact.    IMPRESSION AND PLAN:  This is a 61-year-old female with some ongoing AC joint irritation as well as some possible biceps tendinitis.  We have given her an injection into the AC joint today.  She is going to take some limited ibuprofen to try and treat the biceps tendinitis and I will see her back in a couple of weeks if she is continuing to have pain in the shoulder.  All this was expressed to her today.    Linus Plata MD        D: 2022   T: 2022   MT: PETE    Name:     ERIC MATUTE  MRN:      [id number]        Account:    [id number]   :      1960           Visit Date: 2022     Document: [id number]

## 2022-09-19 NOTE — PROGRESS NOTES
Chief Complaint   Patient presents with     RECHECK     Right shoulder pain       Jordyn Lennon LPN

## 2022-09-19 NOTE — PROGRESS NOTES
A steroid injection was performed at R AC joint using 1% plain Lidocaine and 20 mg of Kenalog. This was well tolerated.

## 2022-09-20 ENCOUNTER — THERAPY VISIT (OUTPATIENT)
Dept: CHIROPRACTIC MEDICINE | Facility: OTHER | Age: 62
End: 2022-09-20
Attending: CHIROPRACTOR
Payer: COMMERCIAL

## 2022-09-20 VITALS — OXYGEN SATURATION: 97 % | TEMPERATURE: 98.2 F | RESPIRATION RATE: 16 BRPM | HEART RATE: 63 BPM

## 2022-09-20 DIAGNOSIS — M99.01 SEGMENTAL AND SOMATIC DYSFUNCTION OF CERVICAL REGION: Primary | ICD-10-CM

## 2022-09-20 DIAGNOSIS — M99.02 SEGMENTAL AND SOMATIC DYSFUNCTION OF THORACIC REGION: ICD-10-CM

## 2022-09-20 DIAGNOSIS — M54.2 DORSALGIA OF CERVICAL REGION: ICD-10-CM

## 2022-09-20 PROCEDURE — 98940 CHIROPRACT MANJ 1-2 REGIONS: CPT | Mod: AT | Performed by: CHIROPRACTOR

## 2022-09-20 NOTE — PROGRESS NOTES
Woke up Sunday night with neck stiffness. This has been constant since then with limited ROM as well.  6/10 W24 9/10.  Faviola Sandhu on 9/20/2022 at 8:09 AM    Reviewed by EW    Visit #:  1 PRN  New Episode 9/21/2022    Subjective:  Milana Blum is a 61 year old female who is seen in f/u up for:        Segmental and somatic dysfunction of cervical region  Segmental and somatic dysfunction of thoracic region  Dorsalgia of cervical region.     Since last visit on 8/1/2022,  Milana Blum reports: fell asleep on the couch head hung forward. Sleep very affected, tried rolled up towel   No radiation of symptoms, headaches    Right shoulder, A/C joint yesterday. Slower progression, recommended surgery but trying one last injection    (DVPRS) Pain Rating Score : Hard to ignore, avoid usual activities (W24 9/10) (09/20/22 0806)     Objective:  The following was observed:  Pulse 63   Temp 98.2  F (36.8  C) (Tympanic)   Resp 16   LMP  (LMP Unknown)   SpO2 97%    Neck Disability Index (  Brien H. and Stefanie C. 1991. All rights reserved.; used with permission) 9/20/2022   SECTION 1 - PAIN INTENSITY 4   SECTION 2 - PERSONAL CARE 2   SECTION 3 - LIFTING 3   SECTION 4 - READING 4   SECTION 5 - HEADACHES 4   SECTION 6 - CONCENTRATION 2   SECTION 7 - WORK 1   SECTION 8 - DRIVING 2   SECTION 9 - SLEEPING 5   SECTION 10 - RECREATION 5   Count 10   Sum 32   Raw Score: /50 32   Neck Disability Index Score: (%) 64     Cervical AROM: rotation and lateral flexion moderate restriction    P: palpatory tenderness Suboccipital region bilaterally, T2 bilateral, T6 bilateral:    A: static palpation demonstrates intersegmental asymmetry , cervical, thoracic  R: motion palpation notes restricted motion, C1 , C2 , T2  and T6   T: moderate spasms upper trapezius bilaterally, suboccipitals bilaterally and paraspinals of the cervical/upper thoracic regions bilaterally    Segmental spinal dysfunction/restrictions found at:  :  C1 Right  rotation restricted and Left lateral flexion restricted  C2 Right lateral flexion restricted and Extension restriction  T2 Extension restriction  T6 Extension restriction.      Assessment: Segmental/somatic dysfunction present of the cervical and upper thoracic spine both which are consistent with patient's symptoms.  Suspect patient will benefit from additional care within the next couple of weeks barring acute exacerbation of symptoms or slower progression.    Continue to monitor shoulder concerns.  Patient had been under our care previously for acupuncture which was seeming to show some signs of benefit.  However midway through course of care patient concerned of meche COVID and discontinued course of treatment.    Diagnoses:      1. Segmental and somatic dysfunction of cervical region    2. Segmental and somatic dysfunction of thoracic region    3. Dorsalgia of cervical region        Patient's condition:  Patient had restrictions pre-manipulation    Treatment effectiveness:  Post manipulation there is better intersegmental movement and Patient claims to feel looser post manipulation      Procedures:  CMT:  37920 Chiropractic manipulative treatment 1-2 regions performed   Cervical: Diversified, C1 , C2, Seated  Thoracic: Diversified, T2, T6, Prone    Modalities:  None performed this visit    Therapeutic procedures:  None    Response to Treatment  Reduction in symptoms as reported by patient    Prognosis: Good    Progress towards Goals: Reduce pain by 50%  Improve cervical spine AROM    Recommendations:    Instructions:ice 20 minutes every other hour as needed and heat 15 minutes every other hour as needed    Follow-up:  Continue treatment PRN.

## 2022-10-03 ENCOUNTER — MYC MEDICAL ADVICE (OUTPATIENT)
Dept: FAMILY MEDICINE | Facility: OTHER | Age: 62
End: 2022-10-03

## 2022-10-03 ENCOUNTER — MYC MEDICAL ADVICE (OUTPATIENT)
Dept: CARDIOLOGY | Facility: OTHER | Age: 62
End: 2022-10-03

## 2022-10-03 DIAGNOSIS — Z12.11 SPECIAL SCREENING FOR MALIGNANT NEOPLASMS, COLON: Primary | ICD-10-CM

## 2022-10-04 DIAGNOSIS — I71.21 ANEURYSM OF ASCENDING AORTA WITHOUT RUPTURE (H): ICD-10-CM

## 2022-10-04 DIAGNOSIS — I47.10 PAROXYSMAL SUPRAVENTRICULAR TACHYCARDIA (H): ICD-10-CM

## 2022-10-04 DIAGNOSIS — I10 ESSENTIAL HYPERTENSION, BENIGN: ICD-10-CM

## 2022-10-04 DIAGNOSIS — R00.2 PALPITATIONS: ICD-10-CM

## 2022-10-04 RX ORDER — NEBIVOLOL 10 MG/1
10 TABLET ORAL DAILY
Qty: 90 TABLET | Refills: 3 | Status: SHIPPED | OUTPATIENT
Start: 2022-10-04 | End: 2023-09-13

## 2022-10-21 LAB — NONINV COLON CA DNA+OCC BLD SCRN STL QL: NEGATIVE

## 2022-11-03 ENCOUNTER — OFFICE VISIT (OUTPATIENT)
Dept: INTERNAL MEDICINE | Facility: OTHER | Age: 62
End: 2022-11-03
Attending: STUDENT IN AN ORGANIZED HEALTH CARE EDUCATION/TRAINING PROGRAM
Payer: COMMERCIAL

## 2022-11-03 VITALS
BODY MASS INDEX: 46.25 KG/M2 | TEMPERATURE: 98.1 F | OXYGEN SATURATION: 94 % | HEART RATE: 66 BPM | DIASTOLIC BLOOD PRESSURE: 78 MMHG | SYSTOLIC BLOOD PRESSURE: 136 MMHG | WEIGHT: 293 LBS | RESPIRATION RATE: 20 BRPM

## 2022-11-03 DIAGNOSIS — G62.9 NEUROPATHY: Primary | ICD-10-CM

## 2022-11-03 DIAGNOSIS — E78.2 MIXED HYPERLIPIDEMIA: ICD-10-CM

## 2022-11-03 DIAGNOSIS — R25.3 MUSCLE TWITCH: ICD-10-CM

## 2022-11-03 DIAGNOSIS — E53.8 VITAMIN B 12 DEFICIENCY: ICD-10-CM

## 2022-11-03 DIAGNOSIS — I83.90 ASYMPTOMATIC VARICOSE VEINS: ICD-10-CM

## 2022-11-03 DIAGNOSIS — R60.0 PERIPHERAL EDEMA: ICD-10-CM

## 2022-11-03 LAB
ANION GAP SERPL CALCULATED.3IONS-SCNC: 8 MMOL/L (ref 3–14)
BUN SERPL-MCNC: 10 MG/DL (ref 7–25)
CALCIUM SERPL-MCNC: 9.6 MG/DL (ref 8.6–10.3)
CHLORIDE BLD-SCNC: 101 MMOL/L (ref 98–107)
CO2 SERPL-SCNC: 30 MMOL/L (ref 21–31)
CREAT SERPL-MCNC: 0.69 MG/DL (ref 0.6–1.2)
GFR SERPL CREATININE-BSD FRML MDRD: >90 ML/MIN/1.73M2
GLUCOSE BLD-MCNC: 130 MG/DL (ref 70–105)
MAGNESIUM SERPL-MCNC: 1.9 MG/DL (ref 1.9–2.7)
POTASSIUM BLD-SCNC: 3.9 MMOL/L (ref 3.5–5.1)
SODIUM SERPL-SCNC: 139 MMOL/L (ref 134–144)
TSH SERPL DL<=0.005 MIU/L-ACNC: 2.32 MU/L (ref 0.4–4)
VIT B12 SERPL-MCNC: <50 PG/ML (ref 180–914)

## 2022-11-03 PROCEDURE — 83735 ASSAY OF MAGNESIUM: CPT | Mod: ZL | Performed by: STUDENT IN AN ORGANIZED HEALTH CARE EDUCATION/TRAINING PROGRAM

## 2022-11-03 PROCEDURE — 82607 VITAMIN B-12: CPT | Mod: ZL | Performed by: STUDENT IN AN ORGANIZED HEALTH CARE EDUCATION/TRAINING PROGRAM

## 2022-11-03 PROCEDURE — 82310 ASSAY OF CALCIUM: CPT | Mod: ZL | Performed by: STUDENT IN AN ORGANIZED HEALTH CARE EDUCATION/TRAINING PROGRAM

## 2022-11-03 PROCEDURE — 99214 OFFICE O/P EST MOD 30 MIN: CPT | Performed by: STUDENT IN AN ORGANIZED HEALTH CARE EDUCATION/TRAINING PROGRAM

## 2022-11-03 PROCEDURE — 84443 ASSAY THYROID STIM HORMONE: CPT | Mod: ZL | Performed by: STUDENT IN AN ORGANIZED HEALTH CARE EDUCATION/TRAINING PROGRAM

## 2022-11-03 PROCEDURE — 36415 COLL VENOUS BLD VENIPUNCTURE: CPT | Mod: ZL | Performed by: STUDENT IN AN ORGANIZED HEALTH CARE EDUCATION/TRAINING PROGRAM

## 2022-11-03 ASSESSMENT — PAIN SCALES - GENERAL: PAINLEVEL: NO PAIN (0)

## 2022-11-03 NOTE — LETTER
November 4, 2022      Milana Blum  [address] [facility] [address]        Dear ,    We are writing to inform you of your test results.    Your vitamin b12 is very low. I think this is likely the culprit with your legs. I recommend taking an over the counter b12 supplement every day. We can also do an injection at every visit to help get your levels up and help with the neuropathy.     Resulted Orders   Magnesium   Result Value Ref Range    Magnesium 1.9 1.9 - 2.7 mg/dL   TSH Reflex GH   Result Value Ref Range    TSH 2.32 0.40 - 4.00 mU/L   Vitamin B12   Result Value Ref Range    Vitamin B12 <50 (L) 180 - 914 pg/mL   Basic Metabolic Panel   Result Value Ref Range    Sodium 139 134 - 144 mmol/L    Potassium 3.9 3.5 - 5.1 mmol/L    Chloride 101 98 - 107 mmol/L    Carbon Dioxide (CO2) 30 21 - 31 mmol/L    Anion Gap 8 3 - 14 mmol/L    Urea Nitrogen 10 7 - 25 mg/dL    Creatinine 0.69 0.60 - 1.20 mg/dL    Calcium 9.6 8.6 - 10.3 mg/dL    Glucose 130 (H) 70 - 105 mg/dL    GFR Estimate >90 >60 mL/min/1.73m2      Comment:      Effective December 21, 2021 eGFRcr in adults is calculated using the 2021 CKD-EPI creatinine equation which includes age and gender ( et al., NEJM, DOI: 10.1056/ACQYoq0247964)       If you have any questions or concerns, please call the clinic at the number listed above.       Sincerely,      Wes Gasca MD

## 2022-11-03 NOTE — LETTER
November 3, 2022      Milana Blum  [address] [address]        Dear ,    We are writing to inform you of your test results.    Both your magnesium and potassium are on the lower end of normal.  You may increase your magnesium supplement to 2 times daily to see if this improves your pain.  You may also eat an extra banana or piece of fruit per day to increase your potassium to see if this helps your symptoms as well.  I am still waiting on your vitamin B12 and thyroid testing.    Resulted Orders   Magnesium   Result Value Ref Range    Magnesium 1.9 1.9 - 2.7 mg/dL   Basic Metabolic Panel   Result Value Ref Range    Sodium 139 134 - 144 mmol/L    Potassium 3.9 3.5 - 5.1 mmol/L    Chloride 101 98 - 107 mmol/L    Carbon Dioxide (CO2) 30 21 - 31 mmol/L    Anion Gap 8 3 - 14 mmol/L    Urea Nitrogen 10 7 - 25 mg/dL    Creatinine 0.69 0.60 - 1.20 mg/dL    Calcium 9.6 8.6 - 10.3 mg/dL    Glucose 130 (H) 70 - 105 mg/dL    GFR Estimate >90 >60 mL/min/1.73m2      Comment:      Effective December 21, 2021 eGFRcr in adults is calculated using the 2021 CKD-EPI creatinine equation which includes age and gender ( et al., NEJM, DOI: 10.1056/IRHBbg5423739)       If you have any questions or concerns, please call the clinic at the number listed above.       Sincerely,      Wes Gasca MD

## 2022-11-03 NOTE — PROGRESS NOTES
Assessment & Plan         ICD-10-CM    1. Neuropathy  G62.9 Basic Metabolic Panel     Vitamin B12     TSH Reflex GH     Magnesium     Magnesium     TSH Reflex GH     Vitamin B12     Basic Metabolic Panel      2. Mixed hyperlipidemia  E78.2       3. Peripheral edema  R60.9 Elastic Bandages & Supports (MEDICAL COMPRESSION STOCKINGS) MISC     Compression Sleeve/Stocking Order for DME - ONLY FOR DME      4. Asymptomatic varicose veins  I83.90 Elastic Bandages & Supports (MEDICAL COMPRESSION STOCKINGS) MISC     Compression Sleeve/Stocking Order for DME - ONLY FOR DME      5. Muscle twitch  R25.3       6. Vitamin B 12 deficiency  E53.8         Neuropathy: Numbness of the bottom of her feet particularly in the morning prior to wearing her compression stockings.  We will check electrolyte abnormalities magnesium B12 etc.  Could also be compression of superficial nerves.  She was also given a refill of her compression stockings DME order was placed in the chart. Vitamin b12 level low.       Muscle twitch: Patient could be having some Electrolyte abnormalities as she has nearly daily twitching of her thumb.  Does not seem to be in a pattern of a tremor but more of electrolyte abnormalities.  We will screen as above for neuropathy.  Treat as indicated.    Hyperlipidemia: Discussed that her CV risk is 7.6% indicating that she should be on a statin.  She would like to work on weight loss and improving her cholesterol prior to starting statin medication.  We will discuss this further at her next annual physical exam.        No follow-ups on file.    Wes Gasca MD  [facility]      Gilda Cortés is a 62 year old, presenting for the following health issues:  Numbness (In both feet for a couple of months   also having hand tremors for a month)      HPI     Numbness in feet for a couple of months    Hand tremors for about one month.  Seems to be every day she has the muscle twitching. Tucks hand under her  body to make it stop.   No numbness or loss of strength.     Numbness  Wears compression stockings  Feels like something is under her feet or between her toes.   Feels like she has cotton under her toes.   Wears compression stockings during the day and doesn't notice that sensation.   No discoloration of feet or nailbeds.   Was occasional, now every morning.   No new medication changes.     Problems with superficial phlebitis  Needs prescription for compression stockings.   20/30 for compression rating.       Review of Systems         Objective    /78 (BP Location: Right arm, Patient Position: Sitting, Cuff Size: Adult Large)   Pulse 66   Temp 98.1  F (36.7  C) (Temporal)   Resp 20   Wt 138 kg (304 lb 3.2 oz)   LMP  (LMP Unknown)   SpO2 94%   Breastfeeding No   BMI 46.25 kg/m    Body mass index is 46.25 kg/m .  Physical Exam   General: Pleasant 62-year-old woman sitting clinic no acute distress  MSK: No  resting or intention tremor appreciated

## 2022-11-03 NOTE — NURSING NOTE
"Chief Complaint   Patient presents with     Numbness     In both feet for a couple of months   also having hand tremors for a month       Medication reconciliation completed.    FOOD SECURITY SCREENING QUESTIONS:    The next two questions are to help us understand your food security.  If you are feeling you need any assistance in this area, we have resources available to support you today.    Hunger Vital Signs:  Within the past 12 months we worried whether our food would run out before we got money to buy more. Never  Within the past 12 months the food we bought just didn't last and we didn't have money to get more. Never    Initial Pulse 66   Temp 98.1  F (36.7  C) (Temporal)   Resp 20   Wt 138 kg (304 lb 3.2 oz)   LMP  (LMP Unknown)   SpO2 94%   Breastfeeding No   BMI 46.25 kg/m   Estimated body mass index is 46.25 kg/m  as calculated from the following:    Height as of 8/25/22: 1.727 m (5' 8\").    Weight as of this encounter: 138 kg (304 lb 3.2 oz).       Jacy Iraheta LPN .......  11/3/2022  4:14 PM  "

## 2022-11-04 ENCOUNTER — TELEPHONE (OUTPATIENT)
Dept: INTERNAL MEDICINE | Facility: OTHER | Age: 62
End: 2022-11-04

## 2022-11-04 DIAGNOSIS — E53.8 VITAMIN B12 DEFICIENCY (NON ANEMIC): Primary | ICD-10-CM

## 2022-11-04 NOTE — PROGRESS NOTES
[facility]    Outpatient Physical Therapy Discharge Note  Patient: Milana Blum  : 1960    Beginning/End Dates of Reporting Period:  22 to 22    Referring Provider: Dr. Plata    Therapy Diagnosis: shoulder pain     Client Self Report: Continues to have pain in her shoulder, primarily the biceps. Has difficulty with exercises. Short term relief from DC and acupuncture but waiting to continue due to insurance concerns. Postponed her injection due to conflicts. We discussed today continuing with gentle HEP and progressing as able until she can get her injection. Milana is in agreement with this plan.    Objective Measurements:  Objective Measure: AROM Flexion: 130  Details: AROM ABD: 120          Goals:  Goal Identifier ROM   Goal Description Milana will demonstrate 130 degrees of active shoulder flexion with pain less than 4 for improved activity tolerance and strengthening.   Target Date 22   Date Met      Progress (detail required for progress note):  Achieved level of motion but pain present above these levels     Goal Identifier Strength   Goal Description Pt will be able to lift 3 lbs overhead for ability to complete overhead lifting task to put objects in high shelves.   Target Date 22   Date Met      Progress (detail required for progress note):  Not met     Goal Identifier ADLs   Goal Description Milana will be able to reach behing her head and behind her back in order to perform ADLs like getting dressed and washing her hair.   Target Date 22   Date Met      Progress (detail required for progress note):       Goal Identifier Sleeping   Goal Description Pt will be able to get 6 hours of sleep and not be woken up due to pain.   Target Date 22   Date Met      Progress (detail required for progress note):       Plan:  Discharge from therapy. Pt feels like she is  managing well. She is going to see acupuncture and chiropractic services for relief. Also looking to get an inject done. Chart held open for 1 month and pt did not contact PT to schedule any additional appts. Will be discharged    Discharge:    Reason for Discharge: Patient chooses to discontinue therapy.    Equipment Issued: theraband    Discharge Plan: Patient to continue home program.

## 2022-11-04 NOTE — TELEPHONE ENCOUNTER
Called and spoke with the patient. She is wondering if she could do a nurse only and get a injection for her b12 soon.   Nupur Chawla LPN on 11/4/2022 at 9:22 AM

## 2022-11-09 ENCOUNTER — OFFICE VISIT (OUTPATIENT)
Dept: FAMILY MEDICINE | Facility: OTHER | Age: 62
End: 2022-11-09
Attending: FAMILY MEDICINE
Payer: COMMERCIAL

## 2022-11-09 VITALS
RESPIRATION RATE: 17 BRPM | OXYGEN SATURATION: 98 % | BODY MASS INDEX: 45.61 KG/M2 | WEIGHT: 293 LBS | SYSTOLIC BLOOD PRESSURE: 134 MMHG | HEART RATE: 61 BPM | TEMPERATURE: 97.5 F | DIASTOLIC BLOOD PRESSURE: 80 MMHG

## 2022-11-09 DIAGNOSIS — E53.8 B12 NEUROPATHY (H): Primary | ICD-10-CM

## 2022-11-09 DIAGNOSIS — G63 B12 NEUROPATHY (H): Primary | ICD-10-CM

## 2022-11-09 PROCEDURE — 250N000011 HC RX IP 250 OP 636: Performed by: FAMILY MEDICINE

## 2022-11-09 PROCEDURE — 96372 THER/PROPH/DIAG INJ SC/IM: CPT | Performed by: FAMILY MEDICINE

## 2022-11-09 PROCEDURE — 99213 OFFICE O/P EST LOW 20 MIN: CPT | Performed by: FAMILY MEDICINE

## 2022-11-09 RX ORDER — CYANOCOBALAMIN 1000 UG/ML
1000 INJECTION, SOLUTION INTRAMUSCULAR; SUBCUTANEOUS WEEKLY
Status: ACTIVE | OUTPATIENT
Start: 2022-11-09 | End: 2022-12-14

## 2022-11-09 RX ORDER — CYANOCOBALAMIN 1000 UG/ML
1000 INJECTION, SOLUTION INTRAMUSCULAR; SUBCUTANEOUS WEEKLY
Status: ACTIVE | OUTPATIENT
Start: 2022-11-09

## 2022-11-09 RX ADMIN — CYANOCOBALAMIN 1000 MCG: 1000 INJECTION, SOLUTION INTRAMUSCULAR at 13:03

## 2022-11-09 ASSESSMENT — PAIN SCALES - GENERAL: PAINLEVEL: NO PAIN (0)

## 2022-11-09 NOTE — TELEPHONE ENCOUNTER
Dtre of birth and last name verified.    Saw Dr. Maxwell today and had an injection and has future injection set up also.    When I asked, she has no further questions or concerns at this time.    Coy Miller .......  11/9/2022  5:28 PM

## 2022-11-09 NOTE — NURSING NOTE
"Chief Complaint   Patient presents with     History of Present Illness       Initial /80   Pulse 61   Temp 97.5  F (36.4  C) (Tympanic)   Resp 17   Wt 136.1 kg (300 lb)   LMP  (LMP Unknown)   SpO2 98%   BMI 45.61 kg/m   Estimated body mass index is 45.61 kg/m  as calculated from the following:    Height as of 8/25/22: 1.727 m (5' 8\").    Weight as of this encounter: 136.1 kg (300 lb).  Medication Reconciliation: complete    FOOD SECURITY SCREENING QUESTIONS  Hunger Vital Signs:  Within the past 12 months we worried whether our food would run out before we got money to buy more. Never  Within the past 12 months the food we bought just didn't last and we didn't have money to get more. Never  Suad Dawn LPN 11/9/2022 12:28 PM      "

## 2022-11-09 NOTE — TELEPHONE ENCOUNTER
Standing B12 injections are in computer. Recommend weekly injections x 1 month then monthly injections for one year. In addition to oral supplement.     Wes Gasca MD on 11/9/2022 at 7:58 AM

## 2022-11-09 NOTE — PROGRESS NOTES
"  Assessment & Plan     (E53.8,  G63) B12 neuropathy (H)  (primary encounter diagnosis)  Comment: is extremely low, undetectable on our lab.  Will do an injection once a week for a month, then follow up to determine if she would do this high dose for a 2nd month.  Once she has completed the weekly dosing, usual dosing is once a month.  She does not have the ability to do this at home.    Plan: cyanocobalamin injection 1,000 mcg                        BMI:   Estimated body mass index is 45.61 kg/m  as calculated from the following:    Height as of 8/25/22: 1.727 m (5' 8\").    Weight as of this encounter: 136.1 kg (300 lb).           No follow-ups on file.    Karl Maxwell MD  [facility] AND [facility]    Gilda Cortés is a 62 year old, presenting for the following health issues:  History of Present Illness      History of Present Illness       Reason for visit:  Headaches, fatigue, feet numb, low b12    She eats 2-3 servings of fruits and vegetables daily.She consumes 0 sweetened beverage(s) daily.She exercises with enough effort to increase her heart rate 10 to 19 minutes per day.  She exercises with enough effort to increase her heart rate 3 or less days per week.   She is taking medications regularly.     Ongoing symptoms with numbness in her feet and hand twitching. Feeling overall exhausted lately.  Was recently seen by her PCP and her B12 was extremely low. She was advised to get on an injection and has not yet started this. Is not a vegetarian.  Some loose stools, after eating gluten especially. Is on a gluten free diet for a week.  Has not had gastric bypass surgery.      Recent Results (from the past 240 hour(s))   Magnesium    Collection Time: 11/03/22  4:57 PM   Result Value Ref Range    Magnesium 1.9 1.9 - 2.7 mg/dL   TSH Reflex GH    Collection Time: 11/03/22  4:57 PM   Result Value Ref Range    TSH 2.32 0.40 - 4.00 mU/L   Vitamin B12    Collection Time: 11/03/22  4:57 PM   Result Value Ref " Range    Vitamin B12 <50 (L) 180 - 914 pg/mL   Basic Metabolic Panel    Collection Time: 11/03/22  4:57 PM   Result Value Ref Range    Sodium 139 134 - 144 mmol/L    Potassium 3.9 3.5 - 5.1 mmol/L    Chloride 101 98 - 107 mmol/L    Carbon Dioxide (CO2) 30 21 - 31 mmol/L    Anion Gap 8 3 - 14 mmol/L    Urea Nitrogen 10 7 - 25 mg/dL    Creatinine 0.69 0.60 - 1.20 mg/dL    Calcium 9.6 8.6 - 10.3 mg/dL    Glucose 130 (H) 70 - 105 mg/dL    GFR Estimate >90 >60 mL/min/1.73m2             Review of Systems         Objective    /80   Pulse 61   Temp 97.5  F (36.4  C) (Tympanic)   Resp 17   Wt 136.1 kg (300 lb)   LMP  (LMP Unknown)   SpO2 98%   BMI 45.61 kg/m    Body mass index is 45.61 kg/m .  Physical Exam  Constitutional:       Appearance: Normal appearance.   HENT:      Mouth/Throat:      Comments: Normal toungue  Neurological:      General: No focal deficit present.      Mental Status: She is alert and oriented to person, place, and time.   Psychiatric:         Mood and Affect: Mood normal.         Behavior: Behavior normal.         Thought Content: Thought content normal.

## 2022-11-17 ENCOUNTER — ALLIED HEALTH/NURSE VISIT (OUTPATIENT)
Dept: FAMILY MEDICINE | Facility: OTHER | Age: 62
End: 2022-11-17
Attending: STUDENT IN AN ORGANIZED HEALTH CARE EDUCATION/TRAINING PROGRAM
Payer: COMMERCIAL

## 2022-11-17 DIAGNOSIS — E53.8 B12 DEFICIENCY: Primary | ICD-10-CM

## 2022-11-17 PROCEDURE — 250N000011 HC RX IP 250 OP 636: Performed by: FAMILY MEDICINE

## 2022-11-17 PROCEDURE — 96372 THER/PROPH/DIAG INJ SC/IM: CPT | Performed by: FAMILY MEDICINE

## 2022-11-17 RX ADMIN — CYANOCOBALAMIN 1000 MCG: 1000 INJECTION, SOLUTION INTRAMUSCULAR at 12:48

## 2022-11-17 NOTE — PROGRESS NOTES
Verified patient's first and last name, and . Patient stated reason for visit today is to receive a B12 injection. Patient denied any concerns with previous injections. B12 prepared and administered IM as ordered. Administration of medication documented in MAR (see MAR for further information regarding dose, lot #, NDC #, expiration date). Patient encouraged to wait in lobby for 15 minutes post-injection and notify staff immediately of any reaction.     Anjali Araujo RN ....................  2022   12:45 PM

## 2022-11-25 ENCOUNTER — ALLIED HEALTH/NURSE VISIT (OUTPATIENT)
Dept: FAMILY MEDICINE | Facility: OTHER | Age: 62
End: 2022-11-25
Attending: STUDENT IN AN ORGANIZED HEALTH CARE EDUCATION/TRAINING PROGRAM
Payer: COMMERCIAL

## 2022-11-25 DIAGNOSIS — E53.8 B12 DEFICIENCY: Primary | ICD-10-CM

## 2022-11-25 PROCEDURE — 96372 THER/PROPH/DIAG INJ SC/IM: CPT

## 2022-11-25 PROCEDURE — 250N000011 HC RX IP 250 OP 636

## 2022-11-25 RX ADMIN — CYANOCOBALAMIN 1000 MCG: 1000 INJECTION, SOLUTION INTRAMUSCULAR; SUBCUTANEOUS at 11:11

## 2022-11-25 NOTE — PROGRESS NOTES
Verified patient's first and last name, and . Patient stated reason for visit today is to receive a B12 injection. Patient denied any concerns with previous injections. B12 prepared and administered IM as ordered. Administration of medication documented in MAR (see MAR for further information regarding dose, lot #, NDC #, expiration date). Patient encouraged to wait in lobby for 15 minutes post-injection and notify staff immediately of any reaction.     Anjali Araujo RN ....................  2022   11:07 AM

## 2022-12-01 ENCOUNTER — ALLIED HEALTH/NURSE VISIT (OUTPATIENT)
Dept: FAMILY MEDICINE | Facility: OTHER | Age: 62
End: 2022-12-01
Attending: STUDENT IN AN ORGANIZED HEALTH CARE EDUCATION/TRAINING PROGRAM
Payer: COMMERCIAL

## 2022-12-01 DIAGNOSIS — E53.8 B12 DEFICIENCY: Primary | ICD-10-CM

## 2022-12-01 PROCEDURE — 96372 THER/PROPH/DIAG INJ SC/IM: CPT | Performed by: STUDENT IN AN ORGANIZED HEALTH CARE EDUCATION/TRAINING PROGRAM

## 2022-12-01 PROCEDURE — 250N000011 HC RX IP 250 OP 636: Performed by: STUDENT IN AN ORGANIZED HEALTH CARE EDUCATION/TRAINING PROGRAM

## 2022-12-01 RX ADMIN — CYANOCOBALAMIN 1000 MCG: 1000 INJECTION, SOLUTION INTRAMUSCULAR; SUBCUTANEOUS at 11:16

## 2022-12-01 NOTE — PROGRESS NOTES
Verified patient's first and last name, and . Patient stated reason for visit today is to receive a B12 injection. Patient denied any concerns with previous injections. B12 prepared and administered IM as ordered. Administration of medication documented in MAR (see MAR for further information regarding dose, lot #, NDC #, expiration date). Patient encouraged to wait in lobby for 15 minutes post-injection and notify staff immediately of any reaction.     Anjali Araujo RN ....................  2022   11:14 AM

## 2022-12-02 ASSESSMENT — ASTHMA QUESTIONNAIRES
QUESTION_2 LAST FOUR WEEKS HOW OFTEN HAVE YOU HAD SHORTNESS OF BREATH: THREE TO SIX TIMES A WEEK
QUESTION_1 LAST FOUR WEEKS HOW MUCH OF THE TIME DID YOUR ASTHMA KEEP YOU FROM GETTING AS MUCH DONE AT WORK, SCHOOL OR AT HOME: A LITTLE OF THE TIME
ACT_TOTALSCORE: 15
ACT_TOTALSCORE: 15
QUESTION_3 LAST FOUR WEEKS HOW OFTEN DID YOUR ASTHMA SYMPTOMS (WHEEZING, COUGHING, SHORTNESS OF BREATH, CHEST TIGHTNESS OR PAIN) WAKE YOU UP AT NIGHT OR EARLIER THAN USUAL IN THE MORNING: TWO OR THREE NIGHTS A WEEK
QUESTION_5 LAST FOUR WEEKS HOW WOULD YOU RATE YOUR ASTHMA CONTROL: SOMEWHAT CONTROLLED
QUESTION_4 LAST FOUR WEEKS HOW OFTEN HAVE YOU USED YOUR RESCUE INHALER OR NEBULIZER MEDICATION (SUCH AS ALBUTEROL): TWO OR THREE TIMES PER WEEK

## 2022-12-05 ENCOUNTER — OFFICE VISIT (OUTPATIENT)
Dept: INTERNAL MEDICINE | Facility: OTHER | Age: 62
End: 2022-12-05
Attending: STUDENT IN AN ORGANIZED HEALTH CARE EDUCATION/TRAINING PROGRAM
Payer: COMMERCIAL

## 2022-12-05 VITALS
RESPIRATION RATE: 16 BRPM | WEIGHT: 293 LBS | TEMPERATURE: 98.4 F | BODY MASS INDEX: 45.61 KG/M2 | HEART RATE: 68 BPM | OXYGEN SATURATION: 99 % | DIASTOLIC BLOOD PRESSURE: 94 MMHG | SYSTOLIC BLOOD PRESSURE: 170 MMHG

## 2022-12-05 DIAGNOSIS — I10 PRIMARY HYPERTENSION: Primary | ICD-10-CM

## 2022-12-05 DIAGNOSIS — E53.8 VITAMIN B 12 DEFICIENCY: ICD-10-CM

## 2022-12-05 DIAGNOSIS — G62.9 NEUROPATHY: ICD-10-CM

## 2022-12-05 DIAGNOSIS — E53.8 VITAMIN B12 DEFICIENCY (NON ANEMIC): ICD-10-CM

## 2022-12-05 LAB
ERYTHROCYTE [DISTWIDTH] IN BLOOD BY AUTOMATED COUNT: 13.6 % (ref 10–15)
HCT VFR BLD AUTO: 38.8 % (ref 35–47)
HGB BLD-MCNC: 12.5 G/DL (ref 11.7–15.7)
MCH RBC QN AUTO: 26.9 PG (ref 26.5–33)
MCHC RBC AUTO-ENTMCNC: 32.2 G/DL (ref 31.5–36.5)
MCV RBC AUTO: 84 FL (ref 78–100)
PLATELET # BLD AUTO: 250 10E3/UL (ref 150–450)
RBC # BLD AUTO: 4.64 10E6/UL (ref 3.8–5.2)
VIT B12 SERPL-MCNC: >2000 PG/ML (ref 232–1245)
WBC # BLD AUTO: 9 10E3/UL (ref 4–11)

## 2022-12-05 PROCEDURE — 36415 COLL VENOUS BLD VENIPUNCTURE: CPT | Mod: ZL | Performed by: STUDENT IN AN ORGANIZED HEALTH CARE EDUCATION/TRAINING PROGRAM

## 2022-12-05 PROCEDURE — 250N000011 HC RX IP 250 OP 636: Performed by: STUDENT IN AN ORGANIZED HEALTH CARE EDUCATION/TRAINING PROGRAM

## 2022-12-05 PROCEDURE — 82607 VITAMIN B-12: CPT | Mod: ZL | Performed by: STUDENT IN AN ORGANIZED HEALTH CARE EDUCATION/TRAINING PROGRAM

## 2022-12-05 PROCEDURE — 99214 OFFICE O/P EST MOD 30 MIN: CPT | Performed by: STUDENT IN AN ORGANIZED HEALTH CARE EDUCATION/TRAINING PROGRAM

## 2022-12-05 PROCEDURE — 85027 COMPLETE CBC AUTOMATED: CPT | Mod: ZL | Performed by: STUDENT IN AN ORGANIZED HEALTH CARE EDUCATION/TRAINING PROGRAM

## 2022-12-05 PROCEDURE — 96372 THER/PROPH/DIAG INJ SC/IM: CPT | Performed by: STUDENT IN AN ORGANIZED HEALTH CARE EDUCATION/TRAINING PROGRAM

## 2022-12-05 RX ORDER — CYANOCOBALAMIN 1000 UG/ML
1000 INJECTION, SOLUTION INTRAMUSCULAR; SUBCUTANEOUS ONCE
Status: COMPLETED | OUTPATIENT
Start: 2022-12-05 | End: 2022-12-05

## 2022-12-05 RX ORDER — CYANOCOBALAMIN 1000 UG/ML
1000 INJECTION, SOLUTION INTRAMUSCULAR; SUBCUTANEOUS
Status: ACTIVE | OUTPATIENT
Start: 2022-12-05

## 2022-12-05 RX ADMIN — CYANOCOBALAMIN 1000 MCG: 1000 INJECTION, SOLUTION INTRAMUSCULAR at 13:37

## 2022-12-05 ASSESSMENT — PAIN SCALES - GENERAL: PAINLEVEL: EXTREME PAIN (8)

## 2022-12-05 NOTE — LETTER
December 5, 2022      Milana Blum  [address] [place] [address]        Dear ,    We are writing to inform you of your test results.    Your laboratory results are as below.  Ultimately you are not anemic.  Your vitamin B12 level has normalized.  I think we can continue monthly B12 injections for now and possibly further wean in the future.  You can continue your oral vitamin B12 supplementation for the time being.    Resulted Orders   Vitamin B12   Result Value Ref Range    Vitamin B12 >2,000 (H) 232 - 1,245 pg/mL   CBC W PLT No Diff   Result Value Ref Range    WBC Count 9.0 4.0 - 11.0 10e3/uL    RBC Count 4.64 3.80 - 5.20 10e6/uL    Hemoglobin 12.5 11.7 - 15.7 g/dL    Hematocrit 38.8 35.0 - 47.0 %    MCV 84 78 - 100 fL    MCH 26.9 26.5 - 33.0 pg    MCHC 32.2 31.5 - 36.5 g/dL    RDW 13.6 10.0 - 15.0 %    Platelet Count 250 150 - 450 10e3/uL       If you have any questions or concerns, please call the clinic at the number listed above.       Sincerely,      Wes Gasca MD

## 2022-12-05 NOTE — PROGRESS NOTES
Assessment & Plan         ICD-10-CM    1. Primary hypertension  I10       2. Vitamin B12 deficiency (non anemic)  E53.8       3. Neuropathy  G62.9       4. Vitamin B 12 deficiency  E53.8 cyanocobalamin injection 1,000 mcg     CBC W PLT No Diff        Hypertension: Blood pressure 170/96 today.  She is hesitant to increase her regimen as her systolic blood pressures are 1 teens to 120s at home.  Told her to take her blood pressure at home and if it is persistently elevated she will return to clinic.  I also concerned that her wrist cuff is not as accurate as our manual cuff.  Could certainly increase her hydrochlorothiazide to help with her peripheral edema    Vitamin B-12 deficiency, neuropathy: Patient has significant B12 deficiency with neuropathy and other neurologic symptoms.  B12 injections have greatly improved her symptoms recheck her labs today as she has been on weekly injections for 2 months.  If normalized and her symptoms are improving may decrease to monthly ongoing.  She is unable to perform her vitamin B12 injections at home at this point.  We will continue in clinic.  Eval CBC with B12 deficiency history        No follow-ups on file.    Wes Gasca MD  [facility]      Gilda Cortés is a 62 year old, presenting for the following health issues:  Follow Up (Regarding B12)      History of Present Illness       Reason for visit:  Low b12 and symptoms associated    She eats 2-3 servings of fruits and vegetables daily.She consumes 0 sweetened beverage(s) daily.She exercises with enough effort to increase her heart rate 20 to 29 minutes per day.  She exercises with enough effort to increase her heart rate 4 days per week.   She is taking medications regularly.       Follow up regarding B12.      Neuropathy improving  Less dizzy  Headache improving.   Muscle twitching has greatly improved and has not been present for over 2 weeks    She is unable to perform B12 injections at her  home    She is wondering how long she needs to be on B12 injections.    Discussed her blood pressure quite elevated here, had a stressful morning.  She also states that she has been taking her blood pressure at home with systolics in the 110s to 120s.            Review of Systems         Objective    BP (!) 170/96 (BP Location: Right arm, Patient Position: Sitting, Cuff Size: Adult Large)   Pulse 68   Temp 98.4  F (36.9  C) (Temporal)   Resp 16   Wt 136.1 kg (300 lb)   LMP  (LMP Unknown)   SpO2 99%   Breastfeeding No   BMI 45.61 kg/m    Body mass index is 45.61 kg/m .  Physical Exam   General: Pleasant 62 year old year old female sitting in clinic in no acute distress      Reviewed prior notes from clinic and laboratory results

## 2022-12-05 NOTE — NURSING NOTE
"Chief Complaint   Patient presents with     Follow Up     Regarding B12       Medication reconciliation completed.    FOOD SECURITY SCREENING QUESTIONS:    The next two questions are to help us understand your food security.  If you are feeling you need any assistance in this area, we have resources available to support you today.    Hunger Vital Signs:  Within the past 12 months we worried whether our food would run out before we got money to buy more. Never  Within the past 12 months the food we bought just didn't last and we didn't have money to get more. Never    Initial BP (!) 170/96 (BP Location: Right arm, Patient Position: Sitting, Cuff Size: Adult Large)   Pulse 68   Temp 98.4  F (36.9  C) (Temporal)   Resp 16   Wt 136.1 kg (300 lb)   LMP  (LMP Unknown)   SpO2 99%   Breastfeeding No   BMI 45.61 kg/m   Estimated body mass index is 45.61 kg/m  as calculated from the following:    Height as of 8/25/22: 1.727 m (5' 8\").    Weight as of this encounter: 136.1 kg (300 lb).       Jacy Iraheta LPN .......  12/5/2022  1:09 PM  "

## 2022-12-06 ENCOUNTER — TELEPHONE (OUTPATIENT)
Dept: INTERNAL MEDICINE | Facility: OTHER | Age: 62
End: 2022-12-06

## 2022-12-06 ENCOUNTER — MYC MEDICAL ADVICE (OUTPATIENT)
Dept: INTERNAL MEDICINE | Facility: OTHER | Age: 62
End: 2022-12-06

## 2022-12-06 ENCOUNTER — ALLIED HEALTH/NURSE VISIT (OUTPATIENT)
Dept: FAMILY MEDICINE | Facility: OTHER | Age: 62
End: 2022-12-06
Attending: STUDENT IN AN ORGANIZED HEALTH CARE EDUCATION/TRAINING PROGRAM
Payer: COMMERCIAL

## 2022-12-06 VITALS — SYSTOLIC BLOOD PRESSURE: 154 MMHG | DIASTOLIC BLOOD PRESSURE: 93 MMHG

## 2022-12-06 DIAGNOSIS — I10 HTN (HYPERTENSION): Primary | ICD-10-CM

## 2022-12-06 NOTE — TELEPHONE ENCOUNTER
Patient was in yesterday and saw Dr Gasca and he told her if her blood pressure was high to come in and have it taken.  Can she just come in to have her blood pressure check. BAS out.  Please call      Katharine Cat on 12/6/2022 at 9:22 AM

## 2022-12-06 NOTE — TELEPHONE ENCOUNTER
Patient presented to [facility] regarding BP and nurse only appointment was made.  Robian Torres on 12/6/2022 at 4:45 PM

## 2022-12-09 NOTE — TELEPHONE ENCOUNTER
I do not think that your IM injection would be systemic by that point.  I would certainly reorder a vitamin B12 level if you would like.    Wes Gasca MD on 12/9/2022 at 11:26 AM

## 2022-12-12 ENCOUNTER — THERAPY VISIT (OUTPATIENT)
Dept: CHIROPRACTIC MEDICINE | Facility: OTHER | Age: 62
End: 2022-12-12
Attending: CHIROPRACTOR
Payer: COMMERCIAL

## 2022-12-12 VITALS
HEART RATE: 60 BPM | DIASTOLIC BLOOD PRESSURE: 88 MMHG | OXYGEN SATURATION: 96 % | SYSTOLIC BLOOD PRESSURE: 130 MMHG | RESPIRATION RATE: 16 BRPM | TEMPERATURE: 97 F

## 2022-12-12 DIAGNOSIS — M54.2 CERVICALGIA: ICD-10-CM

## 2022-12-12 DIAGNOSIS — M99.02 SEGMENTAL AND SOMATIC DYSFUNCTION OF THORACIC REGION: ICD-10-CM

## 2022-12-12 DIAGNOSIS — M99.01 SEGMENTAL AND SOMATIC DYSFUNCTION OF CERVICAL REGION: Primary | ICD-10-CM

## 2022-12-12 DIAGNOSIS — M54.6 PAIN IN THORACIC SPINE: ICD-10-CM

## 2022-12-12 DIAGNOSIS — G44.209 TENSION HEADACHE: ICD-10-CM

## 2022-12-12 PROCEDURE — 99212 OFFICE O/P EST SF 10 MIN: CPT | Mod: 25 | Performed by: CHIROPRACTOR

## 2022-12-12 PROCEDURE — 98940 CHIROPRACT MANJ 1-2 REGIONS: CPT | Mod: AT | Performed by: CHIROPRACTOR

## 2022-12-12 NOTE — PROGRESS NOTES
Flared up a few weeks ago Neck mostly left with frequent aching and throbbing. Radiating to head causing a slight headache. Using a new pillow with a decrease.   Barb Escamilla on 12/12/2022 at 1:40 PM    Reviewed by EW    Visit #:  1/3-5  Re-assessment    Subjective:  Milana Blum is a 62 year old female who is seen in f/u up for:        Segmental and somatic dysfunction of cervical region  Cervicalgia  Segmental and somatic dysfunction of thoracic region  Pain in thoracic spine  Tension headache.     Since last visit on 9/20/2022,  Milana Blum reports: Began experiencing a flareup of neck and upper back pain over the past couple weeks.  Notes that symptoms flared even more so last week.  No specific cause noted such as traumatic events or overuse injuries.  Unable to obtain an appointment with our office patient attempted to manage symptoms on her own with a cervical support pillow.  Provided some level of help.  Patient has begun experiencing some mild migraine/precursor headache symptoms such as ringing in the ears, visual changes which patient has reported in the past with previous headaches.  Mild nausea noted.  Denies any radicular complaints of the upper extremities.  No changes to bowel bladder habits.    Currently dealing with numerous health concerns including bilateral shoulder problems.  May be contributing factors for symptoms at this time.    (DVPRS) Pain Rating Score : Sometimes distracts me (W24 5/10) (12/12/22 1333)     Objective:  The following was observed:  /88 (BP Location: Right arm, Patient Position: Sitting, Cuff Size: Adult Regular)   Pulse 60   Temp 97  F (36.1  C)   Resp 16   LMP  (LMP Unknown)   SpO2 96%      Neck Disability Index (  Brien H. and Stefanie C. 1991. All rights reserved.; used with permission) 12/12/2022   SECTION 1 - PAIN INTENSITY 1   SECTION 2 - PERSONAL CARE 0   SECTION 3 - LIFTING -1   SECTION 4 - READING 0   SECTION 5 - HEADACHES 5   SECTION 6 -  CONCENTRATION 1   SECTION 7 - WORK 2   SECTION 8 - DRIVING 0   SECTION 9 - SLEEPING 2   SECTION 10 - RECREATION 0   Count 9   Sum 11   Raw Score: /50 12.22   Neck Disability Index Score: (%) 24.44     Cervical AROM: Restrictions noted with right lateral flexion, right rotation and extension    Cervical distraction: -  Cervical compression: -    P: palpatory tendernessC2 left, C6 right, intrascapular T-spine T2-6:    A: static palpation demonstrates intersegmental asymmetry , cervical, thoracic  R: motion palpation notes restricted motion, C2 , C6 , T2 , T3  and T6   T: muscle spasm at level(s): Paraspinal cervical and upper thoracic regions bilaterally, upper trapezius and rhomboids bilaterally, suboccipitals bilaterally    Segmental spinal dysfunction/restrictions found at:  :  C2 Left lateral flexion restricted and Extension restriction  C6 Right lateral flexion restricted and Extension restriction  T2 Left lateral flexion restricted and Extension restriction  T3 Right lateral flexion restricted and Extension restriction  T6 Extension restriction.      Assessment: Acute neck and upper back symptoms.  Patient has been under care with similar complaints in the past and typically does respond favorably with chiropractic intervention.  Current plan of care to include 3-5 visits in the next 4-6 weeks barring acute exacerbation of symptoms.    Diagnoses:      1. Segmental and somatic dysfunction of cervical region    2. Cervicalgia    3. Segmental and somatic dysfunction of thoracic region    4. Pain in thoracic spine    5. Tension headache        Patient's condition:  Patient had restrictions pre-manipulation    Treatment effectiveness:  Post manipulation there is better intersegmental movement and Patient claims to feel looser post manipulation      Procedures:  E/M [id number]    CMT:  80196 Chiropractic manipulative treatment 1-2 regions performed   Cervical: Diversified, C2, C6, Supine  Thoracic: Diversified, T2, T3, T6,  Prone    Modalities:  69267: MSTM:  To Sub-occipital and Traps  for 2 min    Therapeutic procedures:  None    Response to Treatment  Reduction in symptoms as reported by patient    Prognosis: Good    Progress towards Goals: Reduce symptoms by 50%  Improve cervical spine AROM  Decrease neck disability index score 20-30%    Recommendations:    Instructions:ice 20 minutes every other hour as needed and heat 15 minutes every other hour as needed    Follow-up:  Return to care in 3 days.

## 2022-12-28 ENCOUNTER — ALLIED HEALTH/NURSE VISIT (OUTPATIENT)
Dept: FAMILY MEDICINE | Facility: OTHER | Age: 62
End: 2022-12-28
Attending: STUDENT IN AN ORGANIZED HEALTH CARE EDUCATION/TRAINING PROGRAM
Payer: COMMERCIAL

## 2022-12-28 DIAGNOSIS — E53.8 B12 DEFICIENCY: Primary | ICD-10-CM

## 2022-12-28 PROCEDURE — 96372 THER/PROPH/DIAG INJ SC/IM: CPT | Performed by: STUDENT IN AN ORGANIZED HEALTH CARE EDUCATION/TRAINING PROGRAM

## 2022-12-28 PROCEDURE — 250N000011 HC RX IP 250 OP 636: Performed by: STUDENT IN AN ORGANIZED HEALTH CARE EDUCATION/TRAINING PROGRAM

## 2022-12-28 RX ADMIN — CYANOCOBALAMIN 1000 MCG: 1000 INJECTION, SOLUTION INTRAMUSCULAR at 14:29

## 2022-12-28 NOTE — PROGRESS NOTES
Verified patient's first and last name, and . Patient stated reason for visit today is to receive a B12 injection. Patient denied any concerns with previous injections. B12 prepared and administered IM as ordered. Administration of medication documented in MAR (see MAR for further information regarding dose, lot #, NDC #, expiration date). Patient encouraged to wait in lobby for 15 minutes post-injection and notify staff immediately of any reaction.     Anjali Araujo RN ....................  2022   2:29 PM

## 2023-01-03 ENCOUNTER — THERAPY VISIT (OUTPATIENT)
Dept: CHIROPRACTIC MEDICINE | Facility: OTHER | Age: 63
End: 2023-01-03
Attending: CHIROPRACTOR
Payer: COMMERCIAL

## 2023-01-03 VITALS — TEMPERATURE: 96.8 F | RESPIRATION RATE: 18 BRPM | HEART RATE: 66 BPM | OXYGEN SATURATION: 96 %

## 2023-01-03 DIAGNOSIS — M54.2 CERVICALGIA: ICD-10-CM

## 2023-01-03 DIAGNOSIS — G44.209 TENSION HEADACHE: ICD-10-CM

## 2023-01-03 DIAGNOSIS — M99.01 SEGMENTAL AND SOMATIC DYSFUNCTION OF CERVICAL REGION: Primary | ICD-10-CM

## 2023-01-03 PROCEDURE — 98940 CHIROPRACT MANJ 1-2 REGIONS: CPT | Mod: AT | Performed by: CHIROPRACTOR

## 2023-01-03 NOTE — PROGRESS NOTES
Neck is intermittently tight. Having moderate daily headaches. 4/10 W24 8/10.   Faviola Sandhu on 1/3/2023 at 1:55 PM    Reviewed by EW    Visit #:  2    Subjective:  Milana Blum is a 62 year old female who is seen in f/u up for:        Segmental and somatic dysfunction of cervical region  Cervicalgia  Tension headache.     Since last visit on 12/12/2022,  Milana Blum reports: Increasing amounts of neck pain over the past week.  Reports that she does have daily headaches at this time.  Following last encounter with patient symptoms did show improvement.  Patient unable to follow-up with our office as originally planned due to weather and holiday season.  Has not been able to follow-up with orthopedic provider for shoulder concerns.  Plan to follow-up with patient on January 16    (DVPRS) Pain Rating Score : Distracts me, can do usual activities (W24 8/10) (01/03/23 1352)     Objective:  The following was observed:  Pulse 66   Temp 96.8  F (36  C) (Tympanic)   Resp 18   LMP  (LMP Unknown)   SpO2 96%      P: palpatory tendernessC1 right, C4 left:    A: static palpation demonstrates intersegmental asymmetry , cervical  R: motion palpation notes restricted motion, C1  and C4   T: muscle spasm at level(s): Paraspinal musculature cervical region bilaterally, suboccipitals bilaterally:      Segmental spinal dysfunction/restrictions found at:  :  C1 Left rotation restricted and Right lateral flexion restricted  C4 Left lateral flexion restricted and Extension restriction.      Assessment: Continuation of symptoms seen on last encounter.  Continue to follow-up with patient on as-needed basis.  Brief assessment performed of the upper thoracic spine with patient seated.  Unable to have patient lay prone due to shoulder concerns.    Diagnoses:      1. Segmental and somatic dysfunction of cervical region    2. Cervicalgia    3. Tension headache        Patient's condition:  Patient had restrictions  pre-manipulation    Treatment effectiveness:  Post manipulation there is better intersegmental movement and Patient claims to feel looser post manipulation      Procedures:  CMT:  [id number] Chiropractic manipulative treatment 1-2 regions performed   Cervical: Diversified, C1 , C4, Supine    Modalities:  None performed this visit    Therapeutic procedures:  None    Response to Treatment  Reduction in symptoms as reported by patient    Prognosis: Good    Progress towards Goals: Patient is making progress towards the goal.     Recommendations:    Instructions:Monitor symptoms and perform activities as tolerated    Follow-up:  Return to care if symptoms persist.

## 2023-01-10 ENCOUNTER — OFFICE VISIT (OUTPATIENT)
Dept: INTERNAL MEDICINE | Facility: OTHER | Age: 63
End: 2023-01-10
Attending: STUDENT IN AN ORGANIZED HEALTH CARE EDUCATION/TRAINING PROGRAM
Payer: COMMERCIAL

## 2023-01-10 VITALS
SYSTOLIC BLOOD PRESSURE: 132 MMHG | RESPIRATION RATE: 16 BRPM | HEIGHT: 67 IN | OXYGEN SATURATION: 97 % | HEART RATE: 74 BPM | DIASTOLIC BLOOD PRESSURE: 84 MMHG | TEMPERATURE: 98.4 F | WEIGHT: 293 LBS | BODY MASS INDEX: 45.99 KG/M2

## 2023-01-10 DIAGNOSIS — R20.2 NUMBNESS AND TINGLING OF BOTH FEET: ICD-10-CM

## 2023-01-10 DIAGNOSIS — R20.0 NUMBNESS AND TINGLING OF BOTH FEET: ICD-10-CM

## 2023-01-10 DIAGNOSIS — G63 B12 NEUROPATHY (H): ICD-10-CM

## 2023-01-10 DIAGNOSIS — E66.01 MORBID OBESITY (H): ICD-10-CM

## 2023-01-10 DIAGNOSIS — E53.8 VITAMIN B12 DEFICIENCY (NON ANEMIC): ICD-10-CM

## 2023-01-10 DIAGNOSIS — I50.32 DIASTOLIC DYSFUNCTION WITH CHRONIC HEART FAILURE (H): ICD-10-CM

## 2023-01-10 DIAGNOSIS — R53.81 MALAISE AND FATIGUE: Primary | ICD-10-CM

## 2023-01-10 DIAGNOSIS — E53.8 B12 NEUROPATHY (H): ICD-10-CM

## 2023-01-10 DIAGNOSIS — G62.9 NEUROPATHY: ICD-10-CM

## 2023-01-10 DIAGNOSIS — R53.83 MALAISE AND FATIGUE: Primary | ICD-10-CM

## 2023-01-10 LAB
ALBUMIN SERPL BCG-MCNC: 4 G/DL (ref 3.5–5.2)
ALP SERPL-CCNC: 94 U/L (ref 35–104)
ALT SERPL W P-5'-P-CCNC: 18 U/L (ref 10–35)
ANION GAP SERPL CALCULATED.3IONS-SCNC: 8 MMOL/L (ref 7–15)
AST SERPL W P-5'-P-CCNC: 19 U/L (ref 10–35)
BASOPHILS # BLD AUTO: 0 10E3/UL (ref 0–0.2)
BASOPHILS NFR BLD AUTO: 1 %
BILIRUB SERPL-MCNC: 0.5 MG/DL
BUN SERPL-MCNC: 9.8 MG/DL (ref 8–23)
CALCIUM SERPL-MCNC: 9.6 MG/DL (ref 8.8–10.2)
CHLORIDE SERPL-SCNC: 102 MMOL/L (ref 98–107)
CREAT SERPL-MCNC: 0.63 MG/DL (ref 0.51–0.95)
DEPRECATED HCO3 PLAS-SCNC: 30 MMOL/L (ref 22–29)
EOSINOPHIL # BLD AUTO: 0.1 10E3/UL (ref 0–0.7)
EOSINOPHIL NFR BLD AUTO: 2 %
ERYTHROCYTE [DISTWIDTH] IN BLOOD BY AUTOMATED COUNT: 13.7 % (ref 10–15)
GFR SERPL CREATININE-BSD FRML MDRD: >90 ML/MIN/1.73M2
GLUCOSE SERPL-MCNC: 109 MG/DL (ref 70–99)
HCT VFR BLD AUTO: 40.2 % (ref 35–47)
HGB BLD-MCNC: 12.5 G/DL (ref 11.7–15.7)
IMM GRANULOCYTES # BLD: 0 10E3/UL
IMM GRANULOCYTES NFR BLD: 0 %
LYMPHOCYTES # BLD AUTO: 3.8 10E3/UL (ref 0.8–5.3)
LYMPHOCYTES NFR BLD AUTO: 50 %
MCH RBC QN AUTO: 25.7 PG (ref 26.5–33)
MCHC RBC AUTO-ENTMCNC: 31.1 G/DL (ref 31.5–36.5)
MCV RBC AUTO: 83 FL (ref 78–100)
MONOCYTES # BLD AUTO: 0.4 10E3/UL (ref 0–1.3)
MONOCYTES NFR BLD AUTO: 5 %
NEUTROPHILS # BLD AUTO: 3.2 10E3/UL (ref 1.6–8.3)
NEUTROPHILS NFR BLD AUTO: 42 %
NRBC # BLD AUTO: 0 10E3/UL
NRBC BLD AUTO-RTO: 0 /100
PLATELET # BLD AUTO: 238 10E3/UL (ref 150–450)
POTASSIUM SERPL-SCNC: 4 MMOL/L (ref 3.4–5.3)
PROT SERPL-MCNC: 7 G/DL (ref 6.4–8.3)
RBC # BLD AUTO: 4.87 10E6/UL (ref 3.8–5.2)
SODIUM SERPL-SCNC: 140 MMOL/L (ref 136–145)
VIT B12 SERPL-MCNC: 804 PG/ML (ref 232–1245)
WBC # BLD AUTO: 7.6 10E3/UL (ref 4–11)

## 2023-01-10 PROCEDURE — 80053 COMPREHEN METABOLIC PANEL: CPT | Mod: ZL | Performed by: STUDENT IN AN ORGANIZED HEALTH CARE EDUCATION/TRAINING PROGRAM

## 2023-01-10 PROCEDURE — 82607 VITAMIN B-12: CPT | Mod: ZL | Performed by: STUDENT IN AN ORGANIZED HEALTH CARE EDUCATION/TRAINING PROGRAM

## 2023-01-10 PROCEDURE — 85025 COMPLETE CBC W/AUTO DIFF WBC: CPT | Mod: ZL | Performed by: STUDENT IN AN ORGANIZED HEALTH CARE EDUCATION/TRAINING PROGRAM

## 2023-01-10 PROCEDURE — 36415 COLL VENOUS BLD VENIPUNCTURE: CPT | Mod: ZL | Performed by: STUDENT IN AN ORGANIZED HEALTH CARE EDUCATION/TRAINING PROGRAM

## 2023-01-10 PROCEDURE — 99214 OFFICE O/P EST MOD 30 MIN: CPT | Performed by: STUDENT IN AN ORGANIZED HEALTH CARE EDUCATION/TRAINING PROGRAM

## 2023-01-10 PROCEDURE — 82306 VITAMIN D 25 HYDROXY: CPT | Mod: ZL | Performed by: STUDENT IN AN ORGANIZED HEALTH CARE EDUCATION/TRAINING PROGRAM

## 2023-01-10 RX ORDER — CYANOCOBALAMIN 1000 UG/ML
1 INJECTION, SOLUTION INTRAMUSCULAR; SUBCUTANEOUS
Qty: 3 ML | Refills: 3 | Status: SHIPPED | OUTPATIENT
Start: 2023-01-10 | End: 2024-01-31

## 2023-01-10 ASSESSMENT — ENCOUNTER SYMPTOMS: FATIGUE: 1

## 2023-01-10 ASSESSMENT — PAIN SCALES - GENERAL: PAINLEVEL: NO PAIN (0)

## 2023-01-10 NOTE — NURSING NOTE
"Chief Complaint   Patient presents with     Fatigue     Headaches  numbness in feet  wants lab work done and discuss home B12 injections       Medication reconciliation completed.    FOOD SECURITY SCREENING QUESTIONS:    The next two questions are to help us understand your food security.  If you are feeling you need any assistance in this area, we have resources available to support you today.    Hunger Vital Signs:  Within the past 12 months we worried whether our food would run out before we got money to buy more. Never  Within the past 12 months the food we bought just didn't last and we didn't have money to get more. Never    Initial /84 (BP Location: Right arm, Patient Position: Sitting, Cuff Size: Adult Large)   Pulse 74   Temp 98.4  F (36.9  C) (Temporal)   Resp 16   Ht 1.702 m (5' 7\")   Wt 133.9 kg (295 lb 3.2 oz)   LMP  (LMP Unknown)   SpO2 97%   Breastfeeding No   BMI 46.23 kg/m   Estimated body mass index is 46.23 kg/m  as calculated from the following:    Height as of this encounter: 1.702 m (5' 7\").    Weight as of this encounter: 133.9 kg (295 lb 3.2 oz).       Jacy Iraheta LPN .......  1/10/2023  9:06 AM  "

## 2023-01-10 NOTE — PROGRESS NOTES
Assessment & Plan         ICD-10-CM    1. Malaise and fatigue  R53.81 Vitamin D Total    R53.83 Comprehensive Metabolic Panel     CBC and Differential     CBC and Differential     Comprehensive Metabolic Panel     Vitamin D Total      2. Numbness and tingling of both feet  R20.0     R20.2       3. Neuropathy  G62.9       4. Vitamin B12 deficiency (non anemic)  E53.8 Vitamin B12     cyanocobalamin (CYANOCOBALAMIN) 1000 MCG/ML injection     insulin pen needle (32G X 4 MM) 32G X 4 MM miscellaneous     Vitamin B12      5. Diastolic dysfunction with chronic heart failure (H)  I50.32       6. Morbid obesity (H)  E66.01       7. 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  We will also check a vitamin D level today.  Get basic screening labs including a CBC and a CMP.  Unable to recheck TSH due to her being on biotin.    Obesity: States she is lost 10 pounds by eating better and working out on the elliptical congratulated her on her success.    B12 deficiency: Prescribed supplies for her B12 injections at home as she has high co-pay with getting monthly injections recheck her level today to see if she still needs to continue these injections or oral supplementation.          No follow-ups on file.    Wes Gasca MD  [facility] AND \[facility]\""   Milana is a 62 year old, presenting for the following health issues:  Fatigue (Headaches  numbness in feet  wants lab work done and discuss home B12 injections)      Fatigue  Associated symptoms include fatigue.   History of Present Illness       Reason for visit:  B12    She eats 2-3 servings of fruits and vegetables daily.She consumes 0 sweetened beverage(s) daily.She exercises with enough effort to increase her heart rate 10 to 19 minutes per day.    She is taking medications  "regularly.       Fatigued headaches numbness in feet  Discuss B12 injection and lab work.  Hair falling out and thinning.   Taking B12 , b complex,/ biotin, and saw palmetto.     Fatigue improving with increased b12 supplement.       Lost 10 pounds on the elliptical    Cholesterol elevated.   Discussed statin.     Review of Systems   Constitutional: Positive for fatigue.            Objective    /84 (BP Location: Right arm, Patient Position: Sitting, Cuff Size: Adult Large)   Pulse 74   Temp 98.4  F (36.9  C) (Temporal)   Resp 16   Ht 1.702 m (5' 7\")   Wt 133.9 kg (295 lb 3.2 oz)   LMP  (LMP Unknown)   SpO2 97%   Breastfeeding No   BMI 46.23 kg/m    Body mass index is 46.23 kg/m .  Physical Exam   General: Pleasant 62-year-old woman sitting in clinic no acute distress    Reviewed most recent lab results                "

## 2023-01-10 NOTE — LETTER
January 12, 2023      Milana Blum  [address] [address]        Dear ,    We are writing to inform you of your test results.    Your vitamin D has returned which is on the lower end.  You may continue your vitamin D supplement.    Resulted Orders   Vitamin B12   Result Value Ref Range    Vitamin B12 804 232 - 1,245 pg/mL   Comprehensive Metabolic Panel   Result Value Ref Range    Sodium 140 136 - 145 mmol/L    Potassium 4.0 3.4 - 5.3 mmol/L    Chloride 102 98 - 107 mmol/L    Carbon Dioxide (CO2) 30 (H) 22 - 29 mmol/L    Anion Gap 8 7 - 15 mmol/L    Urea Nitrogen 9.8 8.0 - 23.0 mg/dL    Creatinine 0.63 0.51 - 0.95 mg/dL    Calcium 9.6 8.8 - 10.2 mg/dL    Glucose 109 (H) 70 - 99 mg/dL    Alkaline Phosphatase 94 35 - 104 U/L    AST 19 10 - 35 U/L    ALT 18 10 - 35 U/L    Protein Total 7.0 6.4 - 8.3 g/dL    Albumin 4.0 3.5 - 5.2 g/dL    Bilirubin Total 0.5 <=1.2 mg/dL    GFR Estimate >90 >60 mL/min/1.73m2      Comment:      Effective December 21, 2021 eGFRcr in adults is calculated using the 2021 CKD-EPI creatinine equation which includes age and gender (Donald et al., NEJ, DOI: 10.1056/YEPOwm3029713)   Vitamin D Total   Result Value Ref Range    Vitamin D, Total (25-Hydroxy) 28 20 - 75 ug/L    Narrative    Season, race, dietary intake, and treatment affect the concentration of 25-hydroxy-Vitamin D. Values may decrease during winter months and increase during summer months. Values 20-29 ug/L may indicate Vitamin D insufficiency and values <20 ug/L may indicate Vitamin D deficiency.    Vitamin D determination is routinely performed by an immunoassay specific for 25 hydroxyvitamin D3.  If an individual is on vitamin D2(ergocalciferol) supplementation, please specify 25 OH vitamin D2 and D3 level determination by LCMSMS test VITD23.     CBC with platelets and differential   Result Value Ref Range    WBC Count 7.6 4.0 - 11.0 10e3/uL    RBC Count 4.87 3.80 - 5.20 10e6/uL    Hemoglobin 12.5  11.7 - 15.7 g/dL    Hematocrit 40.2 35.0 - 47.0 %    MCV 83 78 - 100 fL    MCH 25.7 (L) 26.5 - 33.0 pg    MCHC 31.1 (L) 31.5 - 36.5 g/dL    RDW 13.7 10.0 - 15.0 %    Platelet Count 238 150 - 450 10e3/uL    % Neutrophils 42 %    % Lymphocytes 50 %    % Monocytes 5 %    % Eosinophils 2 %    % Basophils 1 %    % Immature Granulocytes 0 %    NRBCs per 100 WBC 0 <1 /100    Absolute Neutrophils 3.2 1.6 - 8.3 10e3/uL    Absolute Lymphocytes 3.8 0.8 - 5.3 10e3/uL    Absolute Monocytes 0.4 0.0 - 1.3 10e3/uL    Absolute Eosinophils 0.1 0.0 - 0.7 10e3/uL    Absolute Basophils 0.0 0.0 - 0.2 10e3/uL    Absolute Immature Granulocytes 0.0 <=0.4 10e3/uL    Absolute NRBCs 0.0 10e3/uL       If you have any questions or concerns, please call the clinic at the number listed above.       Sincerely,      Wes Gasca MD

## 2023-01-10 NOTE — LETTER
January 10, 2023      Milana Blum  [address] [facility] [address]        Dear ,    We are writing to inform you of your test results.    Your lab results are as below.  Your vitamin B12 level has improved and I recommend continuing your monthly B12 injections as we discussed in clinic.  The rest of your blood counts and kidney function electrolytes are normal.    Resulted Orders   Vitamin B12   Result Value Ref Range    Vitamin B12 804 232 - 1,245 pg/mL   Comprehensive Metabolic Panel   Result Value Ref Range    Sodium 140 136 - 145 mmol/L    Potassium 4.0 3.4 - 5.3 mmol/L    Chloride 102 98 - 107 mmol/L    Carbon Dioxide (CO2) 30 (H) 22 - 29 mmol/L    Anion Gap 8 7 - 15 mmol/L    Urea Nitrogen 9.8 8.0 - 23.0 mg/dL    Creatinine 0.63 0.51 - 0.95 mg/dL    Calcium 9.6 8.8 - 10.2 mg/dL    Glucose 109 (H) 70 - 99 mg/dL    Alkaline Phosphatase 94 35 - 104 U/L    AST 19 10 - 35 U/L    ALT 18 10 - 35 U/L    Protein Total 7.0 6.4 - 8.3 g/dL    Albumin 4.0 3.5 - 5.2 g/dL    Bilirubin Total 0.5 <=1.2 mg/dL    GFR Estimate >90 >60 mL/min/1.73m2      Comment:      Effective December 21, 2021 eGFRcr in adults is calculated using the 2021 CKD-EPI creatinine equation which includes age and gender (Donald watson al., NE, DOI: 10.1056/PWLGcv5610224)   CBC with platelets and differential   Result Value Ref Range    WBC Count 7.6 4.0 - 11.0 10e3/uL    RBC Count 4.87 3.80 - 5.20 10e6/uL    Hemoglobin 12.5 11.7 - 15.7 g/dL    Hematocrit 40.2 35.0 - 47.0 %    MCV 83 78 - 100 fL    MCH 25.7 (L) 26.5 - 33.0 pg    MCHC 31.1 (L) 31.5 - 36.5 g/dL    RDW 13.7 10.0 - 15.0 %    Platelet Count 238 150 - 450 10e3/uL    % Neutrophils 42 %    % Lymphocytes 50 %    % Monocytes 5 %    % Eosinophils 2 %    % Basophils 1 %    % Immature Granulocytes 0 %    NRBCs per 100 WBC 0 <1 /100    Absolute Neutrophils 3.2 1.6 - 8.3 10e3/uL    Absolute Lymphocytes 3.8 0.8 - 5.3 10e3/uL    Absolute Monocytes 0.4 0.0 - 1.3 10e3/uL    Absolute  Eosinophils 0.1 0.0 - 0.7 10e3/uL    Absolute Basophils 0.0 0.0 - 0.2 10e3/uL    Absolute Immature Granulocytes 0.0 <=0.4 10e3/uL    Absolute NRBCs 0.0 10e3/uL       If you have any questions or concerns, please call the clinic at the number listed above.       Sincerely,      Wes Gasca MD

## 2023-01-11 LAB — DEPRECATED CALCIDIOL+CALCIFEROL SERPL-MC: 28 UG/L (ref 20–75)

## 2023-01-16 ENCOUNTER — HOSPITAL ENCOUNTER (OUTPATIENT)
Dept: GENERAL RADIOLOGY | Facility: OTHER | Age: 63
Discharge: HOME | End: 2023-01-16
Attending: ORTHOPAEDIC SURGERY
Payer: COMMERCIAL

## 2023-01-16 ENCOUNTER — OFFICE VISIT (OUTPATIENT)
Dept: ORTHOPEDICS | Facility: OTHER | Age: 63
End: 2023-01-16
Attending: ORTHOPAEDIC SURGERY
Payer: COMMERCIAL

## 2023-01-16 DIAGNOSIS — G89.29 CHRONIC RIGHT SHOULDER PAIN: Primary | ICD-10-CM

## 2023-01-16 DIAGNOSIS — M25.512 ACUTE PAIN OF LEFT SHOULDER: ICD-10-CM

## 2023-01-16 DIAGNOSIS — M25.511 CHRONIC RIGHT SHOULDER PAIN: Primary | ICD-10-CM

## 2023-01-16 PROCEDURE — 250N000011 HC RX IP 250 OP 636: Mod: JW | Performed by: ORTHOPAEDIC SURGERY

## 2023-01-16 PROCEDURE — 73030 X-RAY EXAM OF SHOULDER: CPT | Mod: LT

## 2023-01-16 PROCEDURE — 20610 DRAIN/INJ JOINT/BURSA W/O US: CPT | Mod: LT | Performed by: ORTHOPAEDIC SURGERY

## 2023-01-16 PROCEDURE — 250N000009 HC RX 250: Performed by: ORTHOPAEDIC SURGERY

## 2023-01-16 PROCEDURE — 99213 OFFICE O/P EST LOW 20 MIN: CPT | Mod: 25 | Performed by: ORTHOPAEDIC SURGERY

## 2023-01-16 RX ORDER — LIDOCAINE HYDROCHLORIDE 10 MG/ML
0.5 INJECTION, SOLUTION EPIDURAL; INFILTRATION; INTRACAUDAL; PERINEURAL ONCE
Status: COMPLETED | OUTPATIENT
Start: 2023-01-16 | End: 2023-01-16

## 2023-01-16 RX ORDER — TRIAMCINOLONE ACETONIDE 40 MG/ML
20 INJECTION, SUSPENSION INTRA-ARTICULAR; INTRAMUSCULAR ONCE
Status: COMPLETED | OUTPATIENT
Start: 2023-01-16 | End: 2023-01-16

## 2023-01-16 RX ORDER — TRIAMCINOLONE ACETONIDE 40 MG/ML
20 INJECTION, SUSPENSION INTRA-ARTICULAR; INTRAMUSCULAR ONCE
Status: DISCONTINUED | OUTPATIENT
Start: 2023-01-16 | End: 2023-01-16

## 2023-01-16 RX ORDER — LIDOCAINE HYDROCHLORIDE 10 MG/ML
0.5 INJECTION, SOLUTION EPIDURAL; INFILTRATION; INTRACAUDAL; PERINEURAL ONCE
Status: DISCONTINUED | OUTPATIENT
Start: 2023-01-16 | End: 2023-01-16

## 2023-01-16 RX ADMIN — LIDOCAINE HYDROCHLORIDE 0.5 ML: 10 INJECTION, SOLUTION EPIDURAL; INFILTRATION; INTRACAUDAL; PERINEURAL at 11:17

## 2023-01-16 RX ADMIN — TRIAMCINOLONE ACETONIDE 20 MG: 40 INJECTION, SUSPENSION INTRA-ARTICULAR; INTRAMUSCULAR at 11:17

## 2023-01-16 NOTE — PROGRESS NOTES
A steroid injection was performed at L AC joint using 1% plain Lidocaine and 20 mg of Kenalog. This was well tolerated.

## 2023-01-16 NOTE — PROGRESS NOTES
Patient is here for follow up on her right shoulder pain.  Avani Saenz LPN .....................1/16/2023 10:43 AM

## 2023-01-16 NOTE — PROGRESS NOTES
{PROVIDER CHARTING PREFERENCE:301588}    Subjective   Milana is a 62 year old{ACCOMPANIED BY STATEMENT (Optional):156515}, presenting for the following health issues:  RECHECK (Rt shoulder pain)      HPI     ***    Review of Systems   {ROS COMP (Optional):899848}      Objective    LMP  (LMP Unknown)   There is no height or weight on file to calculate BMI.  Physical Exam   {Exam List (Optional):878843}    {Diagnostic Test Results (Optional):062801}    {AMBULATORY ATTESTATION (Optional):255053}

## 2023-01-17 NOTE — PROGRESS NOTES
Visit Date: 2023    We gave her an injection back in 2022 into the AC joint on the right and her right shoulder has been fine.  Now, her left shoulder is really bothering her even though she comes in for followup on both of her shoulders.  Her left one was giving her more problems.  The pain is primarily right on the top of her shoulder and it is exactly the same pain that she had on the right.    PHYSICAL EXAMINATION:  She is tender to palpation at the AC joint, has positive cross body adduction test, and otherwise she is neuro and vascularly intact in the left upper extremity.    IMAGING:  X-ray examination shows significant AC joint arthritis of the left shoulder.    IMPRESSION AND PLAN:  A 62-year-old female with left shoulder acromioclavicular joint arthritis.  All the risks and benefits of an injection were discussed with her, and she wished to proceed.  We went ahead and gave her an injection today.  She tolerated it well and had good relief of her pain.  We are going to see her back on an as-needed basis for this.    Linus Plata MD        D: 2023   T: 2023   MT: LISA    Name:     ERIC MATUTE  MRN:      [id number]        Account:    [id number]   :      1960           Visit Date: 2023     Document: [id number]

## 2023-01-19 ENCOUNTER — OFFICE VISIT (OUTPATIENT)
Dept: INTERNAL MEDICINE | Facility: OTHER | Age: 63
End: 2023-01-19
Attending: STUDENT IN AN ORGANIZED HEALTH CARE EDUCATION/TRAINING PROGRAM
Payer: COMMERCIAL

## 2023-01-19 VITALS
TEMPERATURE: 98.3 F | SYSTOLIC BLOOD PRESSURE: 168 MMHG | RESPIRATION RATE: 16 BRPM | DIASTOLIC BLOOD PRESSURE: 104 MMHG | BODY MASS INDEX: 45.86 KG/M2 | WEIGHT: 292.8 LBS | HEART RATE: 62 BPM | OXYGEN SATURATION: 97 %

## 2023-01-19 DIAGNOSIS — I10 PRIMARY HYPERTENSION: Primary | ICD-10-CM

## 2023-01-19 DIAGNOSIS — G63 B12 NEUROPATHY (H): ICD-10-CM

## 2023-01-19 DIAGNOSIS — E53.8 B12 NEUROPATHY (H): ICD-10-CM

## 2023-01-19 DIAGNOSIS — R42 LIGHT HEADEDNESS: ICD-10-CM

## 2023-01-19 DIAGNOSIS — I47.10 PAROXYSMAL SUPRAVENTRICULAR TACHYCARDIA (H): ICD-10-CM

## 2023-01-19 PROCEDURE — 99214 OFFICE O/P EST MOD 30 MIN: CPT | Performed by: STUDENT IN AN ORGANIZED HEALTH CARE EDUCATION/TRAINING PROGRAM

## 2023-01-19 RX ORDER — SYRINGE-NEEDLE,INSULIN,0.5 ML 27GX1/2"
SYRINGE, EMPTY DISPOSABLE MISCELLANEOUS
Qty: 12 EACH | Refills: 3 | Status: SHIPPED | OUTPATIENT
Start: 2023-01-19 | End: 2024-01-19

## 2023-01-19 ASSESSMENT — PAIN SCALES - GENERAL: PAINLEVEL: NO PAIN (0)

## 2023-01-19 NOTE — NURSING NOTE
"Chief Complaint   Patient presents with     Hypertension     Blood pressure has been running high last 2 days       Medication reconciliation completed.    FOOD SECURITY SCREENING QUESTIONS:    The next two questions are to help us understand your food security.  If you are feeling you need any assistance in this area, we have resources available to support you today.    Hunger Vital Signs:  Within the past 12 months we worried whether our food would run out before we got money to buy more. Never  Within the past 12 months the food we bought just didn't last and we didn't have money to get more. Never    Initial BP (!) 168/104 (BP Location: Right arm, Patient Position: Sitting, Cuff Size: Adult Large)   Pulse 62   Temp 98.3  F (36.8  C) (Temporal)   Resp 16   Wt 132.8 kg (292 lb 12.8 oz)   LMP  (LMP Unknown)   SpO2 97%   Breastfeeding No   BMI 45.86 kg/m   Estimated body mass index is 45.86 kg/m  as calculated from the following:    Height as of 1/10/23: 1.702 m (5' 7\").    Weight as of this encounter: 132.8 kg (292 lb 12.8 oz).       Jacy Iraheta LPN .......  1/19/2023  9:26 AM  "

## 2023-01-19 NOTE — PROGRESS NOTES
"Assessment & Plan         ICD-10-CM    1. Primary hypertension  I10       2. B12 neuropathy (H)  E53.8 insulin syringe-needle U-100 (30G X 1/2\" 1 ML) 30G X 1/2\" 1 ML miscellaneous    G63       3. Light headedness  R42       4. Paroxysmal supraventricular tachycardia (H)  I47.1         Hypertension, lightheadedness: Patient has had lightheadedness and elevated blood pressure for the past 3 to 4 days after not following her diet recently and had a glass of wine pizza and some steak.  Unsure if she has a viral illness going on or if she just has sodium overload with her recent dietary indiscretion.  Discussed increasing her hydrochlorothiazide to 50 mg daily for 1 week and then seeing what her blood pressure does if still persistently elevated we will resume hydrochlorothiazide 25 mg daily and add losartan.  This will also give time for any possible URI to resolve as well.  No palpitations or evidence of arrhythmia on exam today.  She does have a history of paroxysmal supraventricular tachycardia.     Vitamin B12 neuropathy: B12 supplementation has greatly improved her neuropathy.  She is going to start injections at home.  She has an appointment with the nurse next week to drain and how to do the injections herself.  She needs a new order for syringes as the pharmacy did not supply her with any.    No evidence of palpitations or  No follow-ups on file.    Wes Gasca MD  [facility]   Milana is a 62 year old, presenting for the following health issues:  Hypertension (Blood pressure has been running high last 2 days)      History of Present Illness       Reason for visit:  B12    She eats 2-3 servings of fruits and vegetables daily.She consumes 0 sweetened beverage(s) daily.She exercises with enough effort to increase her heart rate 10 to 19 minutes per day.    She is taking medications regularly.       Hypertension Follow-up      Do you check your blood pressure regularly outside " of the clinic? Yes     Are you following a low salt diet? No    Are your blood pressures ever more than 140 on the top number (systolic) OR more   than 90 on the bottom number (diastolic), for example 140/90? Yes      How many servings of fruits and vegetables do you eat daily?  2-3    On average, how many sweetened beverages do you drink each day (Examples: soda, juice, sweet tea, etc.  Do NOT count diet or artificially sweetened beverages)?   0    How many days per week do you exercise enough to make your heart beat faster? 3 or less    How many minutes a day do you exercise enough to make your heart beat faster? 10 - 19    How many days per week do you miss taking your medication? 0    Shakey, headahce, dizzy. B  Did not follow diet over weekend. Wine Friday with dinner. Pizza. Steak on Saturday.   Big breakfast Sunday.     Blood pressure elevated today.     Left shoulder feeling good after injection.     No URI symptoms.     No chest pain, minimal SOB.   Taking all medication.   Worked out on elliptical.   BP got better with elliptical.             Review of Systems         Objective    BP (!) 168/104 (BP Location: Right arm, Patient Position: Sitting, Cuff Size: Adult Large)   Pulse 62   Temp 98.3  F (36.8  C) (Temporal)   Resp 16   Wt 132.8 kg (292 lb 12.8 oz)   LMP  (LMP Unknown)   SpO2 97%   Breastfeeding No   BMI 45.86 kg/m    Body mass index is 45.86 kg/m .  Physical Exam   General: Pleasant 62-year-old woman sitting clinic no acute distress  CV: Regular rate and rhythm no significant peripheral edema appreciated  Pulmonary: Clear to auscultation

## 2023-01-25 ENCOUNTER — ALLIED HEALTH/NURSE VISIT (OUTPATIENT)
Dept: FAMILY MEDICINE | Facility: OTHER | Age: 63
End: 2023-01-25
Attending: STUDENT IN AN ORGANIZED HEALTH CARE EDUCATION/TRAINING PROGRAM
Payer: COMMERCIAL

## 2023-01-25 DIAGNOSIS — E53.8 B12 DEFICIENCY: Primary | ICD-10-CM

## 2023-01-25 PROCEDURE — 96372 THER/PROPH/DIAG INJ SC/IM: CPT | Performed by: STUDENT IN AN ORGANIZED HEALTH CARE EDUCATION/TRAINING PROGRAM

## 2023-01-25 PROCEDURE — 250N000011 HC RX IP 250 OP 636: Performed by: STUDENT IN AN ORGANIZED HEALTH CARE EDUCATION/TRAINING PROGRAM

## 2023-01-25 RX ADMIN — CYANOCOBALAMIN 1000 MCG: 1000 INJECTION, SOLUTION INTRAMUSCULAR; SUBCUTANEOUS at 12:52

## 2023-01-25 NOTE — PROGRESS NOTES
Pt needs orders for 3 mL syringe and 1 inch needle for B12 injection. Pt was given insulin injection supply. Please place order for patient to  at pharmacy, thank you    Anjali Araujo RN on 1/25/2023 at 12:58 PM      Please giana up as I have ordered incorrectly x3    Wes Gasca MD on 1/26/2023 at 11:03 AM

## 2023-01-25 NOTE — PROGRESS NOTES
Verified patient's first and last name, and . Patient stated reason for visit today is to receive a B12 injection. Patient denied any concerns with previous injections. B12 prepared and administered IM as ordered. Administration of medication documented in MAR (see MAR for further information regarding dose, lot #, NDC #, expiration date). Patient encouraged to wait in lobby for 15 minutes post-injection and notify staff immediately of any reaction.     Anjali Araujo RN ....................  2023   12:37 PM

## 2023-01-26 ENCOUNTER — MYC MEDICAL ADVICE (OUTPATIENT)
Dept: INTERNAL MEDICINE | Facility: OTHER | Age: 63
End: 2023-01-26
Payer: COMMERCIAL

## 2023-01-26 NOTE — TELEPHONE ENCOUNTER
Per Dr. Gasca's last office visit note:    Discussed increasing her hydrochlorothiazide to 50 mg daily for 1 week and then seeing what her blood pressure does if still persistently elevated we will resume hydrochlorothiazide 25 mg daily and add losartan    Routing to covering provider as Dr. Gasca is out of the office.    NANCY SPEAR RN on 1/26/2023 at 11:17 AM

## 2023-01-31 ENCOUNTER — MYC MEDICAL ADVICE (OUTPATIENT)
Dept: INTERNAL MEDICINE | Facility: OTHER | Age: 63
End: 2023-01-31
Payer: COMMERCIAL

## 2023-01-31 DIAGNOSIS — E53.8 B12 NEUROPATHY (H): Primary | ICD-10-CM

## 2023-01-31 DIAGNOSIS — G63 B12 NEUROPATHY (H): Primary | ICD-10-CM

## 2023-01-31 NOTE — TELEPHONE ENCOUNTER
I am okay with you going back to hydrochlorothiazide 25 mg daily.  If your blood pressure goes back up or if you are starting to retain fluid again then we may need to go back to hydrochlorothiazide 50 mg daily for a permanent basis.    I will try again with the needle and the syringe. It should be at [facility]. 3ml syringe 25g needle.    Wes Gasca MD on 1/31/2023 at 3:20 PM

## 2023-03-23 ENCOUNTER — OFFICE VISIT (OUTPATIENT)
Dept: INTERNAL MEDICINE | Facility: OTHER | Age: 63
End: 2023-03-23
Payer: COMMERCIAL

## 2023-03-23 ENCOUNTER — HOSPITAL ENCOUNTER (OUTPATIENT)
Dept: GENERAL RADIOLOGY | Facility: OTHER | Age: 63
Discharge: HOME | End: 2023-03-23
Payer: COMMERCIAL

## 2023-03-23 VITALS
WEIGHT: 292.38 LBS | BODY MASS INDEX: 45.79 KG/M2 | TEMPERATURE: 97.4 F | SYSTOLIC BLOOD PRESSURE: 138 MMHG | RESPIRATION RATE: 20 BRPM | HEART RATE: 74 BPM | OXYGEN SATURATION: 96 % | DIASTOLIC BLOOD PRESSURE: 74 MMHG

## 2023-03-23 DIAGNOSIS — R06.02 SHORTNESS OF BREATH: ICD-10-CM

## 2023-03-23 DIAGNOSIS — J20.9 ACUTE BRONCHITIS, UNSPECIFIED ORGANISM: Primary | ICD-10-CM

## 2023-03-23 DIAGNOSIS — J45.30 MILD PERSISTENT ASTHMA WITHOUT COMPLICATION: ICD-10-CM

## 2023-03-23 PROCEDURE — 71046 X-RAY EXAM CHEST 2 VIEWS: CPT

## 2023-03-23 PROCEDURE — 99214 OFFICE O/P EST MOD 30 MIN: CPT

## 2023-03-23 RX ORDER — BENZONATATE 100 MG/1
100-200 CAPSULE ORAL 3 TIMES DAILY PRN
Qty: 60 CAPSULE | Refills: 0 | Status: SHIPPED | OUTPATIENT
Start: 2023-03-23 | End: 2023-04-13

## 2023-03-23 RX ORDER — METHYLPREDNISOLONE 4 MG
TABLET, DOSE PACK ORAL
Qty: 21 TABLET | Refills: 0 | Status: SHIPPED | OUTPATIENT
Start: 2023-03-23 | End: 2023-04-13

## 2023-03-23 RX ORDER — AZITHROMYCIN 250 MG/1
TABLET, FILM COATED ORAL
Qty: 6 TABLET | Refills: 0 | Status: SHIPPED | OUTPATIENT
Start: 2023-03-23 | End: 2023-03-28

## 2023-03-23 ASSESSMENT — ASTHMA QUESTIONNAIRES
QUESTION_4 LAST FOUR WEEKS HOW OFTEN HAVE YOU USED YOUR RESCUE INHALER OR NEBULIZER MEDICATION (SUCH AS ALBUTEROL): TWO OR THREE TIMES PER WEEK
QUESTION_1 LAST FOUR WEEKS HOW MUCH OF THE TIME DID YOUR ASTHMA KEEP YOU FROM GETTING AS MUCH DONE AT WORK, SCHOOL OR AT HOME: A LITTLE OF THE TIME
QUESTION_3 LAST FOUR WEEKS HOW OFTEN DID YOUR ASTHMA SYMPTOMS (WHEEZING, COUGHING, SHORTNESS OF BREATH, CHEST TIGHTNESS OR PAIN) WAKE YOU UP AT NIGHT OR EARLIER THAN USUAL IN THE MORNING: ONCE OR TWICE
QUESTION_5 LAST FOUR WEEKS HOW WOULD YOU RATE YOUR ASTHMA CONTROL: WELL CONTROLLED
ACT_TOTALSCORE: 19
QUESTION_2 LAST FOUR WEEKS HOW OFTEN HAVE YOU HAD SHORTNESS OF BREATH: ONCE OR TWICE A WEEK
ACT_TOTALSCORE: 19

## 2023-03-23 ASSESSMENT — ENCOUNTER SYMPTOMS
APPETITE CHANGE: 0
COUGH: 1
SWEATS: 1
SORE THROAT: 1
SHORTNESS OF BREATH: 1
CHILLS: 1
ACTIVITY CHANGE: 0
WHEEZING: 1
SINUS PRESSURE: 1
FATIGUE: 1

## 2023-03-23 ASSESSMENT — PAIN SCALES - GENERAL: PAINLEVEL: NO PAIN (0)

## 2023-03-23 NOTE — PROGRESS NOTES
Assessment & Plan   Milana Blum is a 62 year old presenting for the following health issues:      ICD-10-CM    1. Acute bronchitis, unspecified organism  J20.9 XR Chest 2 Views     benzonatate (TESSALON) 100 MG capsule     methylPREDNISolone (MEDROL DOSEPAK) 4 MG tablet therapy pack     azithromycin (ZITHROMAX) 250 MG tablet      2. Shortness of breath  R06.02 XR Chest 2 Views      3. Mild persistent asthma without complication  J45.30 XR Chest 2 Views        Chest x-ray does not show any signs of developing pneumonia. Suspect that this is acute viral bronchitis. Patient does have underlying asthma which has otherwise been stable- instructed her to use medrol dosepak, tessalon perles, and to utilize her nebulizer at home. She has been using albuterol, but has not tried using her nebulizer. I did provide her azithromycin to use only if symptoms did not improve after using alterative remedies after one week.     Return if symptoms worsen or fail to improve.    YOLIS Zafar [facility]      Subjective   Milana is a 62 year old, presenting for the following health issues:  Sinus Problem (Cough, congestion, headache and short of breath since Sunday   Amanda Garcia LPN on 3/23/2023 at 10:52 AM//)  No flowsheet data found.  Sinus Problem   This is a new problem. The current episode started more than 2 days ago. The problem has not changed since onset.The maximum temperature recorded prior to her arrival was 100 to 100.9 F. The fever has been present for 1 to 2 days. The pain is moderate. Associated symptoms include chills, sweats, congestion, ear pain, sinus pressure, sore throat, cough (clear non-productive) and shortness of breath.   History of Present Illness       Reason for visit:  Flu  Symptom onset:  3-7 days ago    She eats 2-3 servings of fruits and vegetables daily.She consumes 0 sweetened beverage(s) daily.She exercises with enough effort to increase her heart rate 10  to 19 minutes per day.  She exercises with enough effort to increase her heart rate 3 or less days per week.   She is taking medications regularly.       Acute Illness  Acute illness concerns: Cough, congestion, headache  Onset/Duration: 5 days ago  Symptoms:  Fever: YES  Chills/Sweats: YES  Headache (location?): YES  Sinus Pressure: No  Conjunctivitis:  No  Ear Pain: YES- left ear- feels full  Rhinorrhea: No  Congestion: YES  Sore Throat: No  Cough: YES-non-productive  Wheeze: No  Decreased Appetite: No  Nausea: No  Vomiting: No  Diarrhea: YES  Dysuria/Freq.: No  Dysuria or Hematuria: No  Fatigue/Achiness: YES  Sick/Strep Exposure: No  Therapies tried and outcome: Over the counter cold medicine, advil, hasn't been taking that often      Review of Systems   Constitutional: Positive for chills, fatigue and fever ( low grade). Negative for activity change and appetite change.   HENT: Positive for congestion, ear pain, sinus pressure and sore throat.    Respiratory: Positive for cough (clear non-productive), shortness of breath and wheezing ( increased use of inhaler).    All other systems reviewed and are negative.         Objective    /74 (BP Location: Right arm, Patient Position: Sitting, Cuff Size: Adult Large)   Pulse 74   Temp 97.4  F (36.3  C)   Resp 20   Wt 132.6 kg (292 lb 6 oz)   LMP  (LMP Unknown)   SpO2 96%   BMI 45.79 kg/m    Body mass index is 45.79 kg/m .  Physical Exam  Vitals reviewed.   Constitutional:       General: She is not in acute distress.     Appearance: She is well-developed. She is not ill-appearing or toxic-appearing.   HENT:      Head: Normocephalic and atraumatic.      Right Ear: Tympanic membrane, ear canal and external ear normal. There is no impacted cerumen.      Left Ear: Tympanic membrane, ear canal and external ear normal. There is no impacted cerumen.      Nose: Nose normal.      Mouth/Throat:      Mouth: Mucous membranes are moist.      Pharynx: Oropharynx is clear.  No oropharyngeal exudate or posterior oropharyngeal erythema.   Eyes:      General: No scleral icterus.     Conjunctiva/sclera: Conjunctivae normal.      Pupils: Pupils are equal, round, and reactive to light.   Neck:      Thyroid: No thyromegaly.   Cardiovascular:      Rate and Rhythm: Normal rate and regular rhythm.      Heart sounds: No murmur heard.  Pulmonary:      Effort: Pulmonary effort is normal. No respiratory distress.      Breath sounds: Normal breath sounds. No wheezing.   Musculoskeletal:         General: No tenderness or deformity. Normal range of motion.      Cervical back: Normal range of motion and neck supple.   Lymphadenopathy:      Cervical: No cervical adenopathy.   Skin:     General: Skin is warm and dry.      Findings: No rash.   Neurological:      Mental Status: She is alert and oriented to person, place, and time.      Cranial Nerves: No cranial nerve deficit.      Coordination: Coordination normal.   Psychiatric:         Behavior: Behavior normal.         Thought Content: Thought content normal.         Judgment: Judgment normal.        Results for orders placed or performed in visit on 03/23/23   XR Chest 2 Views     Status: None    Narrative    Exam:  XR CHEST 2 VIEWS    HISTORY: Shortness of breath; Acute cough; Mild persistent asthma  without complication.    COMPARISON:  5/23/2022, 9/23/2019    FINDINGS:     The cardiomediastinal contours are normal.      No focal consolidation, effusion, or pneumothorax.      No acute osseous abnormality.       Impression    IMPRESSION:      No acute cardiopulmonary process.      NATASHA MCKEON MD         SYSTEM ID:  [id number]

## 2023-03-23 NOTE — PATIENT INSTRUCTIONS
Results for orders placed or performed in visit on 03/23/23   XR Chest 2 Views     Status: None    Narrative    Exam:  XR CHEST 2 VIEWS    HISTORY: Shortness of breath; Acute cough; Mild persistent asthma  without complication.    COMPARISON:  5/23/2022, 9/23/2019    FINDINGS:     The cardiomediastinal contours are normal.      No focal consolidation, effusion, or pneumothorax.      No acute osseous abnormality.       Impression    IMPRESSION:      No acute cardiopulmonary process.      NATASHA MCKEON MD         SYSTEM ID:  [id number]       Try to use nebulizer kit at home to help open up her airway, use albuterol inhaler.  You may start Medrol Dosepak to help bring down inflammation, this will cause your blood sugars to increase, increase monitoring of blood sugars.  Additionally if symptoms do not improve within a week you may start the azithromycin.

## 2023-03-23 NOTE — LETTER
March 23, 2023      Milana Blum  [address] [facility] [address]        To Whom It May Concern:    Milana Blum was seen in our clinic. Please excuse her from missing work due to illness from 3/21/23 through 3/24/23  Sincerely,        YOLIS Zafar CNP

## 2023-03-25 ASSESSMENT — ENCOUNTER SYMPTOMS: FEVER: 1

## 2023-04-05 ENCOUNTER — VIRTUAL VISIT (OUTPATIENT)
Dept: INTERNAL MEDICINE | Facility: OTHER | Age: 63
End: 2023-04-05
Payer: COMMERCIAL

## 2023-04-05 ENCOUNTER — TELEPHONE (OUTPATIENT)
Dept: INTERNAL MEDICINE | Facility: OTHER | Age: 63
End: 2023-04-05
Payer: COMMERCIAL

## 2023-04-05 DIAGNOSIS — J01.90 ACUTE SINUSITIS WITH SYMPTOMS > 10 DAYS: Primary | ICD-10-CM

## 2023-04-05 PROCEDURE — 99213 OFFICE O/P EST LOW 20 MIN: CPT | Mod: TEL

## 2023-04-05 NOTE — TELEPHONE ENCOUNTER
Called Milana back and we will add her on for a phone visit   Amanda Garcia LPN on 4/5/2023 at 9:22 AM

## 2023-04-05 NOTE — PROGRESS NOTES
Milana is a 62 year old who is being evaluated via a billable telephone visit.       What phone number would you like to be contacted at? [phone]  How would you like to obtain your AVS? Benjamin    Distant Location (provider location):  On-site    Assessment & Plan   Milana Blum is a 62 year old presenting for the following health issues:      ICD-10-CM    1. Acute sinusitis with symptoms > 10 days  J01.90 amoxicillin-clavulanate (AUGMENTIN) 875-125 MG tablet        Due to length of symptoms and one-sided facial pain, suspect the patient has bacterial acute sinusitis.  Due to symptoms not improving with azithromycin from acute bronchitis, suspect that she needs broader coverage for acute sinus infection.  Sent in prescription for 10-day course of Augmentin.  Instructed her if symptoms resolve she may stop this at day 7 but no sooner than day 7.  If she develops new or worsening symptoms, she needs to come into clinic to be evaluated in person.  Patient verbalizes understanding and agreement to the treatment plan.    No follow-ups on file.    YOLIS Zafar [facility]      Subjective   Milana is a 62 year old, presenting for the following health issues:  Sinus Problem (Pain right side of face and cheekbone/sinus. Congestion and some cough. Mucus nasal drainage.   Amanda Garcia LPN on 4/5/2023 at 9:30 AM//)         View : No data to display.              Sinus Problem        Patient presents for phone visit for possible sinus infection.  She was recently treated for acute bronchitis 3- with a 5-day course of azithromycin.  She states that that improved her respiratory symptoms, now she developed a lingering right sided facial pain, sinus pressure, congestion that radiates into her right eye.  She continues to have congestion, rhinorrhea, headache and chills.  She has been trying to use Radhika pot and over-the-counter medications without improvement.  She did  initially feel better after finishing her course of azithromycin, but did not feel like it helped with her sinus symptoms.    Acute Illness  Acute illness concerns: sinus pressure, facial pain, congestion- right side cheek- Swollen hurts into eye and into bridge of nose.    Onset/Duration: 3 days  Symptoms:  Fever: No  Chills/Sweats: YES  Headache (location?): YES  Sinus Pressure: YES  Conjunctivitis:  No  Ear Pain: no  Rhinorrhea: YES  Congestion: YES  Sore Throat: No  Cough: YES-non-productive  Wheeze: No  Decreased Appetite: No  Nausea: No  Vomiting: No  Diarrhea: No  Dysuria/Freq.: No  Dysuria or Hematuria: No  Fatigue/Achiness: YES  Sick/Strep Exposure: No  Therapies tried and outcome: None          Review of Systems   All other systems reviewed and are negative.           Objective    Vitals - Patient Reported  Systolic (Patient Reported): 127  Diastolic (Patient Reported): 77  Pain Score: Extreme Pain (8)  Pain Loc: Face        Physical Exam   healthy, alert and no distress  PSYCH: Alert and oriented times 3; coherent speech, normal   rate and volume, able to articulate logical thoughts, able   to abstract reason, no tangential thoughts, no hallucinations   or delusions  Her affect is normal  RESP: No cough, no audible wheezing, able to talk in full sentences  Remainder of exam unable to be completed due to telephone visits                Phone call duration: 7 minutes

## 2023-04-05 NOTE — TELEPHONE ENCOUNTER
Please call the patient.  She has a sinus infection.  Can she get a medication called in?      Suad Hernandez on 4/5/2023 at 8:58 AM

## 2023-04-13 ENCOUNTER — OFFICE VISIT (OUTPATIENT)
Dept: CARDIOLOGY | Facility: OTHER | Age: 63
End: 2023-04-13
Attending: INTERNAL MEDICINE
Payer: COMMERCIAL

## 2023-04-13 VITALS
TEMPERATURE: 96.6 F | HEIGHT: 67 IN | HEART RATE: 67 BPM | RESPIRATION RATE: 16 BRPM | OXYGEN SATURATION: 95 % | SYSTOLIC BLOOD PRESSURE: 138 MMHG | DIASTOLIC BLOOD PRESSURE: 86 MMHG | BODY MASS INDEX: 45.36 KG/M2 | WEIGHT: 289 LBS

## 2023-04-13 DIAGNOSIS — R07.89 ATYPICAL CHEST PAIN: ICD-10-CM

## 2023-04-13 DIAGNOSIS — G47.33 OSA ON CPAP: ICD-10-CM

## 2023-04-13 DIAGNOSIS — R60.0 PERIPHERAL EDEMA: ICD-10-CM

## 2023-04-13 DIAGNOSIS — E66.01 MORBID OBESITY (H): ICD-10-CM

## 2023-04-13 DIAGNOSIS — R06.09 DOE (DYSPNEA ON EXERTION): ICD-10-CM

## 2023-04-13 DIAGNOSIS — J45.30 MILD PERSISTENT ASTHMA WITHOUT COMPLICATION: ICD-10-CM

## 2023-04-13 DIAGNOSIS — E78.00 HYPERCHOLESTEROLEMIA: ICD-10-CM

## 2023-04-13 DIAGNOSIS — I71.21 ANEURYSM OF ASCENDING AORTA WITHOUT RUPTURE (H): ICD-10-CM

## 2023-04-13 DIAGNOSIS — R00.2 PALPITATIONS: ICD-10-CM

## 2023-04-13 DIAGNOSIS — I10 ESSENTIAL HYPERTENSION, BENIGN: Primary | ICD-10-CM

## 2023-04-13 DIAGNOSIS — I50.32 DIASTOLIC DYSFUNCTION WITH CHRONIC HEART FAILURE (H): ICD-10-CM

## 2023-04-13 DIAGNOSIS — R73.03 PREDIABETES: ICD-10-CM

## 2023-04-13 DIAGNOSIS — I47.10 PAROXYSMAL SUPRAVENTRICULAR TACHYCARDIA (H): ICD-10-CM

## 2023-04-13 PROCEDURE — 99214 OFFICE O/P EST MOD 30 MIN: CPT | Performed by: INTERNAL MEDICINE

## 2023-04-13 RX ORDER — HYDROCHLOROTHIAZIDE 50 MG/1
50 TABLET ORAL DAILY
Qty: 90 TABLET | Refills: 3
Start: 2023-04-13 | End: 2023-09-12

## 2023-04-13 ASSESSMENT — PAIN SCALES - GENERAL: PAINLEVEL: MODERATE PAIN (4)

## 2023-04-13 NOTE — PROGRESS NOTES
[facility]   CARDIOLOGY PROGRESS NOTE     Chief Complaint   Patient presents with     Follow Up     Blood pressure,          Diagnosis:  1.  TAAA. 4.2 cm in 5/5/2022. 4.0 cm 12/27/2018.  2.  DORMAN.  Diastolic dysfunction/asthma.  3.  CHF-diastolic grade 1 on 12/27/2018.  4.  KIM on CPAP.  5.  Palpitations.  6.  Chest pain-atypical.  7.  Hyperlipidemia-uncontrolled.  8.  Hypertriglyceridemia-controlled.  9.  Hypertension- uncontrolled.  10.  Asthma-mild.  11.  Morbid obesity.      Assessment/Plan:    1.  TAAA: 4.0 cm on 12/27/2018 and 4.2 cm on 4/16/2021.  Discussed need for surgery at 5.5 cm or increase of 5 mm in a year.  Surgery not indicated at this time.  We will follow.  Plan for repeat echocardiogram in 1 year.  2.  Palpitations: Lasting 10 minutes to a full day.  Had symptoms while wearing Zio patch on 3/21/2022.  Had x7 episodes of SVT lasting up to 16.2 seconds.  Continue Bystolic 10 mg daily.  If has SVT for hours, discussed potential need for ablation.  For now, we will use medication.  Somewhat setback by the idea of an ablation.  3.  Obesity: Discussed intermittent fasting.  4.  Hypertension: Uncontrolled. Currently on hydrochlorothiazide 25 mg daily.  Will increase Bystolic from 5 mg to 10 mg daily.  5.  Hyperlipidemia: Borderline control.  Continue hydrochlorothiazide and Bystolic.  6.  CHF: Discussed today.  Patient aware she has diastolic dysfunction grade 1 on 12/27/2018.  Has some mild peripheral edema with compression stockings.  Will increase hydrochlorothiazide from 25 mg to 50 mg daily.  Plan for an echocardiogram.  7.  KIM: On CPAP.  8.  Follow-up in 1 year.      Interval history:  Milana is being seen in follow-up.  She is concerned about the diagnosis of heart failure.  She is also concerned about her elevated blood pressure.  She is concerned about her weight of 289 pounds today.  We also discussed her ascending aortic aneurysm.  I explained to her acing aortic aneurysm is small/mild  should be followed.  Does not require surgery.  Obviously, this diagnosis has created some anxiety.  Patient reassured.  Suggested proper management of blood pressure.  Also, discussed diastolic dysfunction.  She has seen significant improvement on hydrochlorothiazide 25 mg daily.  However, she has mild ongoing peripheral edema.  Will increase hydrochlorothiazide to 50 mg daily.  Also discussed Lasix but declined as patient is concerned with kidney function.  We discussed her weight.  She is concerned about her weight she was happy as she lost 10 pounds but apparently gained it back.  We discussed intermittent fasting.  We discussed her cholesterol and the reason for her shortness of breath.      HPI:    Mrs. Blum is being seen by cardiology to establish care.  She has been having weekly palpitations lasting 10 minutes to a full day, sharp/atypical pain to her chest, uncontrolled hypertension, and was following with cardiology in [place] related to a mild ascending aortic aneurysm.     Zio patch from 3/21/2022 showed SVT up to 16.2 seconds, DORMAN with history of mild asthma, obesity with a BMI of 47, prediabetes-controlled, hypercholesterolemia-controlled, diastolic dysfunction grade 1 on 12/27/18, KIM on CPAP, and diverticula.     She had been seeing cardiology.  She moved to [place] in October, 2021.  Her job allows her to work remotely if she lives an hour or greater away.  She lives less than an hour from her job as she lives in downtown [place].  She is feeling less safe.  She decided to be closer to her brother and increase her distance to allow her to work remotely.  She was seeing cardiology in [place].  She saw Dr. Theodore in cardiology related to an ascending aortic aneurysm.  It was 4.0 cm on 12/27/18 and increased to 4.2 cm on 5/5/2022.  Discussed this is a mild finding and does not require surgery.  Surgery is typically performed at 5.5 cm.     She also has been having palpitations.  She  describes them happening on a weekly basis.  They can last from anywhere from 10 minutes to a full day.  She has symptoms while wearing her Zio patch which was completed 3/21/2022.  This monitor showed x7 episodes of SVT lasting up to 16.2 seconds, SVE, and VE.  No A. fib, heart block, or VT.  Patient gets mildly dizzy but that has not had syncope or near syncope.  Currently on Bystolic and was increased.  If found to have extended episodes lasting hours, may need to have an ablation.  We will treat with medications.     She is having atypical chest pain.  She describes a sharp, substernal pain that is not exertional.  Lasts seconds.  It is random.  It is tender to palpation and reproducible.  Patient reassured.  No history of CAD or stenting.     She has a history of hypertension.  Blood pressure on 6/2/2022 was 158/94.  Blood pressures have been in the 130s systolically at home.  Discussed the benefits of tighter control blood pressure.  We will increase Bystolic from 5 mg daily to 10 mg daily for better blood pressure control and help manage palpitations.     She has diastolic dysfunction.  She has peripheral edema.  She currently on hydrochlorothiazide 25 mg daily.  Echo from 12/27/2018 showed grade 1.      Relevant testing:  Echocardiogram on 5/5/2022:  Left ventricular size, wall motion and function are normal. The ejection fraction is 60-65%.  Right ventricular function, chamber size, wall motion, and thickness are normal.  Ascending aorta 4.2 cm, indexed at 1.75 cm/m2.  The inferior vena cava was normal in size with preserved respiratory variability. No pericardial effusion is present.     Zio patch 3/21/2022:  Patient's monitor was in place for 11 days and 21-hour.  Minimum heart rate 51 bpm, average heart rate 72 bpm, maximum heart rate 160 bpm. Rhythm/s present include sinus rhythm, supraventricular tachycardia.  There were rare ventricular ectopic beats accounting for less than 1% of all beats. There  were 0 ventricular triplets and 0 couplets. There were rare supraventricular ectopic beats.  There were 10 triggered events during which time the noted rhythms were supraventricular tachycardia and sinus rhythm.  There were 9 diary entries during which time the noted rhythms were supraventricular tachycardia and sinus rhythm.  There were 7 episodes of supraventricular tachycardia with the fastest interval lasting 5 beats with a max rate of 169 and the longest lasting 16.2 seconds.  Review of actual tracings showed no other abnormality.     Echocardiogram on 4/16/2021:  Left ventricular ejection fraction is normal. The calculated left ventricular ejection fraction is 59%.    Normal left ventricular cavity size and wall thickness.    Diastolic function is normal.    Normal right ventricular size and systolic function.    No hemodynamically significant valvular heart abnormalities.    The ascending aorta is mildly dilated measuring 4.2 cm.    When compared to the previous study dated 12/27/2018, there has been no significant change.     Echocardiogram on 12/27/2018:  Normal left ventricular size and systolic function.The calculated left ventricular ejection fraction is 65%. This represents a normal ejection fraction. Borderline concentric hypertrophy noted. Grade I (mild) left ventricular diastolic dysfunction.    Normal right ventricular size and systolic function. TAPSE is normal    Left atrial volume is moderately increased    Estimated central venous pressure equal to 3 mmHg.    The ascending aorta is mildly dilated at 40 mm.    No previous study for comparison.        ICD-10-CM    1. Essential hypertension, benign  I10 Echocardiogram Complete     hydrochlorothiazide (HYDRODIURIL) 50 MG tablet      2. DORMAN (dyspnea on exertion)  R06.09 Echocardiogram Complete      3. Morbid obesity (H)  E66.01       4. Prediabetes  R73.03       5. Hypercholesterolemia  E78.00       6. Paroxysmal supraventricular tachycardia (H)   I47.1       7. Palpitations  R00.2       8. Diastolic dysfunction with chronic heart failure (H)  I50.32 Echocardiogram Complete     hydrochlorothiazide (HYDRODIURIL) 50 MG tablet      9. Aneurysm of ascending aorta without rupture (H)  I71.21 Echocardiogram Complete      10. Peripheral edema  R60.9 Echocardiogram Complete     hydrochlorothiazide (HYDRODIURIL) 50 MG tablet      11. Atypical chest pain  R07.89       12. KIM on CPAP  G47.33     Z99.89       13. Mild persistent asthma without complication  J45.30           Past Medical History:   Diagnosis Date     Aortic root dilatation (H) 5/5/2022    Last echocardiogram May 2022 measurement 4.1 cm.  Slowly progressively getting bigger continue annual exams unless recommended otherwise by cardiology     Asthma      Hypertension      Low back pain        Past Surgical History:   Procedure Laterality Date     BACK SURGERY  08/2010    L4-5, S1 Hemilaminectomy, foraminotomy     IR LUMBAR EPIDURAL STEROID INJECTION  3/4/2010     MA REMOVAL OF OVARY/TUBE(S)      Description: Salpingo-oophorectomy Left Side;  Proc Date: 12/06/2004;     ZC SUPRACERV ABD HYSTERECTOMY      Description: Supracervical Hysterectomy;  Proc Date: 12/06/2004;       Allergies   Allergen Reactions     House Dust [Dust Mite Extract] Shortness Of Breath     Pollen      Other Environmental Allergy Itching       Current Outpatient Medications   Medication Sig Dispense Refill     albuterol (PROAIR HFA/PROVENTIL HFA/VENTOLIN HFA) 108 (90 Base) MCG/ACT inhaler [ALBUTEROL (VENTOLIN HFA) 90 MCG/ACTUATION INHALER] INHALE 1 TO 2 PUFFS ORALLY EVERY FOUR HOURS AS NEEDED 54 g 3     amoxicillin-clavulanate (AUGMENTIN) 875-125 MG tablet Take 1 tablet by mouth 2 times daily for 10 days 20 tablet 0     aspirin 81 MG EC tablet [ASPIRIN 81 MG EC TABLET] Take 1 tablet (81 mg total) by mouth daily.  0     b complex vitamins tablet [B COMPLEX VITAMINS TABLET] Take 1 tablet by mouth daily.       budesonide-formoterol  "(SYMBICORT) 160-4.5 MCG/ACT Inhaler INHALE 2 PUFFS BY MOUTH TWICE A DAY 28 g 3     cyanocobalamin (CYANOCOBALAMIN) 1000 MCG/ML injection Inject 1 mL (1,000 mcg) into the muscle every 30 days 3 mL 3     Elastic Bandages & Supports (MEDICAL COMPRESSION STOCKINGS) MISC 1 Units daily 20-30 mm HG 6 each 11     hydrochlorothiazide (HYDRODIURIL) 50 MG tablet Take 1 tablet (50 mg) by mouth daily 90 tablet 3     insulin syringe-needle U-100 (30G X 1/2\" 1 ML) 30G X 1/2\" 1 ML miscellaneous Use 1 syringes monthly or as directed. 12 each 3     magnesium oxide 250 mg Tab [MAGNESIUM OXIDE 250 MG TAB] Take 250 mg by mouth daily.       nebivolol (BYSTOLIC) 10 MG tablet Take 1 tablet (10 mg) by mouth daily 90 tablet 3     Syringe/Needle, Disp, (SYRINGE LUER LOCK) 25G X 1\" 3 ML MISC 1 Syringe once a week 50 each 4       Social History     Socioeconomic History     Marital status: Single     Spouse name: Not on file     Number of children: Not on file     Years of education: Not on file     Highest education level: Not on file   Occupational History     Not on file   Tobacco Use     Smoking status: Never     Smokeless tobacco: Never   Vaping Use     Vaping status: Never Used   Substance and Sexual Activity     Alcohol use: Yes     Alcohol/week: 2.0 standard drinks of alcohol     Comment: weekly beer or wine     Drug use: No     Sexual activity: Yes     Partners: Male     Birth control/protection: Surgical   Other Topics Concern     Not on file   Social History Narrative     Not on file     Social Determinants of Health     Financial Resource Strain: Not on file   Food Insecurity: Not on file   Transportation Needs: Not on file   Physical Activity: Not on file   Stress: Not on file   Social Connections: Not on file   Intimate Partner Violence: Not on file   Housing Stability: Not on file       LAB RESULTS:   No visits with results within 2 Month(s) from this visit.   Latest known visit with results is:   Office Visit on 01/10/2023 "   Component Date Value Ref Range Status     Vitamin B12 01/10/2023 804  232 - 1,245 pg/mL Final     Sodium 01/10/2023 140  136 - 145 mmol/L Final     Potassium 01/10/2023 4.0  3.4 - 5.3 mmol/L Final     Chloride 01/10/2023 102  98 - 107 mmol/L Final     Carbon Dioxide (CO2) 01/10/2023 30 (H)  22 - 29 mmol/L Final     Anion Gap 01/10/2023 8  7 - 15 mmol/L Final     Urea Nitrogen 01/10/2023 9.8  8.0 - 23.0 mg/dL Final     Creatinine 01/10/2023 0.63  0.51 - 0.95 mg/dL Final     Calcium 01/10/2023 9.6  8.8 - 10.2 mg/dL Final     Glucose 01/10/2023 109 (H)  70 - 99 mg/dL Final     Alkaline Phosphatase 01/10/2023 94  35 - 104 U/L Final     AST 01/10/2023 19  10 - 35 U/L Final     ALT 01/10/2023 18  10 - 35 U/L Final     Protein Total 01/10/2023 7.0  6.4 - 8.3 g/dL Final     Albumin 01/10/2023 4.0  3.5 - 5.2 g/dL Final     Bilirubin Total 01/10/2023 0.5  <=1.2 mg/dL Final     GFR Estimate 01/10/2023 >90  >60 mL/min/1.73m2 Final     Vitamin D, Total (25-Hydroxy) 01/10/2023 28  20 - 75 ug/L Final     WBC Count 01/10/2023 7.6  4.0 - 11.0 10e3/uL Final     RBC Count 01/10/2023 4.87  3.80 - 5.20 10e6/uL Final     Hemoglobin 01/10/2023 12.5  11.7 - 15.7 g/dL Final     Hematocrit 01/10/2023 40.2  35.0 - 47.0 % Final     MCV 01/10/2023 83  78 - 100 fL Final     MCH 01/10/2023 25.7 (L)  26.5 - 33.0 pg Final     MCHC 01/10/2023 31.1 (L)  31.5 - 36.5 g/dL Final     RDW 01/10/2023 13.7  10.0 - 15.0 % Final     Platelet Count 01/10/2023 238  150 - 450 10e3/uL Final     % Neutrophils 01/10/2023 42  % Final     % Lymphocytes 01/10/2023 50  % Final     % Monocytes 01/10/2023 5  % Final     % Eosinophils 01/10/2023 2  % Final     % Basophils 01/10/2023 1  % Final     % Immature Granulocytes 01/10/2023 0  % Final     NRBCs per 100 WBC 01/10/2023 0  <1 /100 Final     Absolute Neutrophils 01/10/2023 3.2  1.6 - 8.3 10e3/uL Final     Absolute Lymphocytes 01/10/2023 3.8  0.8 - 5.3 10e3/uL Final     Absolute Monocytes 01/10/2023 0.4  0.0 - 1.3  "10e3/uL Final     Absolute Eosinophils 01/10/2023 0.1  0.0 - 0.7 10e3/uL Final     Absolute Basophils 01/10/2023 0.0  0.0 - 0.2 10e3/uL Final     Absolute Immature Granulocytes 01/10/2023 0.0  <=0.4 10e3/uL Final     Absolute NRBCs 01/10/2023 0.0  10e3/uL Final        Review of systems: Negative except that which was noted in the HPI.    Physical examination:  /86 (BP Location: Right arm, Patient Position: Sitting, Cuff Size: Adult Large)   Pulse 67   Temp (!) 96.6  F (35.9  C) (Temporal)   Resp 16   Ht 1.7 m (5' 6.93\")   Wt 131.1 kg (289 lb)   LMP  (LMP Unknown)   SpO2 95%   BMI 45.36 kg/m      GENERAL APPEARANCE: healthy, alert and no distress  CHEST: lungs clear to auscultation - no rales, rhonchi or wheezes, no use of accessory muscles, no retractions, respirations are unlabored, normal respiratory rate  CARDIOVASCULAR: regular rhythm, normal S1 with physiologic split S2, no S3 or S4 and no murmur, click or rub  EXTREMITIES: no clubbing, cyanosis but with mild edema.    Total time spent on day of visit, including review of tests, obtaining/reviewing separately obtained history, ordering medications/tests/procedures, communicating with PCP/consultants, and documenting in electronic medical record: 30 minutes.               Thank you for allowing me to participate in the care of your patient. Please do not hesitate to contact me if you have any questions.     Dustin Hallman, DO          "

## 2023-04-13 NOTE — NURSING NOTE
"Patient comes in for follow up on blood pressure.  Katie Talley LPN ....................4/13/2023   9:52 AM  Chief Complaint   Patient presents with     Follow Up     Blood pressure,       Initial /86 (BP Location: Right arm, Patient Position: Sitting, Cuff Size: Adult Large)   Pulse 67   Temp (!) 96.6  F (35.9  C) (Temporal)   Resp 16   Ht 1.7 m (5' 6.93\")   Wt 131.1 kg (289 lb)   LMP  (LMP Unknown)   SpO2 95%   BMI 45.36 kg/m   Estimated body mass index is 45.36 kg/m  as calculated from the following:    Height as of this encounter: 1.7 m (5' 6.93\").    Weight as of this encounter: 131.1 kg (289 lb).  Meds Reconciled: complete  Pt is not on Aspirin  Pt is on a Statin  PHQ and/or DOYLE reviewed. Pt referred to PCP/MH Provider as appropriate.    Katie Talley LPN      "

## 2023-04-17 ASSESSMENT — ASTHMA QUESTIONNAIRES
QUESTION_2 LAST FOUR WEEKS HOW OFTEN HAVE YOU HAD SHORTNESS OF BREATH: ONCE OR TWICE A WEEK
QUESTION_4 LAST FOUR WEEKS HOW OFTEN HAVE YOU USED YOUR RESCUE INHALER OR NEBULIZER MEDICATION (SUCH AS ALBUTEROL): TWO OR THREE TIMES PER WEEK
QUESTION_3 LAST FOUR WEEKS HOW OFTEN DID YOUR ASTHMA SYMPTOMS (WHEEZING, COUGHING, SHORTNESS OF BREATH, CHEST TIGHTNESS OR PAIN) WAKE YOU UP AT NIGHT OR EARLIER THAN USUAL IN THE MORNING: ONCE OR TWICE
QUESTION_1 LAST FOUR WEEKS HOW MUCH OF THE TIME DID YOUR ASTHMA KEEP YOU FROM GETTING AS MUCH DONE AT WORK, SCHOOL OR AT HOME: A LITTLE OF THE TIME
QUESTION_5 LAST FOUR WEEKS HOW WOULD YOU RATE YOUR ASTHMA CONTROL: WELL CONTROLLED
ACT_TOTALSCORE: 19
ACT_TOTALSCORE: 19

## 2023-04-18 ENCOUNTER — OFFICE VISIT (OUTPATIENT)
Dept: INTERNAL MEDICINE | Facility: OTHER | Age: 63
End: 2023-04-18
Attending: STUDENT IN AN ORGANIZED HEALTH CARE EDUCATION/TRAINING PROGRAM
Payer: COMMERCIAL

## 2023-04-18 VITALS
BODY MASS INDEX: 45.52 KG/M2 | SYSTOLIC BLOOD PRESSURE: 138 MMHG | WEIGHT: 290 LBS | RESPIRATION RATE: 18 BRPM | HEART RATE: 57 BPM | TEMPERATURE: 97.9 F | OXYGEN SATURATION: 97 % | HEIGHT: 67 IN | DIASTOLIC BLOOD PRESSURE: 88 MMHG

## 2023-04-18 DIAGNOSIS — R73.9 ELEVATED RANDOM BLOOD GLUCOSE LEVEL: Primary | ICD-10-CM

## 2023-04-18 DIAGNOSIS — I10 BENIGN ESSENTIAL HYPERTENSION: ICD-10-CM

## 2023-04-18 DIAGNOSIS — Z13.220 LIPID SCREENING: ICD-10-CM

## 2023-04-18 DIAGNOSIS — E53.8 VITAMIN B 12 DEFICIENCY: ICD-10-CM

## 2023-04-18 LAB
CHOLEST SERPL-MCNC: 177 MG/DL
FASTING STATUS PATIENT QL REPORTED: NO
GLUCOSE SERPL-MCNC: 101 MG/DL (ref 70–99)
HBA1C MFR BLD: 6.1 % (ref 4–6.2)
HDLC SERPL-MCNC: 36 MG/DL
LDLC SERPL CALC-MCNC: 112 MG/DL
NONHDLC SERPL-MCNC: 141 MG/DL
TRIGL SERPL-MCNC: 145 MG/DL
VIT B12 SERPL-MCNC: 682 PG/ML (ref 232–1245)

## 2023-04-18 PROCEDURE — 82607 VITAMIN B-12: CPT | Mod: ZL | Performed by: STUDENT IN AN ORGANIZED HEALTH CARE EDUCATION/TRAINING PROGRAM

## 2023-04-18 PROCEDURE — 90677 PCV20 VACCINE IM: CPT | Performed by: STUDENT IN AN ORGANIZED HEALTH CARE EDUCATION/TRAINING PROGRAM

## 2023-04-18 PROCEDURE — 80061 LIPID PANEL: CPT | Mod: ZL | Performed by: STUDENT IN AN ORGANIZED HEALTH CARE EDUCATION/TRAINING PROGRAM

## 2023-04-18 PROCEDURE — 83036 HEMOGLOBIN GLYCOSYLATED A1C: CPT | Mod: ZL | Performed by: STUDENT IN AN ORGANIZED HEALTH CARE EDUCATION/TRAINING PROGRAM

## 2023-04-18 PROCEDURE — 90471 IMMUNIZATION ADMIN: CPT | Performed by: STUDENT IN AN ORGANIZED HEALTH CARE EDUCATION/TRAINING PROGRAM

## 2023-04-18 PROCEDURE — 36415 COLL VENOUS BLD VENIPUNCTURE: CPT | Mod: ZL | Performed by: STUDENT IN AN ORGANIZED HEALTH CARE EDUCATION/TRAINING PROGRAM

## 2023-04-18 PROCEDURE — 82947 ASSAY GLUCOSE BLOOD QUANT: CPT | Mod: ZL | Performed by: STUDENT IN AN ORGANIZED HEALTH CARE EDUCATION/TRAINING PROGRAM

## 2023-04-18 PROCEDURE — 99214 OFFICE O/P EST MOD 30 MIN: CPT | Mod: 25 | Performed by: STUDENT IN AN ORGANIZED HEALTH CARE EDUCATION/TRAINING PROGRAM

## 2023-04-18 ASSESSMENT — PAIN SCALES - GENERAL: PAINLEVEL: NO PAIN (0)

## 2023-04-18 NOTE — LETTER
April 19, 2023      Milana Blum  [address] [address]        Dear ,    We are writing to inform you of your test results.    Your lab results are as below.  Overall your cholesterol is reasonable.  The option for a statin is always there given increased risk due to age and blood pressure.  Your hemoglobin A1c indicates that you are not diabetic.  Your vitamin B12 level is not as high as it was previously, but is normal.  I would like you to continue supplementation of vitamin B, preferably oral.  We can always recheck in a few months to make sure that your B12 level is normal to decide whether to continue oral supplementation versus injections.    Resulted Orders   Vitamin B12   Result Value Ref Range    Vitamin B12 682 232 - 1,245 pg/mL   Hemoglobin A1c   Result Value Ref Range    Hemoglobin A1C 6.1 4.0 - 6.2 %   Glucose   Result Value Ref Range    Glucose 101 (H) 70 - 99 mg/dL    Patient Fasting > 8hrs? No    Lipid Panel   Result Value Ref Range    Cholesterol 177 <200 mg/dL    Triglycerides 145 <150 mg/dL    Direct Measure HDL 36 (L) >=50 mg/dL    LDL Cholesterol Calculated 112 (H) <=100 mg/dL    Non HDL Cholesterol 141 (H) <130 mg/dL    Narrative    Cholesterol  Desirable:  <200 mg/dL    Triglycerides  Normal:  Less than 150 mg/dL  Borderline High:  150-199 mg/dL  High:  200-499 mg/dL  Very High:  Greater than or equal to 500 mg/dL    Direct Measure HDL  Female:  Greater than or equal to 50 mg/dL   Male:  Greater than or equal to 40 mg/dL    LDL Cholesterol  Desirable:  <100mg/dL  Above Desirable:  100-129 mg/dL   Borderline High:  130-159 mg/dL   High:  160-189 mg/dL   Very High:  >= 190 mg/dL    Non HDL Cholesterol  Desirable:  130 mg/dL  Above Desirable:  130-159 mg/dL  Borderline High:  160-189 mg/dL  High:  190-219 mg/dL  Very High:  Greater than or equal to 220 mg/dL       If you have any questions or concerns, please call the clinic at the number listed above.        Sincerely,      Wes Gasca MD

## 2023-04-18 NOTE — PROGRESS NOTES
Assessment & Plan         ICD-10-CM    1. Elevated random blood glucose level  R73.09 Lipid Panel     Glucose     Hemoglobin A1c     Hemoglobin A1c     Glucose     Lipid Panel      2. Lipid screening  Z13.220 Lipid Panel     Glucose     Glucose     Lipid Panel      3. Vitamin B 12 deficiency  E53.8 Vitamin B12     Vitamin B12      4. Benign essential hypertension  I10         Elevated random blood glucose level: Needs screening labs for work we will obtain also screen for hemoglobin A1c and start a GLP-1 if able for the weight loss properties.    Lipid screen: Needs screening lipids for work as well we will obtain a lipid panel.  If cholesterol still remains high I will again recommend a statin for her.  She is hesitant to start this at this time.    Vitamin B12 deficiency: Has been doing B12 injections at home symptoms have greatly improved.  We will recheck B12 levels today.    Hypertension: Blood pressures under good control.  She wanted my opinion as far as hydrochlorothiazide versus furosemide.  She would prefer to be on hydrochlorothiazide due to inability to work and camp on furosemide due to the significant diuretic effect.  I think this is acceptable as her blood pressure is at goal at this time.        No follow-ups on file.    Wes Gasca MD  [facility]      Gilda Cortés is a 62 year old, presenting for the following health issues:  RECHECK (Lab work and fill out health screening form for work  )         View : No data to display.              History of Present Illness       Reason for visit:  Workplace physical papes need to be signed. lab tests    She eats 2-3 servings of fruits and vegetables daily.She consumes 0 sweetened beverage(s) daily.She exercises with enough effort to increase her heart rate 20 to 29 minutes per day.  She exercises with enough effort to increase her heart rate 4 days per week.   She is taking medications regularly.       Lab work and fill out  "health Screening form      Discussed with elevated cholesterol she should probably be on a statin.    We also discussed screening for diabetes with her elevated random blood sugar, family history of diabetes and the ability to start a GLP-1 for weight loss as well if she were to be diabetic.    Review of Systems         Objective    /88 (BP Location: Right arm, Patient Position: Sitting, Cuff Size: Adult Large)   Pulse 57   Temp 97.9  F (36.6  C) (Temporal)   Resp 18   Ht 1.695 m (5' 6.75\")   Wt 131.5 kg (290 lb)   LMP  (LMP Unknown)   SpO2 97%   Breastfeeding No   BMI 45.76 kg/m    Body mass index is 45.76 kg/m .  Physical Exam       Reviewed prior laboratory results.            "

## 2023-04-18 NOTE — NURSING NOTE
"Chief Complaint   Patient presents with     RECHECK     Lab work and fill out health screening form for work         Medication reconciliation completed.    FOOD SECURITY SCREENING QUESTIONS:    The next two questions are to help us understand your food security.  If you are feeling you need any assistance in this area, we have resources available to support you today.    Hunger Vital Signs:  Within the past 12 months we worried whether our food would run out before we got money to buy more. Never  Within the past 12 months the food we bought just didn't last and we didn't have money to get more. Never    Initial /88 (BP Location: Right arm, Patient Position: Sitting, Cuff Size: Adult Large)   Pulse 57   Temp 97.9  F (36.6  C) (Temporal)   Resp 18   Ht 1.695 m (5' 6.75\")   Wt 131.5 kg (290 lb)   LMP  (LMP Unknown)   SpO2 97%   Breastfeeding No   BMI 45.76 kg/m   Estimated body mass index is 45.76 kg/m  as calculated from the following:    Height as of this encounter: 1.695 m (5' 6.75\").    Weight as of this encounter: 131.5 kg (290 lb).       Jacy Iraheta LPN .......  4/18/2023  1:15 PM  "

## 2023-04-27 ENCOUNTER — HOSPITAL ENCOUNTER (OUTPATIENT)
Dept: CARDIOLOGY | Facility: OTHER | Age: 63
Discharge: HOME | End: 2023-04-27
Attending: INTERNAL MEDICINE | Admitting: INTERNAL MEDICINE
Payer: COMMERCIAL

## 2023-04-27 DIAGNOSIS — R06.09 DOE (DYSPNEA ON EXERTION): ICD-10-CM

## 2023-04-27 DIAGNOSIS — I10 ESSENTIAL HYPERTENSION, BENIGN: ICD-10-CM

## 2023-04-27 DIAGNOSIS — I71.21 ANEURYSM OF ASCENDING AORTA WITHOUT RUPTURE (H): ICD-10-CM

## 2023-04-27 DIAGNOSIS — I50.32 DIASTOLIC DYSFUNCTION WITH CHRONIC HEART FAILURE (H): ICD-10-CM

## 2023-04-27 DIAGNOSIS — R60.0 PERIPHERAL EDEMA: ICD-10-CM

## 2023-04-27 LAB — LVEF ECHO: NORMAL

## 2023-04-27 PROCEDURE — 93306 TTE W/DOPPLER COMPLETE: CPT

## 2023-04-27 PROCEDURE — 93306 TTE W/DOPPLER COMPLETE: CPT | Mod: 26 | Performed by: INTERNAL MEDICINE

## 2023-09-01 ENCOUNTER — TRANSFERRED RECORDS (OUTPATIENT)
Dept: MULTI SPECIALTY CLINIC | Facility: CLINIC | Age: 63
End: 2023-09-01

## 2023-09-06 ENCOUNTER — THERAPY VISIT (OUTPATIENT)
Dept: CHIROPRACTIC MEDICINE | Facility: OTHER | Age: 63
End: 2023-09-06
Attending: CHIROPRACTOR
Payer: COMMERCIAL

## 2023-09-06 VITALS — TEMPERATURE: 97.6 F | RESPIRATION RATE: 20 BRPM | HEART RATE: 62 BPM | OXYGEN SATURATION: 95 %

## 2023-09-06 DIAGNOSIS — M54.10 RADICULAR PAIN OF RIGHT LOWER EXTREMITY: ICD-10-CM

## 2023-09-06 DIAGNOSIS — M99.04 SEGMENTAL AND SOMATIC DYSFUNCTION OF SACRAL REGION: ICD-10-CM

## 2023-09-06 DIAGNOSIS — M62.838 MUSCLE SPASMS OF NECK: ICD-10-CM

## 2023-09-06 DIAGNOSIS — M53.3 SI (SACROILIAC) JOINT DYSFUNCTION: Primary | ICD-10-CM

## 2023-09-06 DIAGNOSIS — M54.2 CERVICALGIA: ICD-10-CM

## 2023-09-06 PROCEDURE — 98940 CHIROPRACT MANJ 1-2 REGIONS: CPT | Mod: AT | Performed by: CHIROPRACTOR

## 2023-09-06 PROCEDURE — 99212 OFFICE O/P EST SF 10 MIN: CPT | Mod: 25 | Performed by: CHIROPRACTOR

## 2023-09-06 NOTE — PROGRESS NOTES
Right lower back is a constant aching, tightness, and spasm. Using cold pad, heat pad, advil, ibuprofen, and CBD cream. These are taking the edge off. 8/10 W24 10/10.  Faviola Sandhu on 9/6/2023 at 9:15 AM    Reviewed by EW    Visit #:  1/4-6  Re-assessment    Subjective:  Milana Blum is a 62 year old female who is seen for:      Segmental/somatic dysfunction present of the SI joints consistent with patient's reports.   SI (sacroiliac) joint dysfunction  Segmental and somatic dysfunction of sacral region  Radicular pain of right lower extremity  Cervicalgia  Muscle spasms of neck.     Milana Blum reports: Symptoms started yesterday due to unknown reason.  Denies any traumatic events or overuse contributing to symptoms.  Initially tried treating symptoms on her own with stretching, over-the-counter medication, ice and heat, and walking.  This did seem to provide some little benefit but symptoms continue to be present.  Experiencing radicular complaints into the anterior shin on the right side.  Denies any red flag signs or symptoms consistent with cauda equina.    Orts taking a Darvocet last night which helped but when she awoke at 6 AM symptoms are still present.    Currently also having some neck pain and headache symptoms.    (DVPRS) Pain Rating Score : 8-Awful, hard to do anything (W24 10/10) (09/06/23 0907)     Objective:  The following was observed:  Pulse 62   Temp 97.6  F (36.4  C) (Tympanic)   Resp 20   LMP  (LMP Unknown)   SpO2 95%  Oswestry (COLLIN) Questionnaire        9/6/2023     9:13 AM   OSWESTRY DISABILITY INDEX   Count 9   Sum 26   Oswestry Score (%) 57.78 %      Thoracic/lumbar AROM: Restriction with endrange pain extension, flexion WNL, rotation generally unremarkable    Slumps: +right, -left  Iliac compression: positive bilaterally    P: palpatory tenderness PSIS bilaterally, suboccipitals on right:    A: static palpation demonstrates intersegmental asymmetry , pelvis  R: motion  palpation notes restricted motion, Sacrum   T: muscle spasm at level(s):  right quadratus lumborum, right suboccipitals:      Segmental spinal dysfunction/restrictions found at:  :  Sacrum Right lateral flexion restricted and Extension restriction.      Assessment: Patient has been under our care in the past with beneficial results to treatment expect that to be the case at this time.  We will plan for 4-6 visits within the next 4 weeks barring acute exacerbation of symptoms and response to treatment.  Neck concerns that consistent with segmental/somatic dysfunction however there are subsequent spasms which could be contributing symptoms.  Plan to follow-up with patient in 1-2 days.    Diagnoses:      1. SI (sacroiliac) joint dysfunction    2. Segmental and somatic dysfunction of sacral region    3. Radicular pain of right lower extremity    4. Cervicalgia    5. Muscle spasms of neck        Patient's condition:  Patient had restrictions pre-manipulation    Treatment effectiveness:  Post manipulation there is better intersegmental movement and Patient claims to feel looser post manipulation      Procedures:  E/M 97269    CMT:  75007 Chiropractic manipulative treatment 1-2 regions performed   Pelvis: Drop Table, Sacrum , Prone    Modalities:  47586: MSTM:  To Quad lumb and Sub-occipital  for 3 min    Therapeutic procedures:  None    Response to Treatment  Reduction in symptoms as reported by patient    Prognosis: Good    Progress towards Goals: Reduce back pain symptoms by up to 75% within 4 weeks  Improved pain-free lumbar AROM primarily extension within 4 weeks  Improve Oswestry score 20-30% within 4 weeks     Recommendations:    Instructions:ice 20 minutes every other hour as needed and walk 10 minutes    Follow-up:  Return to care in 1-2 days.

## 2023-09-07 ENCOUNTER — THERAPY VISIT (OUTPATIENT)
Dept: CHIROPRACTIC MEDICINE | Facility: OTHER | Age: 63
End: 2023-09-07
Attending: CHIROPRACTOR
Payer: COMMERCIAL

## 2023-09-07 VITALS — OXYGEN SATURATION: 96 % | RESPIRATION RATE: 16 BRPM | TEMPERATURE: 97.1 F | HEART RATE: 56 BPM

## 2023-09-07 DIAGNOSIS — M54.10 RADICULAR PAIN OF RIGHT LOWER EXTREMITY: ICD-10-CM

## 2023-09-07 DIAGNOSIS — M99.04 SEGMENTAL AND SOMATIC DYSFUNCTION OF SACRAL REGION: ICD-10-CM

## 2023-09-07 DIAGNOSIS — M62.838 SPASM OF MUSCLE: ICD-10-CM

## 2023-09-07 DIAGNOSIS — M53.3 SI (SACROILIAC) JOINT DYSFUNCTION: Primary | ICD-10-CM

## 2023-09-07 PROCEDURE — 98940 CHIROPRACT MANJ 1-2 REGIONS: CPT | Mod: AT | Performed by: CHIROPRACTOR

## 2023-09-07 NOTE — PROGRESS NOTES
Right lower back with constant aching. Radiating into right leg, using stretching with a slight decrease in pain. 7/10 W24 10/10.  Cecelia Willinghamon on 9/7/2023 at 2:39 PM    Reviewed by EW    Visit #:  2/4-6    Subjective:  Milana Blum is a 62 year old female who is seen in f/u up for:        SI (sacroiliac) joint dysfunction  Segmental and somatic dysfunction of sacral region  Radicular pain of right lower extremity  Spasm of muscle.     Since last visit on 9/6/2023,  Milana Blum reports: Symptoms are slightly improved since last encounter.  Notes that she is able to sit with less painful limitations, also noting radicular complaints beginning to not extend as far below the knee.  Noticeable increase in muscular spasms primarily around the lateral thigh and buttocks area.  No apparent red flag signs or symptoms consistent with cauda equina at this point.      (DVPRS) Pain Rating Score : 7-Focus of attention, prevents doing daily activities (W24 10/10) (09/07/23 1435)     Objective:  The following was observed:  Pulse 56   Temp 97.1  F (36.2  C) (Tympanic)   Resp 16   LMP  (LMP Unknown)   SpO2 96%      P: palpatory tenderness right PSIS:    A: static palpation demonstrates intersegmental asymmetry , pelvis  R: motion palpation notes restricted motion, Sacrum   T: muscle spasm at level(s):  right quadratus lumborum right gluteus medius:      Segmental spinal dysfunction/restrictions found at:  :  Sacrum Right lateral flexion restricted and Extension restriction.      Assessment: Slight improvement of symptoms since last encounter.  Noticeable increase of muscular spasm, encourage patient to rest, utilize ice and heat if needed over the weekend.  Follow-up with medical provider if symptoms fail to improve or worsen over weekend.  Additional visits may be beneficial next week.    Diagnoses:      1. SI (sacroiliac) joint dysfunction    2. Segmental and somatic dysfunction of sacral region    3. Radicular pain  of right lower extremity    4. Spasm of muscle        Patient's condition:  Patient had restrictions pre-manipulation    Treatment effectiveness:  Post manipulation there is better intersegmental movement      Procedures:  CMT:  47389 Chiropractic manipulative treatment 1-2 regions performed   Pelvis: Drop Table, Sacrum , Prone    Modalities:  73016: MSTM:  To Quad lumb  for 2 min    Therapeutic procedures:  None    Response to Treatment  Improved intersegmental motion    Prognosis: Good    Progress towards Goals: Patient is making progress towards the goal.     Recommendations:    Instructions:ice 20 minutes every other hour as needed and rest    Follow-up:  Return to care in 5 days if symptoms continue.

## 2023-09-12 ENCOUNTER — TELEPHONE (OUTPATIENT)
Dept: CARDIOLOGY | Facility: OTHER | Age: 63
End: 2023-09-12
Payer: COMMERCIAL

## 2023-09-12 DIAGNOSIS — I10 ESSENTIAL HYPERTENSION, BENIGN: ICD-10-CM

## 2023-09-12 DIAGNOSIS — R06.09 DOE (DYSPNEA ON EXERTION): Primary | ICD-10-CM

## 2023-09-12 DIAGNOSIS — R60.0 PERIPHERAL EDEMA: ICD-10-CM

## 2023-09-12 DIAGNOSIS — I50.32 DIASTOLIC DYSFUNCTION WITH CHRONIC HEART FAILURE (H): ICD-10-CM

## 2023-09-12 RX ORDER — HYDROCHLOROTHIAZIDE 50 MG/1
50 TABLET ORAL DAILY
Qty: 90 TABLET | Refills: 3 | Status: SHIPPED | OUTPATIENT
Start: 2023-09-12 | End: 2024-08-07

## 2023-09-12 NOTE — TELEPHONE ENCOUNTER
Patient called in regard to medication.     Reason for call: Medication or medication refill    Name of medication requested: hydrochlorothiazide - Dr. Hallman wanted to change dosage, please send new prescription    How many days of medication do you have left? A few weeks    What pharmacy do you use? [facility]    Preferred method for responding to this message: Telephone Call    Phone number patient can be reached at: Cell number on file:    Telephone Information:   Mobile [phone]       If we cannot reach you directly, may we leave a detailed response at the number you provided? Yes     Tita Senior on 9/12/2023 at 10:57 AM

## 2023-09-12 NOTE — TELEPHONE ENCOUNTER
Spoke to patient and she states at her last visit with Dustin Hallman,  she was on hydrochlorothiazide 25mg once daily but was told to increase to 50mg once daily.  She has slowly increased this and now is requesting a refill to be sent to her pharmacy for 50mg once daily.  To Dustin Hallman DO to address.  Nuria Steven RN......September 12, 2023...3:49 PM

## 2023-09-12 NOTE — TELEPHONE ENCOUNTER
"Per Dustin Hallman, DO: \"Chlorothiazide 50 mg sent to her mail order pharmacy as requested.  Thanks!    Dr. Hallman\"    Patient did not request a call back if refill would be done.  Nuria Steven RN......September 12, 2023...4:15 PM   "

## 2023-09-13 DIAGNOSIS — I71.21 ANEURYSM OF ASCENDING AORTA WITHOUT RUPTURE (H): ICD-10-CM

## 2023-09-13 DIAGNOSIS — I47.10 PAROXYSMAL SUPRAVENTRICULAR TACHYCARDIA (H): ICD-10-CM

## 2023-09-13 DIAGNOSIS — I10 ESSENTIAL HYPERTENSION, BENIGN: ICD-10-CM

## 2023-09-13 DIAGNOSIS — R00.2 PALPITATIONS: ICD-10-CM

## 2023-09-13 RX ORDER — NEBIVOLOL 10 MG/1
10 TABLET ORAL DAILY
Qty: 90 TABLET | Refills: 3 | Status: SHIPPED | OUTPATIENT
Start: 2023-09-13 | End: 2024-08-07

## 2023-09-13 NOTE — TELEPHONE ENCOUNTER
"Requested Prescriptions   Pending Prescriptions Disp Refills    nebivolol (BYSTOLIC) 10 MG tablet [Pharmacy Med Name: NEBIVOLOL TAB 10MG] 90 tablet 3     Sig: TAKE 1 TABLET DAILY       Beta-Blockers Protocol Passed - 9/13/2023  2:51 PM        Passed - Blood pressure under 140/90 in past 12 months     BP Readings from Last 3 Encounters:   04/18/23 138/88   04/13/23 138/86   03/23/23 138/74                 Passed - Patient is age 6 or older        Passed - Recent (12 mo) or future (30 days) visit within the authorizing provider's specialty     Patient has had an office visit with the authorizing provider or a provider within the authorizing providers department within the previous 12 mos or has a future within next 30 days. See \"Patient Info\" tab in inbasket, or \"Choose Columns\" in Meds & Orders section of the refill encounter.              Passed - Medication is active on med list           Last Written Prescription Date:  10/4/22  Last Fill Quantity: 90,   # refills: 3  Last Office Visit: 4/13/23  Future Office visit:   none    Unable to complete prescription refill per RN Medication Refill Policy.   Nuria Steven RN ....................  9/13/2023   4:19 PM    "

## 2023-09-21 DIAGNOSIS — M25.561 RIGHT KNEE PAIN, UNSPECIFIED CHRONICITY: Primary | ICD-10-CM

## 2023-09-25 ENCOUNTER — OFFICE VISIT (OUTPATIENT)
Dept: ORTHOPEDICS | Facility: OTHER | Age: 63
End: 2023-09-25
Attending: ORTHOPAEDIC SURGERY
Payer: COMMERCIAL

## 2023-09-25 ENCOUNTER — HOSPITAL ENCOUNTER (OUTPATIENT)
Dept: GENERAL RADIOLOGY | Facility: OTHER | Age: 63
Discharge: HOME | End: 2023-09-25
Attending: ORTHOPAEDIC SURGERY | Admitting: ORTHOPAEDIC SURGERY
Payer: COMMERCIAL

## 2023-09-25 VITALS — OXYGEN SATURATION: 98 % | HEART RATE: 62 BPM

## 2023-09-25 DIAGNOSIS — M25.561 RIGHT KNEE PAIN, UNSPECIFIED CHRONICITY: ICD-10-CM

## 2023-09-25 DIAGNOSIS — M25.551 PAIN OF RIGHT HIP: Primary | ICD-10-CM

## 2023-09-25 DIAGNOSIS — M25.551 PAIN OF RIGHT HIP: ICD-10-CM

## 2023-09-25 PROCEDURE — 99213 OFFICE O/P EST LOW 20 MIN: CPT | Performed by: ORTHOPAEDIC SURGERY

## 2023-09-25 PROCEDURE — 73560 X-RAY EXAM OF KNEE 1 OR 2: CPT | Mod: LT

## 2023-09-25 PROCEDURE — 73502 X-RAY EXAM HIP UNI 2-3 VIEWS: CPT

## 2023-09-25 ASSESSMENT — PAIN SCALES - GENERAL: PAINLEVEL: SEVERE PAIN (7)

## 2023-09-25 NOTE — PROGRESS NOTES
Subjective:    62-year-old female with right knee pain as well as lateral hip pain on the right.  Most of her pain in the right knee is medial and then located distal to the patella.  Most of her hip pain is Straight lateral and starts kind of in the buttock and wraps around the outside of her hip and goes down the leg.  She had her hip injected back in 2019 and it did help.  She is taking ibuprofen for this and that seems to help a little bit as well.  No injury that she notes and the pain is really kind of gotten noticeable over the last 3 weeks.    Objective:    On examination today she is tender to palpation at the medial joint line of the right knee she is also mildly tender to palpation at the patellar tendon as it inserts on the tibial tuberosity.  She is tender to palpation over the greater trochanter laterally and nor really elsewhere on the right side.    Assessment:    62-year-old female with knee osteoarthritis as well as some mild hip osteoarthritis.  I think the great biggest issue at this point is likely deconditioning.    Plan:    We will go ahead and get her into physical therapy for her right lower extremity.  This will be hamstring stretching and strengthening as well as quadriceps strengthening and abductor of the right hip strengthening.  We will see her back on an as-needed basis.

## 2023-10-08 ENCOUNTER — HEALTH MAINTENANCE LETTER (OUTPATIENT)
Age: 63
End: 2023-10-08

## 2023-10-09 ENCOUNTER — THERAPY VISIT (OUTPATIENT)
Dept: PHYSICAL THERAPY | Facility: OTHER | Age: 63
End: 2023-10-09
Attending: ORTHOPAEDIC SURGERY
Payer: COMMERCIAL

## 2023-10-09 DIAGNOSIS — M25.551 PAIN OF RIGHT HIP: ICD-10-CM

## 2023-10-09 PROCEDURE — 97161 PT EVAL LOW COMPLEX 20 MIN: CPT | Mod: GP,PO

## 2023-10-09 PROCEDURE — 97110 THERAPEUTIC EXERCISES: CPT | Mod: GP,PO

## 2023-10-09 NOTE — PROGRESS NOTES
PHYSICAL THERAPY EVALUATION  Type of Visit: Evaluation    See electronic medical record for Abuse and Falls Screening details.    Subjective       Presenting condition or subjective complaint: Patient is a 62 year old female referred to physical therapy with right knee and hip pain. She feels that she has been dealing with this right side for years. She states that there hasn't been any injuries that caused this pain. She states that she has seen orthopedics in the past and they always just related this to arthritis. She reports that she did do taping to the knee and this did help her. About a month ago, this stopped working. This is why she set up an appointment with Dr. Plata. In regards to her hip, it will catch and crampu p when she sits for too long. She notes that with walking and stretching of these hip muscles, it does improved. Denies numbness and tingling down the leg. There are times she will get pain that shoots down her leg.  Date of onset: 09/25/23    Relevant medical history: Anemia; Arthritis; Asthma; Foot drop; High blood pressure; History of fractures; Overweight; Pain at night or rest   Dates & types of surgery: laminectomy left side, l2-5, 10-15 yrs ago    Prior diagnostic imaging/testing results: X-ray     Prior therapy history for the same diagnosis, illness or injury: No      Prior Level of Function  Transfers: Independent  Ambulation: Independent  ADL: Independent    Living Environment  Social support: Alone   Type of home: House   Stairs to enter the home: Yes 2 Is there a railing: Yes   Ramp: No   Stairs inside the home: Yes 12 Is there a railing: Yes   Help at home: None  Equipment owned:   None    Employment: Yes software support consultant    Patient goals for therapy: walk properly, sleep without pain    Pain assessment: Location: Right knee and hip/Rating: Best: 4/10, Worst: 10/10     Objective   HIP EVALUATION  PAIN: Pain Quality: Aching and Burning  Pain Frequency: constant  Pain  is Exacerbated By: Sitting, walking, lifting, carrying, house tasks  Pain is Relieved By: cold, heat, and otc medications  INTEGUMENTARY (edema, incisions): WNL  POSTURE: Protracted shoulders, slouched posture  GAIT:   Weightbearing Status: WBAT  Assistive Device(s): None  Gait Deviations: Patient has an antalgic pattern that is most noticed when first arising from a chair. After her first 10 steps or so, the pain does improve. Lacks push off and heel strike.   BALANCE/PROPRIOCEPTION: Notes that when her pain is most severe, her balance isn't at baseline. Only fall she has had is when she tripped on a dog leash.   WEIGHTBEARING ALIGNMENT: WNL  ROM:  Stiffness noted with hip flexion/add stretching. Also has pain and stiffness noted with hip ER/IR. Knee flexion is limited to 90 degrees and she has 10 degrees short of full knee extension.   PELVIC/SI SCREEN: WFL  STRENGTH: WNL However painful when testing most knee and hip strength.   SPECIAL TESTS: Scour: negative, JHONNY/FADIR: painful, Anterior/posterior drawer: negative, valgus/varus testing: negative  PALPATION:  Discomfort noted with palpation to gluteus medius, piriformis, trochanteric bursa, IT band, distal quads, and VMO    Assessment & Plan   CLINICAL IMPRESSIONS  Medical Diagnosis: Pain of right hip (M25.551)    Treatment Diagnosis: Impaired mobility, decreased strength and endurance, impaired gait, hip and knee pain   Impression/Assessment: Patient is a 62 year old female with hip and knee complaints.  The following significant findings have been identified: Pain, Decreased ROM/flexibility, Decreased strength, Impaired balance, Impaired muscle performance, and Decreased activity tolerance. These impairments interfere with their ability to perform self care tasks, work tasks, recreational activities, household chores, driving , household mobility, and community mobility as compared to previous level of function.     Clinical Decision Making  (Complexity):  Clinical Presentation: Stable/Uncomplicated  Clinical Presentation Rationale: based on medical and personal factors listed in PT evaluation  Clinical Decision Making (Complexity): Low complexity    PLAN OF CARE  Treatment Interventions:  Modalities: Cryotherapy, E-stim, Hot Pack, Ultrasound, Vasoneumatic Device  Interventions: Gait Training, Manual Therapy, Neuromuscular Re-education, Therapeutic Activity, Therapeutic Exercise, Aquatic Therapy    Long Term Goals     PT Goal 1  Goal Identifier: ADL's  Goal Description: Patient will be able to complete all dressing, bathing, and grooming activities with no increase in hip and knee pain in order to improve her ability to complete ADL's  Rationale: to maximize safety and independence with performance of ADLs and functional tasks  Target Date: 12/04/23  PT Goal 2  Goal Identifier: Ambulation  Goal Description: Patient will be able to ambulate for longer than 5 minutes with no increase in knee or hip pain in order to improve her overall mobility within the community  Rationale: to maximize safety and independence within the community  Target Date: 12/04/23  PT Goal 3  Goal Identifier: Stairs  Goal Description: Patient will be able to ascend/descend 1 flight of stairs with no increase in knee or hip pain in order to improve her overall mobility in the community and in her homem.  Rationale: to maximize safety and independence within the home  Target Date: 12/04/23      Frequency of Treatment: 1-2 times per week  Duration of Treatment: 8 weeks    Education Assessment:   Learner/Method: Patient  Education Comments: Educated on LE anatomy    Risks and benefits of evaluation/treatment have been explained.   Patient/Family/caregiver agrees with Plan of Care.     Evaluation Time:     PT Eval, Low Complexity Minutes (61276): 25     Signing Clinician: Gabino Salinas PT

## 2023-10-10 PROBLEM — M25.551 PAIN OF RIGHT HIP: Status: ACTIVE | Noted: 2023-10-10

## 2023-10-12 ENCOUNTER — THERAPY VISIT (OUTPATIENT)
Dept: PHYSICAL THERAPY | Facility: OTHER | Age: 63
End: 2023-10-12
Attending: ORTHOPAEDIC SURGERY
Payer: COMMERCIAL

## 2023-10-12 DIAGNOSIS — M25.551 PAIN OF RIGHT HIP: Primary | ICD-10-CM

## 2023-10-12 PROCEDURE — 97110 THERAPEUTIC EXERCISES: CPT | Mod: GP,PO

## 2023-10-12 PROCEDURE — 97140 MANUAL THERAPY 1/> REGIONS: CPT | Mod: GP,PO

## 2023-10-23 ENCOUNTER — THERAPY VISIT (OUTPATIENT)
Dept: PHYSICAL THERAPY | Facility: OTHER | Age: 63
End: 2023-10-23
Attending: ORTHOPAEDIC SURGERY
Payer: COMMERCIAL

## 2023-10-23 DIAGNOSIS — M25.551 PAIN OF RIGHT HIP: Primary | ICD-10-CM

## 2023-10-23 PROCEDURE — 97140 MANUAL THERAPY 1/> REGIONS: CPT | Mod: GP,PO

## 2023-11-09 ENCOUNTER — THERAPY VISIT (OUTPATIENT)
Dept: PHYSICAL THERAPY | Facility: OTHER | Age: 63
End: 2023-11-09
Attending: ORTHOPAEDIC SURGERY
Payer: COMMERCIAL

## 2023-11-09 DIAGNOSIS — M25.551 PAIN OF RIGHT HIP: Primary | ICD-10-CM

## 2023-11-09 PROCEDURE — 97110 THERAPEUTIC EXERCISES: CPT | Mod: GP,CQ

## 2023-11-09 PROCEDURE — 97140 MANUAL THERAPY 1/> REGIONS: CPT | Mod: GP,CQ

## 2023-11-13 ENCOUNTER — THERAPY VISIT (OUTPATIENT)
Dept: PHYSICAL THERAPY | Facility: OTHER | Age: 63
End: 2023-11-13
Attending: ORTHOPAEDIC SURGERY
Payer: COMMERCIAL

## 2023-11-13 DIAGNOSIS — M25.551 PAIN OF RIGHT HIP: Primary | ICD-10-CM

## 2023-11-13 PROCEDURE — 97113 AQUATIC THERAPY/EXERCISES: CPT | Mod: GP,PN,CQ

## 2023-11-27 ENCOUNTER — THERAPY VISIT (OUTPATIENT)
Dept: PHYSICAL THERAPY | Facility: OTHER | Age: 63
End: 2023-11-27
Attending: ORTHOPAEDIC SURGERY
Payer: COMMERCIAL

## 2023-11-27 DIAGNOSIS — M25.551 PAIN OF RIGHT HIP: Primary | ICD-10-CM

## 2023-11-27 PROCEDURE — 97113 AQUATIC THERAPY/EXERCISES: CPT | Mod: GP,PN,CQ

## 2023-11-28 NOTE — PROGRESS NOTES
HISTORY OF PRESENT ILLNESS  Asked to see by Dr Maurer for evalution of dizziness. Patient reports developing ringing in the ears. She has also developed pressure, pain and a headache. The dizziness gets worse with sound and the buzzing gets louder. She has difficulty playing Solitaire on her computer. The buzzing is very loud after a stressful day. It prevents her from hearing the TV. The dizziness is more like a motion sickness. She feels as if she is moving when she is not. The headaches are very bad. The headaches involve the entire scalp and the ears. She has pressure in the left ear today. When she is at work loud voices seem to make her symptoms worse. She was unable to tolerate fireworks because of pain inside of the ears. Sometimes she gets dizzy. When she has to talk a lot she will get very dizzy due to the sensation of extra pressure in the ears. No drainage. No prior ear infections. The dizziness has been present for about a month. She hears the ringing in both ears but the left ear feels most tender to her.    REVIEW OF SYSTEMS  Review of Systems: a 10-system review was performed. Pertinent positives are noted in the HPI and on a separate scanned document in the chart.    PMH, PSH, FH and SH has documented in the EHR.      EXAM    CONSTITUTIONAL  General Appearance:  Normal, well developed, well nourished, no obvious distress  Ability to Communicate:  communicates appropriately.    HEAD AND FACE  Appearance and Symmetry:  Normal, no scalp or facial scarring or suspicious lesions.  Paranasal sinuses tenderness:  Normal, Paranasal sinuses non tender    EARS  Clinical speech reception threshold:  Normal, able to hear normal speech.  Auricle:  Normal, Auricles without scars, lesions, masses.  External auditory canal:  Normal, External auditory canal normal.  Tympanic membrane:  Normal, Tympanic membranes normal without swelling or erythema.  Tympanic membrane mobility:  Normal, Normal tympanic membrane  mobility.    NOSE (speculum or scope)  Architecture:  Normal, Grossly normal external nasal architecture with no masses or lesions.  Mucosa:  Normal mucosa, No polyps or masses.  Septum:  Normal, Septum non-obstructing.  Turbinates:  Normal, No turbinate abnormalities    ORAL CAVITY AND OROPHARYNX  Lips:  Normal.  Dental and gingiva:  Normal, No obvious dental or gingival disease.  Mucosa:  Normal, Moist mucous membranes.  Tongue:  Normal, Tongue mobile with no mucosal abnormalities  Hard and soft palate:  Normal, Hard and soft palate without cleft or mucosal lesions.  Oral pharynx:  Normal, Posterior pharynx without lesions or remarkable asymmetry.  Saliva:  Normal, Clear saliva.  Masses:  Normal, No palpable masses or pathologically enlarged lymph nodes.    NECK  Masses/lymph nodes:  Normal, No worrisome neck masses or lymph nodes.  Salivary glands:  Normal, Parotid and submandibular glands.  Trachea and larynx position:  Normal, Trachea and larynx midline.  Thyroid:  Normal, No thyroid abnormality.  Tenderness:  Normal, No cervical tenderness.  Suppleness:  Normal, Neck supple    NEUROLOGICAL  Speech pattern:  Normal, Proasaic    RESPIRATORY  Symmetry and Respiratory effort:  Normal, Symmetric chest movement and expansion with no increased intercostal retractions or use of accessory muscles.     HEARING TEST  Results of hearing test as documented in audiology notes which were reviewed.    IMPRESSION  1. Tinnitus likely related to bilateral high tone sensorineural hearing loss.   2. Pressure and headaches left ear and head.  3. Dizziness with loud sounds.    RECOMMENDATION  CT head with and without contrast.  CT IAC  Rationale for the testing discussed. Collin call with results.    Wm Bustos MD   rolling walker

## 2023-12-01 ENCOUNTER — THERAPY VISIT (OUTPATIENT)
Dept: PHYSICAL THERAPY | Facility: OTHER | Age: 63
End: 2023-12-01
Attending: ORTHOPAEDIC SURGERY
Payer: COMMERCIAL

## 2023-12-01 DIAGNOSIS — M25.551 PAIN OF RIGHT HIP: Primary | ICD-10-CM

## 2023-12-01 PROCEDURE — 97110 THERAPEUTIC EXERCISES: CPT | Mod: GP,PO

## 2023-12-12 ENCOUNTER — THERAPY VISIT (OUTPATIENT)
Dept: CHIROPRACTIC MEDICINE | Facility: OTHER | Age: 63
End: 2023-12-12
Attending: CHIROPRACTOR
Payer: COMMERCIAL

## 2023-12-12 VITALS — RESPIRATION RATE: 24 BRPM | HEART RATE: 65 BPM | OXYGEN SATURATION: 97 % | TEMPERATURE: 97 F

## 2023-12-12 DIAGNOSIS — M54.2 CERVICALGIA: Primary | ICD-10-CM

## 2023-12-12 DIAGNOSIS — M99.01 SEGMENTAL AND SOMATIC DYSFUNCTION OF CERVICAL REGION: ICD-10-CM

## 2023-12-12 DIAGNOSIS — G44.209 TENSION HEADACHE: ICD-10-CM

## 2023-12-12 DIAGNOSIS — M62.838 MUSCLE SPASMS OF NECK: ICD-10-CM

## 2023-12-12 PROCEDURE — 99212 OFFICE O/P EST SF 10 MIN: CPT | Mod: 25 | Performed by: CHIROPRACTOR

## 2023-12-12 PROCEDURE — 98940 CHIROPRACT MANJ 1-2 REGIONS: CPT | Mod: AT | Performed by: CHIROPRACTOR

## 2023-12-12 NOTE — PROGRESS NOTES
Neck is constantly aching sometimes sharp. Causing severe headaches almost all of the time. 4/10 W24 10/10. Using ice and doing stretches with no change in pain.   Faviola Sandhu on 12/12/2023 at 9:33 AM    Reviewed by EW    Visit #:  1  Re-assessment   New episode of care    Subjective:  Milana Blum is a 63 year old female who is seen in f/u up for:        Cervicalgia  Muscle spasms of neck  Segmental and somatic dysfunction of cervical region  Tension headache.     Since last visit on 9/7/2023,  Milana Blum reports: Sunday night patient states that she began experiencing left-sided neck and headache symptoms.  Symptoms did affect ability to sleep.  Attempted to manage symptoms with medication which proved ultimately not horribly successful.  Following night patient did take Benadryl which did help her sleep.  Left-sided neck and head are improving however patient is having more right-sided neck and head symptoms which is why she is presenting to our office at this time.  Feels similar to prior episodes of care with our office.  Denies any radiculopathy, dizziness, blurred vision or alterations to hearing.      (DVPRS) Pain Rating Score : 4-Distracts me, can do usual activities (W24 10/10) (12/12/23 0929)     Objective:  The following was observed:  Pulse 65   Temp 97  F (36.1  C) (Tympanic)   Resp 24   LMP  (LMP Unknown)   SpO2 97%        12/12/2023     9:33 AM   Neck Disability Index (  Brien H. and Stefanie C. 1991. All rights reserved.; used with permission)   SECTION 1 - PAIN INTENSITY 2   SECTION 2 - PERSONAL CARE 0   SECTION 3 - LIFTING 2   SECTION 4 - READING 0   SECTION 5 - HEADACHES 5   SECTION 6 - CONCENTRATION 2   SECTION 7 - WORK 2   SECTION 8 - DRIVING 2   SECTION 9 - SLEEPING 5   SECTION 10 - RECREATION 2   Count 10   Sum 22   Raw Score: /50 22   Neck Disability Index Score: (%) 44 %      Cervical AROM: Moderate restrictions rotation and lateral flexion bilaterally with pain according to  patient.    Cervical distraction: negative    P: palpatory tendernessSub-occipital R>>L and Bilaterally  A: static palpation demonstrates intersegmental asymmetry , cervical  R: motion palpation notes restricted motion, C1  and C2   T: muscle spasm at level(s): Sub-occipital Bilaterally    Segmental spinal dysfunction/restrictions found at:  :  C1 Left rotation restricted and Right lateral flexion restricted  C2 Right rotation restricted, Left lateral flexion restricted, and Extension restriction.      Assessment: Segmental/somatic dysfunction apparent of the upper cervical spine which does appear to be contributing to neck and headache symptoms.  Patient has been under our care in the past with similar complaints and typically does respond favorably to chiropractic intervention which I believe to be the case at this time.  Plan to follow-up with patient if needed over the next 4 weeks.  Suspect additional care may be beneficial within a couple of days-1 week.    Diagnoses:      1. Cervicalgia    2. Muscle spasms of neck    3. Segmental and somatic dysfunction of cervical region    4. Tension headache        Patient's condition:  Patient had restrictions pre-manipulation    Treatment effectiveness:  Post manipulation there is better intersegmental movement and Patient claims to feel looser post manipulation      Procedures:  E/M 71946    CMT:  92963 Chiropractic manipulative treatment 1-2 regions performed   Cervical: Gentle/light force diversified, C1 , C2, Supine    Modalities:  74655: MSTM:  To Sub-occipital  for 4 min    Therapeutic procedures:  None    Response to Treatment  Reduction in symptoms as reported by patient    Prognosis: Good    Progress towards Goals: Reduce neck and headache symptoms 50% within 4 weeks  Improve pain-free cervical AROM within 4 weeks  Patient report improved headaches as shown by 1-2 point decrease on neck disability index score     Recommendations:    Instructions:ice 20 minutes  every other hour as needed    Follow-up:  Return to care if symptoms persist.

## 2024-01-19 ENCOUNTER — OFFICE VISIT (OUTPATIENT)
Dept: FAMILY MEDICINE | Facility: OTHER | Age: 64
End: 2024-01-19
Attending: PHYSICIAN ASSISTANT
Payer: COMMERCIAL

## 2024-01-19 VITALS
TEMPERATURE: 97.7 F | HEART RATE: 63 BPM | SYSTOLIC BLOOD PRESSURE: 136 MMHG | HEIGHT: 67 IN | BODY MASS INDEX: 45.99 KG/M2 | WEIGHT: 293 LBS | RESPIRATION RATE: 20 BRPM | DIASTOLIC BLOOD PRESSURE: 80 MMHG

## 2024-01-19 DIAGNOSIS — G63 B12 NEUROPATHY (H): ICD-10-CM

## 2024-01-19 DIAGNOSIS — L98.9 SKIN LESION: Primary | ICD-10-CM

## 2024-01-19 DIAGNOSIS — I47.10 SVT (SUPRAVENTRICULAR TACHYCARDIA) (H): ICD-10-CM

## 2024-01-19 DIAGNOSIS — I47.10 SUPRAVENTRICULAR TACHYCARDIA (H): ICD-10-CM

## 2024-01-19 DIAGNOSIS — E66.01 MORBID OBESITY (H): ICD-10-CM

## 2024-01-19 DIAGNOSIS — E53.8 B12 NEUROPATHY (H): ICD-10-CM

## 2024-01-19 DIAGNOSIS — I71.21 ANEURYSM OF ASCENDING AORTA WITHOUT RUPTURE (H): ICD-10-CM

## 2024-01-19 DIAGNOSIS — I50.32 DIASTOLIC DYSFUNCTION WITH CHRONIC HEART FAILURE (H): ICD-10-CM

## 2024-01-19 PROCEDURE — 99213 OFFICE O/P EST LOW 20 MIN: CPT | Performed by: PHYSICIAN ASSISTANT

## 2024-01-19 ASSESSMENT — PAIN SCALES - GENERAL: PAINLEVEL: NO PAIN (0)

## 2024-01-19 NOTE — PROGRESS NOTES
"  Assessment & Plan   Problem List Items Addressed This Visit          Nervous and Auditory    B12 neuropathy (H24)       Digestive    Morbid obesity (H)       Circulatory    Ascending aortic aneurysm (H24)    Diastolic dysfunction with chronic heart failure (H)    SVT (supraventricular tachycardia)    Supraventricular tachycardia     Other Visit Diagnoses       Skin lesion    -  Primary    Relevant Orders    Adult General Surg Referral           Skin lesion: Referred to general surgery for a consult for skin lesion removal.    Due for physical with pap test.     Morbid obesity: Encourage good diet, exercise, and weight loss to reduce future health disease complications.    Supraventricular tachycardia, SVT, diastolic dysfunction with chronic heart failure, ascending aortic aneurysm, B12 neuropathy: Continues to follow with cardiology.  No acute concerns at this time.  Stable on her course of medications.  No side effects noted.  Continue to follow with cardiology.     BMI  Estimated body mass index is 47.14 kg/m  as calculated from the following:    Height as of this encounter: 1.702 m (5' 7\").    Weight as of this encounter: 136.5 kg (301 lb).   Weight management plan: Discussed healthy diet and exercise guidelines    See Patient Instructions    Return for Physical with pap test.      Gilda Cortés is a 63 year old, presenting for the following health issues:  Derm Problem (Mole on shoulder/neck area right side  noticed this summer )    HPI     Skin lesion on right upper neck/back.  Goes bigger and smaller at times.  Catches on things and bleeds. Bled in the shower this past week.  Started this summer.  May have had a mosquito bite there this summer.  Unsure if she had a mole there previously. Raised. No fevers, chills. Otherwise feeling fine.       Review of Systems  Constitutional, HEENT, cardiovascular, pulmonary, gi and gu systems are negative, except as otherwise noted.      Objective    /80   " "Pulse 63   Temp 97.7  F (36.5  C) (Temporal)   Resp 20   Ht 1.702 m (5' 7\")   Wt 136.5 kg (301 lb)   LMP  (LMP Unknown)   BMI 47.14 kg/m    Body mass index is 47.14 kg/m .  Physical Exam  Vitals and nursing note reviewed.   Constitutional:       Appearance: Normal appearance.   HENT:      Head: Normocephalic and atraumatic.   Musculoskeletal:         General: Normal range of motion.      Cervical back: Normal range of motion.   Skin:     General: Skin is warm and dry.      Findings: No rash.      Comments: Mildly erythematous papule that is slightly raised on the right lateral posterior neck/shoulder approximately 3 x 4 mm in diameter.  Small scab appreciated in the central region.   Neurological:      General: No focal deficit present.      Mental Status: She is alert and oriented to person, place, and time.   Psychiatric:         Mood and Affect: Mood normal.         Behavior: Behavior normal.              Signed Electronically by: Suad Rivas PA-C    "

## 2024-01-19 NOTE — NURSING NOTE
"Chief Complaint   Patient presents with    Derm Problem     Mole on shoulder/neck area right side  noticed this summer        Initial /80   Pulse 63   Temp 97.7  F (36.5  C) (Temporal)   Resp 20   Ht 1.702 m (5' 7\")   Wt 136.5 kg (301 lb)   LMP  (LMP Unknown)   BMI 47.14 kg/m   Estimated body mass index is 47.14 kg/m  as calculated from the following:    Height as of this encounter: 1.702 m (5' 7\").    Weight as of this encounter: 136.5 kg (301 lb).  Medication Review: complete    The next two questions are to help us understand your food security.  If you are feeling you need any assistance in this area, we have resources available to support you today.          1/4/2024   SDOH- Food Insecurity   Within the past 12 months, did you worry that your food would run out before you got money to buy more? N   Within the past 12 months, did the food you bought just not last and you didn t have money to get more? N         Health Care Directive:  Patient does not have a Health Care Directive or Living Will: Discussed advance care planning with patient; however, patient declined at this time.    Rhina MARTINEZ Still, LPN      "

## 2024-01-30 DIAGNOSIS — E53.8 VITAMIN B12 DEFICIENCY (NON ANEMIC): ICD-10-CM

## 2024-01-31 RX ORDER — CYANOCOBALAMIN 1000 UG/ML
1 INJECTION, SOLUTION INTRAMUSCULAR; SUBCUTANEOUS
Qty: 3 ML | Refills: 0 | Status: SHIPPED | OUTPATIENT
Start: 2024-01-31 | End: 2024-04-29

## 2024-01-31 NOTE — TELEPHONE ENCOUNTER
[facility] in [id number] in [employer] [facility] [place] sent Rx request for the following:      Requested Prescriptions   Pending Prescriptions Disp Refills    cyanocobalamin (CYANOCOBALAMIN) 1000 MCG/ML injection [Pharmacy Med Name: CYANOCOBALAMIN 1,000 MCG/ML VL] 3 mL 3     Sig: INJECT 1 ML (1,000 MCG) INTO THE MUSCLE EVERY 30 DAYS   Last Prescription Date:   1/10/23  Last Fill Qty/Refills:         3 ml, R-3    Last Office Visit:              1/19/24   Future Office visit:           None    Per LOV note:  Return for Physical with pap test.     Prescription approved per [facility] Refill Protocol.    Freda Hazel RN .............. 1/31/2024  4:27 PM

## 2024-02-05 ENCOUNTER — OFFICE VISIT (OUTPATIENT)
Dept: SURGERY | Facility: OTHER | Age: 64
End: 2024-02-05
Attending: SURGERY
Payer: COMMERCIAL

## 2024-02-05 VITALS
RESPIRATION RATE: 17 BRPM | OXYGEN SATURATION: 97 % | SYSTOLIC BLOOD PRESSURE: 138 MMHG | BODY MASS INDEX: 45.99 KG/M2 | WEIGHT: 293 LBS | HEART RATE: 60 BPM | DIASTOLIC BLOOD PRESSURE: 79 MMHG | HEIGHT: 67 IN | TEMPERATURE: 98.2 F

## 2024-02-05 DIAGNOSIS — L98.9 SKIN LESION: Primary | ICD-10-CM

## 2024-02-05 PROCEDURE — 12031 INTMD RPR S/A/T/EXT 2.5 CM/<: CPT | Performed by: SURGERY

## 2024-02-05 PROCEDURE — 88305 TISSUE EXAM BY PATHOLOGIST: CPT

## 2024-02-05 PROCEDURE — 11403 EXC TR-EXT B9+MARG 2.1-3CM: CPT | Performed by: SURGERY

## 2024-02-05 ASSESSMENT — PAIN SCALES - GENERAL: PAINLEVEL: NO PAIN (0)

## 2024-02-05 NOTE — PROCEDURES
TIMEOUT    Universal Protocol    A. Pre-procedure verification complete   1-relevant information / documentation available, reviewed and properly matched to the patient; 2-consent accurate and complete, 3-equipment and supplies available    B. Site marking complete   Site marked if not in continuous attendance with patient    C. TIME OUT completed   Time Out was conducted just prior to starting procedure to verify the eight required elements: 1-patient identity, 2-consent accurate and complete, 3-position, 4-correct side/site marked (if applicable), 5-procedure, 6-relevant images / results properly labeled and displayed (if applicable), 7-antibiotics / irrigation fluids (if applicable), 8-safety precautions.    Surgical Nurse  ....................  2/5/2024   3:12 PM

## 2024-02-05 NOTE — PATIENT INSTRUCTIONS
Instructions:    Your incision was closed with stitches that will dissolve.  A surgical glue was used to help keep the incision closed.    It is get the incision wet in the shower on the day after your procedure. Pat dry the area. Do not rub.    Once pathology is reviewed by the provider we will give you a call with the results.    Don't soak in a tub, pool or lake for 7 days.       Monitor for any signs of infection:     redness in the area of the wound, particularly if it spreads or forms a red streak  swelling or warmth in the affected area  pain or tenderness at or around the site of the wound  pus forming around or oozing from the wound  fever  delayed wound healing      Please call the General Surgery office and ask for a nurse in unit 4S with problems, questions, or concerns at [phone].    General Surgery Nurses  Kristal Cobb LPN

## 2024-02-05 NOTE — PROGRESS NOTES
Procedure Note     Pre/Post Operative Diagnosis:   Upper back skin lesion     Procedure:    Excision of Upper back skin lesion     Surgeon: WILMER Portillo MD     Local Anesthesia: 1% lidocaine with epinephrine    Indication for the procedure:    This is a 63 year old female patient with Upper back skin lesion.  Patient states she has had a small skin lesion on her upper back on the right side that bleeds easily.  No previous history of skin cancer.  Clinically, there is a 3 mm x 3 mm vascular appearing lesion on the right upper back, this may be a hemangioma or an irregular basal cell cancer.  Will plan for excision.  After explaining the risks to include bleeding, infection, recurrence or need for re-excision, and scarring the patient wished to proceed.    Procedure:   The area was prepped and draped in usual sterile fashion with ChloraPrep. After adequate local anesthesia, an elliptical skin incision was made to encompass the lesion, 2.5cm x 0.8 cm with margins. The subcutaneous tissues were reapproximated with 3-0 vicryl in inverted interrupted fashion. The skin was closed with 4-0 monocryl suture in a running fashion.  Dermabond was applied.     Plan:  The patient will be called with pathology results.  Patient will followup if there any problems with the wound including redness or drainage.      WILMER Portillo MD

## 2024-02-05 NOTE — NURSING NOTE
"Chief Complaint   Patient presents with    Derm Problem     Had this area since July or August that keeps on bleeding. Never seems to heal. Is next to a lump that she believes is a cyst as well.     Initial /79 (BP Location: Right arm, Patient Position: Sitting, Cuff Size: Adult Large)   Pulse 60   Temp 98.2  F (36.8  C) (Tympanic)   Resp 17   Ht 1.702 m (5' 7\")   Wt 138 kg (304 lb 3.2 oz)   LMP  (LMP Unknown)   SpO2 97%   BMI 47.64 kg/m   Estimated body mass index is 47.64 kg/m  as calculated from the following:    Height as of this encounter: 1.702 m (5' 7\").    Weight as of this encounter: 138 kg (304 lb 3.2 oz).  Meds Reconciled: complete      Kristal Holliday RN      "

## 2024-02-09 LAB
PATH REPORT.COMMENTS IMP SPEC: NORMAL
PATH REPORT.FINAL DX SPEC: NORMAL
PHOTO IMAGE: NORMAL

## 2024-02-19 ENCOUNTER — MYC REFILL (OUTPATIENT)
Dept: INTERNAL MEDICINE | Facility: OTHER | Age: 64
End: 2024-02-19
Payer: COMMERCIAL

## 2024-02-19 DIAGNOSIS — I83.90 ASYMPTOMATIC VARICOSE VEINS: ICD-10-CM

## 2024-02-19 DIAGNOSIS — R60.0 PERIPHERAL EDEMA: ICD-10-CM

## 2024-02-19 NOTE — PROGRESS NOTES
12/01/23 0500   Appointment Info   Signing clinician's name / credentials Gabino Salinas DPT   Total/Authorized Visits 7   Visits Used 7/10   Medical Diagnosis Pain of right hip (M25.551)   PT Tx Diagnosis Impaired mobility, decreased strength and endurance, impaired gait, hip and knee pain   Other pertinent information Eval and Treat, R trochanteric bursitis, hamstring tightness; hamstring stretch and abductor/flexor/quad strengthening.   Progress Note/Certification   Onset of illness/injury or Date of Surgery 09/25/23   Therapy Frequency 1-2 times per week   Predicted Duration 8 weeks   Progress Note Due Date 12/05/23   Supervision   PT Assistant Visit Number 3       Present No   GOALS   PT Goals 2;3   PT Goal 1   Goal Identifier ADL's   Goal Description Patient will be able to complete all dressing, bathing, and grooming activities with no increase in hip and knee pain in order to improve her ability to complete ADL's   Rationale to maximize safety and independence with performance of ADLs and functional tasks   Target Date 12/04/23   PT Goal 2   Goal Identifier Ambulation   Goal Description Patient will be able to ambulate for longer than 5 minutes with no increase in knee or hip pain in order to improve her overall mobility within the community   Rationale to maximize safety and independence within the community   Target Date 12/04/23   PT Goal 3   Goal Identifier Stairs   Goal Description Patient will be able to ascend/descend 1 flight of stairs with no increase in knee or hip pain in order to improve her overall mobility in the community and in her homem.   Rationale to maximize safety and independence within the home   Target Date 12/04/23   Subjective Report   Subjective Report Patient reports that she is doing pretty good today. She felt that after last pool session, she did very well. She was able to go up and down stairs normally. Today is her last visit and she wants to trial  independent management of her symptoms. She plans on getting a [facility] membership so she can use the machines and pool at their facility. We will hold her chart open for a few weeks while she trials this. She will call back if more appointments are needed.   Objective Measures   Objective Measures Objective Measure 1   Objective Measure 1   Objective Measure Subjective pain rating   Details 5/10   Treatment Interventions (PT)   Interventions Therapeutic Procedure/Exercise;Manual Therapy   Therapeutic Procedure/Exercise   Therapeutic Procedures: strength, endurance, ROM, flexibillity minutes (65181) 25   Ther Proc 1 ROM/Stretching/strengthening   Ther Proc 1 - Details Passive knee ROm in sitting, Sci fit for 5 minutes on level 2, leg press: 2x10at 100 lbs, hip abd: 2x10 at 46 lbs, knee extension: 2x10 at 40 lbs, hamstring curls; 2x10 at 80 lbs, discussed parameters and weights to be using when using machines at the [facility]. Patient verbalized understanding and had no further questions.   Patient Response/Progress Tolerated well   Aquatic Therapy   Aquatic Therapy 1 Warm up   Aquatic Therapy 1 - Details Amb fwd/back and side stepping, no floats added this session. Gastroc stretch x2 long holds, hip flexor stretch x2. Repeated ambulation again after LE ex. and able to increase speed slightly.   Aquatic Therapy 2 LE ex   Aquatic Therapy 2 - Details Multi hip bilat 4ft as this felt better for her, and added ankle float to RLE. Squats, wide squats into HS curls. Hip flexed with LAQ/HS curls. HS stretch at step x2 30 sec hold. Trialed step ups but too painful. Squats 3 ft good tolerance x30.   Skilled Intervention education, technique   Patient Response/Progress good tolerance   Education   Learner/Method Patient   Education Comments Educated on LE anatomy   Plan   Home program Lumbar rotation: 2 minutes, twice daily, Single knee to chest: 2x20 seconds, twice daily, quad sets: 2x10, twice daily, Hamstring stretch: 2x20 seconds,  twice daily, sciatic nerve glides in slump position: 2x10, twice daily   Plan for next session Pt is willing to try the pool next session sice she is having a hard time tolerating standing activity and progression of ex. Assess response to HEP, progress mobility and strengthening as able.   Comments   Comments Will hold her chart open for a few weeks while patient trials independent management of her symptoms.   Total Session Time   Timed Code Treatment Minutes 25   Total Treatment Time (sum of timed and untimed services) 25     Unable to assess progress towards goals on this date because of an unplanned discharge.    DISCHARGE  Reason for Discharge: No further visits were scheduled.     Equipment Issued: none    Discharge Plan: Follow up with physician as needed.     Referring Provider:  Linus Plata

## 2024-02-20 NOTE — TELEPHONE ENCOUNTER
Patient comment: Need renewal of prescription for compression stockings.  will be ordering from [facility] in [place].     Requested Prescriptions   Pending Prescriptions Disp Refills    Elastic Bandages & Supports (MEDICAL COMPRESSION STOCKINGS) MISC 6 each 11     Si Units daily 20-30 mm HG       There is no refill protocol information for this order        Last Prescription Date:   11/3/22  Last Fill Qty/Refills:         6, R-11    Last Office Visit:              24   Future Office visit:             Next 5 appointments (look out 90 days)      2024  4:00 PM  (Arrive by 3:45 PM)  Benjamin Cobian with Wes Gasca MD  [facility] ([facility] ) [address]  [place] [id number] [phone]          Unable to complete prescription refill per RN Medication Refill Policy.     Freda Hazel RN .............. 2024  8:20 AM

## 2024-02-23 ENCOUNTER — DOCUMENTATION ONLY (OUTPATIENT)
Dept: INTERNAL MEDICINE | Facility: OTHER | Age: 64
End: 2024-02-23
Payer: COMMERCIAL

## 2024-02-23 DIAGNOSIS — R60.9 EDEMA, UNSPECIFIED TYPE: Primary | ICD-10-CM

## 2024-02-23 DIAGNOSIS — I83.90 ASYMPTOMATIC VARICOSE VEINS: ICD-10-CM

## 2024-03-18 ENCOUNTER — HOSPITAL ENCOUNTER (OUTPATIENT)
Dept: GENERAL RADIOLOGY | Facility: OTHER | Age: 64
Discharge: HOME | End: 2024-03-18
Attending: FAMILY MEDICINE
Payer: COMMERCIAL

## 2024-03-18 ENCOUNTER — OFFICE VISIT (OUTPATIENT)
Dept: FAMILY MEDICINE | Facility: OTHER | Age: 64
End: 2024-03-18
Attending: FAMILY MEDICINE
Payer: COMMERCIAL

## 2024-03-18 VITALS
OXYGEN SATURATION: 97 % | HEIGHT: 67 IN | DIASTOLIC BLOOD PRESSURE: 86 MMHG | TEMPERATURE: 97 F | SYSTOLIC BLOOD PRESSURE: 134 MMHG | RESPIRATION RATE: 18 BRPM | WEIGHT: 293 LBS | BODY MASS INDEX: 45.99 KG/M2 | HEART RATE: 58 BPM

## 2024-03-18 DIAGNOSIS — S69.92XA INJURY OF FINGER OF LEFT HAND, INITIAL ENCOUNTER: Primary | ICD-10-CM

## 2024-03-18 DIAGNOSIS — S69.92XA INJURY OF FINGER OF LEFT HAND, INITIAL ENCOUNTER: ICD-10-CM

## 2024-03-18 PROCEDURE — 99213 OFFICE O/P EST LOW 20 MIN: CPT | Performed by: FAMILY MEDICINE

## 2024-03-18 PROCEDURE — 73140 X-RAY EXAM OF FINGER(S): CPT | Mod: LT

## 2024-03-18 ASSESSMENT — PAIN SCALES - GENERAL: PAINLEVEL: MODERATE PAIN (4)

## 2024-03-18 NOTE — PROGRESS NOTES
"      Gilda Cortés is a 63 year old, presenting for the following health issues:  Finger (Left index finger injury 2 weeks ago)      3/18/2024     3:38 PM   Additional Questions   Roomed by Lata Stuart LPN     History of Present Illness       Reason for visit:  Broken finger  Symptom onset:  1-2 weeks ago  Symptom intensity:  Moderate  Symptom progression:  Staying the same  Had these symptoms before:  No  What makes it worse:  Movement  What makes it better:  Wrapping    She eats 2-3 servings of fruits and vegetables daily.She consumes 0 sweetened beverage(s) daily.She exercises with enough effort to increase her heart rate 10 to 19 minutes per day.  She exercises with enough effort to increase her heart rate 4 days per week.   She is taking medications regularly.                     Objective    /86   Pulse 58   Temp 97  F (36.1  C) (Temporal)   Resp 18   Ht 1.702 m (5' 7\")   Wt 135.9 kg (299 lb 9.6 oz)   LMP  (LMP Unknown)   SpO2 97%   Breastfeeding No   BMI 46.92 kg/m    Body mass index is 46.92 kg/m .  Physical Exam               Signed Electronically by: Darin Aguilar MD    "

## 2024-03-18 NOTE — PROGRESS NOTES
"Nursing Notes:   Lata Stuart LPN  3/18/2024  3:43 PM  Sign at exiting of workspace  Chief Complaint   Patient presents with    Finger     Left index finger injury 2 weeks ago       Initial /86   Pulse 58   Temp 97  F (36.1  C) (Temporal)   Resp 18   Ht 1.702 m (5' 7\")   Wt 135.9 kg (299 lb 9.6 oz)   LMP  (LMP Unknown)   SpO2 97%   Breastfeeding No   BMI 46.92 kg/m   Estimated body mass index is 46.92 kg/m  as calculated from the following:    Height as of this encounter: 1.702 m (5' 7\").    Weight as of this encounter: 135.9 kg (299 lb 9.6 oz).  Medication Review: complete    The next two questions are to help us understand your food security.  If you are feeling you need any assistance in this area, we have resources available to support you today.          1/4/2024   SDOH- Food Insecurity   Within the past 12 months, did you worry that your food would run out before you got money to buy more? N   Within the past 12 months, did the food you bought just not last and you didn t have money to get more? N         Health Care Directive:  Patient does not have a Health Care Directive or Living Will: Patient states has Advance Directive and will bring in a copy to clinic.    Lata Stuart LPN      SUBJECTIVE:  Milana Blum  is a 63 year old female who comes in for an injury to her left index finger.  Couple weeks ago, she was moving her hands around and smacked into the bedpost.  It has been painful for the last 2 weeks and she thought she should get it x-rayed to make sure she did not break something.  [place] from the vibration on the steering wheel.    Past Medical, Family, and Social History reviewed and updated as noted below.   ROS is negative except as noted above       Allergies   Allergen Reactions    House Dust [Dust Mite Extract] Shortness Of Breath     Pollen     Other Environmental Allergy Itching   ,   Family History   Problem Relation Age of " "Onset    Hypertension Mother     Cancer Father     Diabetes Father     Hypertension Father     Cancer Sister    ,   Current Outpatient Medications   Medication    albuterol (PROAIR HFA/PROVENTIL HFA/VENTOLIN HFA) 108 (90 Base) MCG/ACT inhaler    aspirin 81 MG EC tablet    b complex vitamins tablet    budesonide-formoterol (SYMBICORT) 160-4.5 MCG/ACT Inhaler    cyanocobalamin (CYANOCOBALAMIN) 1000 MCG/ML injection    Elastic Bandages & Supports (MEDICAL COMPRESSION STOCKINGS) MISC    hydrochlorothiazide (HYDRODIURIL) 50 MG tablet    magnesium oxide 250 mg Tab    nebivolol (BYSTOLIC) 10 MG tablet    Syringe/Needle, Disp, (SYRINGE LUER LOCK) 25G X 1\" 3 ML MISC     Current Facility-Administered Medications   Medication    cyanocobalamin injection 1,000 mcg    cyanocobalamin injection 1,000 mcg   ,   Past Medical History:   Diagnosis Date    Aortic root dilatation (H24) 5/5/2022    Last echocardiogram May 2022 measurement 4.1 cm.  Slowly progressively getting bigger continue annual exams unless recommended otherwise by cardiology    Asthma     Hypertension     Low back pain    ,   Patient Active Problem List    Diagnosis Date Noted    SVT (supraventricular tachycardia) 01/19/2024     Priority: Medium    Supraventricular tachycardia 01/19/2024     Priority: Medium    Pain of right hip 10/10/2023     Priority: Medium    B12 neuropathy (H24)      Priority: Medium    Bronchitis 06/05/2022     Priority: Medium    Palpitations 06/02/2022     Priority: Medium    Ascending aortic aneurysm (H24) 06/02/2022     Priority: Medium    DORMAN (dyspnea on exertion) 06/02/2022     Priority: Medium    KIM on CPAP 06/02/2022     Priority: Medium    Diastolic dysfunction with chronic heart failure (H) 06/02/2022     Priority: Medium    Peripheral edema 06/02/2022     Priority: Medium    Diverticula of colon 06/02/2022     Priority: Medium    Atypical chest pain 06/02/2022     Priority: Medium    Paroxysmal supraventricular tachycardia " "04/18/2022     Priority: Medium    Prediabetes 02/16/2021     Priority: Medium    Hypercholesterolemia      Priority: Medium     Created by Conversion        Essential hypertension, benign      Priority: Medium     Created by Conversion  Replacement Utility updated for latest IMO load        Mild persistent asthma      Priority: Medium     Created by Conversion        Hay Fever      Priority: Medium     Created by Conversion  Replacement Utility updated for latest IMO load        Varicose veins 06/28/2016     Priority: Medium    Degenerative disc disease, lumbar 06/28/2016     Priority: Medium    Morbid obesity (H) 06/28/2016     Priority: Medium    Gluten intolerance 06/28/2016     Priority: Medium    Lumbar disc herniation with radiculopathy 08/20/2010     Priority: Medium    Numbness 06/02/2010     Priority: Medium    Generalized muscle weakness 06/02/2010     Priority: Medium    Leg pain 06/02/2010     Priority: Medium    Intervertebral disk disease 06/02/2010     Priority: Medium   ,   Past Surgical History:   Procedure Laterality Date    BACK SURGERY  08/2010    L4-5, S1 Hemilaminectomy, foraminotomy    IR LUMBAR EPIDURAL STEROID INJECTION  3/4/2010    MD REMOVAL OF OVARY/TUBE(S)      Description: Salpingo-oophorectomy Left Side;  Proc Date: 12/06/2004;    [facility] SUPRACERV ABD HYSTERECTOMY      Description: Supracervical Hysterectomy;  Proc Date: 12/06/2004;    and   Social History     Tobacco Use    Smoking status: Never     Passive exposure: Past    Smokeless tobacco: Never   Substance Use Topics    Alcohol use: Yes     Alcohol/week: 2.0 standard drinks of alcohol     Comment: weekly beer or wine     OBJECTIVE:  /86   Pulse 58   Temp 97  F (36.1  C) (Temporal)   Resp 18   Ht 1.702 m (5' 7\")   Wt 135.9 kg (299 lb 9.6 oz)   LMP  (LMP Unknown)   SpO2 97%   Breastfeeding No   BMI 46.92 kg/m     EXAM:  Alert cooperative, no distress.  Her right hand appears normal.  Left index finger has a bit of a " fusiform swelling.  She is more tender over the DIP joint than anyplace else.  She has fairly good range of motion although she has incomplete flexion.  X-ray is read as negative by the radiologist but there may be a nondisplaced fracture at the distal end of the middle phalanx on 1 view.  That is where she is tender.  Certainly would not need anything done surgically for this.    Results for orders placed or performed during the hospital encounter of 03/18/24   XR Finger Left G/E 2 Views     Status: None    Narrative    Exam: XR FINGER LEFT G/E 2 VIEWS     History:Female, age 63 years, trauma. Pain, possible fracture.; Injury  of finger of left hand, initial encounter    Comparison:  No relevant prior imaging.    Technique: Three views are submitted.    Findings: Bones are normally mineralized. No evidence of acute or  subacute fracture.  No evidence of dislocation.           Impression    Impression:  No evidence of acute or subacute bony abnormality.     Moderate degenerative changes of the distal interphalangeal joint.    DASH GARNER MD         SYSTEM ID:  [id number]      ASSESSMENT/Plan :    Milana was seen today for finger.    Diagnoses and all orders for this visit:    Injury of finger of left hand, initial encounter  -     XR Finger Left G/E 2 Views; Future      Reviewed x-rays with her.  Discussed symptomatic treatment with ice and NSAIDs.  Discussed splinting or lorenzo taping.  Advised this may take 6 to 8 weeks or even sometimes up to 12 weeks to feel completely better.  She will let us know and avoid reinjury.    Darin Aguilar MD          Answers submitted by the patient for this visit:  General Questionnaire (Submitted on 3/18/2024)  Chief Complaint: Chronic problems general questions HPI Form  How many servings of fruits and vegetables do you eat daily?: 2-3  On average, how many sweetened beverages do you drink each day (Examples: soda, juice, sweet tea, etc.  Do NOT count diet or artificially  sweetened beverages)?: 0  How many minutes a day do you exercise enough to make your heart beat faster?: 10 to 19  How many days a week do you exercise enough to make your heart beat faster?: 4  How many days per week do you miss taking your medication?: 0  General Concern (Submitted on 3/18/2024)  Chief Complaint: Chronic problems general questions HPI Form  What is the reason for your visit today?: broken finger  When did your symptoms begin?: 1-2 weeks ago  How would you describe these symptoms?: Moderate  Are your symptoms:: Staying the same  Have you had these symptoms before?: No  Is there anything that makes you feel worse?: movement  Is there anything that makes you feel better?: wrapping

## 2024-03-18 NOTE — NURSING NOTE
"Chief Complaint   Patient presents with    Finger     Left index finger injury 2 weeks ago       Initial /86   Pulse 58   Temp 97  F (36.1  C) (Temporal)   Resp 18   Ht 1.702 m (5' 7\")   Wt 135.9 kg (299 lb 9.6 oz)   LMP  (LMP Unknown)   SpO2 97%   Breastfeeding No   BMI 46.92 kg/m   Estimated body mass index is 46.92 kg/m  as calculated from the following:    Height as of this encounter: 1.702 m (5' 7\").    Weight as of this encounter: 135.9 kg (299 lb 9.6 oz).  Medication Review: complete    The next two questions are to help us understand your food security.  If you are feeling you need any assistance in this area, we have resources available to support you today.          1/4/2024   SDOH- Food Insecurity   Within the past 12 months, did you worry that your food would run out before you got money to buy more? N   Within the past 12 months, did the food you bought just not last and you didn t have money to get more? N         Health Care Directive:  Patient does not have a Health Care Directive or Living Will: Patient states has Advance Directive and will bring in a copy to clinic.    Lata Stuart LPN      "

## 2024-03-27 NOTE — PROGRESS NOTES
[facility] HEART CARE   CARDIOLOGY PROGRESS NOTE     Chief Complaint   Patient presents with    Follow Up     annual          Diagnosis:  1.  TAAA.    -4.2 cm on 4/27/2023.  -4.2 cm in 5/5/2022.   -4.0 cm 12/27/2018.  2.  DORMAN.    -Diastolic dysfunction/asthma.  3.  CHF.   -Normal on 5/5/2022.  -Diastolic grade 1 on 12/27/2018.  4.  KIM on CPAP.  5.  Palpitations.  6.  Chest pain-atypical.  7.  Hyperlipidemia-uncontrolled.  8.  Hypertriglyceridemia-controlled.  9.  Hypertension-controlled.  10.  Asthma-mild.  11.  Morbid obesity.   -BMI of 47 on 3/18/2024.      Assessment/Plan:   1.  TAAA: 4.2 cm on 4/27/2023. 4.0 cm on 12/27/2018 and 4.2 cm on 4/16/2021.  Findings reviewed.  Briefly, discussed need for surgery at 5.5 cm or increase of 5 mm in a year.  Surgery not indicated at this time.  We will follow.  Plan for repeat echocardiogram in 1 year which will be 2 years from her last echocardiogram.  2.  Palpitations: Much improved.  She has not had palpitations for a long time.  She describes x1 episode more recently which caught her off guard as she had not been having them regularly.  With the stability, changes were not made.  Previously, Bystolic was increased.  Previously, she had a Zio patch on 3/21/2022.  This monitor showed x7 episodes of SVT lasting up to 16.2 seconds. If there are changes in the future and found to have SVT lasting for hours, discussed potential need for ablation.  For now, we will use medication. Continue Bystolic 10 mg daily.   3.  Obesity: Discussed intermittent fasting.  Patient plans to discuss weight management  4.  Hypertension: Uncontrolled.  Blood pressure today was 150/98.  Discussed changes but declined.  Continue hydrochlorothiazide 50 mg daily and Bystolic 10 mg daily.  5.  Hyperlipidemia: LDL of 112 on 4/18/2023.  Not currently on statin.  6.  CHF: Discussed today.  Patient aware she has diastolic dysfunction grade 1 on 12/27/2018.  Has some mild peripheral edema with compression  stockings.  Currently on hydrochlorothiazide 50 mg once a day with good control.  Diastolic dysfunction not discussed on echocardiogram from 4/26/2023.  EF normal at 55-60%.  7.  KIM: On CPAP.  8.  Follow-up in 1 year or sooner with issues.  Plan for an echocardiogram in 1 year.      Interval history:  See above.    HPI:    Mrs. Blum is being seen by cardiology to establish care.  She has been having weekly palpitations lasting 10 minutes to a full day, sharp/atypical pain to her chest, uncontrolled hypertension, and was following with cardiology in [place] related to a mild ascending aortic aneurysm.     Zio patch from 3/21/2022 showed SVT up to 16.2 seconds, DORMAN with history of mild asthma, obesity with a BMI of 47, prediabetes-controlled, hypercholesterolemia-controlled, diastolic dysfunction grade 1 on 12/27/18, KIM on CPAP, and diverticula.     She had been seeing cardiology.  She moved to [place] in October, 2021.  Her job allows her to work remotely if she lives an hour or greater away.  She lives less than an hour [place] [place]  She is feeling less safe.  She decided to be closer to her brother and increase her distance to allow her to work remotely.  She was seeing cardiology in [place].  She saw Dr. Theodore in cardiology related to an ascending aortic aneurysm.  It was 4.0 cm on 12/27/18 and increased to 4.2 cm on 5/5/2022.  Discussed this is a mild finding and does not require surgery.  Surgery is typically performed at 5.5 cm.     She also has been having palpitations.  She describes them happening on a weekly basis.  They can last from anywhere from 10 minutes to a full day.  She has symptoms while wearing her Zio patch which was completed 3/21/2022.  This monitor showed x7 episodes of SVT lasting up to 16.2 seconds, SVE, and VE.  No A. fib, heart block, or VT.  Patient gets mildly dizzy but that has not had syncope or near syncope.  Currently on Bystolic and  was increased.  If found to have extended episodes lasting hours, may need to have an ablation.  We will treat with medications.     She is having atypical chest pain.  She describes a sharp, substernal pain that is not exertional.  Lasts seconds.  It is random.  It is tender to palpation and reproducible.  Patient reassured.  No history of CAD or stenting.     She has a history of hypertension.  Blood pressure on 6/2/2022 was 158/94.  Blood pressures have been in the 130s systolically at home.  Discussed the benefits of tighter control blood pressure.  We will increase Bystolic from 5 mg daily to 10 mg daily for better blood pressure control and help manage palpitations.     She has diastolic dysfunction.  She has peripheral edema.  She currently on hydrochlorothiazide 25 mg daily.  Echo from 12/27/2018 showed grade 1.      Relevant testing:  Echo on 4/27/2023:  Left ventricular size, wall motion and function are normal. The ejection fraction is 55-60%.  Right ventricular function, chamber size, wall motion, and thickness are normal.  No pericardial effusion is present.  The inferior vena cava is normal.  Mild dilatation of the aorta is present. Ascending aorta 4.2 cm.  Compared to prior imaging on 5/5/2022 there are no hemodynamically significant differences.    Echocardiogram on 5/5/2022:  Left ventricular size, wall motion and function are normal. The ejection fraction is 60-65%.  Right ventricular function, chamber size, wall motion, and thickness are normal.  Ascending aorta 4.2 cm, indexed at 1.75 cm/m2.  The inferior vena cava was normal in size with preserved respiratory variability. No pericardial effusion is present.     Zio patch 3/21/2022:  Patient's monitor was in place for 11 days and 21-hour.  Minimum heart rate 51 bpm, average heart rate 72 bpm, maximum heart rate 160 bpm. Rhythm/s present include sinus rhythm, supraventricular tachycardia.  There were rare ventricular ectopic beats accounting for  less than 1% of all beats. There were 0 ventricular triplets and 0 couplets. There were rare supraventricular ectopic beats.  There were 10 triggered events during which time the noted rhythms were supraventricular tachycardia and sinus rhythm.  There were 9 diary entries during which time the noted rhythms were supraventricular tachycardia and sinus rhythm.  There were 7 episodes of supraventricular tachycardia with the fastest interval lasting 5 beats with a max rate of 169 and the longest lasting 16.2 seconds.  Review of actual tracings showed no other abnormality.     Echocardiogram on 4/16/2021:  Left ventricular ejection fraction is normal. The calculated left ventricular ejection fraction is 59%.    Normal left ventricular cavity size and wall thickness.    Diastolic function is normal.    Normal right ventricular size and systolic function.    No hemodynamically significant valvular heart abnormalities.    The ascending aorta is mildly dilated measuring 4.2 cm.    When compared to the previous study dated 12/27/2018, there has been no significant change.     Echocardiogram on 12/27/2018:  Normal left ventricular size and systolic function.The calculated left ventricular ejection fraction is 65%. This represents a normal ejection fraction. Borderline concentric hypertrophy noted. Grade I (mild) left ventricular diastolic dysfunction.    Normal right ventricular size and systolic function. TAPSE is normal    Left atrial volume is moderately increased    Estimated central venous pressure equal to 3 mmHg.    The ascending aorta is mildly dilated at 40 mm.    No previous study for comparison.        ICD-10-CM    1. Paroxysmal supraventricular tachycardia  I47.10 EKG 12-lead, tracing only      2. DORMAN (dyspnea on exertion)  R06.09 Echocardiogram Complete      3. Aneurysm of ascending aorta without rupture (H24)  I71.21 Echocardiogram Complete      4. Diastolic dysfunction with chronic heart failure (H)  I50.32  Echocardiogram Complete      5. Essential hypertension, benign  I10       6. Palpitations  R00.2       7. Supraventricular tachycardia  I47.10       8. Prediabetes  R73.03       9. Hypercholesterolemia  E78.00       10. Morbid obesity (H)  E66.01       11. KIM on CPAP  G47.33       12. Atypical chest pain  R07.89       13. SVT (supraventricular tachycardia)  I47.10       14. Peripheral edema  R60.9             Past Medical History:   Diagnosis Date    Aortic root dilatation (H24) 5/5/2022    Last echocardiogram May 2022 measurement 4.1 cm.  Slowly progressively getting bigger continue annual exams unless recommended otherwise by cardiology    Asthma     Hypertension     Low back pain        Past Surgical History:   Procedure Laterality Date    BACK SURGERY  08/2010    L4-5, S1 Hemilaminectomy, foraminotomy    IR LUMBAR EPIDURAL STEROID INJECTION  3/4/2010    CO REMOVAL OF OVARY/TUBE(S)      Description: Salpingo-oophorectomy Left Side;  Proc Date: 12/06/2004;    ZZC SUPRACERV ABD HYSTERECTOMY      Description: Supracervical Hysterectomy;  Proc Date: 12/06/2004;       Allergies   Allergen Reactions    House Dust [Dust Mite Extract] Shortness Of Breath     Pollen     Other Environmental Allergy Itching       Current Outpatient Medications   Medication Sig Dispense Refill    albuterol (PROAIR HFA/PROVENTIL HFA/VENTOLIN HFA) 108 (90 Base) MCG/ACT inhaler [ALBUTEROL (VENTOLIN HFA) 90 MCG/ACTUATION INHALER] INHALE 1 TO 2 PUFFS ORALLY EVERY FOUR HOURS AS NEEDED 54 g 3    aspirin 81 MG EC tablet [ASPIRIN 81 MG EC TABLET] Take 1 tablet (81 mg total) by mouth daily.  0    b complex vitamins tablet [B COMPLEX VITAMINS TABLET] Take 1 tablet by mouth daily.      budesonide-formoterol (SYMBICORT) 160-4.5 MCG/ACT Inhaler INHALE 2 PUFFS BY MOUTH TWICE A DAY 28 g 3    cyanocobalamin (CYANOCOBALAMIN) 1000 MCG/ML injection INJECT 1 ML (1,000 MCG) INTO THE MUSCLE EVERY 30 DAYS 3 mL 0    Elastic Bandages & Supports (MEDICAL COMPRESSION  "STOCKINGS) MISC 1 Units daily 20-30 mm HG 6 each 11    hydrochlorothiazide (HYDRODIURIL) 50 MG tablet Take 1 tablet (50 mg) by mouth daily 90 tablet 3    magnesium oxide 250 mg Tab [MAGNESIUM OXIDE 250 MG TAB] Take 250 mg by mouth daily.      nebivolol (BYSTOLIC) 10 MG tablet TAKE 1 TABLET DAILY 90 tablet 3    Syringe/Needle, Disp, (SYRINGE LUER LOCK) 25G X 1\" 3 ML MISC 1 Syringe once a week 50 each 4       Social History     Socioeconomic History    Marital status: Single     Spouse name: Not on file    Number of children: Not on file    Years of education: Not on file    Highest education level: Not on file   Occupational History    Not on file   Tobacco Use    Smoking status: Never     Passive exposure: Past    Smokeless tobacco: Never   Vaping Use    Vaping Use: Never used   Substance and Sexual Activity    Alcohol use: Yes     Alcohol/week: 2.0 standard drinks of alcohol     Comment: weekly beer or wine    Drug use: No    Sexual activity: Yes     Partners: Male     Birth control/protection: Surgical   Other Topics Concern    Not on file   Social History Narrative    Not on file     Social Determinants of Health     Financial Resource Strain: Low Risk  (1/4/2024)    Financial Resource Strain     Within the past 12 months, have you or your family members you live with been unable to get utilities (heat, electricity) when it was really needed?: No   Food Insecurity: Low Risk  (1/4/2024)    Food Insecurity     Within the past 12 months, did you worry that your food would run out before you got money to buy more?: No     Within the past 12 months, did the food you bought just not last and you didn t have money to get more?: No   Transportation Needs: Low Risk  (1/4/2024)    Transportation Needs     Within the past 12 months, has lack of transportation kept you from medical appointments, getting your medicines, non-medical meetings or appointments, work, or from getting things that you need?: No   Physical Activity: " Not on file   Stress: Not on file   Social Connections: Not on file   Interpersonal Safety: Low Risk  (3/18/2024)    Interpersonal Safety     Do you feel physically and emotionally safe where you currently live?: Yes     Within the past 12 months, have you been hit, slapped, kicked or otherwise physically hurt by someone?: No     Within the past 12 months, have you been humiliated or emotionally abused in other ways by your partner or ex-partner?: No   Housing Stability: Low Risk  (1/4/2024)    Housing Stability     Do you have housing? : Yes     Are you worried about losing your housing?: No       LAB RESULTS:   No visits with results within 2 Month(s) from this visit.   Latest known visit with results is:   Office Visit on 01/10/2023   Component Date Value Ref Range Status    Vitamin B12 01/10/2023 804  232 - 1,245 pg/mL Final    Sodium 01/10/2023 140  136 - 145 mmol/L Final    Potassium 01/10/2023 4.0  3.4 - 5.3 mmol/L Final    Chloride 01/10/2023 102  98 - 107 mmol/L Final    Carbon Dioxide (CO2) 01/10/2023 30 (H)  22 - 29 mmol/L Final    Anion Gap 01/10/2023 8  7 - 15 mmol/L Final    Urea Nitrogen 01/10/2023 9.8  8.0 - 23.0 mg/dL Final    Creatinine 01/10/2023 0.63  0.51 - 0.95 mg/dL Final    Calcium 01/10/2023 9.6  8.8 - 10.2 mg/dL Final    Glucose 01/10/2023 109 (H)  70 - 99 mg/dL Final    Alkaline Phosphatase 01/10/2023 94  35 - 104 U/L Final    AST 01/10/2023 19  10 - 35 U/L Final    ALT 01/10/2023 18  10 - 35 U/L Final    Protein Total 01/10/2023 7.0  6.4 - 8.3 g/dL Final    Albumin 01/10/2023 4.0  3.5 - 5.2 g/dL Final    Bilirubin Total 01/10/2023 0.5  <=1.2 mg/dL Final    GFR Estimate 01/10/2023 >90  >60 mL/min/1.73m2 Final    Vitamin D, Total (25-Hydroxy) 01/10/2023 28  20 - 75 ug/L Final    WBC Count 01/10/2023 7.6  4.0 - 11.0 10e3/uL Final    RBC Count 01/10/2023 4.87  3.80 - 5.20 10e6/uL Final    Hemoglobin 01/10/2023 12.5  11.7 - 15.7 g/dL Final    Hematocrit 01/10/2023 40.2  35.0 - 47.0 % Final     "MCV 01/10/2023 83  78 - 100 fL Final    MCH 01/10/2023 25.7 (L)  26.5 - 33.0 pg Final    MCHC 01/10/2023 31.1 (L)  31.5 - 36.5 g/dL Final    RDW 01/10/2023 13.7  10.0 - 15.0 % Final    Platelet Count 01/10/2023 238  150 - 450 10e3/uL Final    % Neutrophils 01/10/2023 42  % Final    % Lymphocytes 01/10/2023 50  % Final    % Monocytes 01/10/2023 5  % Final    % Eosinophils 01/10/2023 2  % Final    % Basophils 01/10/2023 1  % Final    % Immature Granulocytes 01/10/2023 0  % Final    NRBCs per 100 WBC 01/10/2023 0  <1 /100 Final    Absolute Neutrophils 01/10/2023 3.2  1.6 - 8.3 10e3/uL Final    Absolute Lymphocytes 01/10/2023 3.8  0.8 - 5.3 10e3/uL Final    Absolute Monocytes 01/10/2023 0.4  0.0 - 1.3 10e3/uL Final    Absolute Eosinophils 01/10/2023 0.1  0.0 - 0.7 10e3/uL Final    Absolute Basophils 01/10/2023 0.0  0.0 - 0.2 10e3/uL Final    Absolute Immature Granulocytes 01/10/2023 0.0  <=0.4 10e3/uL Final    Absolute NRBCs 01/10/2023 0.0  10e3/uL Final        Review of systems: Negative except that which was noted in the HPI.    Physical examination:  BP (!) 144/84 (BP Location: Right arm, Patient Position: Sitting, Cuff Size: Adult Large)   Pulse 65   Temp (!) 96  F (35.6  C) (Temporal)   Resp 16   Ht 1.7 m (5' 6.93\")   Wt 133.8 kg (295 lb)   LMP  (LMP Unknown)   SpO2 97%   BMI 46.30 kg/m      GENERAL APPEARANCE: healthy, alert and no distress  CHEST: lungs clear to auscultation - no rales, rhonchi or wheezes, no use of accessory muscles, no retractions, respirations are unlabored, normal respiratory rate  CARDIOVASCULAR: regular rhythm, normal S1 with physiologic split S2, no S3 or S4 and no murmur, click or rub  EXTREMITIES: no clubbing, cyanosis but with mild edema.    Total time spent on day of visit, including review of tests, obtaining/reviewing separately obtained history, ordering medications/tests/procedures, communicating with PCP/consultants, and documenting in electronic medical record: 21 " minutes.               Thank you for allowing me to participate in the care of your patient. Please do not hesitate to contact me if you have any questions.     Dustin Hallman, DO

## 2024-03-28 ENCOUNTER — OFFICE VISIT (OUTPATIENT)
Dept: CARDIOLOGY | Facility: OTHER | Age: 64
End: 2024-03-28
Attending: INTERNAL MEDICINE
Payer: COMMERCIAL

## 2024-03-28 ENCOUNTER — TELEPHONE (OUTPATIENT)
Dept: CARDIOLOGY | Facility: OTHER | Age: 64
End: 2024-03-28

## 2024-03-28 VITALS
WEIGHT: 293 LBS | DIASTOLIC BLOOD PRESSURE: 84 MMHG | RESPIRATION RATE: 16 BRPM | BODY MASS INDEX: 45.99 KG/M2 | HEIGHT: 67 IN | OXYGEN SATURATION: 97 % | TEMPERATURE: 96 F | HEART RATE: 65 BPM | SYSTOLIC BLOOD PRESSURE: 144 MMHG

## 2024-03-28 DIAGNOSIS — R00.2 PALPITATIONS: ICD-10-CM

## 2024-03-28 DIAGNOSIS — I47.10 PAROXYSMAL SUPRAVENTRICULAR TACHYCARDIA (H): Primary | ICD-10-CM

## 2024-03-28 DIAGNOSIS — I10 ESSENTIAL HYPERTENSION, BENIGN: ICD-10-CM

## 2024-03-28 DIAGNOSIS — R06.09 DOE (DYSPNEA ON EXERTION): ICD-10-CM

## 2024-03-28 DIAGNOSIS — I47.10 SVT (SUPRAVENTRICULAR TACHYCARDIA) (H): ICD-10-CM

## 2024-03-28 DIAGNOSIS — R73.03 PREDIABETES: ICD-10-CM

## 2024-03-28 DIAGNOSIS — I71.21 ANEURYSM OF ASCENDING AORTA WITHOUT RUPTURE (H): ICD-10-CM

## 2024-03-28 DIAGNOSIS — G47.33 OSA ON CPAP: ICD-10-CM

## 2024-03-28 DIAGNOSIS — E66.01 MORBID OBESITY (H): ICD-10-CM

## 2024-03-28 DIAGNOSIS — I50.32 DIASTOLIC DYSFUNCTION WITH CHRONIC HEART FAILURE (H): ICD-10-CM

## 2024-03-28 DIAGNOSIS — R07.89 ATYPICAL CHEST PAIN: ICD-10-CM

## 2024-03-28 DIAGNOSIS — E78.00 HYPERCHOLESTEROLEMIA: ICD-10-CM

## 2024-03-28 DIAGNOSIS — R60.0 PERIPHERAL EDEMA: ICD-10-CM

## 2024-03-28 DIAGNOSIS — I47.10 SUPRAVENTRICULAR TACHYCARDIA (H): ICD-10-CM

## 2024-03-28 LAB
ATRIAL RATE - MUSE: 64 BPM
DIASTOLIC BLOOD PRESSURE - MUSE: NORMAL MMHG
INTERPRETATION ECG - MUSE: NORMAL
P AXIS - MUSE: 34 DEGREES
PR INTERVAL - MUSE: 162 MS
QRS DURATION - MUSE: 96 MS
QT - MUSE: 426 MS
QTC - MUSE: 439 MS
R AXIS - MUSE: 6 DEGREES
SYSTOLIC BLOOD PRESSURE - MUSE: NORMAL MMHG
T AXIS - MUSE: 43 DEGREES
VENTRICULAR RATE- MUSE: 64 BPM

## 2024-03-28 PROCEDURE — 99213 OFFICE O/P EST LOW 20 MIN: CPT | Performed by: INTERNAL MEDICINE

## 2024-03-28 PROCEDURE — 93000 ELECTROCARDIOGRAM COMPLETE: CPT | Performed by: INTERNAL MEDICINE

## 2024-03-28 ASSESSMENT — PAIN SCALES - GENERAL: PAINLEVEL: NO PAIN (0)

## 2024-03-28 NOTE — TELEPHONE ENCOUNTER
Echocardiogram Denied due to not meeting medical necessity for the test. Provider needs to call [phone]. Case #[id number]  CPT Codes: 47947, 19441, 29062, , 36004, 84086

## 2024-03-28 NOTE — TELEPHONE ENCOUNTER
Called phone number provided, Emerita at [facility]. Claim is not denied per Emerita, it's still in process.     Emerita asked to speak with provider. Dr. Hallman spoke with Emerita and since the echo was ordered for 1 year from now, authorization now doesn't make sense since it has to be done 30 days prior to procedure. Per Dr. Hallman, close encounter.     KARMEN BYNUM, RN on 3/28/2024 at 2:28 PM

## 2024-03-28 NOTE — NURSING NOTE
"Patient comes in for annual visit.    Chief Complaint   Patient presents with    Follow Up     annual       Initial BP (!) 150/98 (BP Location: Right arm, Patient Position: Sitting, Cuff Size: Adult Large)   Pulse 65   Temp (!) 96  F (35.6  C) (Temporal)   Resp 16   Ht 1.7 m (5' 6.93\")   Wt 133.8 kg (295 lb)   LMP  (LMP Unknown)   SpO2 97%   BMI 46.30 kg/m   Estimated body mass index is 46.3 kg/m  as calculated from the following:    Height as of this encounter: 1.7 m (5' 6.93\").    Weight as of this encounter: 133.8 kg (295 lb).  Meds Reconciled: complete  Pt is on Aspirin  Pt is not on a Statin  PHQ and/or DOYLE reviewed. Pt referred to PCP/MH Provider as appropriate.    Katie Talley LPN      "

## 2024-04-04 ENCOUNTER — OFFICE VISIT (OUTPATIENT)
Dept: INTERNAL MEDICINE | Facility: OTHER | Age: 64
End: 2024-04-04
Attending: STUDENT IN AN ORGANIZED HEALTH CARE EDUCATION/TRAINING PROGRAM
Payer: COMMERCIAL

## 2024-04-04 VITALS
BODY MASS INDEX: 45.99 KG/M2 | SYSTOLIC BLOOD PRESSURE: 136 MMHG | TEMPERATURE: 96.1 F | HEIGHT: 67 IN | RESPIRATION RATE: 20 BRPM | OXYGEN SATURATION: 96 % | WEIGHT: 293 LBS | DIASTOLIC BLOOD PRESSURE: 86 MMHG | HEART RATE: 60 BPM

## 2024-04-04 DIAGNOSIS — E66.01 MORBID OBESITY (H): Primary | ICD-10-CM

## 2024-04-04 DIAGNOSIS — I50.32 CHRONIC DIASTOLIC HEART FAILURE (H): ICD-10-CM

## 2024-04-04 DIAGNOSIS — E53.8 VITAMIN B12 DEFICIENCY (NON ANEMIC): ICD-10-CM

## 2024-04-04 DIAGNOSIS — R73.03 PREDIABETES: ICD-10-CM

## 2024-04-04 DIAGNOSIS — Z13.220 ENCOUNTER FOR SCREENING FOR LIPID DISORDER: ICD-10-CM

## 2024-04-04 DIAGNOSIS — I10 BENIGN ESSENTIAL HYPERTENSION: ICD-10-CM

## 2024-04-04 LAB
ALBUMIN SERPL BCG-MCNC: 4.4 G/DL (ref 3.5–5.2)
ALP SERPL-CCNC: 81 U/L (ref 40–150)
ALT SERPL W P-5'-P-CCNC: 21 U/L (ref 0–50)
ANION GAP SERPL CALCULATED.3IONS-SCNC: 11 MMOL/L (ref 7–15)
AST SERPL W P-5'-P-CCNC: 22 U/L (ref 0–45)
BILIRUB SERPL-MCNC: 0.5 MG/DL
BUN SERPL-MCNC: 11.1 MG/DL (ref 8–23)
CALCIUM SERPL-MCNC: 10.2 MG/DL (ref 8.8–10.2)
CHLORIDE SERPL-SCNC: 101 MMOL/L (ref 98–107)
CHOLEST SERPL-MCNC: 207 MG/DL
CREAT SERPL-MCNC: 0.63 MG/DL (ref 0.51–0.95)
DEPRECATED HCO3 PLAS-SCNC: 28 MMOL/L (ref 22–29)
EGFRCR SERPLBLD CKD-EPI 2021: >90 ML/MIN/1.73M2
FASTING STATUS PATIENT QL REPORTED: ABNORMAL
GLUCOSE SERPL-MCNC: 95 MG/DL (ref 70–99)
HDLC SERPL-MCNC: 39 MG/DL
LDLC SERPL CALC-MCNC: 138 MG/DL
NONHDLC SERPL-MCNC: 168 MG/DL
POTASSIUM SERPL-SCNC: 3.8 MMOL/L (ref 3.4–5.3)
PROT SERPL-MCNC: 7.4 G/DL (ref 6.4–8.3)
SODIUM SERPL-SCNC: 140 MMOL/L (ref 135–145)
TRIGL SERPL-MCNC: 148 MG/DL
VIT B12 SERPL-MCNC: 1242 PG/ML (ref 232–1245)

## 2024-04-04 PROCEDURE — 80053 COMPREHEN METABOLIC PANEL: CPT | Mod: ZL | Performed by: STUDENT IN AN ORGANIZED HEALTH CARE EDUCATION/TRAINING PROGRAM

## 2024-04-04 PROCEDURE — 80061 LIPID PANEL: CPT | Mod: ZL | Performed by: STUDENT IN AN ORGANIZED HEALTH CARE EDUCATION/TRAINING PROGRAM

## 2024-04-04 PROCEDURE — 99214 OFFICE O/P EST MOD 30 MIN: CPT | Performed by: STUDENT IN AN ORGANIZED HEALTH CARE EDUCATION/TRAINING PROGRAM

## 2024-04-04 PROCEDURE — 36415 COLL VENOUS BLD VENIPUNCTURE: CPT | Mod: ZL | Performed by: STUDENT IN AN ORGANIZED HEALTH CARE EDUCATION/TRAINING PROGRAM

## 2024-04-04 PROCEDURE — 82607 VITAMIN B-12: CPT | Mod: ZL | Performed by: STUDENT IN AN ORGANIZED HEALTH CARE EDUCATION/TRAINING PROGRAM

## 2024-04-04 ASSESSMENT — PAIN SCALES - GENERAL: PAINLEVEL: NO PAIN (0)

## 2024-04-04 NOTE — PROGRESS NOTES
Assessment & Plan         ICD-10-CM    1. Morbid obesity (H)  E66.01 Semaglutide-Weight Management (WEGOVY) 0.25 MG/0.5ML pen     Semaglutide-Weight Management (WEGOVY) 0.5 MG/0.5ML pen     Semaglutide-Weight Management (WEGOVY) 1 MG/0.5ML pen     Semaglutide-Weight Management (WEGOVY) 1.7 MG/0.75ML pen     Semaglutide-Weight Management (WEGOVY) 2.4 MG/0.75ML pen      2. Chronic diastolic heart failure (H)  I50.32 Semaglutide-Weight Management (WEGOVY) 0.25 MG/0.5ML pen     Semaglutide-Weight Management (WEGOVY) 0.5 MG/0.5ML pen     Semaglutide-Weight Management (WEGOVY) 1 MG/0.5ML pen     Semaglutide-Weight Management (WEGOVY) 1.7 MG/0.75ML pen     Semaglutide-Weight Management (WEGOVY) 2.4 MG/0.75ML pen      3. Prediabetes  R73.03 Semaglutide-Weight Management (WEGOVY) 0.25 MG/0.5ML pen     Semaglutide-Weight Management (WEGOVY) 0.5 MG/0.5ML pen     Semaglutide-Weight Management (WEGOVY) 1 MG/0.5ML pen     Semaglutide-Weight Management (WEGOVY) 1.7 MG/0.75ML pen     Semaglutide-Weight Management (WEGOVY) 2.4 MG/0.75ML pen     Comprehensive Metabolic Panel     Comprehensive Metabolic Panel      4. Encounter for screening for lipid disorder  Z13.220 Lipid Panel     Lipid Panel      5. Vitamin B12 deficiency (non anemic)  E53.8 Vitamin B12     Vitamin B12        Morbid obesity: BMI 46.52.  She is interested in starting semaglutide today.  Prescription for Wegovy sent.  Patient would benefit from a GLP-1 over additional therapies given her diastolic heart failure, advanced prediabetes and the severe life-threatening morbid obesity that she is suffering from with heart failure already and poorly controlled hypertension.  Can trial the addition of an SGLT2 to in the future as well but this is not going to have nearly the magnitude of weight loss as semaglutide.  She is willing to cash pay if insurance is unwilling to help her lose weight.    Vitamin B12 deficiency: Continue B12 injections.  She is somewhat fasting  "today.    Lipid screen: Nonfasting lipids were drawn today.  Needs this drawn for her work.        No follow-ups on file.    Wes Gasca MD  [facility]      Gilda Cortés is a 63 year old, presenting for the following health issues:  Clinic Care Coordination - Follow-up (labs)      4/4/2024     3:49 PM   Additional Questions   Roomed by regine gandhi lpn     History of Present Illness       She eats 2-3 servings of fruits and vegetables daily.She consumes 0 sweetened beverage(s) daily.She exercises with enough effort to increase her heart rate 10 to 19 minutes per day.  She exercises with enough effort to increase her heart rate 4 days per week.   She is taking medications regularly.       She is ready to trial a GLP-1 for weight loss.  We discussed the pathophysiology, side effects mechanism of action and and route of administration at length.    She also would like her lipids drawn for work.  She is also wondering if her vitamin B12 level is normal.  She is doing injections at home and we discussed optimal management of injections and she is going to schedule an appointment with the injection nurse for review of this.          Objective    /86 (BP Location: Right arm, Patient Position: Sitting, Cuff Size: Adult Large)   Pulse 60   Temp (!) 96.1  F (35.6  C) (Temporal)   Resp 20   Ht 1.702 m (5' 7\")   Wt 134.7 kg (297 lb)   LMP 01/01/2004 (Within Months)   SpO2 96%   BMI 46.52 kg/m    Body mass index is 46.52 kg/m .  Physical Exam   General: Pleasant 63-year-old woman sitting in clinic no acute distress, morbid obesity.            Signed Electronically by: Wes Gasca MD    "

## 2024-04-04 NOTE — LETTER
April 5, 2024      Milana Blum  [address] [address]        Dear ,    We are writing to inform you of your test results.    Your laboratory results are below.  Your vitamin B12 level is on the upper end of normal.  You are doing your shots correctly.  Your cholesterol is fairly elevated and we will work hard on weight loss over the next year.  If you are not able to get your cholesterol down with some weight loss I am still recommending a cholesterol medicine.  The rest of your laboratory results are unremarkable.    Resulted Orders   Vitamin B12   Result Value Ref Range    Vitamin B12 1,242 232 - 1,245 pg/mL   Lipid Panel   Result Value Ref Range    Cholesterol 207 (H) <200 mg/dL    Triglycerides 148 <150 mg/dL    Direct Measure HDL 39 (L) >=50 mg/dL    LDL Cholesterol Calculated 138 (H) <=100 mg/dL    Non HDL Cholesterol 168 (H) <130 mg/dL    Patient Fasting > 8hrs? Unknown     Narrative    Cholesterol  Desirable:  <200 mg/dL    Triglycerides  Normal:  Less than 150 mg/dL  Borderline High:  150-199 mg/dL  High:  200-499 mg/dL  Very High:  Greater than or equal to 500 mg/dL    Direct Measure HDL  Female:  Greater than or equal to 50 mg/dL   Male:  Greater than or equal to 40 mg/dL    LDL Cholesterol  Desirable:  <100mg/dL  Above Desirable:  100-129 mg/dL   Borderline High:  130-159 mg/dL   High:  160-189 mg/dL   Very High:  >= 190 mg/dL    Non HDL Cholesterol  Desirable:  130 mg/dL  Above Desirable:  130-159 mg/dL  Borderline High:  160-189 mg/dL  High:  190-219 mg/dL  Very High:  Greater than or equal to 220 mg/dL   Comprehensive Metabolic Panel   Result Value Ref Range    Sodium 140 135 - 145 mmol/L      Comment:      Reference intervals for this test were updated on 09/26/2023 to more accurately reflect our healthy population. There may be differences in the flagging of prior results with similar values performed with this method. Interpretation of those prior results can be made  in the context of the updated reference intervals.     Potassium 3.8 3.4 - 5.3 mmol/L    Carbon Dioxide (CO2) 28 22 - 29 mmol/L    Anion Gap 11 7 - 15 mmol/L    Urea Nitrogen 11.1 8.0 - 23.0 mg/dL    Creatinine 0.63 0.51 - 0.95 mg/dL    GFR Estimate >90 >60 mL/min/1.73m2    Calcium 10.2 8.8 - 10.2 mg/dL    Chloride 101 98 - 107 mmol/L    Glucose 95 70 - 99 mg/dL    Alkaline Phosphatase 81 40 - 150 U/L      Comment:      Reference intervals for this test were updated on 11/14/2023 to more accurately reflect our healthy population. There may be differences in the flagging of prior results with similar values performed with this method. Interpretation of those prior results can be made in the context of the updated reference intervals.    AST 22 0 - 45 U/L      Comment:      Reference intervals for this test were updated on 6/12/2023 to more accurately reflect our healthy population. There may be differences in the flagging of prior results with similar values performed with this method. Interpretation of those prior results can be made in the context of the updated reference intervals.    ALT 21 0 - 50 U/L      Comment:      Reference intervals for this test were updated on 6/12/2023 to more accurately reflect our healthy population. There may be differences in the flagging of prior results with similar values performed with this method. Interpretation of those prior results can be made in the context of the updated reference intervals.      Protein Total 7.4 6.4 - 8.3 g/dL    Albumin 4.4 3.5 - 5.2 g/dL    Bilirubin Total 0.5 <=1.2 mg/dL       If you have any questions or concerns, please call the clinic at the number listed above.       Sincerely,      Wes Gasca MD

## 2024-04-04 NOTE — NURSING NOTE
"Patient presents to the clinic for discuss lab work.    FOOD SECURITY SCREENING QUESTIONS:    The next two questions are to help us understand your food security.  If you are feeling you need any assistance in this area, we have resources available to support you today.    Hunger Vital Signs:  Within the past 12 months we worried whether our food would run out before we got money to buy more. Never  Within the past 12 months the food we bought just didn't last and we didn't have money to get more. Never    Advance Care Directive on file? no  Advance Care Directive provided to patient? Declined-has legal documents at home.    Chief Complaint   Patient presents with    Clinic Care Coordination - Follow-up     labs       Initial /86 (BP Location: Right arm, Patient Position: Sitting, Cuff Size: Adult Large)   Pulse 60   Temp (!) 96.1  F (35.6  C) (Temporal)   Resp 20   Ht 1.702 m (5' 7\")   Wt 134.7 kg (297 lb)   LMP 01/01/2004 (Within Months)   SpO2 96%   BMI 46.52 kg/m   Estimated body mass index is 46.52 kg/m  as calculated from the following:    Height as of this encounter: 1.702 m (5' 7\").    Weight as of this encounter: 134.7 kg (297 lb).  Medication Reconciliation: complete        Jordyn Zimmer LPN'     "

## 2024-04-16 ENCOUNTER — MYC MEDICAL ADVICE (OUTPATIENT)
Dept: INTERNAL MEDICINE | Facility: OTHER | Age: 64
End: 2024-04-16
Payer: COMMERCIAL

## 2024-04-16 DIAGNOSIS — R73.03 PREDIABETES: ICD-10-CM

## 2024-04-16 DIAGNOSIS — I50.32 CHRONIC DIASTOLIC HEART FAILURE (H): ICD-10-CM

## 2024-04-16 DIAGNOSIS — E66.01 MORBID OBESITY (H): ICD-10-CM

## 2024-04-16 NOTE — TELEPHONE ENCOUNTER
All Wegovy orders stuck in system when ordered on 4/4/24.  Released all to [facility].     Patient update on [employer].    Emerita Multani RN on 4/16/2024 at 12:32 PM

## 2024-04-17 RX ORDER — SEMAGLUTIDE 0.25 MG/.5ML
INJECTION, SOLUTION SUBCUTANEOUS
Qty: 2 ML | Refills: 0 | Status: SHIPPED | OUTPATIENT
Start: 2024-04-17 | End: 2024-05-06

## 2024-04-17 NOTE — TELEPHONE ENCOUNTER
Changes Requested     WEGOVY 0.25 MG/0.5ML pen         Changed from: Semaglutide-Weight Management (WEGOVY) 0.25 MG/0.5ML pen     Possible duplicate: [place] to review recent actions on this medication    Sig: INJECT 0.25 MG SUBCUTANEOUS EVERY 7 DAYS X4 DOSES - FOR DIABETES AND OBESITY - THEN INCREASE TO 0.5 MG WEEKLY    Disp: Not specified (Pharmacy requested: 2 each)    Refills: 0    Start: 4/16/2024    Class: E-Prescribe    For: Morbid obesity (H), Chronic diastolic heart failure (H), Prediabetes    Last ordered: 1 week ago (4/4/2024) by Wes Gasca MD    Last refill: 4/16/2024    Rx #: [id number]    Pharmacy comment: Product Backordered/Unavailable:ON BACK ORDER.       To be filled at: [facility] [address] [place], [place] - [facility] [address]        Freda Hazel RN .............. 4/17/2024  1:19 PM

## 2024-04-29 DIAGNOSIS — E53.8 VITAMIN B12 DEFICIENCY (NON ANEMIC): ICD-10-CM

## 2024-04-29 RX ORDER — CYANOCOBALAMIN 1000 UG/ML
1 INJECTION, SOLUTION INTRAMUSCULAR; SUBCUTANEOUS
Qty: 3 ML | Refills: 3 | Status: SHIPPED | OUTPATIENT
Start: 2024-04-29

## 2024-04-29 NOTE — TELEPHONE ENCOUNTER
[facility] in #[id number] in [facility] [place] sent Rx request for the following:      Requested Prescriptions   Pending Prescriptions Disp Refills    cyanocobalamin (CYANOCOBALAMIN) 1000 MCG/ML injection [Pharmacy Med Name: CYANOCOBALAMIN 1,000 MCG/ML VL] 3 mL 0     Sig: INJECT 1 ML (1,000 MCG) INTO THE MUSCLE EVERY 30 DAYS   Last Prescription Date:   1/31/21  Last Fill Qty/Refills:         3 ml, R-0    Last Office Visit:              4/4/24   Future Office visit:           8/7/24    Prescription refilled per RN Medication Refill Policy.................... Freda Hazel RN ....................  4/29/2024   11:58 AM

## 2024-05-06 ENCOUNTER — MYC MEDICAL ADVICE (OUTPATIENT)
Dept: INTERNAL MEDICINE | Facility: OTHER | Age: 64
End: 2024-05-06
Payer: COMMERCIAL

## 2024-05-06 DIAGNOSIS — E66.01 MORBID OBESITY (H): ICD-10-CM

## 2024-05-06 DIAGNOSIS — I50.32 CHRONIC DIASTOLIC HEART FAILURE (H): ICD-10-CM

## 2024-05-06 DIAGNOSIS — R73.03 PREDIABETES: ICD-10-CM

## 2024-05-06 RX ORDER — SEMAGLUTIDE 0.25 MG/.5ML
0.25 INJECTION, SOLUTION SUBCUTANEOUS
Qty: 2 ML | Refills: 0 | Status: SHIPPED | OUTPATIENT
Start: 2024-05-06 | End: 2024-06-05

## 2024-05-06 NOTE — TELEPHONE ENCOUNTER
Transferring non-controlled substance script to correct pharmacy.      Non-controlled substance and start date adjusted to original order.      Sent Wegovy scripts to [facility].      Patient update on [employer].    Emerita Multani RN on 5/6/2024 at 2:59 PM

## 2024-05-06 NOTE — LETTER
5/17/2024        RE: Milana Blum  [address] [facility] [address]      To Whom It May Concern,    I am Milana Blum's Primary Care Provider and she was recently denied coverage of Wegovy.  The reasoning for denial was listed as:    Your plan only covers this drug when you experience benefits from taking the drug.  We have denied your request because you did not have good outcomes from the drug.  We reviewed the information we had.  Your request has been denied.  Your doctor can send us any new or missing information for us to review.      Milana has never taken Wegovy, nor any GLP1 receptor agonist.  We believe where this confusion is coming from is that the prior authorization was run on a non-starting dose of Wegovy, making it seem like a continuation of therapy.  However she has not yet been approved for this medication, nor started it, so we are asking that you re-consider approving the starting dose of Wegovy 0.25 for Milana.      Don't hesitate to reach out to my office with questions or updates at [phone].      Sincerely,        Wes Gasca MD

## 2024-05-08 NOTE — TELEPHONE ENCOUNTER
Wegovy first ordered 4/4.     Reached out to [employer] to inquire about PA on Wegovy.  They are currently on 5/3.  Request just sent from [facility] today.     Patient update on [employer].     Emerita Multani RN on 5/8/2024 at 2:04 PM

## 2024-05-13 ENCOUNTER — TELEPHONE (OUTPATIENT)
Dept: INTERNAL MEDICINE | Facility: OTHER | Age: 64
End: 2024-05-13
Payer: COMMERCIAL

## 2024-05-13 NOTE — TELEPHONE ENCOUNTER
Reached out to [employer] to inquire about PA on Wegovy.     Denial for: 0.5mg, 1.7mg, and 2.4 doses.     Sent denial info.     I can't see that a PA has ever been done on starting dose 0.25.      I may need to call.      Emerita Multani RN on 5/13/2024 at 3:19 PM

## 2024-05-13 NOTE — TELEPHONE ENCOUNTER
What does that mean?  Did the patient not have a good outcome or do need to prescribe a different medication?  Is it not on formulary?    Wes Gasca MD on 5/13/2024 at 4:27 PM

## 2024-05-13 NOTE — TELEPHONE ENCOUNTER
[facility] due to not having good outcome on rx.    Philly Carrasco on 5/13/2024 at 4:06 PM

## 2024-05-14 NOTE — TELEPHONE ENCOUNTER
Please tell them that their physician would the patient's physician would like the patient to be on this medication.  They are not a physician.  There are multiple studies that show the benefit of this drug and the patient is life-threatening morbid obesity is a hazard to her life.  The FDA has approved this medication for weight loss and their speculation of the drug is not a reason to decide whether or not the patient can have appropriate dosing of this medication.  Please ask which medication would be covered in its place.    Wes Gasca MD on 5/14/2024 at 9:05 AM

## 2024-05-14 NOTE — TELEPHONE ENCOUNTER
Writer spoke to Philly in Prior Auth Department who stated they received a fax from the insurance company stating they have denied the 1.7mg and 2.4mg doses of the Wegovy due to not having good patient outcomes. Philly also stated that department has not yet received approval or denial for 0.25mg, 0.5mg, or 1mg of Wegovy.     Tania Berry LPN on 5/14/2024 at 8:46 AM   Ext. [phone]

## 2024-05-16 NOTE — TELEPHONE ENCOUNTER
Called [facility] Bryan ESPINOZA at [phone].    Wegovy PA 0.25.     Denial letter states:  Your plan only covers this drug when you experience benefits from takign the drug and when your results from taking the drug are sent to us.  Your doctor needs to send us all the following:    A)  Your weight prior to starting weght loss drug therapy.   B) Your weight now, and  C) The dates your wegihts were taken.      We have denied your request because we did not receive all of your results.      Insurance stated that:  Because there are already 2 denials on file for this medication, you would have to start the appeal process.     Patient update on [employer].    Emerita Multani RN on 5/16/2024 at 12:43 PM

## 2024-05-17 NOTE — TELEPHONE ENCOUNTER
Wegovy letter of appeal drafted and pended for review/print/signing.     Let me know when it is signed and I will come and  and fax in.     Emerita Multani RN on 5/17/2024 at 4:23 PM

## 2024-05-17 NOTE — TELEPHONE ENCOUNTER
Letter obtained and faxed to [facility] department.  Fax [phone].  6 pages sent at 4:54pm.     Patient update on MyChart.    Emerita Multani RN on 5/17/2024 at 4:56 PM

## 2024-05-23 NOTE — TELEPHONE ENCOUNTER
Received a letter faxed from [facility] from HIM.     It was for a: Mi Heredia  [phone].      Re-faxed letter of appeal to [facility] with clarifying info- telling them that this is not the same patient.  Also included by direct number and fax number for follow-up on the cover-letter.     Emerita Multani RN on 2024 at 11:44 AM

## 2024-05-28 ENCOUNTER — MYC MEDICAL ADVICE (OUTPATIENT)
Dept: INTERNAL MEDICINE | Facility: OTHER | Age: 64
End: 2024-05-28
Payer: COMMERCIAL

## 2024-05-28 DIAGNOSIS — E66.01 MORBID OBESITY (H): ICD-10-CM

## 2024-05-28 DIAGNOSIS — I50.32 CHRONIC DIASTOLIC HEART FAILURE (H): ICD-10-CM

## 2024-05-28 DIAGNOSIS — R73.03 PREDIABETES: ICD-10-CM

## 2024-05-28 NOTE — TELEPHONE ENCOUNTER
5/17/24 Letter of appeal sent.          [facility]    Emerita Multani RN on 5/28/2024 at 4:48 PM

## 2024-05-31 NOTE — TELEPHONE ENCOUNTER
Member ID: [id number]    NPI:  Campos [id number]    Called [facility] Bryan ESPINOZA at [phone].      Appeal received.  June 6th decision.  Appeal received on 23rd.  15 calendar days.      [facility]    Emerita Multani RN on 5/31/2024 at 12:20 PM

## 2024-06-04 ENCOUNTER — TELEPHONE (OUTPATIENT)
Dept: INTERNAL MEDICINE | Facility: OTHER | Age: 64
End: 2024-06-04
Payer: COMMERCIAL

## 2024-06-04 NOTE — TELEPHONE ENCOUNTER
[facility] appeals department is calling in requesting clinical information on the patient.  Bnonie Grey on 6/4/2024 at 11:12 AM

## 2024-06-04 NOTE — TELEPHONE ENCOUNTER
Called but message said no one is available at this time unable to leave a VM.  Jaylin Mc LPN on 6/4/2024 at 2:34 PM  EXT. [phone]

## 2024-06-05 RX ORDER — SEMAGLUTIDE 0.25 MG/.5ML
0.25 INJECTION, SOLUTION SUBCUTANEOUS
Qty: 2 ML | Refills: 0 | Status: SHIPPED | OUTPATIENT
Start: 2024-06-05

## 2024-06-05 RX ORDER — SEMAGLUTIDE 0.25 MG/.5ML
0.25 INJECTION, SOLUTION SUBCUTANEOUS
Qty: 2 ML | Refills: 0 | Status: SHIPPED | OUTPATIENT
Start: 2024-06-05 | End: 2024-06-05

## 2024-06-05 NOTE — TELEPHONE ENCOUNTER
Rosa Elena de souza as a new start of medication to try and get approved.  Needed to know if patient has participated in a weight loss activity, with or:  physical activity, reduced calories.  Writer answered yes.  Medication has been approved for 7 months.  Approval letter will be sent  Emerita Hernandez LPN.......6/5/2024 11:36 AM

## 2024-06-18 ENCOUNTER — MYC MEDICAL ADVICE (OUTPATIENT)
Dept: INTERNAL MEDICINE | Facility: OTHER | Age: 64
End: 2024-06-18
Payer: COMMERCIAL

## 2024-06-18 NOTE — TELEPHONE ENCOUNTER
Palpitations are not a common or expected side effect from Wegovy- I recommend she be evaluated by any available provider for her palpitations and symptoms.

## 2024-07-11 DIAGNOSIS — E66.01 MORBID OBESITY (H): ICD-10-CM

## 2024-07-11 DIAGNOSIS — I50.32 CHRONIC DIASTOLIC HEART FAILURE (H): ICD-10-CM

## 2024-07-11 DIAGNOSIS — R73.03 PREDIABETES: ICD-10-CM

## 2024-07-16 ENCOUNTER — MYC MEDICAL ADVICE (OUTPATIENT)
Dept: INTERNAL MEDICINE | Facility: OTHER | Age: 64
End: 2024-07-16
Payer: COMMERCIAL

## 2024-07-16 DIAGNOSIS — E66.01 MORBID OBESITY (H): ICD-10-CM

## 2024-07-16 DIAGNOSIS — I50.32 CHRONIC DIASTOLIC HEART FAILURE (H): ICD-10-CM

## 2024-07-16 DIAGNOSIS — R73.03 PREDIABETES: ICD-10-CM

## 2024-07-16 RX ORDER — SEMAGLUTIDE 0.25 MG/.5ML
INJECTION, SOLUTION SUBCUTANEOUS
OUTPATIENT
Start: 2024-07-16

## 2024-07-16 NOTE — TELEPHONE ENCOUNTER
Transferring non-controlled substance script from [facility] to [facility] in [place].  Wegovy 0.5mg.      Non-controlled substance and start date adjusted to original order.      Emerita Multani RN on 7/16/2024 at 1:21 PM

## 2024-07-16 NOTE — TELEPHONE ENCOUNTER
Please see MyChart encounter, dated 7/16/24. Pt informed of new prescription for 0.5 mg dosing. Freda Hazel RN .............. 7/16/2024  4:40 PM

## 2024-07-16 NOTE — TELEPHONE ENCOUNTER
Future orders for Wegovy at [facility].  I can transfer to [facility] in [place].      Clarifying question(s) sent to patient through MyChart encounter.      Emerita Multani RN on 7/16/2024 at 1:17 PM

## 2024-07-17 ENCOUNTER — THERAPY VISIT (OUTPATIENT)
Dept: CHIROPRACTIC MEDICINE | Facility: OTHER | Age: 64
End: 2024-07-17
Attending: CHIROPRACTOR
Payer: COMMERCIAL

## 2024-07-17 VITALS
HEART RATE: 63 BPM | TEMPERATURE: 97.3 F | RESPIRATION RATE: 18 BRPM | OXYGEN SATURATION: 98 % | SYSTOLIC BLOOD PRESSURE: 126 MMHG | DIASTOLIC BLOOD PRESSURE: 80 MMHG

## 2024-07-17 DIAGNOSIS — G44.209 TENSION HEADACHE: ICD-10-CM

## 2024-07-17 DIAGNOSIS — M99.02 SEGMENTAL AND SOMATIC DYSFUNCTION OF THORACIC REGION: ICD-10-CM

## 2024-07-17 DIAGNOSIS — M99.04 SEGMENTAL AND SOMATIC DYSFUNCTION OF SACRAL REGION: ICD-10-CM

## 2024-07-17 DIAGNOSIS — M99.01 SEGMENTAL AND SOMATIC DYSFUNCTION OF CERVICAL REGION: ICD-10-CM

## 2024-07-17 DIAGNOSIS — M54.2 CERVICALGIA: Primary | ICD-10-CM

## 2024-07-17 DIAGNOSIS — M53.3 SI (SACROILIAC) JOINT DYSFUNCTION: ICD-10-CM

## 2024-07-17 PROCEDURE — 98941 CHIROPRACT MANJ 3-4 REGIONS: CPT | Mod: AT | Performed by: CHIROPRACTOR

## 2024-07-17 PROCEDURE — 99212 OFFICE O/P EST SF 10 MIN: CPT | Mod: 25 | Performed by: CHIROPRACTOR

## 2024-07-17 NOTE — PROGRESS NOTES
Patient stated Flare- up was about a month ago. Neck with a frequent aching. 8/10 W24 10/10. Radiating across top of left shoulder.   Right lower back with a frequent aching and tightness. 2/10 W24 7/10.   Cecelia Ty on 7/17/2024 at 1:19 PM    Reviewed by EW    Visit #:  1    Subjective:  Milana Blum is a 63 year old female who is seen for:        Cervicalgia  Segmental and somatic dysfunction of cervical region  Tension headache  Segmental and somatic dysfunction of sacral region  SI (sacroiliac) joint dysfunction  Segmental and somatic dysfunction of thoracic region.      Milana Blum reports: Has been experiencing symptoms of neck and back pain over the past month.  Neck pain has been radiating into the shoulder.  Denies any jaw pain on the left side.    Is also been experiencing some low back pain as well.  Denies any red flag symptoms consistent with cauda equina    Experienced a mid back pop when reaching overhead recently.    Presents to our office for assessment and treatment    (DVPRS) Pain Rating Score : 2-Notice pain, does not interfere with activities (W24 7/10) (07/17/24 1317)     Objective:  The following was observed:  /80 (BP Location: Right arm, Patient Position: Sitting, Cuff Size: Adult Large)   Pulse 63   Temp 97.3  F (36.3  C) (Tympanic)   Resp 18   LMP 01/01/2004 (Within Months)   SpO2 98%        7/17/2024     1:20 PM   Neck Disability Index (  Brien ARMENDARIZ. and Stefanie C. 1991. All rights reserved.; used with permission)   SECTION 1 - PAIN INTENSITY 3   SECTION 2 - PERSONAL CARE 0   SECTION 3 - LIFTING 1   SECTION 4 - READING 0   SECTION 5 - HEADACHES 4   SECTION 6 - CONCENTRATION 2   SECTION 7 - WORK 1   SECTION 8 - DRIVING 2   SECTION 9 - SLEEPING 3   SECTION 10 - RECREATION 2   Count 10   Sum 18   Raw Score: /50 18   Neck Disability Index Score: (%) 36 %      Cervical AROM: Moderate restrictions with lateral flexion, mild restriction with rotation, flexion and extension  appear within normal limits    Cervical compression: -  Cervical distraction: +    Oswestry (COLLIN) Questionnaire        7/17/2024     1:21 PM   OSWESTRY DISABILITY INDEX   Count 9   Sum 14   Oswestry Score (%) 31.11 %      Positive minors from sit to stand    Kemps: Positive mid thoracic spine with extension, positive right SI joint    P: palpatory tenderness C1 on right, T8, right PSIS:    A: static palpation demonstrates intersegmental asymmetry , cervical, thoracic, pelvis  R: motion palpation notes restricted motion, C1 , T8 , and Sacrum   T: muscle spasm at level(s):  Right quadratus lumborum, suboccipitals on left:      Segmental spinal dysfunction/restrictions found at:  :  C1 Right rotation restricted and Left lateral flexion restricted  T8 Extension restriction  Sacrum Right lateral flexion restricted and Extension restriction.      Assessment: Segmental/somatic dysfunction apparent of the cervical, thoracic and lumbar/pelvic regions consistent with subjective reports and objective findings.  Patient has been under chiropractic care in the past with good results.  Suspect that to be the case at this time.  No contraindications precluding patient from receiving care today.  Plan for up to 4 visits within the next 4 weeks barring acute exacerbation of symptoms.  Plan to follow-up with patient if needed in the next 4 weeks.    Diagnoses:      1. Cervicalgia    2. Segmental and somatic dysfunction of cervical region    3. Tension headache    4. Segmental and somatic dysfunction of sacral region    5. SI (sacroiliac) joint dysfunction    6. Segmental and somatic dysfunction of thoracic region        Patient's condition:  Patient had restrictions pre-manipulation    Treatment effectiveness:  Post manipulation there is better intersegmental movement and Patient claims to feel looser post manipulation      Procedures:  E/M [address]    [facility]:  [address] Chiropractic manipulative treatment 3-4 regions performed   Cervical:  seated diversified, light force, C1 , Seated  Thoracic: Diversified, T8, Prone  Pelvis: Drop Table, Sacrum , Prone    Modalities:  32[address]: MSTM:  To Quad lumb  for 4 min    Therapeutic procedures:  None    Response to Treatment  Reduction in symptoms as reported by patient    Prognosis: Good    Progress towards Goals: Reduce symptoms by 50% within 4 weeks  Improve cervical AROM within 4 weeks  Reduce headaches as shown by a 2 point decrease on neck disability index score     Recommendations:    Instructions:ice 20 minutes every other hour as needed    Follow-up:  Return to care if symptoms persist.

## 2024-08-02 SDOH — HEALTH STABILITY: PHYSICAL HEALTH: ON AVERAGE, HOW MANY DAYS PER WEEK DO YOU ENGAGE IN MODERATE TO STRENUOUS EXERCISE (LIKE A BRISK WALK)?: 4 DAYS

## 2024-08-02 SDOH — HEALTH STABILITY: PHYSICAL HEALTH: ON AVERAGE, HOW MANY MINUTES DO YOU ENGAGE IN EXERCISE AT THIS LEVEL?: 30 MIN

## 2024-08-02 ASSESSMENT — SOCIAL DETERMINANTS OF HEALTH (SDOH): HOW OFTEN DO YOU GET TOGETHER WITH FRIENDS OR RELATIVES?: ONCE A WEEK

## 2024-08-05 ENCOUNTER — THERAPY VISIT (OUTPATIENT)
Dept: CHIROPRACTIC MEDICINE | Facility: OTHER | Age: 64
End: 2024-08-05
Attending: CHIROPRACTOR
Payer: COMMERCIAL

## 2024-08-05 VITALS — HEART RATE: 64 BPM | OXYGEN SATURATION: 97 % | TEMPERATURE: 97.7 F | RESPIRATION RATE: 18 BRPM

## 2024-08-05 DIAGNOSIS — M53.3 SI (SACROILIAC) JOINT DYSFUNCTION: ICD-10-CM

## 2024-08-05 DIAGNOSIS — M99.04 SEGMENTAL AND SOMATIC DYSFUNCTION OF SACRAL REGION: Primary | ICD-10-CM

## 2024-08-05 PROCEDURE — 98940 CHIROPRACT MANJ 1-2 REGIONS: CPT | Mod: AT | Performed by: CHIROPRACTOR

## 2024-08-05 NOTE — PROGRESS NOTES
Lower right back is frequently aching. 4/10 W24 8/10. Doing stretches which is providing a decrease in discomfort.   Lata Jennifer on 8/5/2024 at 10:04 AM     Reviewed by EW    Visit #:  2    Subjective:  Milana Blum is a 63 year old female who is seen in f/u up for:        Segmental and somatic dysfunction of sacral region  SI (sacroiliac) joint dysfunction.     Since last visit on 7/17/2024,  Milana Blum reports: Did well after last encounter.  Neck seems to be doing well just a little tight.  Low back did flareup recently.  Home stretches seem to be providing some level of benefit.  Denies any radiculopathy or red flags consistent with cauda equina.      (DVPRS) Pain Rating Score : 4-Distracts me, can do usual activities (w24 8/10) (08/05/24 0958)     Objective:  The following was observed:  Pulse 64   Temp 97.7  F (36.5  C) (Tympanic)   Resp 18   LMP 01/01/2004 (Within Months)   SpO2 97%    Oswestry (COLLIN) Questionnaire        7/17/2024     1:21 PM   OSWESTRY DISABILITY INDEX   Count 9   Sum 14   Oswestry Score (%) 31.11 %           P: palpatory tenderness right PSIS:    A: static palpation demonstrates intersegmental asymmetry , pelvis  R: motion palpation notes restricted motion, Sacrum   T: muscle spasm at level(s): Quad lumb R>>L    Segmental spinal dysfunction/restrictions found at:  :  Sacrum Right lateral flexion restricted and Extension restriction.      Assessment: Neck symptoms doing better since last encounter.  Exacerbation of SI joint dysfunction.  Patient may benefit from additional visit within the next week or 2.  Follow-up with patient if needed in the next 24 hours.    Diagnoses:      1. Segmental and somatic dysfunction of sacral region    2. SI (sacroiliac) joint dysfunction        Patient's condition:  Patient had restrictions pre-manipulation    Treatment effectiveness:  Post manipulation there is better intersegmental movement and Patient claims to feel looser post  manipulation      Procedures:  CMT:  94908 Chiropractic manipulative treatment 1-2 regions performed   Pelvis: Drop Table, Sacrum , Prone    Modalities:  None performed this visit    Therapeutic procedures:  None    Response to Treatment  Reduction in symptoms as reported by patient    Prognosis: Good    Progress towards Goals: Patient is making progress towards the goal.     Recommendations:    Instructions: monitor symptoms    Follow-up:  Return to care if symptoms persist.

## 2024-08-07 ENCOUNTER — OFFICE VISIT (OUTPATIENT)
Dept: INTERNAL MEDICINE | Facility: OTHER | Age: 64
End: 2024-08-07
Attending: STUDENT IN AN ORGANIZED HEALTH CARE EDUCATION/TRAINING PROGRAM
Payer: COMMERCIAL

## 2024-08-07 VITALS
OXYGEN SATURATION: 95 % | HEART RATE: 66 BPM | BODY MASS INDEX: 44.51 KG/M2 | RESPIRATION RATE: 16 BRPM | HEIGHT: 67 IN | TEMPERATURE: 98.6 F | DIASTOLIC BLOOD PRESSURE: 82 MMHG | SYSTOLIC BLOOD PRESSURE: 134 MMHG | WEIGHT: 283.6 LBS

## 2024-08-07 DIAGNOSIS — I10 ESSENTIAL HYPERTENSION, BENIGN: ICD-10-CM

## 2024-08-07 DIAGNOSIS — R06.09 DOE (DYSPNEA ON EXERTION): ICD-10-CM

## 2024-08-07 DIAGNOSIS — I71.21 ANEURYSM OF ASCENDING AORTA WITHOUT RUPTURE (H): ICD-10-CM

## 2024-08-07 DIAGNOSIS — R00.2 PALPITATIONS: ICD-10-CM

## 2024-08-07 DIAGNOSIS — E53.8 VITAMIN B12 DEFICIENCY (NON ANEMIC): ICD-10-CM

## 2024-08-07 DIAGNOSIS — Z00.00 ANNUAL PHYSICAL EXAM: Primary | ICD-10-CM

## 2024-08-07 DIAGNOSIS — J40 BRONCHITIS: ICD-10-CM

## 2024-08-07 DIAGNOSIS — R06.2 WHEEZING: ICD-10-CM

## 2024-08-07 DIAGNOSIS — J45.30 MILD PERSISTENT ASTHMA WITHOUT COMPLICATION: ICD-10-CM

## 2024-08-07 DIAGNOSIS — Z12.4 CERVICAL CANCER SCREENING: ICD-10-CM

## 2024-08-07 DIAGNOSIS — I50.32 DIASTOLIC DYSFUNCTION WITH CHRONIC HEART FAILURE (H): ICD-10-CM

## 2024-08-07 DIAGNOSIS — M54.50 LUMBAR BACK PAIN: ICD-10-CM

## 2024-08-07 DIAGNOSIS — E78.2 MIXED HYPERLIPIDEMIA: ICD-10-CM

## 2024-08-07 DIAGNOSIS — Z12.31 ENCOUNTER FOR SCREENING MAMMOGRAM FOR BREAST CANCER: ICD-10-CM

## 2024-08-07 DIAGNOSIS — I47.10 PAROXYSMAL SUPRAVENTRICULAR TACHYCARDIA (H): ICD-10-CM

## 2024-08-07 DIAGNOSIS — I10 BENIGN ESSENTIAL HYPERTENSION: ICD-10-CM

## 2024-08-07 DIAGNOSIS — R60.0 PERIPHERAL EDEMA: ICD-10-CM

## 2024-08-07 LAB
ALBUMIN SERPL BCG-MCNC: 4.2 G/DL (ref 3.5–5.2)
ALP SERPL-CCNC: 77 U/L (ref 40–150)
ALT SERPL W P-5'-P-CCNC: 16 U/L (ref 0–50)
ANION GAP SERPL CALCULATED.3IONS-SCNC: 9 MMOL/L (ref 7–15)
AST SERPL W P-5'-P-CCNC: 23 U/L (ref 0–45)
BASOPHILS # BLD AUTO: 0.1 10E3/UL (ref 0–0.2)
BASOPHILS NFR BLD AUTO: 1 %
BILIRUB SERPL-MCNC: 0.8 MG/DL
BUN SERPL-MCNC: 8.9 MG/DL (ref 8–23)
CALCIUM SERPL-MCNC: 9.7 MG/DL (ref 8.8–10.4)
CHLORIDE SERPL-SCNC: 101 MMOL/L (ref 98–107)
CHOLEST SERPL-MCNC: 192 MG/DL
CREAT SERPL-MCNC: 0.73 MG/DL (ref 0.51–0.95)
EGFRCR SERPLBLD CKD-EPI 2021: >90 ML/MIN/1.73M2
EOSINOPHIL # BLD AUTO: 0.1 10E3/UL (ref 0–0.7)
EOSINOPHIL NFR BLD AUTO: 1 %
ERYTHROCYTE [DISTWIDTH] IN BLOOD BY AUTOMATED COUNT: 13.5 % (ref 10–15)
FASTING STATUS PATIENT QL REPORTED: YES
FASTING STATUS PATIENT QL REPORTED: YES
GLUCOSE SERPL-MCNC: 97 MG/DL (ref 70–99)
HCO3 SERPL-SCNC: 30 MMOL/L (ref 22–29)
HCT VFR BLD AUTO: 41.9 % (ref 35–47)
HDLC SERPL-MCNC: 40 MG/DL
HGB BLD-MCNC: 13.2 G/DL (ref 11.7–15.7)
IMM GRANULOCYTES # BLD: 0 10E3/UL
IMM GRANULOCYTES NFR BLD: 0 %
LDLC SERPL CALC-MCNC: 131 MG/DL
LYMPHOCYTES # BLD AUTO: 3.5 10E3/UL (ref 0.8–5.3)
LYMPHOCYTES NFR BLD AUTO: 51 %
MCH RBC QN AUTO: 27.5 PG (ref 26.5–33)
MCHC RBC AUTO-ENTMCNC: 31.5 G/DL (ref 31.5–36.5)
MCV RBC AUTO: 87 FL (ref 78–100)
MONOCYTES # BLD AUTO: 0.4 10E3/UL (ref 0–1.3)
MONOCYTES NFR BLD AUTO: 6 %
NEUTROPHILS # BLD AUTO: 2.8 10E3/UL (ref 1.6–8.3)
NEUTROPHILS NFR BLD AUTO: 41 %
NONHDLC SERPL-MCNC: 152 MG/DL
NRBC # BLD AUTO: 0 10E3/UL
NRBC BLD AUTO-RTO: 0 /100
PLATELET # BLD AUTO: 220 10E3/UL (ref 150–450)
POTASSIUM SERPL-SCNC: 3.5 MMOL/L (ref 3.4–5.3)
PROT SERPL-MCNC: 7.1 G/DL (ref 6.4–8.3)
RBC # BLD AUTO: 4.8 10E6/UL (ref 3.8–5.2)
SODIUM SERPL-SCNC: 140 MMOL/L (ref 135–145)
TRIGL SERPL-MCNC: 105 MG/DL
VIT B12 SERPL-MCNC: 1118 PG/ML (ref 232–1245)
WBC # BLD AUTO: 6.9 10E3/UL (ref 4–11)

## 2024-08-07 PROCEDURE — 90750 HZV VACC RECOMBINANT IM: CPT | Performed by: STUDENT IN AN ORGANIZED HEALTH CARE EDUCATION/TRAINING PROGRAM

## 2024-08-07 PROCEDURE — 99396 PREV VISIT EST AGE 40-64: CPT | Mod: 25 | Performed by: STUDENT IN AN ORGANIZED HEALTH CARE EDUCATION/TRAINING PROGRAM

## 2024-08-07 PROCEDURE — 90678 RSV VACC PREF BIVALENT IM: CPT | Performed by: STUDENT IN AN ORGANIZED HEALTH CARE EDUCATION/TRAINING PROGRAM

## 2024-08-07 PROCEDURE — 90471 IMMUNIZATION ADMIN: CPT | Performed by: STUDENT IN AN ORGANIZED HEALTH CARE EDUCATION/TRAINING PROGRAM

## 2024-08-07 PROCEDURE — 36415 COLL VENOUS BLD VENIPUNCTURE: CPT | Mod: ZL | Performed by: STUDENT IN AN ORGANIZED HEALTH CARE EDUCATION/TRAINING PROGRAM

## 2024-08-07 PROCEDURE — 85025 COMPLETE CBC W/AUTO DIFF WBC: CPT | Mod: ZL | Performed by: STUDENT IN AN ORGANIZED HEALTH CARE EDUCATION/TRAINING PROGRAM

## 2024-08-07 PROCEDURE — 80061 LIPID PANEL: CPT | Mod: ZL | Performed by: STUDENT IN AN ORGANIZED HEALTH CARE EDUCATION/TRAINING PROGRAM

## 2024-08-07 PROCEDURE — 99214 OFFICE O/P EST MOD 30 MIN: CPT | Mod: 25 | Performed by: STUDENT IN AN ORGANIZED HEALTH CARE EDUCATION/TRAINING PROGRAM

## 2024-08-07 PROCEDURE — 80053 COMPREHEN METABOLIC PANEL: CPT | Mod: ZL | Performed by: STUDENT IN AN ORGANIZED HEALTH CARE EDUCATION/TRAINING PROGRAM

## 2024-08-07 PROCEDURE — 90472 IMMUNIZATION ADMIN EACH ADD: CPT | Performed by: STUDENT IN AN ORGANIZED HEALTH CARE EDUCATION/TRAINING PROGRAM

## 2024-08-07 PROCEDURE — 82607 VITAMIN B-12: CPT | Mod: ZL | Performed by: STUDENT IN AN ORGANIZED HEALTH CARE EDUCATION/TRAINING PROGRAM

## 2024-08-07 RX ORDER — CYCLOBENZAPRINE HCL 5 MG
5-10 TABLET ORAL 3 TIMES DAILY PRN
Qty: 30 TABLET | Refills: 1 | Status: SHIPPED | OUTPATIENT
Start: 2024-08-07

## 2024-08-07 RX ORDER — HYDROCHLOROTHIAZIDE 50 MG/1
50 TABLET ORAL DAILY
Qty: 90 TABLET | Refills: 4 | Status: SHIPPED | OUTPATIENT
Start: 2024-08-07

## 2024-08-07 RX ORDER — NEBIVOLOL 10 MG/1
10 TABLET ORAL DAILY
Qty: 90 TABLET | Refills: 4 | Status: SHIPPED | OUTPATIENT
Start: 2024-08-07

## 2024-08-07 RX ORDER — ALBUTEROL SULFATE 90 UG/1
AEROSOL, METERED RESPIRATORY (INHALATION)
Qty: 54 G | Refills: 3 | Status: SHIPPED | OUTPATIENT
Start: 2024-08-07

## 2024-08-07 RX ORDER — BUDESONIDE AND FORMOTEROL FUMARATE DIHYDRATE 160; 4.5 UG/1; UG/1
AEROSOL RESPIRATORY (INHALATION)
Qty: 28 G | Refills: 11 | Status: SHIPPED | OUTPATIENT
Start: 2024-08-07

## 2024-08-07 ASSESSMENT — ASTHMA QUESTIONNAIRES
QUESTION_1 LAST FOUR WEEKS HOW MUCH OF THE TIME DID YOUR ASTHMA KEEP YOU FROM GETTING AS MUCH DONE AT WORK, SCHOOL OR AT HOME: NONE OF THE TIME
QUESTION_5 LAST FOUR WEEKS HOW WOULD YOU RATE YOUR ASTHMA CONTROL: COMPLETELY CONTROLLED
ACT_TOTALSCORE: 25
ACT_TOTALSCORE: 25
QUESTION_3 LAST FOUR WEEKS HOW OFTEN DID YOUR ASTHMA SYMPTOMS (WHEEZING, COUGHING, SHORTNESS OF BREATH, CHEST TIGHTNESS OR PAIN) WAKE YOU UP AT NIGHT OR EARLIER THAN USUAL IN THE MORNING: NOT AT ALL
QUESTION_4 LAST FOUR WEEKS HOW OFTEN HAVE YOU USED YOUR RESCUE INHALER OR NEBULIZER MEDICATION (SUCH AS ALBUTEROL): NOT AT ALL
QUESTION_2 LAST FOUR WEEKS HOW OFTEN HAVE YOU HAD SHORTNESS OF BREATH: NOT AT ALL

## 2024-08-07 ASSESSMENT — PAIN SCALES - GENERAL: PAINLEVEL: EXTREME PAIN (8)

## 2024-08-07 NOTE — PROGRESS NOTES
Preventive Care Visit  [facility] AND [facility]  Wes Gasca MD, Internal Medicine  Aug 7, 2024      Assessment & Plan     Assessment & Plan         ICD-10-CM    1. Annual physical exam  Z00.00 Comprehensive Metabolic Panel     CBC and Differential     Comprehensive Metabolic Panel     CBC and Differential      2. Lumbar back pain  M54.50 cyclobenzaprine (FLEXERIL) 5 MG tablet      3. Mixed hyperlipidemia  E78.2 Lipid Panel     Lipid Panel      4. Benign essential hypertension  I10       5. Mild persistent asthma without complication  J45.30 albuterol (PROAIR HFA/PROVENTIL HFA/VENTOLIN HFA) 108 (90 Base) MCG/ACT inhaler      6. Bronchitis  J40 budesonide-formoterol (SYMBICORT) 160-4.5 MCG/ACT Inhaler      7. Wheezing  R06.2 budesonide-formoterol (SYMBICORT) 160-4.5 MCG/ACT Inhaler      8. Essential hypertension, benign  I10 hydrochlorothiazide (HYDRODIURIL) 50 MG tablet     nebivolol (BYSTOLIC) 10 MG tablet      9. Diastolic dysfunction with chronic heart failure (H)  I50.32 hydrochlorothiazide (HYDRODIURIL) 50 MG tablet      10. Peripheral edema  R60.0 hydrochlorothiazide (HYDRODIURIL) 50 MG tablet      11. DORMAN (dyspnea on exertion)  R06.09 hydrochlorothiazide (HYDRODIURIL) 50 MG tablet      12. Paroxysmal supraventricular tachycardia (H24)  I47.10 nebivolol (BYSTOLIC) 10 MG tablet      13. Palpitations  R00.2 nebivolol (BYSTOLIC) 10 MG tablet      14. Aneurysm of ascending aorta without rupture (H24)  I71.21 nebivolol (BYSTOLIC) 10 MG tablet      15. Vitamin B12 deficiency (non anemic)  E53.8 Vitamin B12     Vitamin B12      16. Encounter for screening mammogram for breast cancer  Z12.31 MA Screen Bilateral w/Chris      17. Cervical cancer screening  Z12.4 Ob/Gyn  Referral        Annual wellness exam: Updated patient's past medical history, surgical history, social history, and medications.  Age appropriate laboratory results were ordered as above.  She is due for annual screening mammogram and  "this is ordered for today.  Blood pressures under good control.  Congratulated her on her 20 pound weight loss and will continue a slow increase in her Wegovy.  She will slowly uptitrate as symptoms allow her to.  May continue lower doses for longer periods of time as long as she continues to lose weight.  She is also due for 1 final Pap smear and she will have this performed with OB/GYN.  Medications were reviewed and renewed as above.  She would like her vitamin B12 levels checked today as she has not had an injection in nearly 3 weeks.  She was also given a prescription for cyclobenzaprine for her low back pain.  No red flag symptoms at this time and she can follow-up in clinic if her pain is not improving.              No follow-ups on file.    Wes Gasca MD  [facility] AND [facility]      Patient has been advised of split billing requirements and indicates understanding: Yes        BMI  Estimated body mass index is 44.42 kg/m  as calculated from the following:    Height as of this encounter: 1.702 m (5' 7\").    Weight as of this encounter: 128.6 kg (283 lb 9.6 oz).   Weight management plan: Discussed healthy diet and exercise guidelines    Counseling  Appropriate preventive services were addressed with this patient via screening, questionnaire, or discussion as appropriate for fall prevention, nutrition, physical activity, Tobacco-use cessation, weight loss and cognition.  Checklist reviewing preventive services available has been given to the patient.  Reviewed patient's diet, addressing concerns and/or questions.   The patient was instructed to see the dentist every 6 months.           No follow-ups on file.    Gilda Cortés is a 63 year old, presenting for the following:  Physical        8/7/2024     7:59 AM   Additional Questions   Roomed by Kristian Berry LPN        Health Care Directive  Patient does not have a Health Care Directive or Living Will: Patient states has Advance Directive and " "will bring in a copy to clinic. - Brought copy today     History of Present Illness       Hyperlipidemia:  She presents for follow up of hyperlipidemia.   She is not taking medication to lower cholesterol. She is not having myalgia or other side effects to statin medications.     She has lost 20 pounds with wegovy.   Fluctuates between constipation and diarrhea.   Fatigue. Insomnia, migraines.   She is on 0.5mg weekly.   She is using her elliptical quite a bit as well.     Was doing yard work this weekend and \"blew out\" her back.   Saw chiropractor this week.   No red flag symptoms.     Heart palpitations have nearly stopped  Shortness of breath has improved.     Vitamin b12 shots last 3 weeks ago                8/2/2024   General Health   How would you rate your overall physical health? (!) FAIR   Feel stress (tense, anxious, or unable to sleep) Rather much      (!) STRESS CONCERN      8/2/2024   Nutrition   Three or more servings of calcium each day? (!) NO   Diet: Low salt    Gluten-free/reduced   How many servings of fruit and vegetables per day? (!) 2-3   How many sweetened beverages each day? 0-1       Multiple values from one day are sorted in reverse-chronological order         8/2/2024   Exercise   Days per week of moderate/strenous exercise 4 days   Average minutes spent exercising at this level 30 min            8/2/2024   Social Factors   Frequency of gathering with friends or relatives Once a week   Worry food won't last until get money to buy more No   Food not last or not have enough money for food? No   Do you have housing? (Housing is defined as stable permanent housing and does not include staying ouside in a car, in a tent, in an abandoned building, in an overnight shelter, or couch-surfing.) Yes   Are you worried about losing your housing? No   Lack of transportation? No   Unable to get utilities (heat,electricity)? No            8/2/2024   Fall Risk   Fallen 2 or more times in the past year? No "   Trouble with walking or balance? No             8/2/2024   Dental   Dentist two times every year? (!) NO                 Today's PHQ-2 Score:       4/4/2024     7:20 AM   PHQ-2 ( 1999 [employer])   Q1: Little interest or pleasure in doing things 1   Q2: Feeling down, depressed or hopeless 1   PHQ-2 Score 2   Q1: Little interest or pleasure in doing things Several days   Q2: Feeling down, depressed or hopeless Several days   PHQ-2 Score 2         8/2/2024   Substance Use   Alcohol more than 3/day or more than 7/wk No   Do you use any other substances recreationally? No        Social History     Tobacco Use    Smoking status: Never     Passive exposure: Past    Smokeless tobacco: Never   Vaping Use    Vaping status: Never Used   Substance Use Topics    Alcohol use: Yes     Alcohol/week: 2.0 standard drinks of alcohol     Comment: weekly beer or wine    Drug use: No           9/14/2022   LAST FHS-7 RESULTS   1st degree relative breast or ovarian cancer No   Any relative bilateral breast cancer No   Any male have breast cancer No   Any ONE woman have BOTH breast AND ovarian cancer No   Any woman with breast cancer before 50yrs No   2 or more relatives with breast AND/OR ovarian cancer No   2 or more relatives with breast AND/OR bowel cancer No                   8/2/2024   STI Screening   New sexual partner(s) since last STI/HIV test? No        History of abnormal Pap smear:        ASCVD Risk   The 10-year ASCVD risk score (Leann KEITH, et al., 2019) is: 8.1%    Values used to calculate the score:      Age: 63 years      Sex: Female      Is Non- : No      Diabetic: No      Tobacco smoker: No      Systolic Blood Pressure: 134 mmHg      Is BP treated: Yes      HDL Cholesterol: 39 mg/dL      Total Cholesterol: 207 mg/dL           Reviewed and updated as needed this visit by Provider                    Past Medical History:   Diagnosis Date    Aortic root dilatation (H24) 5/5/2022    Last  "echocardiogram May 2022 measurement 4.1 cm.  Slowly progressively getting bigger continue annual exams unless recommended otherwise by cardiology    Asthma     Hypertension     Low back pain      Past Surgical History:   Procedure Laterality Date    BACK SURGERY  08/2010    L4-5, S1 Hemilaminectomy, foraminotomy    IR LUMBAR EPIDURAL STEROID INJECTION  3/4/2010    MT REMOVAL OF OVARY/TUBE(S)      Description: Salpingo-oophorectomy Left Side;  Proc Date: 12/06/2004;    ZZC SUPRACERV ABD HYSTERECTOMY      Description: Supracervical Hysterectomy;  Proc Date: 12/06/2004;     Lab work is in process  Labs reviewed in EPIC         Objective    Exam  /82   Pulse 66   Temp 98.6  F (37  C) (Temporal)   Resp 16   Ht 1.702 m (5' 7\")   Wt 128.6 kg (283 lb 9.6 oz)   LMP 01/01/2004 (Within Months)   SpO2 95%   Breastfeeding No   BMI 44.42 kg/m     Estimated body mass index is 44.42 kg/m  as calculated from the following:    Height as of this encounter: 1.702 m (5' 7\").    Weight as of this encounter: 128.6 kg (283 lb 9.6 oz).    Physical Exam  Constitutional:       General: She is not in acute distress.     Appearance: She is well-developed. She is not diaphoretic.   HENT:      Head: Normocephalic and atraumatic.      Right Ear: Tympanic membrane, ear canal and external ear normal. There is no impacted cerumen.      Left Ear: Tympanic membrane, ear canal and external ear normal. There is no impacted cerumen.      Mouth/Throat:      Mouth: Mucous membranes are moist.      Pharynx: Oropharynx is clear.   Eyes:      General: No scleral icterus.     Conjunctiva/sclera: Conjunctivae normal.   Neck:      Vascular: No carotid bruit.   Cardiovascular:      Rate and Rhythm: Normal rate and regular rhythm.   Pulmonary:      Effort: Pulmonary effort is normal. No respiratory distress.      Breath sounds: Normal breath sounds. No wheezing.   Abdominal:      General: Bowel sounds are normal.      Palpations: Abdomen is soft. " There is no mass.      Tenderness: There is no abdominal tenderness.      Hernia: No hernia is present.   Musculoskeletal:         General: No deformity.      Cervical back: Neck supple. No rigidity.      Right lower leg: No edema.      Left lower leg: No edema.   Lymphadenopathy:      Cervical: No cervical adenopathy.   Skin:     General: Skin is warm and dry.      Coloration: Skin is not jaundiced.      Findings: No rash.   Neurological:      Mental Status: She is alert. Mental status is at baseline.   Psychiatric:         Mood and Affect: Mood normal.         Behavior: Behavior normal.               Signed Electronically by: Wes Gasca MD

## 2024-08-07 NOTE — NURSING NOTE
"Chief Complaint   Patient presents with    Physical       Initial /82   Pulse 66   Temp 98.6  F (37  C) (Temporal)   Resp 16   Ht 1.702 m (5' 7\")   Wt 128.6 kg (283 lb 9.6 oz)   LMP 01/01/2004 (Within Months)   SpO2 95%   Breastfeeding No   BMI 44.42 kg/m   Estimated body mass index is 44.42 kg/m  as calculated from the following:    Height as of this encounter: 1.702 m (5' 7\").    Weight as of this encounter: 128.6 kg (283 lb 9.6 oz).  Medication Review: complete    The next two questions are to help us understand your food security.  If you are feeling you need any assistance in this area, we have resources available to support you today.          8/2/2024   SDOH- Food Insecurity   Within the past 12 months, did you worry that your food would run out before you got money to buy more? N   Within the past 12 months, did the food you bought just not last and you didn t have money to get more? N              Tania Berry LPN      "

## 2024-08-07 NOTE — LETTER
August 7, 2024      Milana Blum  [address] [facility] [address]        Dear ,    We are writing to inform you of your test results.    Laboratory results are below.  Your vitamin B12 is still in the therapeutic range.  Your metabolic panel is normal.  Your cholesterol has already improved quite a bit.  This is great news.  Blood counts are completely normal.  Keep up the great work with weight loss.  Overall everything looks great.    Resulted Orders   Vitamin B12   Result Value Ref Range    Vitamin B12 1,118 232 - 1,245 pg/mL   Comprehensive Metabolic Panel   Result Value Ref Range    Sodium 140 135 - 145 mmol/L    Potassium 3.5 3.4 - 5.3 mmol/L    Carbon Dioxide (CO2) 30 (H) 22 - 29 mmol/L    Anion Gap 9 7 - 15 mmol/L    Urea Nitrogen 8.9 8.0 - 23.0 mg/dL    Creatinine 0.73 0.51 - 0.95 mg/dL    GFR Estimate >90 >60 mL/min/1.73m2      Comment:      eGFR calculated using 2021 CKD-EPI equation.    Calcium 9.7 8.8 - 10.4 mg/dL      Comment:      Reference intervals for this test were updated on 7/16/2024 to reflect our healthy population more accurately. There may be differences in the flagging of prior results with similar values performed with this method. Those prior results can be interpreted in the context of the updated reference intervals.    Chloride 101 98 - 107 mmol/L    Glucose 97 70 - 99 mg/dL    Alkaline Phosphatase 77 40 - 150 U/L    AST 23 0 - 45 U/L    ALT 16 0 - 50 U/L    Protein Total 7.1 6.4 - 8.3 g/dL    Albumin 4.2 3.5 - 5.2 g/dL    Bilirubin Total 0.8 <=1.2 mg/dL    Patient Fasting > 8hrs? Yes    Lipid Panel   Result Value Ref Range    Cholesterol 192 <200 mg/dL    Triglycerides 105 <150 mg/dL    Direct Measure HDL 40 (L) >=50 mg/dL    LDL Cholesterol Calculated 131 (H) <=100 mg/dL    Non HDL Cholesterol 152 (H) <130 mg/dL    Patient Fasting > 8hrs? Yes     Narrative    Cholesterol  Desirable:  <200 mg/dL    Triglycerides  Normal:  Less than 150 mg/dL  Borderline High:   150-199 mg/dL  High:  200-499 mg/dL  Very High:  Greater than or equal to 500 mg/dL    Direct Measure HDL  Female:  Greater than or equal to 50 mg/dL   Male:  Greater than or equal to 40 mg/dL    LDL Cholesterol  Desirable:  <100mg/dL  Above Desirable:  100-129 mg/dL   Borderline High:  130-159 mg/dL   High:  160-189 mg/dL   Very High:  >= 190 mg/dL    Non HDL Cholesterol  Desirable:  130 mg/dL  Above Desirable:  130-159 mg/dL  Borderline High:  160-189 mg/dL  High:  190-219 mg/dL  Very High:  Greater than or equal to 220 mg/dL   CBC with platelets and differential   Result Value Ref Range    WBC Count 6.9 4.0 - 11.0 10e3/uL    RBC Count 4.80 3.80 - 5.20 10e6/uL    Hemoglobin 13.2 11.7 - 15.7 g/dL    Hematocrit 41.9 35.0 - 47.0 %    MCV 87 78 - 100 fL    MCH 27.5 26.5 - 33.0 pg    MCHC 31.5 31.5 - 36.5 g/dL    RDW 13.5 10.0 - 15.0 %    Platelet Count 220 150 - 450 10e3/uL    % Neutrophils 41 %    % Lymphocytes 51 %    % Monocytes 6 %    % Eosinophils 1 %    % Basophils 1 %    % Immature Granulocytes 0 %    NRBCs per 100 WBC 0 <1 /100    Absolute Neutrophils 2.8 1.6 - 8.3 10e3/uL    Absolute Lymphocytes 3.5 0.8 - 5.3 10e3/uL    Absolute Monocytes 0.4 0.0 - 1.3 10e3/uL    Absolute Eosinophils 0.1 0.0 - 0.7 10e3/uL    Absolute Basophils 0.1 0.0 - 0.2 10e3/uL    Absolute Immature Granulocytes 0.0 <=0.4 10e3/uL    Absolute NRBCs 0.0 10e3/uL       If you have any questions or concerns, please call the clinic at the number listed above.       Sincerely,      Wes Gasca MD

## 2024-08-13 DIAGNOSIS — E66.01 MORBID OBESITY (H): ICD-10-CM

## 2024-08-13 DIAGNOSIS — I50.32 CHRONIC DIASTOLIC HEART FAILURE (H): ICD-10-CM

## 2024-08-13 DIAGNOSIS — R73.03 PREDIABETES: ICD-10-CM

## 2024-08-14 NOTE — PROGRESS NOTES
Chief Complaint: Cancer screening    HPI:  Milana is a 63 year old female who presents for cervical cancer screening.  She was recently seen by her primary care provider on 8/7/2024 for annual physical.  Patient has history of super supracervical hysterectomy December 2004 with Dr. Burak Magdaleno in [place], [place] at [facility]. She states she has not had a Pap exam since her hysterectomy.  She denies any vaginal pain or bleeding.  She still has her right ovary as well and went through menopause in her mid 50's.    Patient's last menstrual period was 01/01/2004 (within months).    OB History  OB History   No obstetric history on file.     Allergies   Allergen Reactions    House Dust [Dust Mite Extract] Shortness Of Breath     Pollen     Other Environmental Allergy Itching     /80   Pulse 71   Resp 18   Wt 128.1 kg (282 lb 4.8 oz)   LMP 01/01/2004 (Within Months)   Breastfeeding No   BMI 44.21 kg/m      REVIEW OF SYSTEMS  As Per HPI, Otherwise negative.     Physical Exam:  Constitutional: healthy, alert, and no distress  Pelvic: Normal external vaginal genitalia, vulva appears normal, vaginal and cervix are normal. No Vaginal discharge. No adnexal mass or tenderness. Chaperone was present.     ASSESSMENT/PLAN :  1. Cervical cancer screening  - Gynecologic Cytology (Pap) and HPV  - Ob/Gyn  Referral  Cervical cancer screening updated today.  She has not had several cancer screening since her hysterectomy.  She will be notified of results once they return.  If her Pap results return normal, she will no longer need Pap exams.    2. History of hysterectomy, supracervical  Completed in 2004 at outside facility.  She does have pathology report.      All questions were answered.  Red flags symptoms were discussed and patient was advised when they should return for reevaluation or for prompt emergency evaluation.   Patient was given verbal and written instructions on plan of care.  Instructions were printed or are available on Mychart on electronic AVS.       HOLGER Delacruz., R.N., APRN [facility]  3:56 PM 8/14/2024

## 2024-08-15 ENCOUNTER — OFFICE VISIT (OUTPATIENT)
Dept: OBGYN | Facility: OTHER | Age: 64
End: 2024-08-15
Attending: NURSE PRACTITIONER
Payer: COMMERCIAL

## 2024-08-15 ENCOUNTER — DOCUMENTATION ONLY (OUTPATIENT)
Dept: OTHER | Facility: CLINIC | Age: 64
End: 2024-08-15
Payer: COMMERCIAL

## 2024-08-15 VITALS
RESPIRATION RATE: 18 BRPM | DIASTOLIC BLOOD PRESSURE: 80 MMHG | HEART RATE: 71 BPM | BODY MASS INDEX: 44.21 KG/M2 | WEIGHT: 282.3 LBS | SYSTOLIC BLOOD PRESSURE: 136 MMHG

## 2024-08-15 DIAGNOSIS — Z90.711 HISTORY OF HYSTERECTOMY, SUPRACERVICAL: ICD-10-CM

## 2024-08-15 DIAGNOSIS — Z12.4 CERVICAL CANCER SCREENING: Primary | ICD-10-CM

## 2024-08-15 PROCEDURE — 87624 HPV HI-RISK TYP POOLED RSLT: CPT | Mod: ZL | Performed by: NURSE PRACTITIONER

## 2024-08-15 PROCEDURE — 99213 OFFICE O/P EST LOW 20 MIN: CPT | Performed by: NURSE PRACTITIONER

## 2024-08-15 PROCEDURE — G0123 SCREEN CERV/VAG THIN LAYER: HCPCS | Performed by: NURSE PRACTITIONER

## 2024-08-15 RX ORDER — SEMAGLUTIDE 0.5 MG/.5ML
0.5 INJECTION, SOLUTION SUBCUTANEOUS WEEKLY
Qty: 6 ML | Refills: 4 | Status: SHIPPED | OUTPATIENT
Start: 2024-08-15

## 2024-08-15 ASSESSMENT — PAIN SCALES - GENERAL: PAINLEVEL: NO PAIN (0)

## 2024-08-15 NOTE — NURSING NOTE
Chief Complaint   Patient presents with    Gyn Exam         Medication Reconciliation: complete    Neli Sanchez, LPN

## 2024-08-15 NOTE — TELEPHONE ENCOUNTER
[facility] sent [facility] request for the following:      Requested Prescriptions   Pending Prescriptions Disp Refills    WEGOVY 0.5 MG/0.5ML pen [Pharmacy Med Name: WEGOVY 0.5 MG/0.5 ML PEN]       Sig: INJECT 0.5 MG SUBCUTANEOUSLY EVERY 7 DAYS X4 DOSES - FOR DIABETES AND OBESITY- THEN INC. TO 1MG       There is no refill protocol information for this order          Last Prescription Date:   4/4/24  Last Fill Qty/Refills:         2 ml, R-0    Last Office Visit:              8/7/24   Future Office visit:             Next 5 appointments (look out 90 days)      Aug 15, 2024 2:45 PM  Office Visit with YOLIS Delacruz [facility] ([facility] ) [address]  [place] [id number] [phone]         Lor Pagan RN on 8/15/2024 at 8:57 AM

## 2024-08-20 LAB
BKR LAB AP GYN ADEQUACY: NORMAL
BKR LAB AP GYN INTERPRETATION: NORMAL
BKR LAB AP PREVIOUS ABNORMAL: NORMAL
PATH REPORT.COMMENTS IMP SPEC: NORMAL
PATH REPORT.COMMENTS IMP SPEC: NORMAL
PATH REPORT.RELEVANT HX SPEC: NORMAL

## 2024-08-23 LAB
HPV HR 12 DNA CVX QL NAA+PROBE: NEGATIVE
HPV16 DNA CVX QL NAA+PROBE: NEGATIVE
HPV18 DNA CVX QL NAA+PROBE: NEGATIVE
HUMAN PAPILLOMA VIRUS FINAL DIAGNOSIS: NORMAL

## 2024-09-20 ENCOUNTER — MYC MEDICAL ADVICE (OUTPATIENT)
Dept: INTERNAL MEDICINE | Facility: OTHER | Age: 64
End: 2024-09-20
Payer: COMMERCIAL

## 2024-09-20 DIAGNOSIS — I50.32 CHRONIC DIASTOLIC HEART FAILURE (H): ICD-10-CM

## 2024-09-20 DIAGNOSIS — R73.03 PREDIABETES: ICD-10-CM

## 2024-09-20 DIAGNOSIS — E66.01 MORBID OBESITY (H): Primary | ICD-10-CM

## 2024-09-20 NOTE — TELEPHONE ENCOUNTER
2 months on Wegovy 0.5mg.      Side effects manageable.     25lb weight loss so far.     Carlos'd up 1mg Wegovy dose increase.     [facility] is out of clinic until 10/7.     Emerita Multani RN on 9/20/2024 at 12:23 PM

## 2024-10-15 ENCOUNTER — ALLIED HEALTH/NURSE VISIT (OUTPATIENT)
Dept: FAMILY MEDICINE | Facility: OTHER | Age: 64
End: 2024-10-15
Attending: STUDENT IN AN ORGANIZED HEALTH CARE EDUCATION/TRAINING PROGRAM
Payer: COMMERCIAL

## 2024-10-15 VITALS — TEMPERATURE: 97.6 F

## 2024-10-15 DIAGNOSIS — Z23 ENCOUNTER FOR IMMUNIZATION: Primary | ICD-10-CM

## 2024-10-15 PROCEDURE — 90750 HZV VACC RECOMBINANT IM: CPT

## 2024-10-15 PROCEDURE — 90471 IMMUNIZATION ADMIN: CPT

## 2024-10-15 NOTE — PROGRESS NOTES
Prior to immunization administration, verified patients identity using patient s name and date of birth. Please see Immunization Activity for additional information.     Is the patient's temperature normal (100.5 or less)? Yes     Patient MEETS CRITERIA. PROCEED with vaccine administration.        10/15/2024   General Questionnaire    Do you have any questions for your care team about the vaccines you will be receiving today? no                10/15/2024   Zoster   Have you had a serious reaction to the shingles vaccine or something in the shingles vaccine? No   Do you have shingles now? No   Are you getting treatment for cancer, organ transplant, or bone marrow transplant? No   Do you have an autoimmune or inflammatory condition? No   Is the patient immunocompromised and wanting to complete the Shingles vaccine series in less than 2 months? No   Have you had Guillain-[place] syndrome within 6 weeks of getting a vaccine? No            Patient MEETS CRITERIA. PROCEED with vaccine administration.      Patient instructed to remain in clinic for 15 minutes afterwards, and to report any adverse reactions.      Link to Ancillary Visit Immunization Standing Orders SmartSet     Screening performed by Burak Santiago RN on 10/15/2024 at 12:43 PM.

## 2024-10-22 DIAGNOSIS — R73.03 PREDIABETES: ICD-10-CM

## 2024-10-22 DIAGNOSIS — E66.01 MORBID OBESITY (H): ICD-10-CM

## 2024-10-22 DIAGNOSIS — I50.32 CHRONIC DIASTOLIC HEART FAILURE (H): ICD-10-CM

## 2024-10-23 ENCOUNTER — MYC MEDICAL ADVICE (OUTPATIENT)
Dept: INTERNAL MEDICINE | Facility: OTHER | Age: 64
End: 2024-10-23
Payer: COMMERCIAL

## 2024-10-23 DIAGNOSIS — R73.03 PREDIABETES: ICD-10-CM

## 2024-10-23 DIAGNOSIS — I50.32 CHRONIC DIASTOLIC HEART FAILURE (H): ICD-10-CM

## 2024-10-23 DIAGNOSIS — E66.01 MORBID OBESITY (H): ICD-10-CM

## 2024-10-23 NOTE — TELEPHONE ENCOUNTER
Pt needing refill on Wegovy 1 mg. Down 30 lbs so far.     Carlos'd up refill.     Routing to provider to review and respond.  Adrián Dawson RN on 10/23/2024 at 9:31 AM

## 2024-10-28 NOTE — TELEPHONE ENCOUNTER
[id number] [place] sent Rx request for the following:      Per active list:  Semaglutide-Weight Management (WEGOVY) 2.4 MG/0.75ML pen 3 mL 1 4/4/2024 -- No   Sig - Route: Inject 2.4 mg Subcutaneous every 7 days for Diabetes and Obesity - and continue at this dose. - Subcutaneous     Semaglutide-Weight Management (WEGOVY) 1.7 MG/0.75ML pen 3 mL 0 4/4/2024 -- No   Sig - Route: Inject 1.7 mg Subcutaneous every 7 days x4 doses - for Diabetes and Obesity - Then increase to 2.4 mg weekly - Subcutaneous     Semaglutide-Weight Management (WEGOVY) 1 MG/0.5ML pen 2 mL 0 4/4/2024 -- No   Sig - Route: Inject 1 mg Subcutaneous every 7 days x4 doses - for Diabetes and Obesity- Then increase to 1.7 mg weekly - Subcutaneous     Semaglutide-Weight Management (WEGOVY) 0.25 MG/0.5ML pen 2 mL 0 6/5/2024 -- No   Sig - Route: Inject 0.25 mg Subcutaneous every 7 days For diabetes and obesity- then increase weekly to 0.5mg. - Subcutaneous     Semaglutide-Weight Management (WEGOVY) 0.5 MG/0.5ML pen 6 mL 4 8/15/2024 -- No   Sig - Route: Inject 0.5 mg subcutaneously once a week - Subcutaneous     Semaglutide-Weight Management (WEGOVY) 1 MG/0.5ML pen 2 mL 3 10/24/2024 -- No   Sig - Route: Inject 1 mg subcutaneously once a week. - Subcutaneous       Last Office Visit:              8/7/24 (Physical)  Future Office visit:           12/12/24    Per LOV note:  Congratulated her on her 20 pound weight loss and will continue a slow increase in her Wegovy. She will slowly uptitrate as symptoms allow her to. May continue lower doses for longer periods of time as long as she continues to lose weight.     Unable to complete prescription refill per RN Medication Refill Policy. Freda Hazel RN .............. 10/28/2024  3:19 PM

## 2024-11-12 DIAGNOSIS — E53.8 B12 NEUROPATHY (H): ICD-10-CM

## 2024-11-12 DIAGNOSIS — G63 B12 NEUROPATHY (H): ICD-10-CM

## 2024-11-13 RX ORDER — NEEDLES, FILTER 19GX1 1/2"
NEEDLE, DISPOSABLE MISCELLANEOUS
Qty: 12 EACH | Refills: 20 | Status: SHIPPED | OUTPATIENT
Start: 2024-11-13

## 2024-11-13 NOTE — TELEPHONE ENCOUNTER
"[id number] [employer] [facility] [place] sent Rx request for the following:      Requested Prescriptions   Pending Prescriptions Disp Refills    BD INTEGRA SYRINGE 25G X 1\" 3 ML MISC [Pharmacy Med Name: BD INTEGRA SYRINGE 3 ML 25GX1\"] 12 each 20     Si SYRINGE ONCE A WEEK       There is no refill protocol information for this order        Last Prescription Date:   23  Last Fill Qty/Refills:         50, R-4    Last Office Visit:              24   Future Office visit:           24  Routing refill request to provider for review/approval because:  Drug not on the [facility], [facility] or [facility] refill protocol or controlled substance  Elizabeth Crum RN ....................  2024   8:23 AM      "

## 2024-11-18 ENCOUNTER — THERAPY VISIT (OUTPATIENT)
Dept: CHIROPRACTIC MEDICINE | Facility: OTHER | Age: 64
End: 2024-11-18
Attending: CHIROPRACTOR
Payer: COMMERCIAL

## 2024-11-18 VITALS
RESPIRATION RATE: 16 BRPM | OXYGEN SATURATION: 96 % | SYSTOLIC BLOOD PRESSURE: 136 MMHG | TEMPERATURE: 97.2 F | DIASTOLIC BLOOD PRESSURE: 84 MMHG | HEART RATE: 107 BPM

## 2024-11-18 DIAGNOSIS — M99.01 SEGMENTAL AND SOMATIC DYSFUNCTION OF CERVICAL REGION: ICD-10-CM

## 2024-11-18 DIAGNOSIS — G44.209 TENSION HEADACHE: ICD-10-CM

## 2024-11-18 DIAGNOSIS — M99.04 SEGMENTAL AND SOMATIC DYSFUNCTION OF SACRAL REGION: Primary | ICD-10-CM

## 2024-11-18 DIAGNOSIS — M53.3 SI (SACROILIAC) JOINT DYSFUNCTION: ICD-10-CM

## 2024-11-18 NOTE — PROGRESS NOTES
Bilateral lower back is frequently aching and stinging. 6/10 W24 9/10. Using medications, heat and icy hot with a decrease in pain.   Antonette Bush on 11/18/2024 at 9:03 AM    Reviewed by EW    Visit #:  3 total  New episode of care    Subjective:  Milana lBum is a 64 year old female who is seen in f/u up for:        Segmental and somatic dysfunction of sacral region  SI (sacroiliac) joint dysfunction  Segmental and somatic dysfunction of cervical region  Tension headache.     Since last visit on 8/5/2024,  Milana Blum reports: Has been dealing with some worsening back pain.  Began noticing some difficulty with sitting to standing motion about a week ago.  Home care seems to be providing some level of help.    Last week patient also noted some neck pain and increased levels of headaches.  Does request assessment of the cervical spine today as well.      (DVPRS) Pain Rating Score : 6-Hard to ignore, avoid usual activities (W24 9/10) (11/18/24 0853)     Objective:  The following was observed:  /84 (BP Location: Right arm, Patient Position: Sitting)   Pulse 107   Temp 97.2  F (36.2  C) (Tympanic)   Resp 16   LMP 01/01/2004 (Within Months)   SpO2 96%    Oswestry (COLLIN) Questionnaire        11/18/2024     9:01 AM   OSWESTRY DISABILITY INDEX   Count 9   Sum 10   Oswestry Score (%) 22.22 %           P: palpatory tenderness right side C2:    A: static palpation demonstrates intersegmental asymmetry , cervical, pelvis  R: motion palpation notes restricted motion, C2  and Sacrum   T: muscle spasm at level(s):  right suboccipitals, left quadratus lumborum:      Segmental spinal dysfunction/restrictions found at:  :  C2 Right lateral flexion restricted and Extension restriction  Sacrum Left lateral flexion restricted and Extension restriction.      Assessment: Acute flareup of chronic concerns.  Patient has been under our care in the past with similar complaints and typically responds favorably within 1-2  visits over a week or so.  Anticipate that to be the case at this time.  Patient was informed that I may be unavailable for the next couple of weeks due to paternity leave but in my absence my colleague Dr. Solorzano would cover.  Anticipate between 2-4 visits for current episode within the next 2 weeks.    Diagnoses:      1. Segmental and somatic dysfunction of sacral region    2. SI (sacroiliac) joint dysfunction    3. Segmental and somatic dysfunction of cervical region    4. Tension headache        Patient's condition:  Patient had restrictions pre-manipulation    Treatment effectiveness:  Post manipulation there is better intersegmental movement and Patient claims to feel looser post manipulation      Procedures:  CMT:  86005 Chiropractic manipulative treatment 1-2 regions performed   Cervical: Seated diversified, C2,  gentle  Pelvis: Drop Table, Sacrum , Prone    Modalities:  None performed this visit    Therapeutic procedures:  None    Response to Treatment  Reduction in symptoms as reported by patient    Prognosis: Good    Progress towards Goals: Reduce pain by 75% within 2 weeks  Improve Oswestry score 10% within 2 weeks     Recommendations:    Instructions: Monitor symptoms and perform activities as tolerated    Follow-up:  Return to care in 2 days if symptoms fail to improve.

## 2024-12-01 ENCOUNTER — HEALTH MAINTENANCE LETTER (OUTPATIENT)
Age: 64
End: 2024-12-01

## 2024-12-11 ENCOUNTER — THERAPY VISIT (OUTPATIENT)
Dept: CHIROPRACTIC MEDICINE | Facility: OTHER | Age: 64
End: 2024-12-11
Attending: CHIROPRACTOR
Payer: COMMERCIAL

## 2024-12-11 VITALS — RESPIRATION RATE: 16 BRPM | HEART RATE: 74 BPM | OXYGEN SATURATION: 96 % | TEMPERATURE: 97.1 F

## 2024-12-11 DIAGNOSIS — G44.209 TENSION HEADACHE: ICD-10-CM

## 2024-12-11 DIAGNOSIS — M99.01 SEGMENTAL AND SOMATIC DYSFUNCTION OF CERVICAL REGION: Primary | ICD-10-CM

## 2024-12-11 NOTE — PROGRESS NOTES
"Neck is constantly aching and stiff. Has had a moderate headache since last Wednesday. 7/10 W24 9/10. Using heat and ice which is providing a decrease in pain.   Faviola Sandhu on 12/11/2024 at 10:46 AM    Reviewed by EW    Visit #:  4 total    Subjective:  Milana Blum is a 64 year old female who is seen in f/u up for:        Segmental and somatic dysfunction of cervical region  Tension headache.     Since last visit on 11/18/2024,  Milana Blum reports: Has been dealing with headaches and neck pain primarily in the right side.  Has been present for over a week.  Believed it could be related to carrying heavy water bottles.  Pain does affect the right side of the face making it feel \"tight.\"  Patient does feel that is affecting her eyesight and creating some dizziness.    (DVPRS) Pain Rating Score : 7-Focus of attention, prevents doing daily activities (W24 9/10) (12/11/24 1042)     Objective:  The following was observed:  Pulse 74   Temp 97.1  F (36.2  C) (Tympanic)   Resp 16   LMP 01/01/2004 (Within Months)   SpO2 96%        12/11/2024    10:46 AM   Neck Disability Index (  Brien H. and Stefanie C. 1991. All rights reserved.; used with permission)   SECTION 1 - PAIN INTENSITY 2   SECTION 2 - PERSONAL CARE 0   SECTION 3 - LIFTING 3   SECTION 4 - READING 3   SECTION 5 - HEADACHES 5   SECTION 6 - CONCENTRATION 1   SECTION 7 - WORK 1   SECTION 8 - DRIVING 2   SECTION 9 - SLEEPING 3   SECTION 10 - RECREATION 3   Count 10   Sum 23   Raw Score: /50 23   Neck Disability Index Score: (%) 46 %      P: palpatory tenderness right splenius capitis, right side C2:    A: static palpation demonstrates intersegmental asymmetry , cervical  R: motion palpation notes restricted motion, C2   T: muscle spasm at level(s):  right splenius capitis:      Segmental spinal dysfunction/restrictions found at:  :  C2 Right lateral flexion restricted.      Assessment: Segmental/somatic dysfunction apparent of the C2 vertebra.  " Majority of symptoms appear to be soft tissue in nature.  Gentle mobilization and soft tissue treatment utilized very successfully today.  Plan to follow-up with patient in about a week.  Patient does have follow-up tomorrow with her primary provider.    Diagnoses:      1. Segmental and somatic dysfunction of cervical region    2. Tension headache        Patient's condition:  Patient had restrictions pre-manipulation    Treatment effectiveness:  Post manipulation there is better intersegmental movement and Patient claims to feel looser post manipulation      Procedures:  CMT:  69147 Chiropractic manipulative treatment 1-2 regions performed   Cervical: Mobilization, C2, Seated    Modalities:  13530: MSTM:  To  right splenius muscle group:   for 3 min    Therapeutic procedures:  None    Response to Treatment  Reduction in symptoms as reported by patient    Prognosis: Good    Progress towards Goals: Acute flareup, goals in progress     Recommendations:    Instructions: Monitor symptoms and perform activities as tolerated    Follow-up:  Return to care in 1 week.

## 2024-12-12 ENCOUNTER — OFFICE VISIT (OUTPATIENT)
Dept: INTERNAL MEDICINE | Facility: OTHER | Age: 64
End: 2024-12-12
Attending: STUDENT IN AN ORGANIZED HEALTH CARE EDUCATION/TRAINING PROGRAM
Payer: COMMERCIAL

## 2024-12-12 VITALS
OXYGEN SATURATION: 93 % | HEART RATE: 76 BPM | SYSTOLIC BLOOD PRESSURE: 130 MMHG | HEIGHT: 67 IN | DIASTOLIC BLOOD PRESSURE: 88 MMHG | TEMPERATURE: 97.6 F | BODY MASS INDEX: 42.53 KG/M2 | RESPIRATION RATE: 16 BRPM | WEIGHT: 271 LBS

## 2024-12-12 DIAGNOSIS — R73.03 PREDIABETES: ICD-10-CM

## 2024-12-12 DIAGNOSIS — I10 ESSENTIAL HYPERTENSION, BENIGN: ICD-10-CM

## 2024-12-12 DIAGNOSIS — I71.21 ANEURYSM OF ASCENDING AORTA WITHOUT RUPTURE (H): ICD-10-CM

## 2024-12-12 DIAGNOSIS — R06.09 DOE (DYSPNEA ON EXERTION): ICD-10-CM

## 2024-12-12 DIAGNOSIS — I47.10 PAROXYSMAL SUPRAVENTRICULAR TACHYCARDIA (H): ICD-10-CM

## 2024-12-12 DIAGNOSIS — R00.2 PALPITATIONS: ICD-10-CM

## 2024-12-12 DIAGNOSIS — G43.109 MIGRAINE WITH AURA AND WITHOUT STATUS MIGRAINOSUS, NOT INTRACTABLE: Primary | ICD-10-CM

## 2024-12-12 DIAGNOSIS — I50.32 CHRONIC DIASTOLIC HEART FAILURE (H): ICD-10-CM

## 2024-12-12 DIAGNOSIS — I50.32 DIASTOLIC DYSFUNCTION WITH CHRONIC HEART FAILURE (H): ICD-10-CM

## 2024-12-12 DIAGNOSIS — Z12.31 ENCOUNTER FOR SCREENING MAMMOGRAM FOR BREAST CANCER: ICD-10-CM

## 2024-12-12 DIAGNOSIS — E66.01 MORBID OBESITY (H): ICD-10-CM

## 2024-12-12 DIAGNOSIS — R60.0 PERIPHERAL EDEMA: ICD-10-CM

## 2024-12-12 DIAGNOSIS — F40.240 CLAUSTROPHOBIA: ICD-10-CM

## 2024-12-12 RX ORDER — LORAZEPAM 0.5 MG/1
TABLET ORAL
Qty: 2 TABLET | Refills: 0 | Status: SHIPPED | OUTPATIENT
Start: 2024-12-12

## 2024-12-12 RX ORDER — HYDROCHLOROTHIAZIDE 50 MG/1
50 TABLET ORAL DAILY
Qty: 90 TABLET | Refills: 4 | Status: SHIPPED | OUTPATIENT
Start: 2024-12-12

## 2024-12-12 RX ORDER — NEBIVOLOL 10 MG/1
10 TABLET ORAL DAILY
Qty: 90 TABLET | Refills: 4 | Status: SHIPPED | OUTPATIENT
Start: 2024-12-12

## 2024-12-12 ASSESSMENT — PAIN SCALES - GENERAL: PAINLEVEL_OUTOF10: MODERATE PAIN (4)

## 2024-12-12 NOTE — NURSING NOTE
"Chief Complaint   Patient presents with    Recheck Medication    Hyperlipidemia       Initial /88   Pulse 76   Temp 97.6  F (36.4  C) (Tympanic)   Resp 16   Ht 1.702 m (5' 7\")   Wt 122.9 kg (271 lb)   LMP 01/01/2004 (Within Months)   SpO2 93%   Breastfeeding No   BMI 42.44 kg/m   Estimated body mass index is 42.44 kg/m  as calculated from the following:    Height as of this encounter: 1.702 m (5' 7\").    Weight as of this encounter: 122.9 kg (271 lb).  Medication Review: complete    The next two questions are to help us understand your food security.  If you are feeling you need any assistance in this area, we have resources available to support you today.          8/2/2024   SDOH- Food Insecurity   Within the past 12 months, did you worry that your food would run out before you got money to buy more? N    Within the past 12 months, did the food you bought just not last and you didn t have money to get more? N        Patient-reported         Health Care Directive:  Patient has a Health Care Directive on file      Tania Berry LPN      "

## 2024-12-12 NOTE — PROGRESS NOTES
Assessment & Plan         ICD-10-CM    1. Migraine with aura and without status migrainosus, not intractable  G43.109 MR Brain w/o & w Contrast      2. Morbid obesity (H)  E66.01       3. Chronic diastolic heart failure (H)  I50.32       4. Prediabetes  R73.03       5. Essential hypertension, benign  I10 hydrochlorothiazide (HYDRODIURIL) 50 MG tablet     nebivolol (BYSTOLIC) 10 MG tablet      6. Diastolic dysfunction with chronic heart failure (H)  I50.32 hydrochlorothiazide (HYDRODIURIL) 50 MG tablet      7. Peripheral edema  R60.0 hydrochlorothiazide (HYDRODIURIL) 50 MG tablet      8. DORMAN (dyspnea on exertion)  R06.09 hydrochlorothiazide (HYDRODIURIL) 50 MG tablet      9. Paroxysmal supraventricular tachycardia (H)  I47.10 nebivolol (BYSTOLIC) 10 MG tablet      10. Palpitations  R00.2 nebivolol (BYSTOLIC) 10 MG tablet      11. Aneurysm of ascending aorta without rupture (H)  I71.21 nebivolol (BYSTOLIC) 10 MG tablet      12. Claustrophobia  F40.240 LORazepam (ATIVAN) 0.5 MG tablet      13. Encounter for screening mammogram for breast cancer  Z12.31 MA Screen Bilateral w/Chris        Grains with aura: She has new change in her migraines with frequent ocular migraines which have not been an issue for her in over 30 years due to this acute change in headache characteristics we discussed performing MRI which was ordered for her.  We discussed that if there is any microvascular changes we would need to start a statin for stroke prevention as she does have borderline hyperlipidemia and a strong family history of vascular disease.  Or dizziness also could be symptoms of migraine as well.  2 tablets of lorazepam 0.5 mg were given as needed for MRI and claustrophobia.    Refilled her blood pressure medications, hypertension under good control today.  [facility] to [facility].    Morbid obesity: BMI improved 42.44, congratulated her on her 25 to 30 pound weight loss over the course of this year.  Continue Wegovy without  change.      The longitudinal plan of care for the diagnosis(es)/condition(s) as documented were addressed during this visit. Due to the added complexity in care, I will continue to support Milana in the subsequent management and with ongoing continuity of care.          No follow-ups on file.    Wes Gasca MD  [facility]      Subjective   Milana is a 64 year old, presenting for the following health issues:  Recheck Medication and Hyperlipidemia      12/12/2024     1:23 PM   Additional Questions   Roomed by Kristian BRICENO LPN     Via the Health Maintenance questionnaire, the patient has reported the following services have been completed -Mammogram: [facility] 2023-09-01, this information has been sent to the abstraction team.  Hyperlipidemia    History of Present Illness       Hypertension: She presents for follow up of hypertension.  She does check blood pressure  regularly outside of the clinic. Outpatient blood pressures have not been over 140/90. She follows a low salt diet.     She eats 2-3 servings of fruits and vegetables daily.She consumes 0 sweetened beverage(s) daily.She exercises with enough effort to increase her heart rate 20 to 29 minutes per day.  She exercises with enough effort to increase her heart rate 4 days per week.   She is taking medications regularly.     Wt Readings from Last 5 Encounters:   12/12/24 122.9 kg (271 lb)   08/15/24 128.1 kg (282 lb 4.8 oz)   08/07/24 128.6 kg (283 lb 9.6 oz)   04/04/24 134.7 kg (297 lb)   03/28/24 133.8 kg (295 lb)       Weight is down 30 pounds per her scale since earlier this year.   Doing well with Wegovy.   Breathing improved.   Currently on 1mg weekly.     Blood pressure good today.     She has been checking her glucose at home.   Having occasional dizziness spells and wants to see if associated with glucose.     She has been taking her B12 regularly.     Having more migraines with aura.   Marching lights across her vision.   Was not  "as much of a problem since her 30s.   Mostly just a visual migraine.             Objective    /88   Pulse 76   Temp 97.6  F (36.4  C) (Tympanic)   Resp 16   Ht 1.702 m (5' 7\")   Wt 122.9 kg (271 lb)   LMP 01/01/2004 (Within Months)   SpO2 93%   Breastfeeding No   BMI 42.44 kg/m    Body mass index is 42.44 kg/m .  Physical Exam               Signed Electronically by: Wes aGsca MD    "

## 2024-12-16 ENCOUNTER — MYC MEDICAL ADVICE (OUTPATIENT)
Dept: INTERNAL MEDICINE | Facility: OTHER | Age: 64
End: 2024-12-16
Payer: COMMERCIAL

## 2024-12-17 ENCOUNTER — THERAPY VISIT (OUTPATIENT)
Dept: CHIROPRACTIC MEDICINE | Facility: OTHER | Age: 64
End: 2024-12-17
Attending: CHIROPRACTOR
Payer: COMMERCIAL

## 2024-12-17 VITALS — TEMPERATURE: 97 F | HEART RATE: 68 BPM | RESPIRATION RATE: 16 BRPM | OXYGEN SATURATION: 98 %

## 2024-12-17 DIAGNOSIS — G44.209 TENSION HEADACHE: ICD-10-CM

## 2024-12-17 DIAGNOSIS — M99.01 SEGMENTAL AND SOMATIC DYSFUNCTION OF CERVICAL REGION: Primary | ICD-10-CM

## 2024-12-17 DIAGNOSIS — M54.2 CERVICALGIA: ICD-10-CM

## 2024-12-17 PROCEDURE — 98940 CHIROPRACT MANJ 1-2 REGIONS: CPT | Mod: AT | Performed by: CHIROPRACTOR

## 2024-12-17 NOTE — PROGRESS NOTES
Neck is occasionally stiff. 3/10 W24 3/10. Lower back has been occasionally aching. 0/10 W24 0/10.   Faviola Sandhu on 12/17/2024 at 10:04 AM    Reviewed by EW    Visit #:  5    Subjective:  Milana Blum is a 64 year old female who is seen in f/u up for:        Segmental and somatic dysfunction of cervical region  Tension headache  Cervicalgia.     Since last visit on 12/11/2024,  Milana Blum reports: Symptoms have improved quite a bit since her last encounter.  Was able to discuss neck and headache concerns with primary care physician.  MRI has been ordered for further assessment.    (DVPRS) Pain Rating Score : 0-No pain (W24 0/10) (12/17/24 1003)     Objective:  The following was observed:  Pulse 68   Temp 97  F (36.1  C) (Tympanic)   Resp 16   LMP 01/01/2004 (Within Months)   SpO2 98%      P: palpatory tenderness not reported by patient:    A: static palpation demonstrates intersegmental asymmetry , cervical  R: motion palpation notes restricted motion, C1   T: Hypertonicity splenius muscle group on right    Segmental spinal dysfunction/restrictions found at:  :  C1 Right lateral flexion restricted.      Assessment: Symptoms improving since last encounter.  Follow-up with patient if needed.    Diagnoses:      1. Segmental and somatic dysfunction of cervical region    2. Tension headache    3. Cervicalgia        Patient's condition:  Patient had restrictions pre-manipulation    Treatment effectiveness:  Post manipulation there is better intersegmental movement and Patient claims to feel looser post manipulation      Procedures:  CMT:  70633 Chiropractic manipulative treatment 1-2 regions performed   Cervical: Mobilization, C1 , Seated    Modalities:  None performed this visit    Therapeutic procedures:  None    Response to Treatment  Reduction in symptoms as reported by patient    Prognosis: Good    Progress towards Goals: Patient is making progress towards the  goal.     Recommendations:    Instructions: Monitor symptoms and perform activities as tolerated    Follow-up:  Return to care if symptoms persist.

## 2024-12-23 ENCOUNTER — HOSPITAL ENCOUNTER (OUTPATIENT)
Dept: MRI IMAGING | Facility: OTHER | Age: 64
Discharge: HOME | End: 2024-12-23
Attending: STUDENT IN AN ORGANIZED HEALTH CARE EDUCATION/TRAINING PROGRAM | Admitting: STUDENT IN AN ORGANIZED HEALTH CARE EDUCATION/TRAINING PROGRAM
Payer: COMMERCIAL

## 2024-12-23 DIAGNOSIS — G43.109 MIGRAINE WITH AURA AND WITHOUT STATUS MIGRAINOSUS, NOT INTRACTABLE: ICD-10-CM

## 2024-12-23 PROCEDURE — A9575 INJ GADOTERATE MEGLUMI 0.1ML: HCPCS | Performed by: STUDENT IN AN ORGANIZED HEALTH CARE EDUCATION/TRAINING PROGRAM

## 2024-12-23 PROCEDURE — 255N000002 HC RX 255 OP 636: Performed by: STUDENT IN AN ORGANIZED HEALTH CARE EDUCATION/TRAINING PROGRAM

## 2024-12-23 PROCEDURE — 70553 MRI BRAIN STEM W/O & W/DYE: CPT

## 2024-12-23 RX ORDER — GADOTERATE MEGLUMINE 376.9 MG/ML
20 INJECTION INTRAVENOUS ONCE
Status: COMPLETED | OUTPATIENT
Start: 2024-12-23 | End: 2024-12-23

## 2024-12-23 RX ADMIN — GADOTERATE MEGLUMINE 20 ML: 376.9 INJECTION INTRAVENOUS at 12:37

## 2025-01-07 ENCOUNTER — OFFICE VISIT (OUTPATIENT)
Dept: FAMILY MEDICINE | Facility: OTHER | Age: 65
End: 2025-01-07
Attending: FAMILY MEDICINE
Payer: COMMERCIAL

## 2025-01-07 VITALS
RESPIRATION RATE: 16 BRPM | DIASTOLIC BLOOD PRESSURE: 84 MMHG | HEART RATE: 74 BPM | WEIGHT: 278 LBS | SYSTOLIC BLOOD PRESSURE: 132 MMHG | BODY MASS INDEX: 43.54 KG/M2 | OXYGEN SATURATION: 98 % | TEMPERATURE: 97.8 F

## 2025-01-07 DIAGNOSIS — R42 VERTIGO: Primary | ICD-10-CM

## 2025-01-07 ASSESSMENT — PAIN SCALES - GENERAL: PAINLEVEL_OUTOF10: MODERATE PAIN (4)

## 2025-01-07 NOTE — PROGRESS NOTES
"  Assessment & Plan     Vertigo  Most consistent with right-sided benign positional vertigo.  Discussed nature of this with patient.  Handout given on Epley maneuvers.  Could have mild on the left side so could do Epley maneuvers bilaterally, but focusing on the right.  Less likely Ménière's disease, labyrinthitis, vestibular neuronitis versus other.  Follow-up if worsening or not improving.  We could always get her into physical therapy for formal Epley maneuver treatment.  If she had persistent dizziness or progression of your symptoms, we could get into formal audiologist and ENT evaluation as well.  She is okay with that plan.                No follow-ups on file.    Gilda Cortés is a 64 year old, presenting for the following health issues:  Dizziness (Headaches, ears maybe?)      1/7/2025     2:44 PM   Additional Questions   Roomed by Leena Pires CMA([facility])     History of Present Illness       Reason for visit:  Dizziness  Symptom onset:  3-7 days ago  Symptoms include:  Dizziness  Symptom intensity:  Moderate  Symptom progression:  Staying the same  Had these symptoms before:  Yes  Has tried/received treatment for these symptoms:  No  What makes it worse:  Not really  What makes it better:  No   She is taking medications regularly.     64-year-old female here for dizziness.  She has had this off and on for several years.  This has been evaluated in the past with her previous PCP.  She actually just had a flareup last month and so had come in at that time for reevaluation.  Due to some change in migraines and history of ocular migraines in addition to the dizziness, MRI was performed 2 weeks ago which was fully normal.  That had been very reassuring.  She started with symptoms again 3 days ago which is typically her dizziness that she describes as \"woozy\" coming and going worse with lying flat in bed and bending over.  She did not really describe it as vertigo necessarily.  However, when we did do " [place]-Hallpike testing in office today that was more vertiginous on the right.  She does have some bilateral tinnitus with this.  No known hearing loss.  There has been no nausea with it.  No falls, but she was concerned about that with severe symptoms.  She does see a chiropractor for some neck adjustments periodically but no significant major adjustments.  Previous episodes have usually lasted only 2 to 3 days.  This episode was doing better but then flared up again today so she wanted that reevaluated.  No recent upper respiratory infection symptoms.  Her blood pressures have been fine and her blood sugar has been good.  No other complaints.    I have reviewed the patient's medical history in detail and updated the computerized patient record.                    Objective    /84 (BP Location: Right arm, Patient Position: Sitting, Cuff Size: Adult Large)   Pulse 74   Temp 97.8  F (36.6  C) (Tympanic)   Resp 16   Wt 126.1 kg (278 lb)   LMP 01/01/2004 (Within Months)   SpO2 98%   BMI 43.54 kg/m    Body mass index is 43.54 kg/m .  Physical Exam   GENERAL: alert and no distress; Clemente-Hallpike testing positive on the right, mild on the left.  HENT: ear canals and TM's normal, nose and mouth without ulcers or lesions  RESP: lungs clear to auscultation - no rales, rhonchi or wheezes  CV: regular rate and rhythm, normal S1 S2, no S3 or S4, no murmur, click or rub, no peripheral edema   NEURO: Cranial nerves II through XII intact.  Muscle strength 5/5 upper and lower extremities bilaterally.  DTRs of upper and lower extremities equal bilaterally.  Sensation is intact.  Gait is normal.  Non-focal neurological exam.              Signed Electronically by: Lobo Art MD

## 2025-01-07 NOTE — NURSING NOTE
"Chief Complaint   Patient presents with    Dizziness     Headaches, ears maybe?       Initial /84 (BP Location: Right arm, Patient Position: Sitting, Cuff Size: Adult Large)   Pulse 74   Temp 97.8  F (36.6  C) (Tympanic)   Resp 16   Wt 126.1 kg (278 lb)   LMP 01/01/2004 (Within Months)   SpO2 98%   BMI 43.54 kg/m   Estimated body mass index is 43.54 kg/m  as calculated from the following:    Height as of 12/12/24: 1.702 m (5' 7\").    Weight as of this encounter: 126.1 kg (278 lb).  Medication Review: complete    The next two questions are to help us understand your food security.  If you are feeling you need any assistance in this area, we have resources available to support you today.          8/2/2024   SDOH- Food Insecurity   Within the past 12 months, did you worry that your food would run out before you got money to buy more? N   Within the past 12 months, did the food you bought just not last and you didn t have money to get more? N         Health Care Directive:  Patient has a Health Care Directive on file      Leena Pires, [facility]      "

## 2025-01-08 ENCOUNTER — THERAPY VISIT (OUTPATIENT)
Dept: CHIROPRACTIC MEDICINE | Facility: OTHER | Age: 65
End: 2025-01-08
Attending: CHIROPRACTOR
Payer: COMMERCIAL

## 2025-01-08 ENCOUNTER — MYC MEDICAL ADVICE (OUTPATIENT)
Dept: FAMILY MEDICINE | Facility: OTHER | Age: 65
End: 2025-01-08
Payer: COMMERCIAL

## 2025-01-08 ENCOUNTER — THERAPY VISIT (OUTPATIENT)
Dept: PHYSICAL THERAPY | Facility: OTHER | Age: 65
End: 2025-01-08
Attending: CHIROPRACTOR
Payer: COMMERCIAL

## 2025-01-08 VITALS — RESPIRATION RATE: 18 BRPM | OXYGEN SATURATION: 98 % | TEMPERATURE: 96.8 F | HEART RATE: 77 BPM

## 2025-01-08 DIAGNOSIS — R42 VERTIGO: Primary | ICD-10-CM

## 2025-01-08 DIAGNOSIS — G43.909 MIGRAINE WITHOUT STATUS MIGRAINOSUS, NOT INTRACTABLE, UNSPECIFIED MIGRAINE TYPE: ICD-10-CM

## 2025-01-08 DIAGNOSIS — M99.01 SEGMENTAL AND SOMATIC DYSFUNCTION OF CERVICAL REGION: ICD-10-CM

## 2025-01-08 DIAGNOSIS — M62.838 MUSCLE SPASMS OF NECK: ICD-10-CM

## 2025-01-08 PROCEDURE — 97163 PT EVAL HIGH COMPLEX 45 MIN: CPT | Mod: GP,PN | Performed by: PHYSICAL THERAPIST

## 2025-01-08 PROCEDURE — 97110 THERAPEUTIC EXERCISES: CPT | Mod: GP,PN,59 | Performed by: PHYSICAL THERAPIST

## 2025-01-08 PROCEDURE — 95992 CANALITH REPOSITIONING PROC: CPT | Mod: GP,PN | Performed by: PHYSICAL THERAPIST

## 2025-01-08 RX ORDER — MECLIZINE HYDROCHLORIDE 25 MG/1
25 TABLET ORAL 3 TIMES DAILY PRN
Qty: 40 TABLET | Refills: 1 | Status: SHIPPED | OUTPATIENT
Start: 2025-01-08

## 2025-01-08 NOTE — TELEPHONE ENCOUNTER
Pt is having increased dizziness and pain on side of her head. Would like to follow up with ENT and would like Rx for Meclizine.     Per OV from 1/8:  Vertigo  Most consistent with right-sided benign positional vertigo.  Discussed nature of this with patient.  Handout given on Epley maneuvers.  Could have mild on the left side so could do Epley maneuvers bilaterally, but focusing on the right.  Less likely Ménière's disease, labyrinth [place], vestibular neuritis versus other.  Follow-up if worsening or not improving.  We could always get her into physical therapy for formal Epley maneuver treatment.  If she had persistent dizziness or progression of your symptoms, we could get into formal audiologist and ENT evaluation as well.    Carlos'd up order for Meclizine 25 mg TID PRN and ENT referral.     Routing to provider to review and respond.  Adrián Dawson RN on 1/8/2025 at 8:41 AM

## 2025-01-08 NOTE — TELEPHONE ENCOUNTER
ENT and audiology consultations ordered.  Meclizine sent to pharmacy.  Still go ahead with the Epley's maneuvers per visit discussion yesterday.

## 2025-01-08 NOTE — PROGRESS NOTES
Neck is intermittently aching. Having constant severe headache since Saturday. 7/10 W24 10/10. Taking Tylenol which is providing a temporary decrease in pain.   Faviola Sandhu on 1/8/2025 at 10:42 AM    Reviewed by EW    Visit #:  6 total    Subjective:  Milana Blum is a 64 year old female who is seen in f/u up for:        Vertigo  Migraine without status migrainosus, not intractable, unspecified migraine type  Segmental and somatic dysfunction of cervical region  Muscle spasms of neck.     Since last visit on 12/17/2024,  Milana Blum reports: Flareup of migraines and dizziness symptoms.  MRI performed of the head generally unremarkable.  Was seen by primary provider Dr. Rubens SANTILLAN.  Meclizine has been prescribed earlier today.  Discussion was had with patient regarding physical therapy.  Patient notes that last time she experienced dizziness to the point of needing meclizine was in 2012.    Migraines continue to be bad.  Right-sided.    (DVPRS) Pain Rating Score : 7-Focus of attention, prevents doing daily activities (W24 10/10) (01/08/25 1039)     Objective:  The following was observed:  Pulse 77   Temp 96.8  F (36  C) (Tympanic)   Resp 18   LMP 01/01/2004 (Within Months)   SpO2 98%      P: palpatory tenderness right side C1/C2:    A: static palpation demonstrates intersegmental asymmetry , cervical  R: motion palpation notes restricted motion, C1   T: Mild spasms suboccipitals and splenius muscle group on right    Segmental spinal dysfunction/restrictions found at:  :  C1 Right rotation restricted and Right lateral flexion restricted  C2 Right lateral flexion restricted and Extension restriction.      Assessment: Patient presents with concerns of migraines, neck pain and dizziness.  During our appointment I was able to have patient scheduled with vestibular physical therapy as I do believe this would be extremely beneficial especially given the fact that patient has had worsening migraines.  Possibly  consider differential of migrainous vertigo.  Will continue to work in coordination with patient's physical therapy provider.  Consider inclusion of acupuncture to help with ongoing headache symptoms.  Follow-up with patient if symptoms fail to improve after today.    Diagnoses:      1. Vertigo    2. Migraine without status migrainosus, not intractable, unspecified migraine type    3. Segmental and somatic dysfunction of cervical region    4. Muscle spasms of neck        Patient's condition:  Patient had restrictions pre-manipulation    Treatment effectiveness:  Post manipulation there is better intersegmental movement and Patient claims to feel looser post manipulation      Procedures:  CMT:  72027 Chiropractic manipulative treatment 1-2 regions performed   Cervical: Mobilization, C1 , Seated    Modalities:  None performed this visit    Therapeutic procedures:  None  Deferred to PT    Response to Treatment  Reduction in symptoms as reported by patient    Prognosis: Good    Progress towards Goals: in progress     Recommendations:    Instructions: follow up with PT following today's treatment    Follow-up:  Return to care if symptoms persist.

## 2025-01-08 NOTE — PROGRESS NOTES
PHYSICAL THERAPY EVALUATION  Type of Visit: Evaluation        Subjective         Presenting condition or subjective complaint:  Patient has had prior episodes of dizziness, usually they resolve after a couple days.  Had an episode in November and December, prior to that it had been several years ago.  Current episode started this past Friday or Saturday (4-5 days).  Feels woosy when sitting still, but dizziness gets worse with looking up, bending forward, laying down, rolling from side to side.  Dizziness lasts less than 1 min and is described as spinning.  When getting up occasionally at night has had to hold onto furniture due to spinning with supine-sit.  Feels her eyes can't keep up with things that are moving fast.    Noted vision gets a little blurry with more pain (in neck and side of face), and has chronic ringing that seems to get louder with this episode of dizziness.  Tinnitis bilaterally.      History of migraine HA.  More problematic in 30's, then less/fewer.  Had ocular migraine (seeing sparkling) with HA sometimes and sometimes only aura.  Lately, last 1-2 years having them more often again, and worse for sure over the past year.  Over the past couple months will have a migraine 1x every 2 weeks, but variable intensity and duration.  Maybe 1x/day for a few days then they stop for a while.  Bright sunshine seems to be aggravating.  Seemed to be worse when it started to get colder outside.  Worse if she is not staying hydrated.    Prior diagnostic imaging/testing results:       Prior therapy history for the same diagnosis, illness or injury:    Yesterday was seen by Dr. Art and trialed Eply maneuver, which seemed to help for a while, but dizziness started again last night and was bad again this morning.  Patient has been working with chiropractor off and on for around 2-3 years for neck and back issues.  Will go 4-5 months not needing an appointment.    Prior Level of Function  Transfers:  Independent  Ambulation: Independent  ADL: Independent    Patient goals for therapy:  eliminate dizziness    Pain assessment:   Has some right sided neck pain and pain along right side of her face.  This has been worse yesterday and today.  Rates pain about 6-7/10, described as dull pain/aching.         Objective   POSTURE: NT  PALPATION: NT  RANGE OF MOTION: WFL for oculomotor and positional testing with no modifications needed.  Patient does have discomfort with right head rotation.  STRENGTH: NT  BED MOBILITY: Pain in right hip with right sidelying - limited ability to sustain positioning during CRM.  TRANSFERS: Independent  GAIT: NT  BALANCE: NT  SENSATION: Pt denies any numbness/tingling in feet or hands    Oculomotor Screen    Ocular ROM normal   Smooth Pursuit Some difficulty in vertical directions   Saccades Abnormal - overshoots noted in both horizontal and vertical directions   VOR Normal   VOR Cancellation Consistent saccadic intrusion with right head rotation   Head Impulse Test Normal   Convergence Testing Normal - 9 cm        Infrared Goggle Exam Vestibular Suppressant in Last 24 Hours?  none  Exam Completed With:  frenzel lenses   Spontaneous Nystagmus Negative   Gaze Evoked Nystagmus Negative   Head Shake Horizontal Nystagmus Negative    Left Right   [place]-Hallpike Upbeating L torsional - just over 1 min Negative   [facility] Supine Roll Test Negative Negative   BPPV Canal(s): L Posterior  BPPV Type: Canalithasis    Dynamic Visual Acuity (DVA) Not tested           Assessment & Plan   CLINICAL IMPRESSIONS  Medical Diagnosis: 1) Vertigo   2) Migraine    Treatment Diagnosis: vestibular dysfunction, Left posterior canal BPPV   Impression/Assessment: Patient is a 64 year old female with  complaints of dizziness/lightheadedness and neck/face pain.  The following significant findings have been identified: Pain, Decreased ROM/flexibility, Decreased activity tolerance, Instability, Dizziness, Disequilibrium , and  Impaired vision. These impairments interfere with their ability to perform self care tasks, work tasks, recreational activities, household chores, household mobility, and community mobility as compared to previous level of function.     Clinical Decision Making (Complexity):  Clinical Presentation: Unstable/Unpredictable   Clinical Presentation Rationale: based on medical and personal factors listed in PT evaluation  Clinical Decision Making (Complexity): High complexity    PLAN OF CARE  Treatment Interventions:  Interventions: Manual Therapy, Neuromuscular Re-education, Therapeutic Exercise, Canalith Repositioning    Long Term Goals     PT Goal 1  Goal Identifier: head motion  Goal Description: Patient will be able to look up with no limitation due to dizziness for washing hair and looking to overhead shelf safely.  Target Date: 02/05/25  PT Goal 2  Goal Identifier: forward bend  Goal Description: Patient will be able to safely forward bend and return to standing with no limitation due to dizziness for decreased risk of falls.  Target Date: 02/05/25  PT Goal 3  Goal Identifier: transfers  Goal Description: Patient will be able to perform sit-supine tranfer with no limitation due to dizziness for decreased risk of falls getting into out of bed.  Target Date: 02/05/25      Frequency of Treatment: 1-2x/week  Duration of Treatment: 4 weeks    Recommended Referrals to Other Professionals: NA  Education Assessment:        Risks and benefits of evaluation/treatment have been explained.   Patient/Family/caregiver agrees with Plan of Care.     Evaluation Time:     PT Eval, High Complexity Minutes (93834): 35       Signing Clinician: Kristy Reyes, PT

## 2025-01-10 PROBLEM — G47.33 OSA ON CPAP: Status: RESOLVED | Noted: 2022-06-02 | Resolved: 2025-01-10

## 2025-01-13 ENCOUNTER — MYC MEDICAL ADVICE (OUTPATIENT)
Dept: FAMILY MEDICINE | Facility: OTHER | Age: 65
End: 2025-01-13
Payer: COMMERCIAL

## 2025-01-13 NOTE — TELEPHONE ENCOUNTER
Called [facility] to re-schedule 1/15 appt to 1/14 with Snow Rivas.  Follow-up on neck/jaw pain/swelling.    Followed up on MyChart.     ____________________________________________________________      Swelling in jaw/neck.     Taking Abx and using heat/cold but swelling and pain have worsened.      Appt with Snow on Wednesday but it seems I may need to be seen sooner.     No redness/fever- just increased swelling/pain.     Emerita Multani RN on 1/13/2025 at 12:13 PM

## 2025-01-14 ENCOUNTER — OFFICE VISIT (OUTPATIENT)
Dept: FAMILY MEDICINE | Facility: OTHER | Age: 65
End: 2025-01-14
Attending: PHYSICIAN ASSISTANT
Payer: COMMERCIAL

## 2025-01-14 VITALS
OXYGEN SATURATION: 97 % | DIASTOLIC BLOOD PRESSURE: 89 MMHG | HEART RATE: 70 BPM | SYSTOLIC BLOOD PRESSURE: 144 MMHG | WEIGHT: 277.6 LBS | TEMPERATURE: 97.4 F | RESPIRATION RATE: 18 BRPM | HEIGHT: 67 IN | BODY MASS INDEX: 43.57 KG/M2

## 2025-01-14 DIAGNOSIS — K11.21 ACUTE PAROTITIS: Primary | ICD-10-CM

## 2025-01-14 ASSESSMENT — PAIN SCALES - GENERAL: PAINLEVEL_OUTOF10: SEVERE PAIN (7)

## 2025-01-14 NOTE — LETTER
My Asthma Action Plan    Name: Milana Blum   YOB: 1960  Date: 1/14/2025   My doctor: Suad Rivas PA-C   My clinic: [facility]        My Rescue Medicine:   Albuterol inhaler (Proair/Ventolin/Proventil HFA)  2-4 puffs EVERY 4 HOURS as needed. Use a spacer if recommended by your provider.   My Asthma Severity:   Intermittent / Exercise Induced  Know your asthma triggers: dust mites             GREEN ZONE   Good Control  I feel good  No cough or wheeze  Can work, sleep and play without asthma symptoms       Take your asthma control medicine every day.     If exercise triggers your asthma, take your rescue medication  15 minutes before exercise or sports, and  During exercise if you have asthma symptoms  Spacer to use with inhaler: If you have a spacer, make sure to use it with your inhaler             YELLOW ZONE Getting Worse  I have ANY of these:  I do not feel good  Cough or wheeze  Chest feels tight  Wake up at night   Keep taking your Green Zone medications  Start taking your rescue medicine:  every 20 minutes for up to 1 hour. Then every 4 hours for 24-48 hours.  If you stay in the Yellow Zone for more than 12-24 hours, contact your doctor.  If you do not return to the Green Zone in 12-24 hours or you get worse, start taking your oral steroid medicine if prescribed by your provider.           RED ZONE Medical Alert - Get Help  I have ANY of these:  I feel awful  Medicine is not helping  Breathing getting harder  Trouble walking or talking  Nose opens wide to breathe       Take your rescue medicine NOW  If your provider has prescribed an oral steroid medicine, start taking it NOW  Call your doctor NOW  If you are still in the Red Zone after 20 minutes and you have not reached your doctor:  Take your rescue medicine again and  Call 911 or go to the emergency room right away    See your regular doctor within 2 weeks of an Emergency Room or Urgent Care visit for follow-up  treatment.          Annual Reminders:  Meet with Asthma Educator,  Flu Shot in the Fall, consider Pneumonia Vaccination for patients with asthma (aged 19 and older).    Pharmacy:    [facility] [address] - [facility] PORTAL [facility]  [facility] [id number] [address] [employer] - [place] [facility] [place] - [address]  [facility] [place], [place] - [address]    Electronically signed by Suad Rivas PA-C   Date: 01/14/25                    Asthma Triggers  How To Control Things That Make Your Asthma Worse    Triggers are things that make your asthma worse.  Look at the list below to help you find your triggers and   what you can do about them. You can help prevent asthma flare-ups by staying away from your triggers.      Trigger                                                          What you can do   Cigarette Smoke  Tobacco smoke can make asthma worse. Do not allow smoking in your home, car or around you.  Be sure no one smokes at a child s day care or school.  If you smoke, ask your health care provider for ways to help you quit.  Ask family members to quit too.  Ask your health care provider for a referral to Quit Plan to help you quit smoking, or call 4-306-845-PLAN.     Colds, Flu, Bronchitis  These are common triggers of asthma. Wash your hands often.  Don t touch your eyes, nose or mouth.  Get a flu shot every year.     Dust Mites  These are tiny bugs that live in cloth or carpet. They are too small to see. Wash sheets and blankets in hot water every week.   Encase pillows and mattress in dust mite proof covers.  Avoid having carpet if you can. If you have carpet, vacuum weekly.   Use a dust mask and HEPA vacuum.   Pollen and Outdoor Mold  Some people are allergic to trees, grass, or weed pollen, or molds. Try to keep your windows closed.  Limit time out doors when pollen count is high.   Ask you health care provider about taking medicine  during allergy season.     Animal Dander  Some people are allergic to skin flakes, urine or saliva from pets with fur or feathers. Keep pets with fur or feathers out of your home.    If you can t keep the pet outdoors, then keep the pet out of your bedroom.  Keep the bedroom door closed.  Keep pets off cloth furniture and away from stuffed toys.     Mice, Rats, and Cockroaches  Some people are allergic to the waste from these pests.   Cover food and garbage.  Clean up spills and food crumbs.  Store grease in the refrigerator.   Keep food out of the bedroom.   Indoor Mold  This can be a trigger if your home has high moisture. Fix leaking faucets, pipes, or other sources of water.   Clean moldy surfaces.  Dehumidify basement if it is damp and smelly.   Smoke, Strong Odors, and Sprays  These can reduce air quality. Stay away from strong odors and sprays, such as perfume, powder, hair spray, paints, smoke incense, paint, cleaning products, candles and new carpet.   Exercise or Sports  Some people with asthma have this trigger. Be active!  Ask your doctor about taking medicine before sports or exercise to prevent symptoms.    Warm up for 5-10 minutes before and after sports or exercise.     Other Triggers of Asthma  Cold air:  Cover your nose and mouth with a scarf.  Sometimes laughing or crying can be a trigger.  Some medicines and food can trigger asthma.

## 2025-01-14 NOTE — NURSING NOTE
"Chief Complaint   Patient presents with    Swelling     Neck/Jaw       Initial BP (!) 144/89 (BP Location: Right arm, Patient Position: Sitting, Cuff Size: Adult Large)   Pulse 70   Temp 97.4  F (36.3  C) (Tympanic)   Resp 18   Ht 1.702 m (5' 7\")   Wt 125.9 kg (277 lb 9.6 oz)   LMP 01/01/2004 (Within Months)   SpO2 97%   BMI 43.48 kg/m   Estimated body mass index is 43.48 kg/m  as calculated from the following:    Height as of this encounter: 1.702 m (5' 7\").    Weight as of this encounter: 125.9 kg (277 lb 9.6 oz).  Medication Review: complete    The next two questions are to help us understand your food security.  If you are feeling you need any assistance in this area, we have resources available to support you today.          8/2/2024   SDOH- Food Insecurity   Within the past 12 months, did you worry that your food would run out before you got money to buy more? N   Within the past 12 months, did the food you bought just not last and you didn t have money to get more? N         Health Care Directive:  Patient has a Health Care Directive on file      Breanne Lee      "

## 2025-01-14 NOTE — PATIENT INSTRUCTIONS
Blood pressure is elevated today. Encouraged to monitor blood pressure a couple times a week over the next few weeks. Return in 3-4 weeks if blood pressure is persistently elevated for a recheck appointment. Work on diet and exercise to decrease blood pressure. Weight loss is helpful.  Decrease salt intake.      -- Learn about DASH Diet (http://[facility].[employer]/DASHDiet - redirects to the [facility]) for dietary ways to reduce blood pressure      DASH Diet: Care Instructions  Your Care Instructions     The DASH diet is an eating plan that can help lower your blood pressure. DASH stands for Dietary Approaches to Stop Hypertension. Hypertension is high blood pressure.  The DASH diet focuses on eating foods that are high in calcium, potassium, and magnesium. These nutrients can lower blood pressure. The foods that are highest in these nutrients are fruits, vegetables, low-fat dairy products, nuts, seeds, and legumes. But taking calcium, potassium, and magnesium supplements instead of eating foods that are high in those nutrients does not have the same effect. The DASH diet also includes whole grains, fish, and poultry.  The DASH diet is one of several lifestyle changes your doctor may recommend to lower your high blood pressure. Your doctor may also want you to decrease the amount of sodium in your diet. Lowering sodium while following the DASH diet can lower blood pressure even further than just the DASH diet alone.  Follow-up care is a key part of your treatment and safety. Be sure to make and go to all appointments, and call your doctor if you are having problems. It's also a good idea to know your test results and keep a list of the medicines you take.  How can you care for yourself at home?  Following the DASH diet  Eat 4 to 5 servings of fruit each day. A serving is 1 medium-sized piece of fruit,   cup chopped or canned fruit, 1/4 cup dried fruit, or 4 ounces (  cup) of fruit juice. Choose fruit more often than fruit  juice.  Eat 4 to 5 servings of vegetables each day. A serving is 1 cup of lettuce or raw leafy vegetables,   cup of chopped or cooked vegetables, or 4 ounces (  cup) of vegetable juice. Choose vegetables more often than vegetable juice.  Get 2 to 3 servings of low-fat and fat-free dairy each day. A serving is 8 ounces of milk, 1 cup of yogurt, or 1   ounces of cheese.  Eat 6 to 8 servings of grains each day. A serving is 1 slice of bread, 1 ounce of dry cereal, or   cup of cooked rice, pasta, or cooked cereal. Try to choose whole-grain products as much as possible.  Limit lean meat, poultry, and fish to 2 servings each day. A serving is 3 ounces, about the size of a deck of cards.  Eat 4 to 5 servings of nuts, seeds, and legumes (cooked dried beans, lentils, and split peas) each week. A serving is 1/3 cup of nuts, 2 tablespoons of seeds, or   cup of cooked beans or peas.  Limit fats and oils to 2 to 3 servings each day. A serving is 1 teaspoon of vegetable oil or 2 tablespoons of salad dressing.  Limit sweets and added sugars to 5 servings or less a week. A serving is 1 tablespoon jelly or jam,   cup sorbet, or 1 cup of lemonade.  Eat less than 2,300 milligrams (mg) of sodium a day. If you limit your sodium to 1,500 mg a day, you can lower your blood pressure even more.  Be aware that all of these are the suggested number of servings for people who eat 1,800 to 2,000 calories a day. Your recommended number of servings may be different if you need more or fewer calories.  Tips for success  Start small. Do not try to make dramatic changes to your diet all at once. You might feel that you are missing out on your favorite foods and then be more likely to not follow the plan. Make small changes, and stick with them. Once those changes become habit, add a few more changes.  Try some of the following:  Make it a goal to eat a fruit or vegetable at every meal and at snacks. This will make it easy to get the recommended  "amount of fruits and vegetables each day.  Try yogurt topped with fruit and nuts for a snack or healthy dessert.  Add lettuce, tomato, cucumber, and onion to sandwiches.  Combine a ready-made pizza crust with low-fat mozzarella cheese and lots of vegetable toppings. Try using tomatoes, squash, spinach, broccoli, carrots, cauliflower, and onions.  Have a variety of cut-up vegetables with a low-fat dip as an appetizer instead of chips and dip.  Sprinkle sunflower seeds or chopped almonds over salads. Or try adding chopped walnuts or almonds to cooked vegetables.  Try some vegetarian meals using beans and peas. Add garbanzo or kidney beans to salads. Make burritos and tacos with mashed gray beans or black beans.  Where can you learn more?  Go to https://www.[employer].net/patiented  Enter H967 in the search box to learn more about \"DASH Diet: Care Instructions.\"  Current as of: March 1, 2023               Content Version: 13.7    [id number] [employer].   Care instructions adapted under license by your healthcare professional. If you have questions about a medical condition or this instruction, always ask your healthcare professional. [employer] disclaims any warranty or liability for your use of this information.         Lowering Your Blood Pressure with DASH  What is the DASH eating plan?  DASH (Dietary Approaches to Stop Hypertension) can help you prevent or lower high blood pressure. This eating plan provides the nutrients that help lower blood pressure: potassium, magnesium, calcium, protein and fiber.   If not controlled, high blood pressure may lead to heart disease, stroke or blindness.  This eating plan is rich in:   Fruits and vegetables  Whole grains  Fat-free or low-fat milk products  Fish and poultry (chicken, turkey, etc.)  Beans, seeds and nuts.  This eating plan is low in:   Salt and sodium  Sugar, sweets and drinks with sugar  Red meat, saturated fat and trans fat.  Lifestyle " changes  Besides a healthy eating plan, other steps are needed to help control high blood pressure.   Stay at a healthy weight.  Be active for at least 30 minutes on most days.  If you drink alcohol, have no more than two drinks per day (for men) or one per day (for women).  If you take blood pressure medicine, take it as directed.  Losing weight with DASH  You can lose weight by eating fewer calories. It is best to take off pounds slowly over time. Talk to a dietitian about the best way to do this.  Staying active   To shed pounds, combine DASH with daily exercise. Start with a 15-minute walk each day. Slowly increase the time until you reach a total of 30 minutes on most days. To avoid weight gain, try for 60 minutes each day. Check with your doctor before starting any exercise program.  Tips for less sodium  Avoid processed foods. These may include baked goods, cereals, soy sauce and even antacids.  Cook with less salt. Don't bring the saltshaker to the table.  Season food with herbs, spices, lemon, lime, vinegar, wine and salt-free seasoning blends.  Use low-sodium canned vegetables or fruits.  Tips to ease the change  Take a week or two to slowly make changes to your diet.  Add a serving of vegetables at lunch one day. The next day, add a serving at dinner as well.  Add fruit to one meal or eat it as a snack.  Work up to three servings of fat-free and low-fat milk products each day.  If you eat large portions of meat, cut back by a third at each meal. The goal is to eat 6 oz (ounces) of meat or less per day.  Serve brown rice and whole-wheat bread and pasta.  Try casseroles and stir-ann dishes that have less meat and more vegetables, grains or cooked dry beans.  Serve two or more meatless meals each week.  For snacks and desserts, eat foods low in fat, sodium and sugar, such as:  Unsalted rice cakes, nuts or seeds  Fresh fruits, raw vegetables and raisins  Fat-free, low-fat or frozen yogurt  Popcorn with no  salt or butter.  If you have trouble digesting milk products, try lactose-free milk or take lactase pills.  If you have a nut allergy, eat seeds, beans, lentils or split peas.  Fruits, vegetables and whole grain foods are high in fiber. To avoid bloating and diarrhea (loose, watery stools), increase these foods over several weeks.  The DASH eating plan  Use this chart to plan your meals. Or take it with you when you shop for food.           Food Group Servings per Day  Serving Size Examples    1,600 Calories 2,000 Calories 2,600 Calories         Grains  (whole wheat) 6 6 to 8 10 to 11 1 slice bread  1 oz dry cereal    cup cooked rice, pasta or cereal Whole-wheat bread and rolls, whole-wheat pasta, English muffin, layla bread, bagel, cereals, grits, oatmeal, brown rice, unsalted pretzels and popcorn   Vegetables  3 to 4 4 to 5 5 to 6 1 cup raw leafy vegetables    cup cut-up raw or cooked vegetable    cup vegetable juice Broccoli, carrots, collards, green beans, green peas, kale, lima beans, potatoes, spinach, squash, sweet potatoes, tomatoes   Fruits 4 4 to 5 5 to 6 1 medium fruit    cup dried fruit    cup fresh, frozen, or canned fruit    cup fruit juice Apples, apricots, bananas, dates, grapes, oranges, grapefruit, mangoes, melons, peaches, pineapples, raisins, strawberries, tangerines   Fat-free or   low-fat milk and milk products 2 to 3 2 to 3 3 1 cup milk or yogurt  1   oz cheese Fat-free or low-fat (1%) milk, buttermilk, cheese, regular or frozen yogurt   Lean meats, poultry and fish 3 to 6 6 or less 6 1 oz cooked meats, poultry (chicken, turkey) or fish  1 egg  2 egg whites Lean meats (trim away any fat, then broil, roast or poach); remove skin from poultry. Eggs are high in cholesterol, so limit egg yolks to four or less per week.   Nuts, seeds   and legumes 3 per week 4 to 5 per week 1 per day ? cup (1   oz) nuts  2 Tbsp peanut butter  2 Tbsp (   oz) seeds    cup cooked legumes (dry beans and peas) Almonds,  hazelnuts, mixed nuts, peanuts, walnuts, sunflower seeds, peanut butter, kidney beans, lentils, split peas   Fats and oils 2 2 to 3 3 1 tsp soft margarine  1 tsp vegetable oil  1 Tbsp nguyen  2 Tbsp salad dressing Soft margarine, vegetable oil (such as canola, corn, olive or safflower), low-fat mayonnaise, light salad dressing   Sweets and   added sugars 0 5 or less per week 2 or less per day 1 Tbsp sugar, jelly or jam    cup sorbet or gelatin  1 cup lemonade Fruit-flavored gelatin, fruit punch, hard candy, jelly, maple syrup, sorbets and ices   A sample meal plan  This sample menu gives two sodium levels. Start with 2,300 mg of sodium (about 1 teaspoon of table salt per day). Then, try to lower your intake to 1,500 mg a day. Talk to your doctor or dietitian about how to do this.   These samples are for people who eat 2,000 calories per day. The less salt you eat, the more you may lower your blood pressure.   Menu for 2,300 mg sodium per day   Breakfast:   cup instant oatmeal, 1 mini whole-wheat bagel, 1 tablespoon peanut butter, 1 medium banana, 1 cup (8 ounces) low-fat milk.   Lunch: 1 cup cantaloupe chunks, 1 cup apple juice and one chicken breast sandwich that includes:  Two slices (3 ounces) chicken breast, no skin  Two slices whole-wheat bread  1 slice (   ounce) reduced-fat cheddar cheese  One leaf edna lettuce  Two slices tomato  1 tablespoon low-fat mayonnaise.  Dinner: 1 cup cooked spaghetti,   cup low-salt vegetarian spaghetti sauce, 3 tablespoons Parmesan cheese;   cup corn (from frozen);   cup canned pears in juice; one spinach salad that includes:  1 cup fresh spinach leaves    cup fresh carrots, grated    cup fresh mushrooms, sliced  1 tablespoon vinegar and oil dressing.  Snacks:? cup unsalted almonds;   cup dried apricots; 1 cup fat-free fruit yogurt, no sugar added.  Reducing to 1,500 mg sodium per day  Use the same menu plan, but:  For breakfast, replace instant oatmeal with regular oatmeal and 1  "teaspoon cinnamon.  For lunch, replace cheddar cheese with low-sodium Swiss cheese.  Resources on diet and health  [facility] [place]  [facility]HLBI [facility] Information Center  [address] [address]   [place] [address]   Phone: [phone]   TTY: [phone]   www.nhlbi.nih.gov   \"Aim for a Healthy Weight\"   www.nhlbi.nih.gov/health/public/heart/obesity/lose_wt/index.htm  \"Dietary Guidelines for Americans 2005\"   and \"A Healthier You\"   www.healthierus.gov/dietaryguidelines  For informational purposes only. Not to replace the advice of your health care provider. Adapted from \"Your Guide to Lowering Blood Pressure with DASH,\" by the National [facility] [place],   NIH Publication No. , revised April 2006. Available at www.nhlbi.nih.gov/health/public/heart/hbp/dash/index.htm. Published by [employer]. [employer] 690238 - REV 09/15.  For informational purposes only. Not to replace the advice of your health care provider.  Copyright   2018 [employer]. All rights reserved.           "

## 2025-01-14 NOTE — PROGRESS NOTES
Assessment & Plan   Problem List Items Addressed This Visit    None  Visit Diagnoses       Acute parotitis    -  Primary           Acute parotitis: Discussed symptoms and concerns at length.  Patient's exam is stable as compared to previous.  Encouraged to continue taking the Augmentin.  Discussed the need for possible CT if symptoms do not start to improve or worsening.  Gave warning signs and symptoms.  Encouraged close follow-up this week if symptoms are changing or worsening.  Encouraged frequent warm compresses every few hours along with sucking on sugar-free lemon drops.    Blood pressure is elevated today. Encouraged to monitor blood pressure a couple times a week over the next few weeks. Return in 3-4 weeks if blood pressure is persistently elevated for a recheck appointment. Work on diet and exercise to decrease blood pressure. Weight loss is helpful.  Decrease salt intake.      -- Learn about DASH Diet (http://[employer]/DASHDiet - redirects to the [place]) for dietary ways to reduce blood pressure       The longitudinal plan of care for the diagnosis(es)/condition(s) as documented were addressed during this visit. Due to the added complexity in care, I will continue to support Milana in the subsequent management and with ongoing continuity of care.    See Patient Instructions    Return if symptoms worsen or fail to improve.      Subjective   Milana is a 64 year old, presenting for the following health issues:  Swelling (Neck/Jaw)        1/14/2025    10:14 AM   Additional Questions   Roomed by Breanne CALDWELL   Accompanied by self     History of Present Illness       Reason for visit:  Dizziness  Symptom onset:  3-7 days ago  Symptoms include:  Dizziness  Symptom intensity:  Moderate  Symptom progression:  Staying the same  Had these symptoms before:  Yes  Has tried/received treatment for these symptoms:  No  What makes it worse:  Not really  What makes it better:  No   She is taking medications regularly.    "  Patient is coming today for recheck.  Recently was diagnosed with acute parotiditis on 1/10/2025.  Felt like the swelling greatly increased yesterday along with the pain.  Improved today.  No fevers.  Having some diarrhea with the Augmentin antibiotic.  No problems swallowing or dental concerns.  Pain with moving her jaw and smiling.  Some increased mucus.  No ear pain or ear drainage.  Keeping food and fluids down.  Has tried a few warm compresses.    Review of Systems  Constitutional, HEENT, cardiovascular, pulmonary, gi and gu systems are negative, except as otherwise noted.      Objective    BP (!) 144/89 (BP Location: Right arm, Patient Position: Sitting, Cuff Size: Adult Large)   Pulse 70   Temp 97.4  F (36.3  C) (Tympanic)   Resp 18   Ht 1.702 m (5' 7\")   Wt 125.9 kg (277 lb 9.6 oz)   LMP 01/01/2004 (Within Months)   SpO2 97%   BMI 43.48 kg/m    Body mass index is 43.48 kg/m .  Physical Exam  Vitals and nursing note reviewed.   Constitutional:       Appearance: Normal appearance.   HENT:      Head: Normocephalic and atraumatic.      Right Ear: Tympanic membrane, ear canal and external ear normal.      Left Ear: Tympanic membrane, ear canal and external ear normal.      Mouth/Throat:      Mouth: Mucous membranes are moist.      Pharynx: Oropharynx is clear. No oropharyngeal exudate or posterior oropharyngeal erythema.      Comments: Mild pain with palpation over the right TMJ joint.  Patient has minimal to mild swelling over the right TMJ joint along with the inferior region.  No overlying erythema appreciated.  Cardiovascular:      Rate and Rhythm: Normal rate and regular rhythm.      Heart sounds: Normal heart sounds.   Pulmonary:      Effort: Pulmonary effort is normal.      Breath sounds: Normal breath sounds. No wheezing or rales.   Musculoskeletal:         General: Normal range of motion.      Cervical back: Normal range of motion and neck supple.   Lymphadenopathy:      Cervical: No cervical " adenopathy.   Skin:     General: Skin is warm and dry.   Neurological:      General: No focal deficit present.      Mental Status: She is alert.   Psychiatric:         Mood and Affect: Mood normal.         Behavior: Behavior normal.            No results found for any visits on 01/14/25.        Signed Electronically by: Suad Rivas PA-C

## 2025-01-15 ENCOUNTER — THERAPY VISIT (OUTPATIENT)
Dept: PHYSICAL THERAPY | Facility: OTHER | Age: 65
End: 2025-01-15
Attending: CHIROPRACTOR
Payer: COMMERCIAL

## 2025-01-15 DIAGNOSIS — R42 VERTIGO: Primary | ICD-10-CM

## 2025-01-15 PROCEDURE — 97112 NEUROMUSCULAR REEDUCATION: CPT | Mod: GP,PN | Performed by: PHYSICAL THERAPIST

## 2025-01-16 NOTE — TELEPHONE ENCOUNTER
"Has not improved since OV. Is using \"old pain meds\". Looking for further suggestions.     Per OV from 1/14:  Acute parotitis: Discussed symptoms and concerns at length.  Patient's exam is stable as compared to previous.  Encouraged to continue taking the Augmentin.  Discussed the need for possible CT if symptoms do not start to improve or worsening.  Gave warning signs and symptoms.  Encouraged close follow-up this week if symptoms are changing or worsening.  Encouraged frequent warm compresses every few hours along with sucking on sugar-free lemon drops.    Recommend F/U OV. Do not take \"old pain meds\".     Routing to provider to review and respond.  Adrián Dawson RN on 1/16/2025 at 10:19 AM    "

## 2025-01-16 NOTE — TELEPHONE ENCOUNTER
Please call let the patient know that I would recommend having her go to the rapid clinic or emergency room.  She will likely need a CT send symptoms are progressing and not improving to rule out concerns.  If she declines the rapid clinic or emergency room, please set up with an appointment either today or tomorrow in the morning or early afternoon to have a CT completed. Ok to use same day or prov add in.  Suad Rivas PA-C ..................1/16/2025 1:14 PM

## 2025-01-16 NOTE — TELEPHONE ENCOUNTER
Called patient. She does not want to go to [facility] or ER. She is unable to come in tomorrow until after 12. Only provider with a spot was Dr LAMONT Dawson RN on 1/16/2025 at 2:45 PM

## 2025-01-22 ENCOUNTER — THERAPY VISIT (OUTPATIENT)
Dept: PHYSICAL THERAPY | Facility: OTHER | Age: 65
End: 2025-01-22
Attending: CHIROPRACTOR
Payer: COMMERCIAL

## 2025-01-22 DIAGNOSIS — R42 VERTIGO: Primary | ICD-10-CM

## 2025-01-22 PROCEDURE — 97112 NEUROMUSCULAR REEDUCATION: CPT | Mod: GP,PN | Performed by: PHYSICAL THERAPIST

## 2025-01-22 NOTE — PROGRESS NOTES
Progress Note:  Patient was seen 4 total visits.  Treatment for left posterior canal BPPV.  Patient reports improved positional tolerance and improved visual motion tolerance, but not problem-free.  Continues to have mild central signs and ongoing chronic Tinnitus.  Scheduled to follow up with ENT in February.      PLAN  Will hold chart open x 1 month.    Beginning/End Dates of Progress Note Reporting Period:   1/8/25 to 01/22/2025    Referring Provider:  Tony Mederos            01/22/25 [id number]   Appointment Info   Signing clinician's name / credentials Adrian Reyes, PT   Visits Used 4   Medical Diagnosis 1) Vertigo   2) Migraine   PT Tx Diagnosis vestibular dysfunction, Left posterior canal BPPV   Progress Note/Certification   Onset of illness/injury or Date of Surgery 01/03/25   Therapy Frequency 1-2x/week   Predicted Duration 4 weeks   Progress Note Due Date 02/05/25   GOALS   PT Goals 2;3   PT Goal 1   Goal Identifier head motion   Goal Description Patient will be able to look up with no limitation due to dizziness for washing hair and looking to overhead shelf safely.   Goal Progress Goal met   Target Date 02/05/25   Date Met 01/22/25   PT Goal 2   Goal Identifier forward bend   Goal Description Patient will be able to safely forward bend and return to standing with no limitation due to dizziness for decreased risk of falls.   Goal Progress progress made - often avoids position   Target Date 02/05/25   PT Goal 3   Goal Identifier transfers   Goal Description Patient will be able to perform sit-supine tranfer with no limitation due to dizziness for decreased risk of falls getting into out of bed.   Goal Progress Goal met   Target Date 02/05/25   Date Met 01/22/25   Subjective Report   Subjective Report Patient states things are going pretty good.  Last couple nights didn't feel dizzy when she went to lay down.  The ringing  in her ears has not improved (got much worse with this episode of dizziness).  Had  hearing checked last around 2013.  Plans to see Dr. Grullon next month.  Brother and nephew both have otosclereosis.   Treatment Interventions (PT)   Interventions Standard Canalith Repositioning;Therapeutic Procedure/Exercise;Neuromuscular Re-education   Neuromuscular Re-education   Neuromuscular re-ed of mvmt, balance, coord, kinesthetic sense, posture, proprioception minutes (07666) 20   Neuro Re-ed 1 Oculomotor exam:   Smooth Pursuits = mild difficulty wiht vertical motion (down gaze);   Saccades = overshoots initially with down gaze;   (look the same as last visit);          Right Clemente Hallpike = negative      Left Clemente Hallpike = negative;     Roll Test Right = negative   Roll test Left = negative;   Instructed patient in continued monitoring.  Will follow up with ENT.   Neuro Re-ed 1 - Details Patient was seen by Snow Rivas PA-C yesterday.  Will continue antibiotic for parotitis.   Patient Response/Progress Reduced central signs today.  Negative positional testing today also.   Total Session Time   Timed Code Treatment Minutes 20   Total Treatment Time (sum of timed and untimed services) 20

## 2025-01-22 NOTE — PROGRESS NOTES
[facility] HEART CARE   CARDIOLOGY PROGRESS NOTE     Chief Complaint   Patient presents with    Follow Up     annual          Diagnosis:  1.  TAAA.    -4.2 cm on 4/27/2023.  -4.2 cm in 5/5/2022.   -4.0 cm 12/27/2018.  2.  DORMAN.    -Diastolic dysfunction/asthma.  3.  CHF.   -Normal on 5/5/2022.  -Diastolic grade 1 on 12/27/2018.  4.  KIM on CPAP.  5.  Palpitations.  6.  Chest pain-atypical.  7.  Hyperlipidemia-uncontrolled.  8.  Hypertriglyceridemia-controlled.  9.  Hypertension-controlled.  10.  Asthma-mild.  11.  Morbid obesity.   -43 on 1/17/2025.   -BMI of 47 on 3/18/2024.      Assessment/Plan:   1.  Plan for an echocardiogram with history of diastolic dysfunction and ascending aortic aneurysm.  2.  Plan for an ultrasound of carotids with strong family history of stroke.  3.  Plan for Zio patch with palpitations.  4.  With palpitations and elevated blood pressure, increase Bystolic from 10 mg to 20 mg daily.  5.  Follow-up after completion of echocardiogram and Zio patch.  High systolic increase today because of elevated blood pressure and palpitations.        Interval history:  See above.    HPI:    Mrs. Blum is being seen by cardiology to establish care.  She has been having weekly palpitations lasting 10 minutes to a full day, sharp/atypical pain to her chest, uncontrolled hypertension, and was following with cardiology in [place] related to a mild ascending aortic aneurysm.     Zio patch from 3/21/2022 showed SVT up to 16.2 seconds, DORMAN with history of mild asthma, obesity with a BMI of 47, prediabetes-controlled, hypercholesterolemia-controlled, diastolic dysfunction grade 1 on 12/27/18, KIM on CPAP, and diverticula.     She had been seeing cardiology.  She moved to [place] in October, 2021.  Her job allows her to work remotely if she lives an hour or greater away.  She lives less than an hour from her job as she lives in [place].  She is feeling less safe.  She decided to be closer to her  brother and increase her distance to allow her to work remotely.  She was seeing cardiology in [place].  She saw Dr. Theodore in cardiology related to an ascending aortic aneurysm.  It was 4.0 cm on 12/27/18 and increased to 4.2 cm on 5/5/2022.  Discussed this is a mild finding and does not require surgery.  Surgery is typically performed at 5.5 cm.     She also has been having palpitations.  She describes them happening on a weekly basis.  They can last from anywhere from 10 minutes to a full day.  She has symptoms while wearing her Zio patch which was completed 3/21/2022.  This monitor showed x7 episodes of SVT lasting up to 16.2 seconds, SVE, and VE.  No A. fib, heart block, or VT.  Patient gets mildly dizzy but that has not had syncope or near syncope.  Currently on Bystolic and was increased.  If found to have extended episodes lasting hours, may need to have an ablation.  We will treat with medications.     She is having atypical chest pain.  She describes a sharp, substernal pain that is not exertional.  Lasts seconds.  It is random.  It is tender to palpation and reproducible.  Patient reassured.  No history of CAD or stenting.     She has a history of hypertension.  Blood pressure on 6/2/2022 was 158/94.  Blood pressures have been in the 130s systolically at home.  Discussed the benefits of tighter control blood pressure.  We will increase Bystolic from 5 mg daily to 10 mg daily for better blood pressure control and help manage palpitations.     She has diastolic dysfunction.  She has peripheral edema.  She currently on hydrochlorothiazide 25 mg daily.  Echo from 12/27/2018 showed grade 1.      Relevant testing:  Echo on 4/27/2023:  Left ventricular size, wall motion and function are normal. The ejection fraction is 55-60%.  Right ventricular function, chamber size, wall motion, and thickness are normal.  No pericardial effusion is present.  The inferior vena cava is normal.  Mild dilatation of the aorta is  present. Ascending aorta 4.2 cm.  Compared to prior imaging on 5/5/2022 there are no hemodynamically significant differences.    Echocardiogram on 5/5/2022:  Left ventricular size, wall motion and function are normal. The ejection fraction is 60-65%.  Right ventricular function, chamber size, wall motion, and thickness are normal.  Ascending aorta 4.2 cm, indexed at 1.75 cm/m2.  The inferior vena cava was normal in size with preserved respiratory variability. No pericardial effusion is present.     Zio patch 3/21/2022:  Patient's monitor was in place for 11 days and 21-hour.  Minimum heart rate 51 bpm, average heart rate 72 bpm, maximum heart rate 160 bpm. Rhythm/s present include sinus rhythm, supraventricular tachycardia.  There were rare ventricular ectopic beats accounting for less than 1% of all beats. There were 0 ventricular triplets and 0 couplets. There were rare supraventricular ectopic beats.  There were 10 triggered events during which time the noted rhythms were supraventricular tachycardia and sinus rhythm.  There were 9 diary entries during which time the noted rhythms were supraventricular tachycardia and sinus rhythm.  There were 7 episodes of supraventricular tachycardia with the fastest interval lasting 5 beats with a max rate of 169 and the longest lasting 16.2 seconds.  Review of actual tracings showed no other abnormality.     Echocardiogram on 4/16/2021:  Left ventricular ejection fraction is normal. The calculated left ventricular ejection fraction is 59%.    Normal left ventricular cavity size and wall thickness.    Diastolic function is normal.    Normal right ventricular size and systolic function.    No hemodynamically significant valvular heart abnormalities.    The ascending aorta is mildly dilated measuring 4.2 cm.    When compared to the previous study dated 12/27/2018, there has been no significant change.     Echocardiogram on 12/27/2018:  Normal left ventricular size and systolic  function.The calculated left ventricular ejection fraction is 65%. This represents a normal ejection fraction. Borderline concentric hypertrophy noted. Grade I (mild) left ventricular diastolic dysfunction.    Normal right ventricular size and systolic function. TAPSE is normal    Left atrial volume is moderately increased    Estimated central venous pressure equal to 3 mmHg.    The ascending aorta is mildly dilated at 40 mm.    No previous study for comparison.        ICD-10-CM    1. Palpitations  R00.2 EKG 12-lead, tracing only     nebivolol (BYSTOLIC) 20 MG tablet     HC ZIO PATCH 8-14 DAYS APPLICATION     ZIO PATCH 8-14 DAYS (additional cost to patient)      2. DORMAN (dyspnea on exertion)  R06.09 Echocardiogram Complete      3. Diastolic dysfunction with chronic heart failure (H)  I50.32 Echocardiogram Complete      4. Essential hypertension, benign  I10 nebivolol (BYSTOLIC) 20 MG tablet      5. Paroxysmal supraventricular tachycardia  I47.10 nebivolol (BYSTOLIC) 20 MG tablet     HC ZIO PATCH 8-14 DAYS APPLICATION     ZIO PATCH 8-14 DAYS (additional cost to patient)      6. Supraventricular tachycardia  I47.10 nebivolol (BYSTOLIC) 20 MG tablet     HC ZIO PATCH 8-14 DAYS APPLICATION     ZIO PATCH 8-14 DAYS (additional cost to patient)      7. SVT (supraventricular tachycardia)  I47.10 nebivolol (BYSTOLIC) 20 MG tablet     HC ZIO PATCH 8-14 DAYS APPLICATION     ZIO PATCH 8-14 DAYS (additional cost to patient)      8. Prediabetes  R73.03       9. Morbid obesity (H)  E66.01       10. Aneurysm of ascending aorta without rupture  I71.21 Echocardiogram Complete     nebivolol (BYSTOLIC) 20 MG tablet      11. Family history of stroke  Z82.3 US Carotid Bilateral      12. Hypercholesterolemia  E78.00       13. Peripheral edema  R60.0               Past Medical History:   Diagnosis Date    Aortic root dilatation 5/5/2022    Last echocardiogram May 2022 measurement 4.1 cm.  Slowly progressively getting bigger continue annual  "exams unless recommended otherwise by cardiology    Asthma     Hypertension     Low back pain        Past Surgical History:   Procedure Laterality Date    BACK SURGERY  08/2010    L4-5, S1 Hemilaminectomy, foraminotomy    IR LUMBAR EPIDURAL STEROID INJECTION  3/4/2010    NV REMOVAL OF OVARY/TUBE(S)      Description: Salpingo-oophorectomy Left Side;  Proc Date: 12/06/2004;    ZZC SUPRACERV ABD HYSTERECTOMY      Description: Supracervical Hysterectomy;  Proc Date: 12/06/2004;       Allergies   Allergen Reactions    House Dust [Dust Mite Extract] Shortness Of Breath     Pollen     Other Environmental Allergy Itching       Current Outpatient Medications   Medication Sig Dispense Refill    albuterol (PROAIR HFA/PROVENTIL HFA/VENTOLIN HFA) 108 (90 Base) MCG/ACT inhaler [ALBUTEROL (VENTOLIN HFA) 90 MCG/ACTUATION INHALER] INHALE 1 TO 2 PUFFS ORALLY EVERY FOUR HOURS AS NEEDED 54 g 3    b complex vitamins tablet [B COMPLEX VITAMINS TABLET] Take 1 tablet by mouth daily.      BD INTEGRA SYRINGE 25G X 1\" 3 ML MISC 1 SYRINGE ONCE A WEEK 12 each 20    cyanocobalamin (CYANOCOBALAMIN) 1000 MCG/ML injection INJECT 1 ML (1,000 MCG) INTO THE MUSCLE EVERY 30 DAYS 3 mL 3    Elastic Bandages & Supports (MEDICAL COMPRESSION STOCKINGS) MISC 1 Units daily 20-30 mm HG 6 each 11    hydrochlorothiazide (HYDRODIURIL) 50 MG tablet Take 1 tablet (50 mg) by mouth daily. 90 tablet 4    magnesium oxide 250 mg Tab [MAGNESIUM OXIDE 250 MG TAB] Take 250 mg by mouth daily.      meclizine (ANTIVERT) 25 MG tablet Take 1 tablet (25 mg) by mouth 3 times daily as needed for dizziness. 40 tablet 1    nebivolol (BYSTOLIC) 20 MG tablet Take 1 tablet (20 mg) by mouth daily. 90 tablet 3    Semaglutide-Weight Management (WEGOVY) 1 MG/0.5ML pen Inject 1 mg subcutaneously every 7 days. x4 doses - for Diabetes and Obesity- Then increase to 1.7 mg weekly 2 mL 4       Social History     Socioeconomic History    Marital status: Single     Spouse name: Not on file    " Number of children: Not on file    Years of education: Not on file    Highest education level: Not on file   Occupational History    Not on file   Tobacco Use    Smoking status: Never     Passive exposure: Past    Smokeless tobacco: Never   Vaping Use    Vaping status: Never Used   Substance and Sexual Activity    Alcohol use: Yes     Alcohol/week: 2.0 standard drinks of alcohol     Comment: weekly beer or wine    Drug use: No    Sexual activity: Yes     Partners: Male     Birth control/protection: Surgical   Other Topics Concern    Not on file   Social History Narrative    Not on file     Social Drivers of Health     Financial Resource Strain: Low Risk  (8/2/2024)    Financial Resource Strain     Within the past 12 months, have you or your family members you live with been unable to get utilities (heat, electricity) when it was really needed?: No   Food Insecurity: Low Risk  (1/17/2025)    Food Insecurity     Within the past 12 months, did you worry that your food would run out before you got money to buy more?: No     Within the past 12 months, did the food you bought just not last and you didn t have money to get more?: No   Transportation Needs: Low Risk  (8/2/2024)    Transportation Needs     Within the past 12 months, has lack of transportation kept you from medical appointments, getting your medicines, non-medical meetings or appointments, work, or from getting things that you need?: No   Physical Activity: Insufficiently Active (8/2/2024)    Exercise Vital Sign     Days of Exercise per Week: 4 days     Minutes of Exercise per Session: 30 min   Stress: Stress Concern Present (8/2/2024)    [employer] - Occupational Stress Questionnaire     Feeling of Stress : Rather much   Social Connections: Unknown (8/2/2024)    Social Connection and Isolation Panel [NHANES]     Frequency of Communication with Friends and Family: Not on file     Frequency of Social Gatherings with Friends and  Family: Once a week     Attends Muslim Services: Not on file     Active Member of Clubs or Organizations: Not on file     Attends Club or Organization Meetings: Not on file     Marital Status: Not on file   Interpersonal Safety: Low Risk  (1/23/2025)    Interpersonal Safety     Do you feel physically and emotionally safe where you currently live?: Yes     Within the past 12 months, have you been hit, slapped, kicked or otherwise physically hurt by someone?: No     Within the past 12 months, have you been humiliated or emotionally abused in other ways by your partner or ex-partner?: No   Housing Stability: Low Risk  (8/2/2024)    Housing Stability     Do you have housing? : Yes     Are you worried about losing your housing?: No       LAB RESULTS:   No visits with results within 2 Month(s) from this visit.   Latest known visit with results is:   Office Visit on 01/10/2023   Component Date Value Ref Range Status    Vitamin B12 01/10/2023 804  232 - 1,245 pg/mL Final    Sodium 01/10/2023 140  136 - 145 mmol/L Final    Potassium 01/10/2023 4.0  3.4 - 5.3 mmol/L Final    Chloride 01/10/2023 102  98 - 107 mmol/L Final    Carbon Dioxide (CO2) 01/10/2023 30 (H)  22 - 29 mmol/L Final    Anion Gap 01/10/2023 8  7 - 15 mmol/L Final    Urea Nitrogen 01/10/2023 9.8  8.0 - 23.0 mg/dL Final    Creatinine 01/10/2023 0.63  0.51 - 0.95 mg/dL Final    Calcium 01/10/2023 9.6  8.8 - 10.2 mg/dL Final    Glucose 01/10/2023 109 (H)  70 - 99 mg/dL Final    Alkaline Phosphatase 01/10/2023 94  35 - 104 U/L Final    AST 01/10/2023 19  10 - 35 U/L Final    ALT 01/10/2023 18  10 - 35 U/L Final    Protein Total 01/10/2023 7.0  6.4 - 8.3 g/dL Final    Albumin 01/10/2023 4.0  3.5 - 5.2 g/dL Final    Bilirubin Total 01/10/2023 0.5  <=1.2 mg/dL Final    GFR Estimate 01/10/2023 >90  >60 mL/min/1.73m2 Final    Vitamin D, Total (25-Hydroxy) 01/10/2023 28  20 - 75 ug/L Final    WBC Count 01/10/2023 7.6  4.0 - 11.0 10e3/uL Final    RBC Count 01/10/2023  "4.87  3.80 - 5.20 10e6/uL Final    Hemoglobin 01/10/2023 12.5  11.7 - 15.7 g/dL Final    Hematocrit 01/10/2023 40.2  35.0 - 47.0 % Final    MCV 01/10/2023 83  78 - 100 fL Final    MCH 01/10/2023 25.7 (L)  26.5 - 33.0 pg Final    MCHC 01/10/2023 31.1 (L)  31.5 - 36.5 g/dL Final    RDW 01/10/2023 13.7  10.0 - 15.0 % Final    Platelet Count 01/10/2023 238  150 - 450 10e3/uL Final    % Neutrophils 01/10/2023 42  % Final    % Lymphocytes 01/10/2023 50  % Final    % Monocytes 01/10/2023 5  % Final    % Eosinophils 01/10/2023 2  % Final    % Basophils 01/10/2023 1  % Final    % Immature Granulocytes 01/10/2023 0  % Final    NRBCs per 100 WBC 01/10/2023 0  <1 /100 Final    Absolute Neutrophils 01/10/2023 3.2  1.6 - 8.3 10e3/uL Final    Absolute Lymphocytes 01/10/2023 3.8  0.8 - 5.3 10e3/uL Final    Absolute Monocytes 01/10/2023 0.4  0.0 - 1.3 10e3/uL Final    Absolute Eosinophils 01/10/2023 0.1  0.0 - 0.7 10e3/uL Final    Absolute Basophils 01/10/2023 0.0  0.0 - 0.2 10e3/uL Final    Absolute Immature Granulocytes 01/10/2023 0.0  <=0.4 10e3/uL Final    Absolute NRBCs 01/10/2023 0.0  10e3/uL Final        Review of systems: Negative except that which was noted in the HPI.    Physical examination:  /88 (BP Location: Right arm, Patient Position: Sitting, Cuff Size: Adult Large)   Pulse 73   Temp 97  F (36.1  C) (Temporal)   Resp 16   Ht 1.7 m (5' 6.93\")   Wt 122 kg (269 lb)   LMP 01/01/2004 (Within Months)   SpO2 96%   BMI 42.22 kg/m      GENERAL APPEARANCE: healthy, alert and no distress  CHEST: lungs clear to auscultation - no rales, rhonchi or wheezes, no use of accessory muscles, no retractions, respirations are unlabored, normal respiratory rate  CARDIOVASCULAR: regular rhythm, normal S1 with physiologic split S2, no S3 or S4 and no murmur, click or rub  EXTREMITIES: no clubbing, cyanosis but with mild edema.                Thank you for allowing me to participate in the care of your patient. Please do not " hesitate to contact me if you have any questions.     Dustin Hallman, DO

## 2025-01-23 ENCOUNTER — OFFICE VISIT (OUTPATIENT)
Dept: FAMILY MEDICINE | Facility: OTHER | Age: 65
End: 2025-01-23
Attending: FAMILY MEDICINE
Payer: COMMERCIAL

## 2025-01-23 ENCOUNTER — OFFICE VISIT (OUTPATIENT)
Dept: CARDIOLOGY | Facility: OTHER | Age: 65
End: 2025-01-23
Attending: INTERNAL MEDICINE
Payer: COMMERCIAL

## 2025-01-23 VITALS
TEMPERATURE: 97 F | RESPIRATION RATE: 16 BRPM | HEIGHT: 67 IN | SYSTOLIC BLOOD PRESSURE: 134 MMHG | BODY MASS INDEX: 42.22 KG/M2 | OXYGEN SATURATION: 96 % | HEART RATE: 73 BPM | WEIGHT: 269 LBS | DIASTOLIC BLOOD PRESSURE: 88 MMHG

## 2025-01-23 VITALS
HEART RATE: 60 BPM | WEIGHT: 269.8 LBS | SYSTOLIC BLOOD PRESSURE: 118 MMHG | HEIGHT: 67 IN | BODY MASS INDEX: 42.35 KG/M2 | OXYGEN SATURATION: 98 % | RESPIRATION RATE: 16 BRPM | DIASTOLIC BLOOD PRESSURE: 64 MMHG | TEMPERATURE: 97.9 F

## 2025-01-23 DIAGNOSIS — I47.10 SUPRAVENTRICULAR TACHYCARDIA: ICD-10-CM

## 2025-01-23 DIAGNOSIS — I50.32 DIASTOLIC DYSFUNCTION WITH CHRONIC HEART FAILURE (H): ICD-10-CM

## 2025-01-23 DIAGNOSIS — I71.21 ANEURYSM OF ASCENDING AORTA WITHOUT RUPTURE: ICD-10-CM

## 2025-01-23 DIAGNOSIS — R73.03 PREDIABETES: ICD-10-CM

## 2025-01-23 DIAGNOSIS — I10 ESSENTIAL HYPERTENSION, BENIGN: ICD-10-CM

## 2025-01-23 DIAGNOSIS — R06.09 DOE (DYSPNEA ON EXERTION): ICD-10-CM

## 2025-01-23 DIAGNOSIS — I47.10 SVT (SUPRAVENTRICULAR TACHYCARDIA): ICD-10-CM

## 2025-01-23 DIAGNOSIS — R00.2 PALPITATIONS: Primary | ICD-10-CM

## 2025-01-23 DIAGNOSIS — R60.0 PERIPHERAL EDEMA: ICD-10-CM

## 2025-01-23 DIAGNOSIS — Z82.3 FAMILY HISTORY OF STROKE: ICD-10-CM

## 2025-01-23 DIAGNOSIS — I47.10 PAROXYSMAL SUPRAVENTRICULAR TACHYCARDIA: ICD-10-CM

## 2025-01-23 DIAGNOSIS — E78.00 HYPERCHOLESTEROLEMIA: ICD-10-CM

## 2025-01-23 DIAGNOSIS — E66.01 MORBID OBESITY (H): ICD-10-CM

## 2025-01-23 DIAGNOSIS — K11.21 ACUTE PAROTITIS: Primary | ICD-10-CM

## 2025-01-23 LAB
ATRIAL RATE - MUSE: 71 BPM
DIASTOLIC BLOOD PRESSURE - MUSE: NORMAL MMHG
INTERPRETATION ECG - MUSE: NORMAL
P AXIS - MUSE: 24 DEGREES
PR INTERVAL - MUSE: 166 MS
QRS DURATION - MUSE: 98 MS
QT - MUSE: 416 MS
QTC - MUSE: 452 MS
R AXIS - MUSE: -4 DEGREES
SYSTOLIC BLOOD PRESSURE - MUSE: NORMAL MMHG
T AXIS - MUSE: 40 DEGREES
VENTRICULAR RATE- MUSE: 71 BPM

## 2025-01-23 RX ORDER — NEBIVOLOL 20 MG/1
20 TABLET ORAL DAILY
Qty: 90 TABLET | Refills: 3 | Status: SHIPPED | OUTPATIENT
Start: 2025-01-23

## 2025-01-23 ASSESSMENT — PAIN SCALES - GENERAL
PAINLEVEL_OUTOF10: NO PAIN (0)
PAINLEVEL_OUTOF10: NO PAIN (0)

## 2025-01-23 NOTE — PROGRESS NOTES
Assessment & Plan     Acute parotitis  Much improved with just some mild residual swelling.  Reassurance given that I am not feeling any discrete nodule or lymphadenopathy at all.  She was on the proper treatment and the parotiditis does appear resolved.  May take just a little longer for the regional swelling to resolve.  If pain recurs or any recurrence of increased swelling or discrete nodule, she would need to follow back up for further evaluation.                No follow-ups on file.    Gilda Cortés is a 64 year old, presenting for the following health issues:  swelling in jaw        1/23/2025     4:13 PM   Additional Questions   Roomed by Breanne CALDWELL   Accompanied by self     History of Present Illness       Reason for visit:  Lymph node swelling and pain.    She eats 2-3 servings of fruits and vegetables daily.She consumes 0 sweetened beverage(s) daily.She exercises with enough effort to increase her heart rate 20 to 29 minutes per day.  She exercises with enough effort to increase her heart rate 3 or less days per week.   She is taking medications regularly.     64-year-old female here for follow-up of recent diagnosis of right sided parotiditis.  She had come in to see Suad Rivas on 1/10/2025.  She was started on Augmentin 875 mg twice daily for 10 days.  She had already noticed some swelling, redness and pain in that area.  She had some increased pain in the right preauricular area and extending up into the right temporal area and so she presented back in to the clinic last week.  There was some concern for trigeminal neuralgia at that time and so was prescribed acyclovir and prednisone.  However, she was concerned about side effects with those so never took them.  The pain seemed to resolve over those next few days.  She actually has no further pain at this point.  She feels like there is still some residual swelling and wanted to make sure that the infection was gone.  No dental problems, no  "tooth aches.  No fevers.  She still is planned to see ENT in a couple weeks for the original benign positional vertigo issues, but she does mention the dizziness is much better at this point after Epley maneuvers and physical therapy.  No other complaints today.    I have reviewed the patient's medical history in detail and updated the computerized patient record.                    Objective    /64 (BP Location: Left arm, Patient Position: Sitting, Cuff Size: Adult Large)   Pulse 60   Temp 97.9  F (36.6  C) (Tympanic)   Resp 16   Ht 1.7 m (5' 6.93\")   Wt 122.4 kg (269 lb 12.8 oz)   LMP 01/01/2004 (Within Months)   SpO2 98%   BMI 42.35 kg/m    Body mass index is 42.35 kg/m .  Physical Exam   GENERAL: alert and no distress  HENT: ear canals and TM's normal, nose and mouth without ulcers or lesions; no dental abnormalities, no tenderness.  No mucous membrane swelling.  NECK: no adenopathy, no asymmetry, masses, or scars; no discrete mass over the right parotid area.  Apparent mild residual swelling extending just below the mandibular angle, but nontender, no erythema, no warmth.            Signed Electronically by: Lobo Art MD    "

## 2025-01-23 NOTE — NURSING NOTE
"Chief Complaint   Patient presents with    swelling in jaw       Initial BP (!) 163/97 (BP Location: Right arm, Patient Position: Sitting, Cuff Size: Adult Regular)   Pulse 60   Temp 97.9  F (36.6  C) (Tympanic)   Resp 16   Ht 1.7 m (5' 6.93\")   Wt 122.4 kg (269 lb 12.8 oz)   LMP 01/01/2004 (Within Months)   SpO2 98%   BMI 42.35 kg/m   Estimated body mass index is 42.35 kg/m  as calculated from the following:    Height as of this encounter: 1.7 m (5' 6.93\").    Weight as of this encounter: 122.4 kg (269 lb 12.8 oz).  Medication Review: complete    The next two questions are to help us understand your food security.  If you are feeling you need any assistance in this area, we have resources available to support you today.          1/17/2025   SDOH- Food Insecurity   Within the past 12 months, did you worry that your food would run out before you got money to buy more? N   Within the past 12 months, did the food you bought just not last and you didn t have money to get more? N         Health Care Directive:  Patient has a Health Care Directive on file      Breanne Lee      "

## 2025-01-23 NOTE — NURSING NOTE
"Chief Complaint   Patient presents with    Follow Up     annual       Initial /88 (BP Location: Right arm, Patient Position: Sitting, Cuff Size: Adult Large)   Pulse 73   Temp 97  F (36.1  C) (Temporal)   Resp 16   Ht 1.7 m (5' 6.93\")   Wt 122 kg (269 lb)   LMP 01/01/2004 (Within Months)   SpO2 96%   BMI 42.22 kg/m   Estimated body mass index is 42.22 kg/m  as calculated from the following:    Height as of this encounter: 1.7 m (5' 6.93\").    Weight as of this encounter: 122 kg (269 lb).  Meds Reconciled: complete  Pt is not on Aspirin  Pt is not on a Statin  PHQ and/or DOYLE reviewed. Pt referred to PCP/MH Provider as appropriate.    Katie Talley LPN      "

## 2025-01-30 ENCOUNTER — MYC MEDICAL ADVICE (OUTPATIENT)
Dept: CARDIOLOGY | Facility: OTHER | Age: 65
End: 2025-01-30
Payer: COMMERCIAL

## 2025-01-30 DIAGNOSIS — R00.2 PALPITATIONS: ICD-10-CM

## 2025-01-30 DIAGNOSIS — I10 ESSENTIAL HYPERTENSION, BENIGN: ICD-10-CM

## 2025-01-30 DIAGNOSIS — R06.09 DOE (DYSPNEA ON EXERTION): Primary | ICD-10-CM

## 2025-01-30 DIAGNOSIS — I47.10 PAROXYSMAL SUPRAVENTRICULAR TACHYCARDIA: ICD-10-CM

## 2025-01-30 RX ORDER — NEBIVOLOL 10 MG/1
10 TABLET ORAL DAILY
Qty: 90 TABLET | Refills: 3 | Status: SHIPPED | OUTPATIENT
Start: 2025-01-30

## 2025-01-30 RX ORDER — NEBIVOLOL 5 MG/1
5 TABLET ORAL DAILY
Qty: 90 TABLET | Refills: 3 | Status: SHIPPED | OUTPATIENT
Start: 2025-01-30

## 2025-02-06 ENCOUNTER — MYC MEDICAL ADVICE (OUTPATIENT)
Dept: FAMILY MEDICINE | Facility: OTHER | Age: 65
End: 2025-02-06

## 2025-02-06 ENCOUNTER — HOSPITAL ENCOUNTER (OUTPATIENT)
Dept: CARDIOLOGY | Facility: OTHER | Age: 65
End: 2025-02-06
Attending: INTERNAL MEDICINE
Payer: COMMERCIAL

## 2025-02-06 ENCOUNTER — ALLIED HEALTH/NURSE VISIT (OUTPATIENT)
Dept: FAMILY MEDICINE | Facility: OTHER | Age: 65
End: 2025-02-06
Attending: INTERNAL MEDICINE
Payer: COMMERCIAL

## 2025-02-06 DIAGNOSIS — I47.10 SVT (SUPRAVENTRICULAR TACHYCARDIA): ICD-10-CM

## 2025-02-06 DIAGNOSIS — R00.2 PALPITATIONS: ICD-10-CM

## 2025-02-06 DIAGNOSIS — I71.21 ANEURYSM OF ASCENDING AORTA WITHOUT RUPTURE: ICD-10-CM

## 2025-02-06 DIAGNOSIS — I47.10 SUPRAVENTRICULAR TACHYCARDIA: ICD-10-CM

## 2025-02-06 DIAGNOSIS — I50.32 DIASTOLIC DYSFUNCTION WITH CHRONIC HEART FAILURE (H): ICD-10-CM

## 2025-02-06 DIAGNOSIS — I47.10 PAROXYSMAL SUPRAVENTRICULAR TACHYCARDIA: ICD-10-CM

## 2025-02-06 DIAGNOSIS — R06.09 DOE (DYSPNEA ON EXERTION): ICD-10-CM

## 2025-02-06 LAB — LVEF ECHO: NORMAL

## 2025-02-06 PROCEDURE — 93246 EXT ECG>7D<15D RECORDING: CPT

## 2025-02-06 PROCEDURE — 93306 TTE W/DOPPLER COMPLETE: CPT

## 2025-02-06 PROCEDURE — 999N000157 HC STATISTIC RCP TIME EA 10 MIN

## 2025-02-06 NOTE — TELEPHONE ENCOUNTER
Update from [facility] on 2/6:  PA submitted on 2/3.      Patient update on MyChart.    _________________________________________________________________    Wegovy 1mg PA. [facility].  Transferred to [facility].      Called [facility].  They do have this on profile.  PA requested on 2/4.      Reached out to [facility] to inquire about PA on Wegovy.     Patient update on [employer].    Emerita Multani, RN on 2/6/2025 at 1:30 PM

## 2025-02-06 NOTE — PROGRESS NOTES
Patient arrived (date)02/06/2025 (time)1000 for a 14 day Zio monitor per (provider name) Dr. Hallman for Palpitations (Dx).  Patient's skin was prepped per protocol.  Zio monitor was placed.  Patient verbalized understanding of instructions and plan of care.  Patient was given Zio diary and prepaid .  Respiratory Therapist reviewed Zio Patch placement process and asked patient if they are comfortable proceeding with placement. Patient (is or is not) is comfortable proceeding. Patient (would or would not) would not like an employee of same gender to be (present or to place) present or to place the Zio Patch.  Documentation of Zio Patch site (Bleeding or Not Bleeding) Not Bleeding  If there is bleeding Documentation of why. N/A  Documentation of accomodation made for patient. No    Sharon Morales RT

## 2025-02-10 NOTE — TELEPHONE ENCOUNTER
Update from [employer] on 2/10:  Your prior authorization for Wegovy has been approved! Authorization Expiration Date: February 10, 2026.     ___________________________________________________________________    Update from [employer]:  Bryan. it had a weird message on it. saying it was sent but when I reopened it, it now says not sent. I'm going to resend it     Patient update on MyChart.    ________________________________________________________________      Reached out to [facility] with:     Could you tell me, was this prior auth requested by [employer] or [facility]?  Just so I know who to follow up with.     Emerita Multani, RN on 2/10/2025 at 11:19 AM

## 2025-02-20 ENCOUNTER — OFFICE VISIT (OUTPATIENT)
Dept: FAMILY MEDICINE | Facility: OTHER | Age: 65
End: 2025-02-20
Attending: STUDENT IN AN ORGANIZED HEALTH CARE EDUCATION/TRAINING PROGRAM
Payer: COMMERCIAL

## 2025-02-20 VITALS
SYSTOLIC BLOOD PRESSURE: 132 MMHG | WEIGHT: 274.4 LBS | TEMPERATURE: 96.9 F | DIASTOLIC BLOOD PRESSURE: 80 MMHG | BODY MASS INDEX: 43.07 KG/M2 | HEART RATE: 75 BPM | HEIGHT: 67 IN | OXYGEN SATURATION: 94 % | RESPIRATION RATE: 16 BRPM

## 2025-02-20 DIAGNOSIS — I50.32 DIASTOLIC DYSFUNCTION WITH CHRONIC HEART FAILURE (H): ICD-10-CM

## 2025-02-20 DIAGNOSIS — R06.09 DOE (DYSPNEA ON EXERTION): ICD-10-CM

## 2025-02-20 DIAGNOSIS — I10 ESSENTIAL HYPERTENSION, BENIGN: ICD-10-CM

## 2025-02-20 DIAGNOSIS — R60.0 PERIPHERAL EDEMA: ICD-10-CM

## 2025-02-20 DIAGNOSIS — J32.0 LEFT MAXILLARY SINUSITIS: Primary | ICD-10-CM

## 2025-02-20 RX ORDER — HYDROCHLOROTHIAZIDE 50 MG/1
50 TABLET ORAL DAILY
Qty: 90 TABLET | Refills: 4 | Status: SHIPPED | OUTPATIENT
Start: 2025-02-20

## 2025-02-20 RX ORDER — FLUTICASONE PROPIONATE 50 MCG
1 SPRAY, SUSPENSION (ML) NASAL DAILY
Qty: 15 ML | Refills: 4 | Status: SHIPPED | OUTPATIENT
Start: 2025-02-20

## 2025-02-20 ASSESSMENT — ASTHMA QUESTIONNAIRES
QUESTION_4 LAST FOUR WEEKS HOW OFTEN HAVE YOU USED YOUR RESCUE INHALER OR NEBULIZER MEDICATION (SUCH AS ALBUTEROL): ONCE A WEEK OR LESS
QUESTION_3 LAST FOUR WEEKS HOW OFTEN DID YOUR ASTHMA SYMPTOMS (WHEEZING, COUGHING, SHORTNESS OF BREATH, CHEST TIGHTNESS OR PAIN) WAKE YOU UP AT NIGHT OR EARLIER THAN USUAL IN THE MORNING: NOT AT ALL
ACT_TOTALSCORE: 22
QUESTION_5 LAST FOUR WEEKS HOW WOULD YOU RATE YOUR ASTHMA CONTROL: WELL CONTROLLED
QUESTION_2 LAST FOUR WEEKS HOW OFTEN HAVE YOU HAD SHORTNESS OF BREATH: ONCE OR TWICE A WEEK
ACT_TOTALSCORE: 22
QUESTION_1 LAST FOUR WEEKS HOW MUCH OF THE TIME DID YOUR ASTHMA KEEP YOU FROM GETTING AS MUCH DONE AT WORK, SCHOOL OR AT HOME: NONE OF THE TIME

## 2025-02-20 ASSESSMENT — ENCOUNTER SYMPTOMS: EYE PAIN: 1

## 2025-02-20 ASSESSMENT — PAIN SCALES - GENERAL: PAINLEVEL_OUTOF10: MILD PAIN (3)

## 2025-02-20 NOTE — NURSING NOTE
"Chief Complaint   Patient presents with    Eye Problem     Pain around right eye for 6 days   She has had pain around and behind her right eye for 6 days. She went to the eye Dr yesterday and he said everything is fine with her eye. She said during the night her nose and eye started draining.  Lata Stuart LPN..................2/20/2025   1:34 PM      Initial /80   Pulse 75   Temp 96.9  F (36.1  C) (Temporal)   Resp 16   Ht 1.702 m (5' 7\")   Wt 124.5 kg (274 lb 6.4 oz)   LMP 01/01/2004 (Within Months)   SpO2 94%   Breastfeeding No   BMI 42.98 kg/m   Estimated body mass index is 42.98 kg/m  as calculated from the following:    Height as of this encounter: 1.702 m (5' 7\").    Weight as of this encounter: 124.5 kg (274 lb 6.4 oz).  Medication Review: complete    The next two questions are to help us understand your food security.  If you are feeling you need any assistance in this area, we have resources available to support you today.          1/17/2025   SDOH- Food Insecurity   Within the past 12 months, did you worry that your food would run out before you got money to buy more? N   Within the past 12 months, did the food you bought just not last and you didn t have money to get more? N         Health Care Directive:  Patient has a Health Care Directive on file      Lata Stuart LPN      "

## 2025-02-20 NOTE — PROGRESS NOTES
"  Assessment & Plan   Problem List Items Addressed This Visit          Respiratory    DORMAN (dyspnea on exertion)    Relevant Medications    hydrochlorothiazide (HYDRODIURIL) 50 MG tablet    fluticasone (FLONASE) 50 MCG/ACT nasal spray       Circulatory    Essential hypertension, benign    Relevant Medications    hydrochlorothiazide (HYDRODIURIL) 50 MG tablet    Diastolic dysfunction with chronic heart failure (H)    Relevant Medications    hydrochlorothiazide (HYDRODIURIL) 50 MG tablet       Musculoskeletal and Integumentary    Peripheral edema    Relevant Medications    hydrochlorothiazide (HYDRODIURIL) 50 MG tablet    fluticasone (FLONASE) 50 MCG/ACT nasal spray     Other Visit Diagnoses       Left maxillary sinusitis    -  Primary    Relevant Medications    amoxicillin-clavulanate (AUGMENTIN) 875-125 MG tablet    fluticasone (FLONASE) 50 MCG/ACT nasal spray           Symptoms are kind of an odd presentation for a sinus infection without preceding URI or allergies but with her location of maxillary pain and sinus drainage it might be. Recommend trial flonase and if no changes or worsening (or new symptoms) then can trial augmentin. Reviewed benefits vs risks and side effects of medication and patient in agreement to start taking.    Discussed return precuations   Needs refills of blood pressure meds        BMI  Estimated body mass index is 42.98 kg/m  as calculated from the following:    Height as of this encounter: 1.702 m (5' 7\").    Weight as of this encounter: 124.5 kg (274 lb 6.4 oz).           No follow-ups on file.      Gilda Cortés is a 64 year old, presenting for the following health issues:  Eye Problem (Pain around right eye for 6 days)      2/20/2025     1:28 PM   Additional Questions   Roomed by Lata Stuart LPN     Eye Problem     History of Present Illness       Reason for visit:  Possible sinus infection  Symptom onset:  3-7 days ago  Symptoms include:  Face and eye pain, runny nose  Symptom " "intensity:  Moderate  Symptom progression:  Staying the same  Had these symptoms before:  Yes  Has tried/received treatment for these symptoms:  Yes  Previous treatment was successful:  Yes  Prior treatment description:  Antibiotics, i think  What makes it worse:  Not really  What makes it better:  Heat pack to the face   She is taking medications regularly.     Ear pain pressure  Went to [facility] yesterday and eye exam fine  Hurts down into maxillary sinus  Worse as the day progresses  Last night used heat and nose is draining now and having post nasal symptoms                   Review of Systems  Constitutional, HEENT, cardiovascular, pulmonary, gi and gu systems are negative, except as otherwise noted.      Objective    /80   Pulse 75   Temp 96.9  F (36.1  C) (Temporal)   Resp 16   Ht 1.702 m (5' 7\")   Wt 124.5 kg (274 lb 6.4 oz)   LMP 01/01/2004 (Within Months)   SpO2 94%   Breastfeeding No   BMI 42.98 kg/m    Body mass index is 42.98 kg/m .  Physical Exam   GENERAL: alert and no distress  EYES: Eyes grossly normal to inspection, PERRL and conjunctivae and sclerae normal  HENT: normal cephalic/atraumatic, ear canals and TM's normal, nose and mouth without ulcers or lesions, oropharynx clear, oral mucous membranes moist, and sinuses: not tender  RESP: lungs clear to auscultation - no rales, rhonchi or wheezes  CV: regular rate and rhythm, normal S1 S2, no S3 or S4, no murmur, click or rub, no peripheral edema   PSYCH: mentation appears normal, affect normal/bright            Signed Electronically by: Jason Magdaleno MD    "

## 2025-02-26 LAB — CV ZIO PRELIM RESULTS: NORMAL

## 2025-02-27 SDOH — HEALTH STABILITY: PHYSICAL HEALTH: ON AVERAGE, HOW MANY MINUTES DO YOU ENGAGE IN EXERCISE AT THIS LEVEL?: 20 MIN

## 2025-02-27 SDOH — HEALTH STABILITY: PHYSICAL HEALTH: ON AVERAGE, HOW MANY DAYS PER WEEK DO YOU ENGAGE IN MODERATE TO STRENUOUS EXERCISE (LIKE A BRISK WALK)?: 3 DAYS

## 2025-02-27 ASSESSMENT — ASTHMA QUESTIONNAIRES
QUESTION_3 LAST FOUR WEEKS HOW OFTEN DID YOUR ASTHMA SYMPTOMS (WHEEZING, COUGHING, SHORTNESS OF BREATH, CHEST TIGHTNESS OR PAIN) WAKE YOU UP AT NIGHT OR EARLIER THAN USUAL IN THE MORNING: NOT AT ALL
QUESTION_2 LAST FOUR WEEKS HOW OFTEN HAVE YOU HAD SHORTNESS OF BREATH: NOT AT ALL
QUESTION_4 LAST FOUR WEEKS HOW OFTEN HAVE YOU USED YOUR RESCUE INHALER OR NEBULIZER MEDICATION (SUCH AS ALBUTEROL): NOT AT ALL
QUESTION_5 LAST FOUR WEEKS HOW WOULD YOU RATE YOUR ASTHMA CONTROL: COMPLETELY CONTROLLED
ACT_TOTALSCORE: 25
ACT_TOTALSCORE: 25
QUESTION_1 LAST FOUR WEEKS HOW MUCH OF THE TIME DID YOUR ASTHMA KEEP YOU FROM GETTING AS MUCH DONE AT WORK, SCHOOL OR AT HOME: NONE OF THE TIME

## 2025-02-27 ASSESSMENT — SOCIAL DETERMINANTS OF HEALTH (SDOH): HOW OFTEN DO YOU GET TOGETHER WITH FRIENDS OR RELATIVES?: ONCE A WEEK

## 2025-03-04 ENCOUNTER — OFFICE VISIT (OUTPATIENT)
Dept: FAMILY MEDICINE | Facility: OTHER | Age: 65
End: 2025-03-04
Attending: FAMILY MEDICINE
Payer: COMMERCIAL

## 2025-03-04 VITALS
WEIGHT: 275.6 LBS | HEIGHT: 67 IN | BODY MASS INDEX: 43.26 KG/M2 | DIASTOLIC BLOOD PRESSURE: 78 MMHG | TEMPERATURE: 97.3 F | SYSTOLIC BLOOD PRESSURE: 128 MMHG | OXYGEN SATURATION: 98 % | RESPIRATION RATE: 16 BRPM | HEART RATE: 64 BPM

## 2025-03-04 DIAGNOSIS — I50.32 DIASTOLIC DYSFUNCTION WITH CHRONIC HEART FAILURE (H): ICD-10-CM

## 2025-03-04 DIAGNOSIS — Z00.00 ADULT GENERAL MEDICAL EXAM: Primary | ICD-10-CM

## 2025-03-04 DIAGNOSIS — E66.01 MORBID OBESITY (H): ICD-10-CM

## 2025-03-04 DIAGNOSIS — R51.9 NONINTRACTABLE HEADACHE, UNSPECIFIED CHRONICITY PATTERN, UNSPECIFIED HEADACHE TYPE: ICD-10-CM

## 2025-03-04 DIAGNOSIS — E53.8 B12 NEUROPATHY: ICD-10-CM

## 2025-03-04 DIAGNOSIS — I10 ESSENTIAL HYPERTENSION, BENIGN: ICD-10-CM

## 2025-03-04 DIAGNOSIS — G63 B12 NEUROPATHY: ICD-10-CM

## 2025-03-04 DIAGNOSIS — R73.03 PREDIABETES: ICD-10-CM

## 2025-03-04 LAB
ANION GAP SERPL CALCULATED.3IONS-SCNC: 12 MMOL/L (ref 7–15)
BASOPHILS # BLD AUTO: 0 10E3/UL (ref 0–0.2)
BASOPHILS NFR BLD AUTO: 1 %
BUN SERPL-MCNC: 11.3 MG/DL (ref 8–23)
CALCIUM SERPL-MCNC: 9.5 MG/DL (ref 8.8–10.4)
CHLORIDE SERPL-SCNC: 103 MMOL/L (ref 98–107)
CHOLEST SERPL-MCNC: 213 MG/DL
CREAT SERPL-MCNC: 0.61 MG/DL (ref 0.51–0.95)
EGFRCR SERPLBLD CKD-EPI 2021: >90 ML/MIN/1.73M2
EOSINOPHIL # BLD AUTO: 0.1 10E3/UL (ref 0–0.7)
EOSINOPHIL NFR BLD AUTO: 1 %
ERYTHROCYTE [DISTWIDTH] IN BLOOD BY AUTOMATED COUNT: 13.6 % (ref 10–15)
ERYTHROCYTE [SEDIMENTATION RATE] IN BLOOD BY WESTERGREN METHOD: 12 MM/HR (ref 0–30)
FASTING STATUS PATIENT QL REPORTED: YES
FASTING STATUS PATIENT QL REPORTED: YES
GLUCOSE SERPL-MCNC: 92 MG/DL (ref 70–99)
HCO3 SERPL-SCNC: 27 MMOL/L (ref 22–29)
HCT VFR BLD AUTO: 40.4 % (ref 35–47)
HDLC SERPL-MCNC: 43 MG/DL
HGB BLD-MCNC: 13.2 G/DL (ref 11.7–15.7)
IMM GRANULOCYTES # BLD: 0 10E3/UL
IMM GRANULOCYTES NFR BLD: 0 %
LDLC SERPL CALC-MCNC: 145 MG/DL
LYMPHOCYTES # BLD AUTO: 3.5 10E3/UL (ref 0.8–5.3)
LYMPHOCYTES NFR BLD AUTO: 46 %
MCH RBC QN AUTO: 27.8 PG (ref 26.5–33)
MCHC RBC AUTO-ENTMCNC: 32.7 G/DL (ref 31.5–36.5)
MCV RBC AUTO: 85 FL (ref 78–100)
MONOCYTES # BLD AUTO: 0.3 10E3/UL (ref 0–1.3)
MONOCYTES NFR BLD AUTO: 4 %
NEUTROPHILS # BLD AUTO: 3.7 10E3/UL (ref 1.6–8.3)
NEUTROPHILS NFR BLD AUTO: 48 %
NONHDLC SERPL-MCNC: 170 MG/DL
NRBC # BLD AUTO: 0 10E3/UL
NRBC BLD AUTO-RTO: 0 /100
PLATELET # BLD AUTO: 215 10E3/UL (ref 150–450)
POTASSIUM SERPL-SCNC: 3.7 MMOL/L (ref 3.4–5.3)
RBC # BLD AUTO: 4.74 10E6/UL (ref 3.8–5.2)
SODIUM SERPL-SCNC: 142 MMOL/L (ref 135–145)
TRIGL SERPL-MCNC: 124 MG/DL
VIT B12 SERPL-MCNC: 750 PG/ML (ref 232–1245)
WBC # BLD AUTO: 7.7 10E3/UL (ref 4–11)

## 2025-03-04 PROCEDURE — 85004 AUTOMATED DIFF WBC COUNT: CPT | Mod: ZL | Performed by: FAMILY MEDICINE

## 2025-03-04 PROCEDURE — 80061 LIPID PANEL: CPT | Mod: ZL | Performed by: FAMILY MEDICINE

## 2025-03-04 PROCEDURE — 36415 COLL VENOUS BLD VENIPUNCTURE: CPT | Mod: ZL | Performed by: FAMILY MEDICINE

## 2025-03-04 PROCEDURE — 85652 RBC SED RATE AUTOMATED: CPT | Mod: ZL | Performed by: FAMILY MEDICINE

## 2025-03-04 PROCEDURE — 80048 BASIC METABOLIC PNL TOTAL CA: CPT | Mod: ZL | Performed by: FAMILY MEDICINE

## 2025-03-04 PROCEDURE — 82607 VITAMIN B-12: CPT | Mod: ZL | Performed by: FAMILY MEDICINE

## 2025-03-04 ASSESSMENT — PAIN SCALES - GENERAL: PAINLEVEL_OUTOF10: NO PAIN (0)

## 2025-03-04 NOTE — PROGRESS NOTES
Preventive Care Visit  [facility] [place]  Lobo Art MD, Family Medicine  Mar 4, 2025      Assessment & Plan     Adult general medical exam  Preventative care discussed.  Labs as below.  Continue healthy diet, regular exercise as well as regular dental and eye examinations.  Follow-up in 1 year for annual preventative physical examination, sooner if any problems.  Biometric screening paperwork for work filled out and she can fill in the lipid panel and glucose results.  Her last mammogram was September 2023 so I did recommend that, but she declines at this time and will plan for that at a later date.  Daina will be due October 2025.    Essential hypertension, benign  Controlled on medications.  Labs as below.  - Basic metabolic panel; Future  - Lipid Profile; Future  - Basic metabolic panel  - Lipid Profile    Prediabetes  Continue to work on healthy diet and regular exercise.    Morbid obesity (H)  Continue to work on healthy diet and regular exercise.    Diastolic dysfunction with chronic heart failure (H)  Continue medications without any changes.  Continue following with cardiology yearly per their recommendations, sooner if any problems.    Nonintractable headache, unspecified chronicity pattern, unspecified headache type  Right-sided periorbital headaches as described below most consistent with tension type headache or migraine variant with her history.  Less likely trigeminal neuralgia or temporal arteritis.  Reassurance given that she had normal eye examination at [facility] and normal head MRI in December.  Recommended ibuprofen and/or Tylenol along with general measures with heating pad and monitor for the next couple weeks.  Will go ahead with sed rate and CBC as part of the blood work today.  If anything worsens, changes or does not improve, we could consider still possibility of trigeminal neuralgia with carbamazepine type treatment.  Discussed that with her and she is  "okay with the plan.  - Sedimentation Rate (ESR); Future  - CBC with Platelets & Differential; Future  - Sedimentation Rate (ESR)  - CBC with Platelets & Differential    B12 neuropathy  Check B12 today.  She is on B12 replacement without changes.  - Vitamin B12; Future  - CBC with Platelets & Differential; Future  - Vitamin B12  - CBC with Platelets & Differential            BMI  Estimated body mass index is 43.17 kg/m  as calculated from the following:    Height as of this encounter: 1.702 m (5' 7\").    Weight as of this encounter: 125 kg (275 lb 9.6 oz).       Counseling  Appropriate preventive services were addressed with this patient via screening, questionnaire, or discussion as appropriate for fall prevention, nutrition, physical activity, Tobacco-use cessation, social engagement, weight loss and cognition.  Checklist reviewing preventive services available has been given to the patient.  Reviewed patient's diet, addressing concerns and/or questions.   She is at risk for lack of exercise and has been provided with information to increase physical activity for the benefit of her well-being.   The patient was instructed to see the dentist every 6 months.           No follow-ups on file.    Gilda Cortés is a 64 year old, presenting for the following:  Physical        3/4/2025     7:58 AM   Additional Questions   Roomed by Tita MURRY          HPI  64-year-old female here for annual preventative physical examination as well as follow-up of multiple medical problems.  She needs biometric screening paperwork filled out for her work with labs and vitals from today.  She overall is doing fairly well.  She does have a couple concerns.  She has had hypertension well-controlled on medications.  She does have morbid obesity and prediabetes and so has been working on healthy diet and regular exercise for that.  She is on Wegovy with some benefit and gradual weight loss on that without significant side effects.  She " does have chronic heart failure with diastolic dysfunction and follows with cardiology.  She takes medications as below without any changes.  Her last echocardiogram actually was just last month on 2/6/2025, which showed stable findings.  She then does take vitamin B12 replacement for her vitamin B12 deficiency with history of B12 neuropathy.  That has all been stable.  No changes necessarily with that.    Her main current complaint is 3-week history of some pain of the right eye and around the right eye.  This is a constant, achy, mild in severity, but can be more moderately severe at times later in the day and at night.  Sometimes it will be more sharp.  She tried some ibuprofen and Tylenol.  She has used some heating pads.  She felt like it was more painful with lateral motion of the right eye.  No blurry vision necessarily with her.  She did see ophthalmologist at [facility] recently and they felt like this was not a primary eye issue.  She does have a history of headaches and has had some migraine headaches in the past.  This has been different than what she has had before.  Medications have not helped too much.  She had seen Dr. Hilario in January when she had more diffuse right-sided facial pain from preauricular area all the way down to the neck and they had talked about possible trigeminal neuralgia at that time.  He had recommended prednisone and Valtrex at that time, but she never did take those.  That however seem to improve over a few weeks until now this pain started a few weeks ago.  She was also dealing with some sinus issues and some of the year issues with BPPV at that time, which did improve overall.  She did see Dr. Blas 2 weeks ago and was felt to have a sinus infection so was treated with Augmentin and Flonase.  She felt like that did not really help too much with the periorbital pain but maybe a little bit with the maxillary pressure.  She is not using the Flonase anymore.   She feels like she has mild residual rhinorrhea, but not too different with her typical chronic sinus issues at baseline.    She is overall otherwise doing well.  No other complaints.  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         Advance Care Planning  Patient has a Health Care Directive on file  Advance care planning document is on file and is current.      2/27/2025   General Health   How would you rate your overall physical health? Good   Feel stress (tense, anxious, or unable to sleep) Only a little   (!) STRESS CONCERN      2/27/2025   Nutrition   Three or more servings of calcium each day? Yes   Diet: Low salt    Gluten-free/reduced   How many servings of fruit and vegetables per day? (!) 2-3   How many sweetened beverages each day? 0-1       Multiple values from one day are sorted in reverse-chronological order         2/27/2025   Exercise   Days per week of moderate/strenous exercise 3 days   Average minutes spent exercising at this level 20 min         2/27/2025   Social Factors   Frequency of gathering with friends or relatives Once a week   Worry food won't last until get money to buy more No   Food not last or not have enough money for food? No   Do you have housing? (Housing is defined as stable permanent housing and does not include staying ouside in a car, in a tent, in an abandoned building, in an overnight shelter, or couch-surfing.) Yes   Are you worried about losing your housing? No   Lack of transportation? No   Unable to get utilities (heat,electricity)? No         2/27/2025   Fall Risk   Fallen 2 or more times in the past year? No   Trouble with walking or balance? No          2/27/2025   Dental   Dentist two times every year? (!) NO              Today's PHQ-2 Score:       2/20/2025     9:41 AM   PHQ-2 ( 1999 [employer])   Q1: Little interest or pleasure in doing things 0   Q2: Feeling down, depressed or hopeless 0   PHQ-2 Score 0    Q1: Little interest  or pleasure in doing things Not at all   Q2: Feeling down, depressed or hopeless Not at all   PHQ-2 Score 0       Patient-reported         2/27/2025   Substance Use   Alcohol more than 3/day or more than 7/wk No   Do you use any other substances recreationally? No     Social History     Tobacco Use    Smoking status: Never     Passive exposure: Past    Smokeless tobacco: Never   Vaping Use    Vaping status: Never Used   Substance Use Topics    Alcohol use: Yes     Alcohol/week: 2.0 standard drinks of alcohol     Comment: weekly beer or wine    Drug use: No           9/14/2022   LAST FHS-7 RESULTS   1st degree relative breast or ovarian cancer No   Any relative bilateral breast cancer No   Any male have breast cancer No   Any ONE woman have BOTH breast AND ovarian cancer No   Any woman with breast cancer before 50yrs No   2 or more relatives with breast AND/OR ovarian cancer No   2 or more relatives with breast AND/OR bowel cancer No        Mammogram Screening - Mammogram every 1-2 years updated in Health Maintenance based on mutual decision making        2/27/2025   STI Screening   New sexual partner(s) since last STI/HIV test? No     History of abnormal Pap smear: No - age 30- 64 PAP with HPV every 5 years recommended        Latest Ref Rng & Units 8/15/2024     3:01 PM   PAP / HPV   PAP  Negative for Intraepithelial Lesion or Malignancy (NILM)    HPV 16 DNA Negative Negative    HPV 18 DNA Negative Negative    Other HR HPV Negative Negative      ASCVD Risk   The 10-year ASCVD risk score (Leann KEITH, et al., 2019) is: 7.6%    Values used to calculate the score:      Age: 64 years      Sex: Female      Is Non- : No      Diabetic: No      Tobacco smoker: No      Systolic Blood Pressure: 128 mmHg      Is BP treated: Yes      HDL Cholesterol: 40 mg/dL      Total Cholesterol: 192 mg/dL           Reviewed and updated as needed this visit by Provider   Tobacco  Allergies  Meds  Problems   "Med Hx  Surg Hx  Fam Hx            Past Medical History:   Diagnosis Date    Aortic root dilatation 5/5/2022    Last echocardiogram May 2022 measurement 4.1 cm.  Slowly progressively getting bigger continue annual exams unless recommended otherwise by cardiology    Asthma     Hypertension     Low back pain      Past Surgical History:   Procedure Laterality Date    BACK SURGERY  08/2010    L4-5, S1 Hemilaminectomy, foraminotomy    IR LUMBAR EPIDURAL STEROID INJECTION  3/4/2010    [place] REMOVAL OF OVARY/TUBE(S)      Description: Salpingo-oophorectomy Left Side;  Proc Date: 12/06/2004;    [facility] SUPRACERV ABD HYSTERECTOMY      Description: Supracervical Hysterectomy;  Proc Date: 12/06/2004;     Current Outpatient Medications   Medication Sig Dispense Refill    albuterol (PROAIR HFA/PROVENTIL HFA/VENTOLIN HFA) 108 (90 Base) MCG/ACT inhaler [ALBUTEROL (VENTOLIN HFA) 90 MCG/ACTUATION INHALER] INHALE 1 TO 2 PUFFS ORALLY EVERY FOUR HOURS AS NEEDED 54 g 3    b complex vitamins tablet [B COMPLEX VITAMINS TABLET] Take 1 tablet by mouth daily.      BD INTEGRA SYRINGE 25G X 1\" 3 ML MISC 1 SYRINGE ONCE A WEEK 12 each 20    cyanocobalamin (CYANOCOBALAMIN) 1000 MCG/ML injection INJECT 1 ML (1,000 MCG) INTO THE MUSCLE EVERY 30 DAYS 3 mL 3    Elastic Bandages & Supports (MEDICAL COMPRESSION STOCKINGS) MISC 1 Units daily 20-30 mm HG 6 each 11    hydrochlorothiazide (HYDRODIURIL) 50 MG tablet Take 1 tablet (50 mg) by mouth daily. 90 tablet 4    magnesium oxide 250 mg Tab [MAGNESIUM OXIDE 250 MG TAB] Take 250 mg by mouth daily.      nebivolol (BYSTOLIC) 10 MG tablet Take 1 tablet (10 mg) by mouth daily. 90 tablet 3    nebivolol (BYSTOLIC) 5 MG tablet Take 1 tablet (5 mg) by mouth daily. 90 tablet 3    Semaglutide-Weight Management (WEGOVY) 1 MG/0.5ML pen Inject 1 mg subcutaneously every 7 days. x4 doses - for Diabetes and Obesity- Then increase to 1.7 mg weekly 2 mL 4    meclizine (ANTIVERT) 25 MG tablet Take 1 tablet (25 mg) by mouth 3 " "times daily as needed for dizziness. (Patient not taking: Reported on 3/4/2025) 40 tablet 1              Objective    Exam  /78 (BP Location: Right arm, Patient Position: Sitting, Cuff Size: Adult Large)   Pulse 64   Temp 97.3  F (36.3  C) (Tympanic)   Resp 16   Ht 1.702 m (5' 7\")   Wt 125 kg (275 lb 9.6 oz)   LMP 01/01/2004 (Within Months)   SpO2 98%   BMI 43.17 kg/m     Estimated body mass index is 43.17 kg/m  as calculated from the following:    Height as of this encounter: 1.702 m (5' 7\").    Weight as of this encounter: 125 kg (275 lb 9.6 oz).    Physical Exam  GENERAL: alert and no distress  EYES: Eyes grossly normal to inspection, PERRL and conjunctivae and sclerae normal  HENT: ear canals and TM's normal, nose and mouth without ulcers or lesions  NECK: no adenopathy, no asymmetry, masses, or scars  RESP: lungs clear to auscultation - no rales, rhonchi or wheezes  CV: regular rate and rhythm, normal S1 S2, no S3 or S4, no murmur, click or rub, no peripheral edema  ABDOMEN: soft, nontender, no hepatosplenomegaly, no masses and bowel sounds normal  MS: no gross musculoskeletal defects noted, no edema  SKIN: no suspicious lesions or rashes  NEURO: Normal strength and tone, mentation intact and speech normal  PSYCH: mentation appears normal, affect normal/bright        Signed Electronically by: Lobo Art MD    "

## 2025-03-04 NOTE — NURSING NOTE
"Chief Complaint   Patient presents with    Physical     Patient reports to the clinic for her annual physical.  She states she has been having eye pain. She states when she looks to the right side, her right eye feels a lot of pain. She states it worse at night and has been going on since mid February.     Initial /78 (BP Location: Right arm, Patient Position: Sitting, Cuff Size: Adult Large)   Pulse 64   Temp 97.3  F (36.3  C) (Tympanic)   Resp 16   Ht 1.702 m (5' 7\")   Wt 125 kg (275 lb 9.6 oz)   LMP 01/01/2004 (Within Months)   SpO2 (!) 64%   BMI 43.17 kg/m   Estimated body mass index is 43.17 kg/m  as calculated from the following:    Height as of this encounter: 1.702 m (5' 7\").    Weight as of this encounter: 125 kg (275 lb 9.6 oz).  Medication Review: complete    The next two questions are to help us understand your food security.  If you are feeling you need any assistance in this area, we have resources available to support you today.          2/27/2025   SDOH- Food Insecurity   Within the past 12 months, did you worry that your food would run out before you got money to buy more? N   Within the past 12 months, did the food you bought just not last and you didn t have money to get more? N         Health Care Directive:  Patient has a Health Care Directive on file      Tita Herr LPN      "

## 2025-03-11 ENCOUNTER — OFFICE VISIT (OUTPATIENT)
Dept: FAMILY MEDICINE | Facility: OTHER | Age: 65
End: 2025-03-11
Attending: FAMILY MEDICINE
Payer: COMMERCIAL

## 2025-03-11 VITALS
OXYGEN SATURATION: 99 % | BODY MASS INDEX: 43.07 KG/M2 | SYSTOLIC BLOOD PRESSURE: 134 MMHG | DIASTOLIC BLOOD PRESSURE: 84 MMHG | TEMPERATURE: 97.9 F | HEART RATE: 62 BPM | WEIGHT: 275 LBS | RESPIRATION RATE: 20 BRPM

## 2025-03-11 DIAGNOSIS — G50.0 TRIGEMINAL NEURALGIA: Primary | ICD-10-CM

## 2025-03-11 RX ORDER — CARBAMAZEPINE 200 MG/1
200 TABLET ORAL 2 TIMES DAILY
Qty: 60 TABLET | Refills: 1 | Status: SHIPPED | OUTPATIENT
Start: 2025-03-11

## 2025-03-11 ASSESSMENT — ENCOUNTER SYMPTOMS: HEADACHES: 1

## 2025-03-11 ASSESSMENT — PAIN SCALES - GENERAL: PAINLEVEL_OUTOF10: SEVERE PAIN (8)

## 2025-03-11 NOTE — PROGRESS NOTES
"  Assessment & Plan     Trigeminal neuralgia  Discussed classic symptoms of this diagnosis in the V1 V2 distribution.  Possibly the previous sinusitis and parotiditis were present and resolved or were some of the initial presenting complaints of trigeminal neuralgia.  Reassurance given on the normal head MRI and normal sed rate, which rules out some primary neurologic issues and temporal arteritis respectively.  Discussed typical treatment course with carbamazepine.  She was hesitant with the medication due to previous sensitivity to multiple medications in the past.  Discussed that we will start with low-dose and build up as tolerated.  Discussed potential side effects and if she has any significant side effects we can always stop it.  We could move into oxcarbazepine or gabapentin or ultimately neurologic referral if not seeing good improvement.  She is okay with the plan.  I will have her follow-up in 3 weeks for recheck, sooner if any problems.  - carBAMazepine (TEGRETOL) 200 MG tablet; Take 1 tablet (200 mg) by mouth 2 times daily.          BMI  Estimated body mass index is 43.07 kg/m  as calculated from the following:    Height as of 3/4/25: 1.702 m (5' 7\").    Weight as of this encounter: 124.7 kg (275 lb).             Return in about 3 weeks (around 4/1/2025) for Follow up, with me.    Gilda Cortés is a 64 year old, presenting for the following health issues:  Headache        3/11/2025    12:42 PM   Additional Questions   Roomed by Tita Herr LPN     Headache         64-year-old female here for ongoing problems with right-sided facial pain and headache.  She has been in multiple times with previous diagnosis of parotiditis and acute sinusitis over the last couple months.  She has been on Augmentin treatment a couple times.  She feels like there really is not too much in the way of sinus symptoms.  When she had the original parotiditis diagnosis back in January she was having more swelling and " pain down into that area.  Now she has had more persistent burning and constant pain above the right eyelid area starting from her right temple.  This also then will come down into the cheek, but not all the way to the nose.  No problems down into the jawline area.  There are no overlying skin changes.  This is a moderately severe constant pain somewhat relieved at times with heating pad and Advil, but not fully.  She will have a little bit of numbness into that right cheek area in the morning.  When she looks to the right side with her eyes, she will get more of a stabbing pain in the lateral right eye area.  She has had MRI which was normal 12/23/2024.  She has had some multiple labs including sed rate, which have all been negative.  She did see [facility] and had complete eye exam and they stated everything looked normal at that time as well.  She has had some migraine variants in the past, but nothing like this and nothing this persistent.  It has been more constant and severe over the last 3 to 4 days no other complaints.  No numbness, weakness or tingling into the upper extremities or lower extremities.  No problems on the left side of the face.  No other visual changes..    I have reviewed the patient's medical history in detail and updated the computerized patient record.                  Objective    /84 (BP Location: Right arm, Patient Position: Sitting, Cuff Size: Adult Large)   Pulse 62   Temp 97.9  F (36.6  C) (Tympanic)   Resp 20   Wt 124.7 kg (275 lb)   LMP 01/01/2004 (Within Months)   SpO2 99%   BMI 43.07 kg/m    Body mass index is 43.07 kg/m .  Physical Exam   GENERAL: alert and no distress  HENT: ear canals and TM's normal, nose and mouth without ulcers or lesions  NEURO: Cranial nerves II through XII intact.  Muscle strength 5/5 upper and lower extremities bilaterally.  Non-focal neurological exam.            Signed Electronically by: Lobo Art MD

## 2025-03-11 NOTE — NURSING NOTE
"Chief Complaint   Patient presents with    Headache     Patient presents to the clinic for a right sided head ache that has been going on since February. She was seen last week for her physical and had some test done for it but it has gotten much worse. She is in constant pain, she states tylenol and ibuprofen barely touch it. It is worse if she looks towards the right.     Initial BP (!) 162/92 (BP Location: Right arm, Patient Position: Sitting, Cuff Size: Adult Large)   Pulse 62   Temp 97.9  F (36.6  C) (Tympanic)   Resp 20   Wt 124.7 kg (275 lb)   LMP 01/01/2004 (Within Months)   SpO2 99%   BMI 43.07 kg/m   Estimated body mass index is 43.07 kg/m  as calculated from the following:    Height as of 3/4/25: 1.702 m (5' 7\").    Weight as of this encounter: 124.7 kg (275 lb).  Medication Review: complete    The next two questions are to help us understand your food security.  If you are feeling you need any assistance in this area, we have resources available to support you today.          2/27/2025   SDOH- Food Insecurity   Within the past 12 months, did you worry that your food would run out before you got money to buy more? N   Within the past 12 months, did the food you bought just not last and you didn t have money to get more? N         Health Care Directive:  Patient has a Health Care Directive on file      Tita Herr LPN      "

## 2025-03-18 ENCOUNTER — THERAPY VISIT (OUTPATIENT)
Dept: CHIROPRACTIC MEDICINE | Facility: OTHER | Age: 65
End: 2025-03-18
Attending: CHIROPRACTOR
Payer: COMMERCIAL

## 2025-03-18 VITALS — TEMPERATURE: 97.2 F | HEART RATE: 68 BPM | OXYGEN SATURATION: 97 % | RESPIRATION RATE: 18 BRPM

## 2025-03-18 DIAGNOSIS — M54.2 NECK PAIN: ICD-10-CM

## 2025-03-18 DIAGNOSIS — G50.0 TRIGEMINAL NEURALGIA: ICD-10-CM

## 2025-03-18 DIAGNOSIS — M99.01 SEGMENTAL AND SOMATIC DYSFUNCTION OF CERVICAL REGION: Primary | ICD-10-CM

## 2025-03-18 NOTE — PROGRESS NOTES
Neck is constantly burning and aching. Radiating into right side of face. Having constant severe headaches. 8/10 W24 9/10. Using medications and using acupressure points. These are providing a decrease in pain for a couple of hours.   Faviola Sandhu on 3/18/2025 at 10:23 AM    Reviewed by EW    Visit #:  1  New Episode of care    Subjective:  Milana Blum is a 64 year old female who is seen in f/u up for:        Segmental and somatic dysfunction of cervical region  Trigeminal neuralgia  Neck pain.     Since last visit on 1/8/2025,  Milana Blum reports: Quite a bit has been going on since our last encounter.  Patient has been dealing with continuous right-sided head and facial pain.  Initially diagnosed with parotitis.  Some level of benefit with treatment but not full resolution.  Again assessed and suspected that she was suffering with trigeminal neuralgia.  Patient reports that lateral eye motions both superiorly and inferiorly seem to be painful.  Patient was diagnosed with left-sided sinusitis around this time as well.  Home care has been providing some level of help however patient feels like there might be other treatment options which is why she is presenting to our office at this time.    (DVPRS) Pain Rating Score : 8-Awful, hard to do anything (W24 10/10) (03/18/25 1020)     Objective:  The following was observed:  Pulse 68   Temp 97.2  F (36.2  C) (Tympanic)   Resp 18   LMP 01/01/2004 (Within Months)   SpO2 97%        3/18/2025    10:24 AM   Neck Disability Index (  Brien H. and Stefanie C. 1991. All rights reserved.; used with permission)   SECTION 1 - PAIN INTENSITY 2   SECTION 2 - PERSONAL CARE 0   SECTION 3 - LIFTING 3   SECTION 4 - READING 3   SECTION 5 - HEADACHES 5   SECTION 6 - CONCENTRATION 2   SECTION 7 - WORK 3   SECTION 8 - DRIVING 0   SECTION 9 - SLEEPING 4   SECTION 10 - RECREATION 4   Count 10   Sum 26   Raw Score: /50 26   Neck Disability Index Score: (%) 52 %        P: palpatory  tendernessSub-occipital R>>L  A: static palpation demonstrates intersegmental asymmetry , cervical  R: motion palpation notes restricted motion, C1   T: muscle spasm at level(s): Sub-occipital R>>L and hypertonicity right masseter, right pterygoid group    Segmental spinal dysfunction/restrictions found at:  :  C1 Right lateral flexion restricted.      Assessment: Segmental/somatic dysfunction apparent of the cervical spine.  Light force treatments have been beneficial for patient in the past when we have seen cervicogenic headaches.  This was performed today with beneficial results.  I did encourage patient to follow-up with her acupuncturist as I believe this could provide additional symptom benefit.  Patient's primary provider has been notified regarding possible further imaging studies or additional diagnostics.    Diagnoses:      1. Segmental and somatic dysfunction of cervical region    2. Trigeminal neuralgia    3. Neck pain        Patient's condition:  Patient had restrictions pre-manipulation    Treatment effectiveness:  Post manipulation there is better intersegmental movement and Patient claims to feel looser post manipulation      Procedures:  CMT:  56984 Chiropractic manipulative treatment 1-2 regions performed   Cervical: Mobilization, C1 , Seated    Modalities:  None performed this visit    Therapeutic procedures:  None    Response to Treatment  Reduction in symptoms as reported by patient    Prognosis: Guarded    Progress towards Goals: in progress     Recommendations:    Instructions: monitor symptoms closely, consult with acupuncturist    Follow-up:  Return to care if symptoms persist.

## 2025-03-24 ENCOUNTER — TRANSFERRED RECORDS (OUTPATIENT)
Dept: HEALTH INFORMATION MANAGEMENT | Facility: OTHER | Age: 65
End: 2025-03-24
Payer: COMMERCIAL

## 2025-03-25 ENCOUNTER — THERAPY VISIT (OUTPATIENT)
Dept: CHIROPRACTIC MEDICINE | Facility: OTHER | Age: 65
End: 2025-03-25
Attending: CHIROPRACTOR
Payer: COMMERCIAL

## 2025-03-25 ENCOUNTER — HOSPITAL ENCOUNTER (EMERGENCY)
Facility: OTHER | Age: 65
Discharge: HOME | End: 2025-03-25
Payer: COMMERCIAL

## 2025-03-25 ENCOUNTER — APPOINTMENT (OUTPATIENT)
Dept: CT IMAGING | Facility: OTHER | Age: 65
End: 2025-03-25
Payer: COMMERCIAL

## 2025-03-25 VITALS
HEART RATE: 61 BPM | DIASTOLIC BLOOD PRESSURE: 73 MMHG | SYSTOLIC BLOOD PRESSURE: 151 MMHG | WEIGHT: 272 LBS | HEIGHT: 67 IN | TEMPERATURE: 97.9 F | BODY MASS INDEX: 42.69 KG/M2 | OXYGEN SATURATION: 99 % | RESPIRATION RATE: 18 BRPM

## 2025-03-25 VITALS — HEART RATE: 67 BPM | OXYGEN SATURATION: 97 % | RESPIRATION RATE: 16 BRPM | TEMPERATURE: 96.8 F

## 2025-03-25 DIAGNOSIS — G43.909 MIGRAINE WITHOUT STATUS MIGRAINOSUS, NOT INTRACTABLE, UNSPECIFIED MIGRAINE TYPE: Primary | ICD-10-CM

## 2025-03-25 DIAGNOSIS — R51.9 RIGHT FACIAL PAIN: ICD-10-CM

## 2025-03-25 DIAGNOSIS — G43.909 MIGRAINE WITHOUT STATUS MIGRAINOSUS, NOT INTRACTABLE, UNSPECIFIED MIGRAINE TYPE: ICD-10-CM

## 2025-03-25 DIAGNOSIS — R51.9 FACIAL PAIN: ICD-10-CM

## 2025-03-25 LAB
ALBUMIN SERPL BCG-MCNC: 4.6 G/DL (ref 3.5–5.2)
ALP SERPL-CCNC: 96 U/L (ref 40–150)
ALT SERPL W P-5'-P-CCNC: 11 U/L (ref 0–50)
ANION GAP SERPL CALCULATED.3IONS-SCNC: 11 MMOL/L (ref 7–15)
AST SERPL W P-5'-P-CCNC: 18 U/L (ref 0–45)
BASOPHILS # BLD AUTO: 0.1 10E3/UL (ref 0–0.2)
BASOPHILS NFR BLD AUTO: 1 %
BILIRUB SERPL-MCNC: 0.4 MG/DL
BUN SERPL-MCNC: 10.8 MG/DL (ref 8–23)
CALCIUM SERPL-MCNC: 9.7 MG/DL (ref 8.8–10.4)
CHLORIDE SERPL-SCNC: 99 MMOL/L (ref 98–107)
CREAT SERPL-MCNC: 0.65 MG/DL (ref 0.51–0.95)
CRP SERPL-MCNC: 8.58 MG/L
EGFRCR SERPLBLD CKD-EPI 2021: >90 ML/MIN/1.73M2
EOSINOPHIL # BLD AUTO: 0.2 10E3/UL (ref 0–0.7)
EOSINOPHIL NFR BLD AUTO: 2 %
ERYTHROCYTE [DISTWIDTH] IN BLOOD BY AUTOMATED COUNT: 13.5 % (ref 10–15)
ERYTHROCYTE [SEDIMENTATION RATE] IN BLOOD BY WESTERGREN METHOD: 17 MM/HR (ref 0–30)
GLUCOSE SERPL-MCNC: 82 MG/DL (ref 70–99)
HCO3 SERPL-SCNC: 29 MMOL/L (ref 22–29)
HCT VFR BLD AUTO: 41.7 % (ref 35–47)
HGB BLD-MCNC: 13.7 G/DL (ref 11.7–15.7)
HOLD SPECIMEN: NORMAL
IMM GRANULOCYTES # BLD: 0 10E3/UL
IMM GRANULOCYTES NFR BLD: 0 %
LYMPHOCYTES # BLD AUTO: 4.6 10E3/UL (ref 0.8–5.3)
LYMPHOCYTES NFR BLD AUTO: 50 %
MCH RBC QN AUTO: 27.9 PG (ref 26.5–33)
MCHC RBC AUTO-ENTMCNC: 32.9 G/DL (ref 31.5–36.5)
MCV RBC AUTO: 85 FL (ref 78–100)
MONOCYTES # BLD AUTO: 0.5 10E3/UL (ref 0–1.3)
MONOCYTES NFR BLD AUTO: 5 %
NEUTROPHILS # BLD AUTO: 3.8 10E3/UL (ref 1.6–8.3)
NEUTROPHILS NFR BLD AUTO: 42 %
NRBC # BLD AUTO: 0 10E3/UL
NRBC BLD AUTO-RTO: 0 /100
PLATELET # BLD AUTO: 237 10E3/UL (ref 150–450)
POTASSIUM SERPL-SCNC: 3.4 MMOL/L (ref 3.4–5.3)
PROT SERPL-MCNC: 7.7 G/DL (ref 6.4–8.3)
RBC # BLD AUTO: 4.91 10E6/UL (ref 3.8–5.2)
SODIUM SERPL-SCNC: 139 MMOL/L (ref 135–145)
WBC # BLD AUTO: 9.1 10E3/UL (ref 4–11)

## 2025-03-25 PROCEDURE — 70481 CT ORBIT/EAR/FOSSA W/DYE: CPT

## 2025-03-25 PROCEDURE — 99284 EMERGENCY DEPT VISIT MOD MDM: CPT

## 2025-03-25 PROCEDURE — 250N000009 HC RX 250

## 2025-03-25 PROCEDURE — 80053 COMPREHEN METABOLIC PANEL: CPT

## 2025-03-25 PROCEDURE — 250N000011 HC RX IP 250 OP 636

## 2025-03-25 PROCEDURE — 85025 COMPLETE CBC W/AUTO DIFF WBC: CPT

## 2025-03-25 PROCEDURE — 86140 C-REACTIVE PROTEIN: CPT

## 2025-03-25 PROCEDURE — 85652 RBC SED RATE AUTOMATED: CPT

## 2025-03-25 PROCEDURE — 99285 EMERGENCY DEPT VISIT HI MDM: CPT | Mod: 25

## 2025-03-25 PROCEDURE — 36415 COLL VENOUS BLD VENIPUNCTURE: CPT

## 2025-03-25 PROCEDURE — 96372 THER/PROPH/DIAG INJ SC/IM: CPT

## 2025-03-25 PROCEDURE — 82040 ASSAY OF SERUM ALBUMIN: CPT

## 2025-03-25 RX ORDER — IOPAMIDOL 755 MG/ML
75 INJECTION, SOLUTION INTRAVASCULAR ONCE
Status: COMPLETED | OUTPATIENT
Start: 2025-03-25 | End: 2025-03-25

## 2025-03-25 RX ORDER — GABAPENTIN 100 MG/1
100 CAPSULE ORAL 3 TIMES DAILY
Qty: 15 CAPSULE | Refills: 0 | Status: SHIPPED | OUTPATIENT
Start: 2025-03-25 | End: 2025-03-30

## 2025-03-25 RX ORDER — KETOROLAC TROMETHAMINE 30 MG/ML
30 INJECTION, SOLUTION INTRAMUSCULAR; INTRAVENOUS ONCE
Status: COMPLETED | OUTPATIENT
Start: 2025-03-25 | End: 2025-03-25

## 2025-03-25 RX ADMIN — KETOROLAC TROMETHAMINE 30 MG: 30 INJECTION, SOLUTION INTRAMUSCULAR at 20:11

## 2025-03-25 RX ADMIN — IOPAMIDOL 75 ML: 755 INJECTION, SOLUTION INTRAVENOUS at 17:52

## 2025-03-25 RX ADMIN — SODIUM CHLORIDE 60 ML: 9 INJECTION, SOLUTION INTRAVENOUS at 17:53

## 2025-03-25 ASSESSMENT — COLUMBIA-SUICIDE SEVERITY RATING SCALE - C-SSRS
2. HAVE YOU ACTUALLY HAD ANY THOUGHTS OF KILLING YOURSELF IN THE PAST MONTH?: NO
1. IN THE PAST MONTH, HAVE YOU WISHED YOU WERE DEAD OR WISHED YOU COULD GO TO SLEEP AND NOT WAKE UP?: NO
6. HAVE YOU EVER DONE ANYTHING, STARTED TO DO ANYTHING, OR PREPARED TO DO ANYTHING TO END YOUR LIFE?: NO

## 2025-03-25 ASSESSMENT — VISUAL ACUITY
OS: 20/25
OD: 20/25

## 2025-03-25 ASSESSMENT — ACTIVITIES OF DAILY LIVING (ADL)
ADLS_ACUITY_SCORE: 41

## 2025-03-25 ASSESSMENT — ENCOUNTER SYMPTOMS: HEADACHES: 1

## 2025-03-25 NOTE — ED TRIAGE NOTES
"Pt presents to ED via private car with c/o Rt eye pain that started 5 weeks ago and has progressively gotten worse. Pt has seen Dr. Pineda and Dr. Art in the clinic. Pt has also tried the chiropractor and acupuncture. Pt had a head CT ordered, but it still hasn't been approved by insurance.  /69   Pulse 63   Temp 97.9  F (36.6  C) (Tympanic)   Resp 18   Ht 1.702 m (5' 7\")   Wt 123.4 kg (272 lb)   LMP 01/01/2004 (Within Months)   SpO2 99%   BMI 42.60 kg/m         Triage Assessment (Adult)       Row Name 03/25/25 1529          Triage Assessment    Airway WDL WDL        Respiratory WDL    Respiratory WDL WDL        Skin Circulation/Temperature WDL    Skin Circulation/Temperature WDL WDL        Cardiac WDL    Cardiac WDL WDL        Peripheral/Neurovascular WDL    Peripheral Neurovascular WDL WDL        Cognitive/Neuro/Behavioral WDL    Cognitive/Neuro/Behavioral WDL WDL                     "

## 2025-03-25 NOTE — PROGRESS NOTES
Neck is frequently aching and crunching. Having constant severe headaches. 5/10 W24 5/10. Taking medications which aren't really helping. Doing acupressure points which help sometimes.  Faviola Sandhu on 3/25/2025 at 2:42 PM    Reviewed by EW    Visit #:  2    Subjective:  Milana Blum is a 64 year old female who is seen in f/u up for:        Migraine without status migrainosus, not intractable, unspecified migraine type  Right facial pain.     Since last visit on 3/18/2025,  Milana Blum reports: Some improvement after last encounter.  Followed up with acupuncturist after our appointment which also seem to provide additional benefit.  Patient did follow-up with primary care provider who has placed orders for imaging of the parotid gland and CT sinuses.  These have not been scheduled yet.  Woke up this morning with migraine symptoms including visual aura.  Presents to our office at this time for treatment.  Patient does feel that vision in the right eye is worsening and has begun noticing pain in the left sinus.        (DVPRS) Pain Rating Score : 5-Interrupts some activities (W24 5/10) (03/25/25 1438)     Objective:  The following was observed:  Pulse 67   Temp 96.8  F (36  C) (Tympanic)   Resp 16   LMP 01/01/2004 (Within Months)   SpO2 97%      P: palpatory tendernessSub-occipital R>>L  A: static palpation demonstrates intersegmental asymmetry , cervical  R: motion palpation notes restricted motion, restricted motion , C1/2 on right  T: muscle spasm at level(s): Sub-occipital R>>L    Segmental spinal dysfunction/restrictions found at:  :  C2 Right lateral flexion restricted.      Assessment: Patient referred to ER for further treatment and assessment.  Continue to be concerned about possible sinus issues or parotid gland issues.  There is evidence suggestive of segmental/somatic dysfunction.    Immediately following appointment with patient I was able to consult with ER provider for transfer of care.  We  will contact patient in the next couple of days.    Diagnoses:      1. Migraine without status migrainosus, not intractable, unspecified migraine type    2. Right facial pain        Patient's condition:  Temporary improvement after initial treatment.  Progressively returned.    Treatment effectiveness:  No treatment rendered as light palpation of the suboccipitals on the right did worsen/aggravate symptoms      Procedures:  CMT:  Not performed    Modalities:  Light digital pressure applied to the right suboccipitals for less than 1 minute.  This was discontinued as patient did feel aggravation of symptoms.    Therapeutic procedures:  None    Response to Treatment  No treatment rendered    Prognosis: Guarded    Progress towards Goals: in progress     Recommendations:    Instructions: present to the ED for further assessment and treatment    Follow-up:  pending ED assessment

## 2025-03-26 ENCOUNTER — MYC MEDICAL ADVICE (OUTPATIENT)
Dept: CARDIOLOGY | Facility: OTHER | Age: 65
End: 2025-03-26
Payer: COMMERCIAL

## 2025-03-26 NOTE — DISCHARGE INSTRUCTIONS
As discussed unclear what is causing your facial pain. Your CT of your sinus and orbit (surrounding your eyeball) with no acute findings. I placed a neurology referral for further evaluation. I sent the referral to [facility] for priority 1-2 weeks. Please follow up with the eye doctor tomorrow. Follow up with Dr. Art the end of this week or early next week.     Please continue taking your celebrex as prescribed for your migraines. Do not take your night dose tonight.     Gabapentin 100mg three times a day for the next couple of days to see if this helps your facial pain. Prescription sent to [facility]. Please stop if you develop side effects. Do not drive while taking this medication as it can make your drowsy.     Please return to the emergency room if you experience worsening pain, vision changes, fever, severe headache, vomiting, or any new or worsening pain.

## 2025-04-04 ENCOUNTER — HOSPITAL ENCOUNTER (OUTPATIENT)
Dept: ULTRASOUND IMAGING | Facility: OTHER | Age: 65
Discharge: HOME | End: 2025-04-04
Attending: FAMILY MEDICINE | Admitting: FAMILY MEDICINE
Payer: COMMERCIAL

## 2025-04-04 DIAGNOSIS — K11.20 PAROTIDITIS: ICD-10-CM

## 2025-04-04 PROCEDURE — 76536 US EXAM OF HEAD AND NECK: CPT

## 2025-04-07 ENCOUNTER — OFFICE VISIT (OUTPATIENT)
Dept: FAMILY MEDICINE | Facility: OTHER | Age: 65
End: 2025-04-07
Attending: FAMILY MEDICINE
Payer: COMMERCIAL

## 2025-04-07 VITALS
HEART RATE: 77 BPM | BODY MASS INDEX: 42.38 KG/M2 | DIASTOLIC BLOOD PRESSURE: 86 MMHG | TEMPERATURE: 97.4 F | RESPIRATION RATE: 16 BRPM | WEIGHT: 270.6 LBS | SYSTOLIC BLOOD PRESSURE: 138 MMHG

## 2025-04-07 DIAGNOSIS — R73.03 PREDIABETES: ICD-10-CM

## 2025-04-07 DIAGNOSIS — G89.29 CHRONIC NONINTRACTABLE HEADACHE, UNSPECIFIED HEADACHE TYPE: Primary | ICD-10-CM

## 2025-04-07 DIAGNOSIS — I50.32 CHRONIC DIASTOLIC HEART FAILURE (H): ICD-10-CM

## 2025-04-07 DIAGNOSIS — R51.9 FACIAL PAIN: ICD-10-CM

## 2025-04-07 DIAGNOSIS — E66.01 MORBID OBESITY (H): ICD-10-CM

## 2025-04-07 DIAGNOSIS — R51.9 CHRONIC NONINTRACTABLE HEADACHE, UNSPECIFIED HEADACHE TYPE: Primary | ICD-10-CM

## 2025-04-07 LAB
FOLATE SERPL-MCNC: 6.5 NG/ML (ref 4.6–34.8)
TSH SERPL DL<=0.005 MIU/L-ACNC: 1.85 UIU/ML (ref 0.3–4.2)

## 2025-04-07 PROCEDURE — 82746 ASSAY OF FOLIC ACID SERUM: CPT | Mod: ZL | Performed by: FAMILY MEDICINE

## 2025-04-07 PROCEDURE — 82306 VITAMIN D 25 HYDROXY: CPT | Mod: ZL | Performed by: FAMILY MEDICINE

## 2025-04-07 PROCEDURE — 36415 COLL VENOUS BLD VENIPUNCTURE: CPT | Mod: ZL | Performed by: FAMILY MEDICINE

## 2025-04-07 PROCEDURE — 84443 ASSAY THYROID STIM HORMONE: CPT | Mod: ZL | Performed by: FAMILY MEDICINE

## 2025-04-07 RX ORDER — GABAPENTIN 100 MG/1
100 CAPSULE ORAL 3 TIMES DAILY
Qty: 90 CAPSULE | Refills: 2 | Status: SHIPPED | OUTPATIENT
Start: 2025-04-07

## 2025-04-07 ASSESSMENT — PAIN SCALES - GENERAL: PAINLEVEL_OUTOF10: MODERATE PAIN (6)

## 2025-04-07 NOTE — NURSING NOTE
"Chief Complaint   Patient presents with    Follow Up     Patient presents to the clinic for a follow up on her on going facial pain. She states she believes that gabapentin is helping a little. She states she is having more good days with minimal pain then she was before. But she has started having more migraines. She goes to see neurology in [place] tomorrow.       Initial /86 (BP Location: Right arm, Patient Position: Sitting, Cuff Size: Adult Large)   Pulse 77   Temp 97.4  F (36.3  C) (Tympanic)   Resp 16   Wt 122.7 kg (270 lb 9.6 oz)   LMP 01/01/2004 (Within Months)   BMI 42.38 kg/m   Estimated body mass index is 42.38 kg/m  as calculated from the following:    Height as of 3/25/25: 1.702 m (5' 7\").    Weight as of this encounter: 122.7 kg (270 lb 9.6 oz).  Medication Review: complete    The next two questions are to help us understand your food security.  If you are feeling you need any assistance in this area, we have resources available to support you today.          2/27/2025   SDOH- Food Insecurity   Within the past 12 months, did you worry that your food would run out before you got money to buy more? N   Within the past 12 months, did the food you bought just not last and you didn t have money to get more? N         Health Care Directive:  Patient has a Health Care Directive on file      Tita Herr LPN      "

## 2025-04-07 NOTE — PROGRESS NOTES
"  Assessment & Plan     Chronic nonintractable headache, unspecified headache type  Somewhat improved with the gabapentin.  Refills given.  Etiology seems to be most typical of trigeminal neuralgia versus now somewhat concern for dental issues as patient describes below.  Check labs as below to complete the workup of neuropathy/nerve irritation.  Reviewed normal orbital CT with sinus evaluation and then the parotid ultrasound from 3 days ago.  She does see neurology tomorrow as well so further mentations per the neurologist as well.  Continue to monitor with the dentist with surgical intervention of the right upper tooth potentially at the end of the month.  Follow-up sooner if worsening or any other new symptoms.  - gabapentin (NEURONTIN) 100 MG capsule; Take 1 capsule (100 mg) by mouth 3 times daily.    Facial pain  As above.  - gabapentin (NEURONTIN) 100 MG capsule; Take 1 capsule (100 mg) by mouth 3 times daily.  - Folic Acid; Future  - Vitamin D Total; Future  - TSH Reflex GH; Future  - Folic Acid  - Vitamin D Total  - TSH Reflex GH    Morbid obesity (H)  Refilled Wegovy.  - Semaglutide-Weight Management (WEGOVY) 1 MG/0.5ML pen; Inject 1 mg subcutaneously every 7 days. x4 doses - for Diabetes and Obesity- Then increase to 1.7 mg weekly    Prediabetes  Refilled Wegovy.  - Semaglutide-Weight Management (WEGOVY) 1 MG/0.5ML pen; Inject 1 mg subcutaneously every 7 days. x4 doses - for Diabetes and Obesity- Then increase to 1.7 mg weekly          BMI  Estimated body mass index is 42.38 kg/m  as calculated from the following:    Height as of 3/25/25: 1.702 m (5' 7\").    Weight as of this encounter: 122.7 kg (270 lb 9.6 oz).             No follow-ups on file.    Gilda Cortés is a 64 year old, presenting for the following health issues:  Follow Up        4/7/2025     3:55 PM   Additional Questions   Roomed by Tita Herr LPN     History of Present Illness       Reason for visit:  Cheekbone/facial pain    She " eats 2-3 servings of fruits and vegetables daily.She consumes 0 sweetened beverage(s) daily.She exercises with enough effort to increase her heart rate 20 to 29 minutes per day.  She exercises with enough effort to increase her heart rate 4 days per week.   She is taking medications regularly.      64-year-old female here for follow-up of right-sided facial pain and headaches.  She has had significant workup with brain MRI, labs, and a more recent parotid ultrasound and orbital CT with the ER visit 2 weeks ago on 3/25/2025.  He insurance did deny the sinus CT, but got a good look at the sinuses on the orbital CT without any abnormalities seen from a sinus standpoint.  She feels like the gabapentin maybe is helping a little bit at the low-dose 100 mg 3 times daily.  She still mainly has pain currently with the sharp, burning, constant and achy pain in the right upper eyelid area and then up into the right forehead.  She still will get some of that down into the right cheek area.  She now has seen the dentist last week and they were stating that she does have 3 fractured teeth, 1 which is in the right upper premolar area and so that might be contributing to some of the pain.  She is planning for surgical invention of those starting at the end of the month.  She feels like the gabapentin has helped somewhat as she has had some days that have been better than others and will have some of those days of better relief.  She feels like that is reassuring.  She now unfortunately is having some left eye involvement around the left periorbital area, but not to the extent of the right side of the face.  She does have some new glasses reordered as her old glasses seemed to have been pinching on the upper part of the nose, which was aggravating things as well.  No other new complaints.    I have reviewed the patient's medical history in detail and updated the computerized patient record.                  Objective    /86  (BP Location: Right arm, Patient Position: Sitting, Cuff Size: Adult Large)   Pulse 77   Temp 97.4  F (36.3  C) (Tympanic)   Resp 16   Wt 122.7 kg (270 lb 9.6 oz)   LMP 01/01/2004 (Within Months)   BMI 42.38 kg/m    Body mass index is 42.38 kg/m .  Physical Exam   GENERAL: alert and no distress  EYES: Eyes grossly normal to inspection, PERRL and conjunctivae and sclerae normal; no tenderness over the frontal and maxillary sinuses bilaterally.  Sensation is equal and intact in the V1, V2, V3 distribution.  No dysesthesias.  Neck: Supple, no lymphadenopathy.  Mouth: Oropharynx clear.  Tenderness to right upper canine with no swelling or erythema.                Signed Electronically by: Lobo Art MD

## 2025-04-08 LAB — VIT D+METAB SERPL-MCNC: 25 NG/ML (ref 20–50)

## 2025-04-10 RX ORDER — SEMAGLUTIDE 1 MG/.5ML
INJECTION, SOLUTION SUBCUTANEOUS
Qty: 2 ML | Refills: 4 | OUTPATIENT
Start: 2025-04-10

## 2025-04-22 ENCOUNTER — THERAPY VISIT (OUTPATIENT)
Dept: CHIROPRACTIC MEDICINE | Facility: OTHER | Age: 65
End: 2025-04-22
Attending: CHIROPRACTOR
Payer: COMMERCIAL

## 2025-04-22 VITALS — HEART RATE: 64 BPM | RESPIRATION RATE: 18 BRPM | TEMPERATURE: 97 F | OXYGEN SATURATION: 96 %

## 2025-04-22 DIAGNOSIS — M53.3 SI (SACROILIAC) JOINT DYSFUNCTION: ICD-10-CM

## 2025-04-22 DIAGNOSIS — R51.9 RIGHT FACIAL PAIN: ICD-10-CM

## 2025-04-22 DIAGNOSIS — M99.04 SEGMENTAL AND SOMATIC DYSFUNCTION OF SACRAL REGION: Primary | ICD-10-CM

## 2025-04-22 DIAGNOSIS — M99.05 SEGMENTAL AND SOMATIC DYSFUNCTION OF PELVIC REGION: ICD-10-CM

## 2025-04-22 NOTE — PROGRESS NOTES
Right lower back is intermittently aching. 2/10 W24 6/10. Using medications which provide temporary relief. Neck is intermittently aching. 0/10 W24 4/10.   Faviola Sandhu on 4/22/2025 at 2:18 PM    Reviewed by EW    Visit #:  1    Subjective:  Milana Blum is a 64 year old female who is seen in f/u up for:        Segmental and somatic dysfunction of sacral region  Segmental and somatic dysfunction of pelvic region  SI (sacroiliac) joint dysfunction  Right facial pain.     Since last visit on 3/25/2025,  Milana Blum reports: Facial pain continues.  Able to follow-up with neurology.  At this time no concrete answers.  Gabapentin does seem to help symptoms little bit however patient is finding that she is experiencing pain on the left side of her face and eye as well.  Patient did discover that she has 3 broken teeth.  Following up with dentist to address this.  Concerned that this may also be contributing to facial symptoms or worsening at this time.    Presenting with primary concerns of low back pain.  No specific causation.  Patient is traveling later this week to the [facility] and patient wishes to address this prior to travel.  No apparent radiculopathy.      (DVPRS) Pain Rating Score : 0-No pain (W24 4/10) (04/22/25 1418)     Objective:  The following was observed:  Pulse 64   Temp 97  F (36.1  C) (Tympanic)   Resp 18   LMP 01/01/2004 (Within Months)   SpO2 96%        4/22/2025     2:19 PM   Neck Disability Index (  Brien H. and Stefanie C. 1991. All rights reserved.; used with permission)   SECTION 1 - PAIN INTENSITY 0   SECTION 2 - PERSONAL CARE 0   SECTION 3 - LIFTING 1   SECTION 4 - READING 1   SECTION 5 - HEADACHES 5   SECTION 6 - CONCENTRATION 1   SECTION 7 - WORK 2   SECTION 8 - DRIVING 2   SECTION 9 - SLEEPING 2   SECTION 10 - RECREATION 3   Count 10   Sum 17   Raw Score: /50 17   Neck Disability Index Score: (%) 34 %      Oswestry (COLLIN) Questionnaire        4/22/2025     2:21 PM   OSWESTRY  DISABILITY INDEX   Count 9   Sum 11   Oswestry Score (%) 24.44 %       Thoracic/lumbar AROM: Pain and restriction with rotation bilaterally    Slumps: +right, -left  Kemps: + right PSIS with extension    P: palpatory tenderness right PSIS:    A: static palpation demonstrates intersegmental asymmetry , asymmetric joints, cervical, pelvis  R: motion palpation notes restricted motion, Sacrum  and PSIS Right   T: muscle spasm at level(s):  Quadratus lumborum bilaterally greater on right, suboccipitals bilaterally greater on right:      Segmental spinal dysfunction/restrictions found at:  :  Sacrum Right lateral flexion restricted and Extension restriction  PSIS Right Flexion restriction.      Assessment: SI joint dysfunction apparent.  Patient has been under our care with similar complaints and responded favorably in the past.  Weather is rainy today which does cause pain to exacerbate which I believe to be a major contributing factor to current symptoms.  Cervical assessment not suggestive of segmental/somatic dysfunction.  Slight asymmetry of the upper cervical spine primarily on right side but I believe this to be secondary to soft tissue concerns.  At this time plan to follow-up with patient if needed upon her return.    Diagnoses:      1. Segmental and somatic dysfunction of sacral region    2. Segmental and somatic dysfunction of pelvic region    3. SI (sacroiliac) joint dysfunction    4. Right facial pain        Patient's condition:  Patient had restrictions pre-manipulation    Treatment effectiveness:  Post manipulation there is better intersegmental movement      Procedures:  E/M 76266    CMT:  29649 Chiropractic manipulative treatment 1-2 regions performed   Pelvis: Drop Table, Sacrum , PSIS Right , Prone    Modalities:  14409: MSTM:  To Sub-occipital  for 3 min    Therapeutic procedures:  None    Response to Treatment  Improved intersegmental motion    Prognosis: Good    Progress towards Goals: Reduce pain by  at least 50% over the next 4 weeks  Improve pain-free lumbar rotation over the next 4 weeks  Improve pain disability index scores by 10% over the next 4 weeks     Recommendations:    Instructions: Monitor symptoms and perform activities as tolerated    Follow-up:  Return to care if symptoms persist.

## 2025-05-13 DIAGNOSIS — E53.8 VITAMIN B12 DEFICIENCY (NON ANEMIC): Primary | ICD-10-CM

## 2025-05-13 RX ORDER — CYANOCOBALAMIN 1000 UG/ML
1000 INJECTION, SOLUTION INTRAMUSCULAR; SUBCUTANEOUS
Status: ACTIVE | OUTPATIENT
Start: 2025-05-13

## 2025-05-14 DIAGNOSIS — E53.8 VITAMIN B12 DEFICIENCY (NON ANEMIC): ICD-10-CM

## 2025-05-15 ENCOUNTER — OFFICE VISIT (OUTPATIENT)
Dept: FAMILY MEDICINE | Facility: OTHER | Age: 65
End: 2025-05-15
Attending: FAMILY MEDICINE
Payer: COMMERCIAL

## 2025-05-15 ENCOUNTER — ALLIED HEALTH/NURSE VISIT (OUTPATIENT)
Dept: FAMILY MEDICINE | Facility: OTHER | Age: 65
End: 2025-05-15
Payer: COMMERCIAL

## 2025-05-15 VITALS
RESPIRATION RATE: 18 BRPM | TEMPERATURE: 99 F | OXYGEN SATURATION: 96 % | SYSTOLIC BLOOD PRESSURE: 128 MMHG | HEART RATE: 78 BPM | DIASTOLIC BLOOD PRESSURE: 82 MMHG

## 2025-05-15 DIAGNOSIS — R51.9 CHRONIC NONINTRACTABLE HEADACHE, UNSPECIFIED HEADACHE TYPE: Primary | ICD-10-CM

## 2025-05-15 DIAGNOSIS — R51.9 FACIAL PAIN: ICD-10-CM

## 2025-05-15 DIAGNOSIS — E53.8 B12 NEUROPATHY: Primary | ICD-10-CM

## 2025-05-15 DIAGNOSIS — G63 B12 NEUROPATHY: Primary | ICD-10-CM

## 2025-05-15 DIAGNOSIS — G89.29 CHRONIC NONINTRACTABLE HEADACHE, UNSPECIFIED HEADACHE TYPE: Primary | ICD-10-CM

## 2025-05-15 PROCEDURE — 250N000011 HC RX IP 250 OP 636: Performed by: FAMILY MEDICINE

## 2025-05-15 PROCEDURE — 96372 THER/PROPH/DIAG INJ SC/IM: CPT | Performed by: FAMILY MEDICINE

## 2025-05-15 RX ORDER — CYANOCOBALAMIN 1000 UG/ML
1 INJECTION, SOLUTION INTRAMUSCULAR; SUBCUTANEOUS
Qty: 3 ML | Refills: 3 | OUTPATIENT
Start: 2025-05-15

## 2025-05-15 RX ORDER — FERROUS SULFATE 325(65) MG
TABLET ORAL
COMMUNITY

## 2025-05-15 RX ORDER — GABAPENTIN 100 MG/1
200 CAPSULE ORAL 3 TIMES DAILY
Qty: 360 CAPSULE | Refills: 4 | Status: SHIPPED | OUTPATIENT
Start: 2025-05-15

## 2025-05-15 RX ADMIN — CYANOCOBALAMIN 1000 MCG: 1000 INJECTION, SOLUTION INTRAMUSCULAR; SUBCUTANEOUS at 15:11

## 2025-05-15 ASSESSMENT — PAIN SCALES - GENERAL: PAINLEVEL_OUTOF10: MODERATE PAIN (5)

## 2025-05-15 ASSESSMENT — ENCOUNTER SYMPTOMS: HEADACHES: 1

## 2025-05-15 NOTE — PROGRESS NOTES
"  Assessment & Plan     (R51.9,  G89.29) Chronic nonintractable headache, unspecified headache type  (primary encounter diagnosis)  Comment: her headache pattern does not clearly fit into a single category. The facial component, and in particular the ophthalmalgic nerve distribution seems to argue for a V1 Trigeminal nerualgia diagnosis. She is not convinced of this. We did go over treatment options for neuropathic face pain, even without a specific diagnosis. I reviewed the neuro notes form [facility] too. It is possible this is an atypical manifestation of her migraines, however pain is severe, intense and not really relenting. Discussed with her lamictal, but her concerns about side effects took down the path of very slowly increasing her neurontin dose, to get at least to 300 milligram three times a day. Also will get her in with Dr. Parrish who is the [facility] visiting neurologist. Her hope is to get a more specific diagnosis.   Plan: gabapentin (NEURONTIN) 100 MG capsule, Adult         Neurology  Referral        Will have her increase neurotin by 100 milligram additional daily every 2 weeks, to get up to 200 milligram three times a day and then follow up.     (R51.9) Facial pain  Comment:    Plan: gabapentin (NEURONTIN) 100 MG capsule, Adult         Neurology  Referral                   BMI  Estimated body mass index is 42.38 kg/m  as calculated from the following:    Height as of 3/25/25: 1.702 m (5' 7\").    Weight as of 4/7/25: 122.7 kg (270 lb 9.6 oz).             Gilda Cortés is a 64 year old, presenting for the following health issues:  Headache (Head aches for 3 months. Back of head to right eye socket)      5/15/2025     2:09 PM   Additional Questions   Roomed by LOVELY Borjas   Accompanied by Self         5/15/2025     2:09 PM   Patient Reported Additional Medications   Patient reports taking the following new medications N/A     Headache     History of Present Illness       Reason " "for visit:  Facial/eye/headache pain    She eats 2-3 servings of fruits and vegetables daily.She consumes 0 sweetened beverage(s) daily.She exercises with enough effort to increase her heart rate 20 to 29 minutes per day.  She exercises with enough effort to increase her heart rate 4 days per week.   She is taking medications regularly.      Significant pain in right face. Initially sharp pain along her right lower eye lid with lateral gaze. By the end of the day would move to cheek bone and temple. Saw dentis, ophthalmology, neuro and ENT. Has some cracked tooth, got this addressed and it improved. Now pian is sharp and \"raw\" in upper eye brow area. Has done chiro treatments and recently did acupuncture there. This relieves her pain for about a day or so. Doing neck stretching exercises also relieves the pain. She has researched this extensively and feels the trigeminal neuralgia diagnosis does not completely explain her symptoms. Has migraines, with flashing lights and occasionally loss of vision bilateral for 15 minutes. Has not had one of these for about 3 weeks. She would like to have meds that do not make her heart palpitations worse. Prednisone in particular triggered this. Tried tegretol and had side effects form this too. Is on neurontin at 100 milligram three times a day and not noticing results. Tried to wean and pain got worse.       The neurology note was reviewed.                 Objective    /82   Pulse 78   Temp 99  F (37.2  C) (Temporal)   Resp 18   LMP 01/01/2004 (Within Months)   SpO2 96%   There is no height or weight on file to calculate BMI.  Physical Exam   GENERAL: alert and no distress  PSYCH: mentation appears normal, affect normal/bright  Pt had severe pain by self palpating over her supraorbital foramen.           Signed Electronically by: Karl Maxwell MD    "

## 2025-05-15 NOTE — PROGRESS NOTES
Verified patient's first and last name, and . Patient stated reason for visit today is to receive a B12 injection. Patient denied any concerns with previous injections. B12 prepared and administered IM as ordered. Administration of medication documented in MAR (see MAR for further information regarding dose, lot #, NDC #, expiration date). Patient encouraged to wait in lobby for 15 minutes post-injection and notify staff immediately of any reaction.     Burak Santiago RN ....................  5/15/2025   3:09 PM

## 2025-05-15 NOTE — TELEPHONE ENCOUNTER
[facility] in #[id number] in [facility] [place] sent Rx request for the following:      Requested Prescriptions   Pending Prescriptions Disp Refills    cyanocobalamin (CYANOCOBALAMIN) 1000 mcg/mL injection [Pharmacy Med Name: CYANOCOBALAMIN 1,000 MCG/ML VL] 3 mL 3     Sig: INJECT 1 ML (1,000 MCG) INTO THE MUSCLE EVERY 30 DAYS       Vitamin Supplements Protocol Passed - 5/15/2025  1:39 PM        Last Prescription Date:   4/29/2024  Last Fill Qty/Refills:         3ml, R-3    Last Office Visit:              3/4/2025   Future Office visit:           5/15/2025    Unable to complete prescription refill per RN Medication Refill Policy.   Talk to patient and dont need any refills at this time   Redundant refill request refused: Too soon:  Burak Santiago, RN on 5/15/2025 at 1:45 PM

## 2025-05-15 NOTE — NURSING NOTE
"Chief Complaint   Patient presents with    Headache     Head aches for 3 months. Back of head to right eye socket       Initial /82   Pulse 78   Temp 99  F (37.2  C) (Temporal)   Resp 18   LMP 01/01/2004 (Within Months)   SpO2 96%  Estimated body mass index is 42.38 kg/m  as calculated from the following:    Height as of 3/25/25: 1.702 m (5' 7\").    Weight as of 4/7/25: 122.7 kg (270 lb 9.6 oz).  Medication Reconciliation: complete      "

## 2025-06-02 DIAGNOSIS — E66.01 MORBID OBESITY (H): Primary | ICD-10-CM

## 2025-06-23 ENCOUNTER — HOSPITAL ENCOUNTER (OUTPATIENT)
Dept: GENERAL RADIOLOGY | Facility: OTHER | Age: 65
Discharge: HOME | End: 2025-06-23
Attending: FAMILY MEDICINE
Payer: COMMERCIAL

## 2025-06-23 ENCOUNTER — HOSPITAL ENCOUNTER (EMERGENCY)
Facility: OTHER | Age: 65
Discharge: LEFT BEFORE DISCHARGE | End: 2025-06-23
Payer: COMMERCIAL

## 2025-06-23 ENCOUNTER — LAB (OUTPATIENT)
Dept: LAB | Facility: OTHER | Age: 65
End: 2025-06-23
Attending: FAMILY MEDICINE
Payer: COMMERCIAL

## 2025-06-23 VITALS
DIASTOLIC BLOOD PRESSURE: 78 MMHG | SYSTOLIC BLOOD PRESSURE: 141 MMHG | WEIGHT: 256 LBS | OXYGEN SATURATION: 98 % | BODY MASS INDEX: 40.18 KG/M2 | HEART RATE: 73 BPM | HEIGHT: 67 IN | TEMPERATURE: 96.3 F | RESPIRATION RATE: 18 BRPM

## 2025-06-23 DIAGNOSIS — M25.542 ARTHRALGIA OF BOTH HANDS: ICD-10-CM

## 2025-06-23 DIAGNOSIS — M25.541 ARTHRALGIA OF BOTH HANDS: ICD-10-CM

## 2025-06-23 LAB — ERYTHROCYTE [SEDIMENTATION RATE] IN BLOOD BY WESTERGREN METHOD: 11 MM/HR (ref 0–30)

## 2025-06-23 PROCEDURE — 86038 ANTINUCLEAR ANTIBODIES: CPT | Mod: ZL

## 2025-06-23 PROCEDURE — 36415 COLL VENOUS BLD VENIPUNCTURE: CPT | Mod: ZL

## 2025-06-23 PROCEDURE — 73130 X-RAY EXAM OF HAND: CPT | Mod: 26 | Performed by: RADIOLOGY

## 2025-06-23 PROCEDURE — 86431 RHEUMATOID FACTOR QUANT: CPT | Mod: ZL

## 2025-06-23 PROCEDURE — 73130 X-RAY EXAM OF HAND: CPT | Mod: LT

## 2025-06-23 PROCEDURE — 85652 RBC SED RATE AUTOMATED: CPT | Mod: ZL

## 2025-06-23 ASSESSMENT — COLUMBIA-SUICIDE SEVERITY RATING SCALE - C-SSRS
2. HAVE YOU ACTUALLY HAD ANY THOUGHTS OF KILLING YOURSELF IN THE PAST MONTH?: NO
6. HAVE YOU EVER DONE ANYTHING, STARTED TO DO ANYTHING, OR PREPARED TO DO ANYTHING TO END YOUR LIFE?: NO
1. IN THE PAST MONTH, HAVE YOU WISHED YOU WERE DEAD OR WISHED YOU COULD GO TO SLEEP AND NOT WAKE UP?: NO

## 2025-06-24 ENCOUNTER — OFFICE VISIT (OUTPATIENT)
Dept: FAMILY MEDICINE | Facility: OTHER | Age: 65
End: 2025-06-24
Attending: FAMILY MEDICINE
Payer: COMMERCIAL

## 2025-06-24 ENCOUNTER — RESULTS FOLLOW-UP (OUTPATIENT)
Dept: FAMILY MEDICINE | Facility: OTHER | Age: 65
End: 2025-06-24

## 2025-06-24 ENCOUNTER — HOSPITAL ENCOUNTER (OUTPATIENT)
Dept: ULTRASOUND IMAGING | Facility: OTHER | Age: 65
Discharge: HOME | End: 2025-06-24
Attending: FAMILY MEDICINE
Payer: COMMERCIAL

## 2025-06-24 VITALS
BODY MASS INDEX: 43.7 KG/M2 | OXYGEN SATURATION: 98 % | DIASTOLIC BLOOD PRESSURE: 84 MMHG | TEMPERATURE: 97.2 F | WEIGHT: 279 LBS | RESPIRATION RATE: 16 BRPM | SYSTOLIC BLOOD PRESSURE: 136 MMHG | HEART RATE: 67 BPM

## 2025-06-24 DIAGNOSIS — M79.605 PAIN AND SWELLING OF LEFT LOWER EXTREMITY: Primary | ICD-10-CM

## 2025-06-24 DIAGNOSIS — M79.641 PAIN OF RIGHT HAND: ICD-10-CM

## 2025-06-24 DIAGNOSIS — I50.32 DIASTOLIC DYSFUNCTION WITH CHRONIC HEART FAILURE (H): ICD-10-CM

## 2025-06-24 DIAGNOSIS — I83.11 VARICOSE VEINS OF BOTH LOWER EXTREMITIES WITH INFLAMMATION: ICD-10-CM

## 2025-06-24 DIAGNOSIS — I83.12 VARICOSE VEINS OF BOTH LOWER EXTREMITIES WITH INFLAMMATION: ICD-10-CM

## 2025-06-24 DIAGNOSIS — M79.89 PAIN AND SWELLING OF LEFT LOWER EXTREMITY: Primary | ICD-10-CM

## 2025-06-24 DIAGNOSIS — M79.89 PAIN AND SWELLING OF LEFT LOWER EXTREMITY: ICD-10-CM

## 2025-06-24 DIAGNOSIS — M79.605 PAIN AND SWELLING OF LEFT LOWER EXTREMITY: ICD-10-CM

## 2025-06-24 LAB — RHEUMATOID FACT SERPL-ACNC: <10 IU/ML

## 2025-06-24 PROCEDURE — 93971 EXTREMITY STUDY: CPT | Mod: LT

## 2025-06-24 PROCEDURE — 93971 EXTREMITY STUDY: CPT | Mod: 26 | Performed by: STUDENT IN AN ORGANIZED HEALTH CARE EDUCATION/TRAINING PROGRAM

## 2025-06-24 ASSESSMENT — PAIN SCALES - GENERAL: PAINLEVEL_OUTOF10: SEVERE PAIN (7)

## 2025-06-24 ASSESSMENT — ENCOUNTER SYMPTOMS: LEG PAIN: 1

## 2025-06-24 NOTE — NURSING NOTE
Chief Complaint   Patient presents with    Leg Pain         Medication Reconciliation: complete    Neli Sanchez, LPN

## 2025-06-24 NOTE — PROGRESS NOTES
Nursing Notes:   Neli Sanchez LPN  6/24/2025  8:31 AM  Signed  Chief Complaint   Patient presents with    Leg Pain         Medication Reconciliation: complete    Neli Sanchez LPN       Gilda Cortés is a 64 year old, presenting for the following health issues:  Leg Pain        6/24/2025     8:30 AM   Additional Questions   Roomed by Neli Sanchez     Milana is here today for a concern that she may have phlebitis in her left leg.  Symptoms started about 10 days ago.  Started in lower leg.  Went on a long drive and didn't wear compression stockings.  Has progressed up her leg to her groin.  Her lower leg is more swollen than normal.  She has not been able to wear her compression stockings due to a bruise on her left thigh.  Never has had a DVT or PE.  Never has needed anticoagulants.  Has extensive varicose veins.  On a good day, these do cause a lot of swelling and inflammation.  Has had 2 prior venous procedures to remove varicosities.    She had fallen and was carrying something and tripped onto a sidewalk.  Her right hand is hurting a lot.  She had an x-ray of her right hand in preparation for a Rheumatology visit upcoming.   Hurts to extend her wrist.    Leg Pain    History of Present Illness       Reason for visit:  Possible phlebitis in left leg  Symptoms include:  Pain, redness in leg, ropy hard vein  Symptom intensity:  Moderate  Symptom progression:  Staying the same  Had these symptoms before:  Yes  Has tried/received treatment for these symptoms:  Yes  Previous treatment was successful:  No  What makes it worse:  Sitting  What makes it better:  Heat, elevation   She is taking medications regularly.          Review of Systems  Constitutional, HEENT, cardiovascular, pulmonary, GI, , musculoskeletal, neuro, skin, endocrine and psych systems are negative, except as otherwise noted.      Objective    /84   Pulse 67   Temp 97.2  F (36.2  C) (Tympanic)   Resp 16   Wt 126.6 kg (279 lb)    LMP 01/01/2004 (Within Months)   SpO2 98%   Breastfeeding No   BMI 43.70 kg/m    Body mass index is 43.7 kg/m .  Physical Exam  Constitutional:       Appearance: Normal appearance.   HENT:      Head: Normocephalic.   Eyes:      Extraocular Movements: Extraocular movements intact.      Pupils: Pupils are equal, round, and reactive to light.   Cardiovascular:      Rate and Rhythm: Normal rate and regular rhythm.      Heart sounds: Normal heart sounds. No murmur heard.  Pulmonary:      Effort: Pulmonary effort is normal.      Breath sounds: Normal breath sounds. No wheezing, rhonchi or rales.   Musculoskeletal:      Cervical back: Normal range of motion and neck supple.      Right lower leg: Edema present.      Left lower leg: Edema present.      Comments: Ropy induration along left medial calf and thigh.  Some areas of her lower leg have some warmth and redness overlying as well.    Extensive varicosities of both lower extremities.    Right wrist:  she does have snuffbox tenderness to palpation.  She does have normal range of motion with flexion/extension and supination/pronation.     Lymphadenopathy:      Cervical: No cervical adenopathy.   Neurological:      Mental Status: She is alert.                X-rays of right wrist from 6/23/25 were reviewed as noted below.  On my review, I also do not see any definite fracture, particularly of scaphoid bone.    Narrative & Impression   PROCEDURE:  XR HAND RIGHT G/E 3 VIEWS     HISTORY: Arthralgia of both hands; Arthralgia of both hands     COMPARISON: Same-day radiographs of the contralateral hand     TECHNIQUE: Three-view right hand radiographs     FINDINGS:     No acute fracture or dislocation is identified. No suspicious osseous  lesion. The joint spaces are preserved. Mild degenerative changes of  the first MCP and interphalangeal joints.     No foreign body or subcutaneous emphysema.                                                                       IMPRESSION:      Degenerative changes without acute osseous abnormality.     KALPESH VALENTIN MD         LLE venous ultrasound from today discussed with patient as noted below:  Exam: US LOWER EXTREMITY VENOUS DUPLEX LEFT     Exam reason: left lower extremity swelling and superficial phlebitis -  rule out DVT; Pain and swelling of left lower extremity; Pain and  swelling of left lower extremity     Comparison: None.     TECHNIQUE: Deep veins were evaluated using B-mode, color flow Doppler  and pulse wave spectral Doppler.       FINDINGS:     The common femoral, saphenofemoral junction, femoral and popliteal  veins are patent. There is no evidence of venous thrombus. These  vessels exhibited normal compressibility.     The posterior tibial and peroneal veins are compressible.      There is superficial thrombus within the GSV and within superficial  varicosities in the calf.                                                                      IMPRESSION:     No acute deep venous thrombosis.     Superficial thrombus within the GSV and within superficial  varicosities in the calf.              NATASHA MCKEON MD       ICD-10-CM    1. Pain and swelling of left lower extremity  M79.605 US Lower Extremity Venous Duplex Left    M79.89       2. Pain of right hand  M79.641 Orthopedic  Referral     Wrist/Arm/Hand Bracing Supplies Order Wrist Brace; Right; with thumb spica      3. Varicose veins of both lower extremities with inflammation  I83.11 Vascular Surgery Referral    I83.12       4. Diastolic dysfunction with chronic heart failure (H)  I50.32            Superficial phlebitis, but no DVT noted in her LLE.  Recommend ice/heat, compression to treat symptomatically.  X-rays reviewed from yesterday.  She does not have a fracture noted on x-ray, but has snuffbox tenderness noted.  She has been wearing an over the counter wrist splint.  She is instead given a thumb spica splint and also referred to sports medication for follow up to see  if further imaging would be warranted and more definitive care if needed.  Referred to vascular surgery per her request for consideration of further vein surgery that might help her symptomatically.  HF action plan updated today.    No follow-ups on file.     31 minutes spent in review of chart, interviewing patient, examining patient, discussing plan, reviewing results and completing note on the date of encounter.    The longitudinal plan of care for the diagnosis(es)/condition(s) as documented were addressed during this visit. Due to the added complexity in care, I will continue to support Milana in the subsequent management and with ongoing continuity of care.      Laura Chen MD

## 2025-06-24 NOTE — LETTER
My Heart Failure Action Plan  Name: Milana Blum   YOB: 1960  Date: 6/24/2025   My doctor:   [facility]   [email or web address]  [place] [address] [phone] My Diagnosis: HF-pEF (EF > 40%)  My Ejection Fraction:   Lab Results   Component Value Date    LVEF 55-60% 02/06/2025     Over 50%  My Exercise Goal: Start exercise slowly - to begin, do a few minutes of exercise, several times a day. Increase your time and speed [id number] to build tolerance, with a goal of 30 minutes of exercise daily. Steady, slow, and consistent exercise is both safe and healthy. Stop and rest when you feel tired or become short of breath. Do not push yourself on days when you don t feel well.       My Weight Plan:   Wt Readings from Last 2 Encounters:   06/24/25 126.6 kg (279 lb)   04/07/25 122.7 kg (270 lb 9.6 oz)     Weigh yourself daily using the same scale. If you gain more than 2 pounds in 24 hours or 5 pounds in 7 days. call the clinic    My Diet Goal: No added salt    Emergency Room Visits:    Our goal is to improve your quality of life and help you avoid a visit to the emergency room or hospital.  If we work together, we can achieve this goal. But, if you feel you need to call 911 or go to the emergency room, please do so.  If you go to the emergency room, please bring your list of medicines and your daily weight chart with you.    Each day ask yourself:  Is my weight up?  Do I have swelling?  Do I have trouble breathing?  How did I sleep?  Other problems?       GREEN ZONE     Weight gained is no more than 2 pounds a day or 5 pounds a in 7 days.  No swelling in feet, ankles, legs or stomach.  No more swelling than usual.  No more trouble breathing than usual.  No change in my sleep.  No other problems. What should I do?  I am doing fine. I will take my medicine, follow my diet, see my doctor, exercise, and watch for symptoms.           YELLOW ZONE          Weight gain of more than 2 pounds in one day or 5 pounds in 7 days.  New swelling in ankle, leg, knee or thigh.  Bloating in belly, pants feel tighter.  Swelling in hands or face.  Coughing or trouble breathing while walking or talking.  Harder to breathe last night.  Have trouble sleeping, wake up short of breath.  Unusually tired.  Not eating.  Nausea, vomiting, or diarrhea  Pain in my chest or bad  leg cramps.  Feel weak or dizzy. What should I do?  I need to take action and call my doctor or nurse today.                 RED ZONE         Weight gain of 5 pounds overnight.  Chest pain or pressure that does not go away.  Feel less alert.  Wheezing or have trouble breathing when at rest.  Cannot sleep lying down.  Cannot take my medicines.  Pass out or faint. What should I do?  I need to call my doctor or nurse now!  Call 911 if I have chest pain or cannot breathe.

## 2025-06-24 NOTE — ED TRIAGE NOTES
"Pt presents to ED via private car with c/o redness, swelling, and pain to her Lt leg. Pt states she has a hx of phlebitis, but wants to make sure it's not something else. BP (!) 141/78   Pulse 73   Temp (!) 96.3  F (35.7  C) (Tympanic)   Resp 18   Ht 1.702 m (5' 7\")   Wt 116.1 kg (256 lb)   LMP 01/01/2004 (Within Months)   SpO2 98%   BMI 40.10 kg/m         Triage Assessment (Adult)       Row Name 06/23/25 2040          Triage Assessment    Airway WDL WDL        Respiratory WDL    Respiratory WDL WDL        Skin Circulation/Temperature WDL    Skin Circulation/Temperature WDL X        Cardiac WDL    Cardiac WDL WDL        Peripheral/Neurovascular WDL    Peripheral Neurovascular WDL WDL        Cognitive/Neuro/Behavioral WDL    Cognitive/Neuro/Behavioral WDL WDL                     "

## 2025-06-25 LAB — ANA SER QL IF: NEGATIVE

## 2025-06-30 ENCOUNTER — RESULTS FOLLOW-UP (OUTPATIENT)
Dept: FAMILY MEDICINE | Facility: OTHER | Age: 65
End: 2025-06-30

## 2025-07-03 ENCOUNTER — HOSPITAL ENCOUNTER (EMERGENCY)
Facility: OTHER | Age: 65
Discharge: HOME | End: 2025-07-03
Attending: FAMILY MEDICINE
Payer: COMMERCIAL

## 2025-07-03 VITALS
WEIGHT: 261 LBS | BODY MASS INDEX: 40.97 KG/M2 | HEART RATE: 63 BPM | TEMPERATURE: 97.2 F | OXYGEN SATURATION: 92 % | HEIGHT: 67 IN | RESPIRATION RATE: 21 BRPM | SYSTOLIC BLOOD PRESSURE: 155 MMHG | DIASTOLIC BLOOD PRESSURE: 76 MMHG

## 2025-07-03 DIAGNOSIS — R00.2 PALPITATIONS: Primary | ICD-10-CM

## 2025-07-03 DIAGNOSIS — E87.6 HYPOKALEMIA: ICD-10-CM

## 2025-07-03 LAB
ALBUMIN SERPL BCG-MCNC: 4.2 G/DL (ref 3.5–5.2)
ALP SERPL-CCNC: 85 U/L (ref 40–150)
ALT SERPL W P-5'-P-CCNC: 10 U/L (ref 0–50)
ANION GAP SERPL CALCULATED.3IONS-SCNC: 12 MMOL/L (ref 7–15)
AST SERPL W P-5'-P-CCNC: 16 U/L (ref 0–45)
BASOPHILS # BLD AUTO: 0.1 10E3/UL (ref 0–0.2)
BASOPHILS NFR BLD AUTO: 1 %
BILIRUB SERPL-MCNC: 0.6 MG/DL
BUN SERPL-MCNC: 12.6 MG/DL (ref 8–23)
CALCIUM SERPL-MCNC: 9.7 MG/DL (ref 8.8–10.4)
CHLORIDE SERPL-SCNC: 100 MMOL/L (ref 98–107)
CREAT SERPL-MCNC: 0.65 MG/DL (ref 0.51–0.95)
EGFRCR SERPLBLD CKD-EPI 2021: >90 ML/MIN/1.73M2
EOSINOPHIL # BLD AUTO: 0.1 10E3/UL (ref 0–0.7)
EOSINOPHIL NFR BLD AUTO: 1 %
ERYTHROCYTE [DISTWIDTH] IN BLOOD BY AUTOMATED COUNT: 13.6 % (ref 10–15)
GLUCOSE SERPL-MCNC: 107 MG/DL (ref 70–99)
HCO3 SERPL-SCNC: 27 MMOL/L (ref 22–29)
HCT VFR BLD AUTO: 42.7 % (ref 35–47)
HGB BLD-MCNC: 14 G/DL (ref 11.7–15.7)
IMM GRANULOCYTES # BLD: 0 10E3/UL
IMM GRANULOCYTES NFR BLD: 0 %
LYMPHOCYTES # BLD AUTO: 4.1 10E3/UL (ref 0.8–5.3)
LYMPHOCYTES NFR BLD AUTO: 40 %
MAGNESIUM SERPL-MCNC: 2.1 MG/DL (ref 1.7–2.3)
MCH RBC QN AUTO: 27.4 PG (ref 26.5–33)
MCHC RBC AUTO-ENTMCNC: 32.8 G/DL (ref 31.5–36.5)
MCV RBC AUTO: 84 FL (ref 78–100)
MONOCYTES # BLD AUTO: 0.6 10E3/UL (ref 0–1.3)
MONOCYTES NFR BLD AUTO: 6 %
NEUTROPHILS # BLD AUTO: 5.4 10E3/UL (ref 1.6–8.3)
NEUTROPHILS NFR BLD AUTO: 53 %
NRBC # BLD AUTO: 0 10E3/UL
NRBC BLD AUTO-RTO: 0 /100
PLATELET # BLD AUTO: 239 10E3/UL (ref 150–450)
POTASSIUM SERPL-SCNC: 3.3 MMOL/L (ref 3.4–5.3)
PROT SERPL-MCNC: 7.2 G/DL (ref 6.4–8.3)
RBC # BLD AUTO: 5.11 10E6/UL (ref 3.8–5.2)
SODIUM SERPL-SCNC: 139 MMOL/L (ref 135–145)
TROPONIN T SERPL HS-MCNC: <6 NG/L
TSH SERPL DL<=0.005 MIU/L-ACNC: 1.02 UIU/ML (ref 0.3–4.2)
WBC # BLD AUTO: 10.3 10E3/UL (ref 4–11)

## 2025-07-03 PROCEDURE — 84155 ASSAY OF PROTEIN SERUM: CPT | Performed by: FAMILY MEDICINE

## 2025-07-03 PROCEDURE — 85025 COMPLETE CBC W/AUTO DIFF WBC: CPT | Performed by: FAMILY MEDICINE

## 2025-07-03 PROCEDURE — 99284 EMERGENCY DEPT VISIT MOD MDM: CPT | Performed by: FAMILY MEDICINE

## 2025-07-03 PROCEDURE — 83735 ASSAY OF MAGNESIUM: CPT | Performed by: FAMILY MEDICINE

## 2025-07-03 PROCEDURE — 250N000013 HC RX MED GY IP 250 OP 250 PS 637: Performed by: FAMILY MEDICINE

## 2025-07-03 PROCEDURE — 93005 ELECTROCARDIOGRAM TRACING: CPT | Performed by: FAMILY MEDICINE

## 2025-07-03 PROCEDURE — 93010 ELECTROCARDIOGRAM REPORT: CPT | Performed by: INTERNAL MEDICINE

## 2025-07-03 PROCEDURE — 84484 ASSAY OF TROPONIN QUANT: CPT | Performed by: FAMILY MEDICINE

## 2025-07-03 PROCEDURE — 36415 COLL VENOUS BLD VENIPUNCTURE: CPT | Performed by: FAMILY MEDICINE

## 2025-07-03 PROCEDURE — 84443 ASSAY THYROID STIM HORMONE: CPT | Performed by: FAMILY MEDICINE

## 2025-07-03 RX ORDER — POTASSIUM CHLORIDE 1500 MG/1
40 TABLET, EXTENDED RELEASE ORAL ONCE
Status: COMPLETED | OUTPATIENT
Start: 2025-07-03 | End: 2025-07-03

## 2025-07-03 RX ADMIN — POTASSIUM CHLORIDE 40 MEQ: 1500 TABLET, EXTENDED RELEASE ORAL at 18:56

## 2025-07-03 ASSESSMENT — ACTIVITIES OF DAILY LIVING (ADL)
ADLS_ACUITY_SCORE: 41

## 2025-07-03 ASSESSMENT — ENCOUNTER SYMPTOMS
RESPIRATORY NEGATIVE: 1
GASTROINTESTINAL NEGATIVE: 1
CONSTITUTIONAL NEGATIVE: 1
LIGHT-HEADEDNESS: 0
HEADACHES: 1
PALPITATIONS: 1

## 2025-07-03 ASSESSMENT — COLUMBIA-SUICIDE SEVERITY RATING SCALE - C-SSRS
6. HAVE YOU EVER DONE ANYTHING, STARTED TO DO ANYTHING, OR PREPARED TO DO ANYTHING TO END YOUR LIFE?: NO
1. IN THE PAST MONTH, HAVE YOU WISHED YOU WERE DEAD OR WISHED YOU COULD GO TO SLEEP AND NOT WAKE UP?: NO
2. HAVE YOU ACTUALLY HAD ANY THOUGHTS OF KILLING YOURSELF IN THE PAST MONTH?: NO

## 2025-07-03 NOTE — DISCHARGE INSTRUCTIONS
EKG and heart monitor show normal rhythm, no concerning findings  Potassium slightly low, this can be from the hydrochlorothiazide. Increasing potassium in your diet will be helpful

## 2025-07-03 NOTE — ED TRIAGE NOTES
"Pt presents to ED via private car with c/o heart palpations, nausea, and headache that started this morning. /82   Pulse 75   Temp 97.2  F (36.2  C) (Tympanic)   Resp 18   Ht 1.702 m (5' 7\")   Wt 118.4 kg (261 lb)   LMP 01/01/2004 (Within Months)   SpO2 97%   BMI 40.88 kg/m         Triage Assessment (Adult)       Row Name 07/03/25 1542          Triage Assessment    Airway WDL WDL        Respiratory WDL    Respiratory WDL WDL        Skin Circulation/Temperature WDL    Skin Circulation/Temperature WDL WDL        Cardiac WDL    Cardiac WDL X;rhythm     Pulse Rate & Regularity apical pulse irregular        Peripheral/Neurovascular WDL    Peripheral Neurovascular WDL WDL        Cognitive/Neuro/Behavioral WDL    Cognitive/Neuro/Behavioral WDL WDL                     "

## 2025-07-03 NOTE — ED PROVIDER NOTES
History     Chief Complaint   Patient presents with    Palpitations     HPI  Milana Blum is a 64 year old female presenting for heart palpitations, nausea and headache.  Symptoms started this morning.  She has no chest pain or tightness.  No dyspnea.  She does not have any lightheadedness.  Has been active working outside in the yard lately.      Allergies:  Allergies   Allergen Reactions    House Dust [Dust Mite Extract] Shortness Of Breath     Pollen     Other Environmental Allergy Itching       Problem List:    Patient Active Problem List    Diagnosis Date Noted    Vertigo 01/08/2025     Priority: Medium    SVT (supraventricular tachycardia) 01/19/2024     Priority: Medium    Supraventricular tachycardia 01/19/2024     Priority: Medium    Pain of right hip 10/10/2023     Priority: Medium    B12 neuropathy      Priority: Medium    Bronchitis 06/05/2022     Priority: Medium    Palpitations 06/02/2022     Priority: Medium    Ascending aortic aneurysm 06/02/2022     Priority: Medium    DORMAN (dyspnea on exertion) 06/02/2022     Priority: Medium    Diastolic dysfunction with chronic heart failure (H) 06/02/2022     Priority: Medium    Peripheral edema 06/02/2022     Priority: Medium    Diverticula of colon 06/02/2022     Priority: Medium    Atypical chest pain 06/02/2022     Priority: Medium    Paroxysmal supraventricular tachycardia 04/18/2022     Priority: Medium    Prediabetes 02/16/2021     Priority: Medium    Hypercholesterolemia      Priority: Medium     Created by Conversion        Essential hypertension, benign      Priority: Medium     Created by Conversion  Replacement Utility updated for latest IMO load        Mild persistent asthma      Priority: Medium     Created by Conversion        Hay Fever      Priority: Medium     Created by Conversion  Replacement Utility updated for latest IMO load        Varicose veins 06/28/2016     Priority: Medium    Degenerative disc disease, lumbar 06/28/2016      "Priority: Medium    Morbid obesity (H) 06/28/2016     Priority: Medium    Gluten intolerance 06/28/2016     Priority: Medium    Lumbar disc herniation with radiculopathy 08/20/2010     Priority: Medium    Numbness 06/02/2010     Priority: Medium    Generalized muscle weakness 06/02/2010     Priority: Medium    Leg pain 06/02/2010     Priority: Medium    Intervertebral disk disease 06/02/2010     Priority: Medium        Past Medical History:    Past Medical History:   Diagnosis Date    Aortic root dilatation 5/5/2022    Asthma     Hypertension     Low back pain        Past Surgical History:    Past Surgical History:   Procedure Laterality Date    BACK SURGERY  08/2010    L4-5, S1 Hemilaminectomy, foraminotomy    IR LUMBAR EPIDURAL STEROID INJECTION  3/4/2010    TX REMOVAL OF OVARY/TUBE(S)      Description: Salpingo-oophorectomy Left Side;  Proc Date: 12/06/2004;    ZZC SUPRACERV ABD HYSTERECTOMY      Description: Supracervical Hysterectomy;  Proc Date: 12/06/2004;       Family History:    Family History   Problem Relation Age of Onset    Hypertension Mother     Diabetes Father     Hypertension Father     Pancreatic Cancer Father     Cancer Sister         unknown primary. mets to bone, liver, kidneys    Cerebrovascular Disease Paternal Grandmother        Social History:  Marital Status:  Single [1]  Social History     Tobacco Use    Smoking status: Never     Passive exposure: Past    Smokeless tobacco: Never   Vaping Use    Vaping status: Never Used   Substance Use Topics    Alcohol use: Yes     Alcohol/week: 2.0 standard drinks of alcohol     Comment: weekly beer or wine    Drug use: No        Medications:    albuterol (PROAIR HFA/PROVENTIL HFA/VENTOLIN HFA) 108 (90 Base) MCG/ACT inhaler  b complex vitamins tablet  BD INTEGRA SYRINGE 25G X 1\" 3 ML MISC  celecoxib (CELEBREX) 200 MG capsule  cyanocobalamin (CYANOCOBALAMIN) 1000 MCG/ML injection  Elastic Bandages & Supports (MEDICAL COMPRESSION STOCKINGS) MISC  ferrous " "sulfate (FEROSUL) 325 (65 Fe) MG tablet  gabapentin (NEURONTIN) 100 MG capsule  hydrochlorothiazide (HYDRODIURIL) 50 MG tablet  magnesium oxide 250 mg Tab  Magnesium Oxide POWD  meclizine (ANTIVERT) 25 MG tablet  nebivolol (BYSTOLIC) 10 MG tablet  nebivolol (BYSTOLIC) 5 MG tablet  Potassium Chloride (K+ POTASSIUM OR)  Semaglutide-Weight Management (WEGOVY) 1 MG/0.5ML pen  Semaglutide-Weight Management (WEGOVY) 1.7 MG/0.75ML pen          Review of Systems   Constitutional: Negative.    Respiratory: Negative.     Cardiovascular:  Positive for palpitations.   Gastrointestinal: Negative.    Neurological:  Positive for headaches. Negative for light-headedness.       Physical Exam   BP: 121/82  Pulse: 75  Temp: 97.2  F (36.2  C)  Resp: 18  Height: 170.2 cm (5' 7\")  Weight: 118.4 kg (261 lb)  SpO2: 97 %      Physical Exam  General Appearance: Alert. No acute distress  Eyes: EOMI, PERRL  Chest/Respiratory Exam: Clear to auscultation bilaterally  Cardiovascular Exam: Regular rate and rhythm. S1, S2, no murmur, gallop, or rubs.  Gastrointestinal Exam: Soft, nontender, no abnormal masses or organomegaly.  Extremities: Significant varicose veins. No pitting lower extremity edema.  Neuro Exam: Strength symmetric upper and lower extremities  Psychiatric: Normal affect and mentation    ED Course        Procedures              EKG Interpretation:      Interpreted by Michael Loving MD  Time reviewed: 1600   Symptoms at time of EKG: palpitations   Rhythm: normal sinus   Rate: normal  Axis: normal  Ectopy: none  Conduction: normal  ST Segments/ T Waves: No ST-T wave changes  Q Waves: none  Comparison to prior: Unchanged    Clinical Impression: normal EKG      Critical Care time:  none         Recent Results (from the past 24 hours)   CBC with platelets differential    Narrative    The following orders were created for panel order CBC with platelets differential.  Procedure                               Abnormality         Status   "                   ---------                               -----------         ------                     CBC with platelets and ...[[id number]]                      Final result                 Please view results for these tests on the individual orders.   Comprehensive metabolic panel   Result Value Ref Range    Sodium 139 135 - 145 mmol/L    Potassium 3.3 (L) 3.4 - 5.3 mmol/L    Carbon Dioxide (CO2) 27 22 - 29 mmol/L    Anion Gap 12 7 - 15 mmol/L    Urea Nitrogen 12.6 8.0 - 23.0 mg/dL    Creatinine 0.65 0.51 - 0.95 mg/dL    GFR Estimate >90 >60 mL/min/1.73m2    Calcium 9.7 8.8 - 10.4 mg/dL    Chloride 100 98 - 107 mmol/L    Glucose 107 (H) 70 - 99 mg/dL    Alkaline Phosphatase 85 40 - 150 U/L    AST 16 0 - 45 U/L    ALT 10 0 - 50 U/L    Protein Total 7.2 6.4 - 8.3 g/dL    Albumin 4.2 3.5 - 5.2 g/dL    Bilirubin Total 0.6 <=1.2 mg/dL   TSH Reflex GH   Result Value Ref Range    TSH 1.02 0.30 - 4.20 uIU/mL   Troponin T, High Sensitivity   Result Value Ref Range    Troponin T, High Sensitivity <6 <=14 ng/L   CBC with platelets and differential   Result Value Ref Range    WBC Count 10.3 4.0 - 11.0 10e3/uL    RBC Count 5.11 3.80 - 5.20 10e6/uL    Hemoglobin 14.0 11.7 - 15.7 g/dL    Hematocrit 42.7 35.0 - 47.0 %    MCV 84 78 - 100 fL    MCH 27.4 26.5 - 33.0 pg    MCHC 32.8 31.5 - 36.5 g/dL    RDW 13.6 10.0 - 15.0 %    Platelet Count 239 150 - 450 10e3/uL    % Neutrophils 53 %    % Lymphocytes 40 %    % Monocytes 6 %    % Eosinophils 1 %    % Basophils 1 %    % Immature Granulocytes 0 %    NRBCs per 100 WBC 0 <1 /100    Absolute Neutrophils 5.4 1.6 - 8.3 10e3/uL    Absolute Lymphocytes 4.1 0.8 - 5.3 10e3/uL    Absolute Monocytes 0.6 0.0 - 1.3 10e3/uL    Absolute Eosinophils 0.1 0.0 - 0.7 10e3/uL    Absolute Basophils 0.1 0.0 - 0.2 10e3/uL    Absolute Immature Granulocytes 0.0 <=0.4 10e3/uL    Absolute NRBCs 0.0 10e3/uL       Medications   potassium chloride carlene ER (KLOR-CON M20) CR tablet 40 mEq (40 mEq Oral $Given  7/3/25 5856)       Assessments & Plan (with Medical Decision Making)     I have reviewed the nursing notes.    I have reviewed the findings, diagnosis, plan and need for follow up with the patient.      Medical Decision Making  The patient's presentation was of moderate complexity (an acute illness with systemic symptoms).    The patient's evaluation involved:  ordering and/or review of 3+ test(s) in this encounter (see separate area of note for details)    The patient's management necessitated moderate risk (prescription drug management including medications given in the ED).        New Prescriptions    No medications on file       Final diagnoses:   Palpitations   Hypokalemia     Patient presenting for palpitations.  EKG shows sinus rhythm.  On cardiac monitoring she remained in sinus rhythm.  We would see an occasional PAC.  We discussed the benign nature of these findings.  They can be due to stress, caffeine, other triggers. The palpitations cause her worry and she has more stress with the palpitations.  This may resulting in worsening of the palpitations.  She is reassured by the findings.  Avoid starting any medication for now.    Potassium mildly low at 3.3.  Given 40 mill equivalents potassium.  She will increase potassium in her diet since she is on hydrochlorothiazide for hypertension.    Michael Loving MD   7/3/2025   [facility] [place]       Michael Loving MD  07/03/25 3812

## 2025-07-04 LAB
ATRIAL RATE - MUSE: 69 BPM
DIASTOLIC BLOOD PRESSURE - MUSE: NORMAL MMHG
INTERPRETATION ECG - MUSE: NORMAL
P AXIS - MUSE: 14 DEGREES
PR INTERVAL - MUSE: 160 MS
QRS DURATION - MUSE: 106 MS
QT - MUSE: 422 MS
QTC - MUSE: 452 MS
R AXIS - MUSE: -11 DEGREES
SYSTOLIC BLOOD PRESSURE - MUSE: NORMAL MMHG
T AXIS - MUSE: 28 DEGREES
VENTRICULAR RATE- MUSE: 69 BPM

## 2025-07-09 ENCOUNTER — MYC REFILL (OUTPATIENT)
Dept: GERIATRICS | Facility: OTHER | Age: 65
End: 2025-07-09
Payer: COMMERCIAL

## 2025-07-09 DIAGNOSIS — E53.8 VITAMIN B12 DEFICIENCY (NON ANEMIC): ICD-10-CM

## 2025-07-11 ENCOUNTER — TELEPHONE (OUTPATIENT)
Dept: FAMILY MEDICINE | Facility: OTHER | Age: 65
End: 2025-07-11
Payer: COMMERCIAL

## 2025-07-15 RX ORDER — CYANOCOBALAMIN 1000 UG/ML
1 INJECTION, SOLUTION INTRAMUSCULAR; SUBCUTANEOUS
Qty: 3 ML | Refills: 3 | Status: SHIPPED | OUTPATIENT
Start: 2025-07-15

## 2025-07-15 NOTE — TELEPHONE ENCOUNTER
[facility] in #[id number] in [facility] [place] sent Rx request for the following:      Requested Prescriptions   Pending Prescriptions Disp Refills    cyanocobalamin (CYANOCOBALAMIN) 1000 mcg/mL injection 3 mL 3     Sig: Inject 1 mL (1,000 mcg) into the muscle every 30 days.       Vitamin Supplements Protocol Passed - 7/15/2025  3:23 PM      Last Prescription Date:   4/29/2024  Last Fill Qty/Refills:         3ml , R-3    Last Office Visit:              3/4/2025   Future Office visit:           none      Prescription approved per [facility] Refill Protocol.  Burak Santiago RN on 7/15/2025 at 3:25 PM

## 2025-07-18 ENCOUNTER — TRANSFERRED RECORDS (OUTPATIENT)
Dept: HEALTH INFORMATION MANAGEMENT | Facility: OTHER | Age: 65
End: 2025-07-18
Payer: COMMERCIAL

## 2025-07-31 ENCOUNTER — THERAPY VISIT (OUTPATIENT)
Dept: CHIROPRACTIC MEDICINE | Facility: OTHER | Age: 65
End: 2025-07-31
Attending: CHIROPRACTOR
Payer: COMMERCIAL

## 2025-07-31 VITALS — HEART RATE: 73 BPM | OXYGEN SATURATION: 97 % | TEMPERATURE: 98.2 F | RESPIRATION RATE: 16 BRPM

## 2025-07-31 DIAGNOSIS — M99.01 SEGMENTAL AND SOMATIC DYSFUNCTION OF CERVICAL REGION: Primary | ICD-10-CM

## 2025-07-31 DIAGNOSIS — M54.2 NECK PAIN: ICD-10-CM

## 2025-07-31 DIAGNOSIS — M99.02 SEGMENTAL AND SOMATIC DYSFUNCTION OF THORACIC REGION: ICD-10-CM

## 2025-07-31 NOTE — PROGRESS NOTES
Neck with a frequent aching and stiffness. 4/10 W24 8/10. Causing frequent moderate headaches. Using Advil with a slight decrease in pain.   Ceceliadada Willinghamon on 7/31/2025 at 1:58 PM    Reviewed by EW    Visit #:  2    Subjective:  Milana Blum is a 64 year old female who is seen in f/u up for:        Segmental and somatic dysfunction of cervical region  Neck pain.     Since last visit on 5/8/2025,  Milana Blum reports: Continuing to work through neck pain, right-sided facial pain and headache symptoms.  Showing some improvement at this time.  Although symptoms continue.  Trying to back down on gabapentin.  Majority of symptoms are of stiffness and neck pain with headache symptoms.    (DVPRS) Pain Rating Score : (not recorded)     Objective:  The following was observed:  Pulse 73   Temp 98.2  F (36.8  C) (Tympanic)   Resp 16   LMP 01/01/2004 (Within Months)   SpO2 97%      P: palpatory tendernesssuboccipitals on right, right-side T2:    A: static palpation demonstrates intersegmental asymmetry , cervical, thoracic  R: motion palpation notes restricted motion, C1 , C2 , and T2   T: Taut tender fibers noted of the suboccipitals and splenius capitis muscle on right, hypertonicity upper trapezius on right    Segmental spinal dysfunction/restrictions found at:  :  C1 Right rotation restricted and Right lateral flexion restricted  T2 Right rotation restricted and Extension restriction.      Assessment: Segmental/somatic dysfunction apparent of the cervical and thoracic regions.  Continue to follow with patient on as-needed basis.  Continue to suspect symptoms to ebb and flow which may necessitate additional chiropractic services.    Diagnoses:      1. Segmental and somatic dysfunction of cervical region    2. Neck pain        Patient's condition:  Patient had restrictions pre-manipulation    Treatment effectiveness:  Post manipulation there is better intersegmental movement and Patient claims to feel looser post  manipulation      Procedures:  CMT:  [id number] Chiropractic manipulative treatment 1-2 regions performed   Cervical: Mobilization, C1 , Seated  Thoracic: Activator, T2, Seated    Modalities:  None performed this visit    Therapeutic procedures:  None    Response to Treatment  Reduction in symptoms as reported by patient    Prognosis: Good    Progress towards Goals: In progress     Recommendations:    Instructions:None    Follow-up:  Return to care if symptoms persist.

## 2025-08-04 ENCOUNTER — PATIENT OUTREACH (OUTPATIENT)
Dept: CARE COORDINATION | Facility: CLINIC | Age: 65
End: 2025-08-04
Payer: COMMERCIAL

## 2025-08-05 ENCOUNTER — OFFICE VISIT (OUTPATIENT)
Dept: FAMILY MEDICINE | Facility: OTHER | Age: 65
End: 2025-08-05
Attending: FAMILY MEDICINE
Payer: COMMERCIAL

## 2025-08-05 VITALS
OXYGEN SATURATION: 97 % | DIASTOLIC BLOOD PRESSURE: 69 MMHG | RESPIRATION RATE: 16 BRPM | HEART RATE: 71 BPM | SYSTOLIC BLOOD PRESSURE: 107 MMHG | BODY MASS INDEX: 41.82 KG/M2 | TEMPERATURE: 96.5 F | WEIGHT: 267 LBS

## 2025-08-05 DIAGNOSIS — J45.31 MILD PERSISTENT ASTHMA WITH ACUTE EXACERBATION: Primary | ICD-10-CM

## 2025-08-05 DIAGNOSIS — E87.6 HYPOKALEMIA: ICD-10-CM

## 2025-08-05 DIAGNOSIS — J40 BRONCHITIS: ICD-10-CM

## 2025-08-05 LAB
ANION GAP SERPL CALCULATED.3IONS-SCNC: 10 MMOL/L (ref 7–15)
BUN SERPL-MCNC: 10.5 MG/DL (ref 8–23)
CALCIUM SERPL-MCNC: 9.9 MG/DL (ref 8.8–10.4)
CHLORIDE SERPL-SCNC: 101 MMOL/L (ref 98–107)
CREAT SERPL-MCNC: 0.7 MG/DL (ref 0.51–0.95)
EGFRCR SERPLBLD CKD-EPI 2021: >90 ML/MIN/1.73M2
GLUCOSE SERPL-MCNC: 104 MG/DL (ref 70–99)
HCO3 SERPL-SCNC: 29 MMOL/L (ref 22–29)
POTASSIUM SERPL-SCNC: 4.2 MMOL/L (ref 3.4–5.3)
SODIUM SERPL-SCNC: 140 MMOL/L (ref 135–145)

## 2025-08-05 PROCEDURE — 80048 BASIC METABOLIC PNL TOTAL CA: CPT | Mod: ZL | Performed by: FAMILY MEDICINE

## 2025-08-05 PROCEDURE — 36415 COLL VENOUS BLD VENIPUNCTURE: CPT | Mod: ZL | Performed by: FAMILY MEDICINE

## 2025-08-05 RX ORDER — ALBUTEROL SULFATE 0.63 MG/3ML
1 SOLUTION RESPIRATORY (INHALATION) EVERY 4 HOURS PRN
Qty: 75 ML | Refills: 2 | Status: SHIPPED | OUTPATIENT
Start: 2025-08-05

## 2025-08-05 RX ORDER — BUDESONIDE AND FORMOTEROL FUMARATE DIHYDRATE 160; 4.5 UG/1; UG/1
AEROSOL RESPIRATORY (INHALATION)
Qty: 28 G | Refills: 11 | Status: SHIPPED | OUTPATIENT
Start: 2025-08-05

## 2025-08-05 ASSESSMENT — PAIN SCALES - GENERAL: PAINLEVEL_OUTOF10: NO PAIN (0)

## 2025-08-05 ASSESSMENT — ENCOUNTER SYMPTOMS
SHORTNESS OF BREATH: 1
COUGH: 1

## 2025-08-08 ENCOUNTER — TRANSFERRED RECORDS (OUTPATIENT)
Dept: HEALTH INFORMATION MANAGEMENT | Facility: OTHER | Age: 65
End: 2025-08-08
Payer: COMMERCIAL

## 2025-08-11 ENCOUNTER — MYC MEDICAL ADVICE (OUTPATIENT)
Dept: FAMILY MEDICINE | Facility: OTHER | Age: 65
End: 2025-08-11
Payer: COMMERCIAL

## 2025-08-11 DIAGNOSIS — J40 BRONCHITIS: Primary | ICD-10-CM

## 2025-08-11 RX ORDER — AZITHROMYCIN 250 MG/1
TABLET, FILM COATED ORAL
Qty: 6 TABLET | Refills: 0 | Status: SHIPPED | OUTPATIENT
Start: 2025-08-11 | End: 2025-08-16

## 2025-08-14 RX ORDER — BENZONATATE 100 MG/1
100 CAPSULE ORAL 3 TIMES DAILY PRN
Qty: 30 CAPSULE | Refills: 0 | Status: SHIPPED | OUTPATIENT
Start: 2025-08-14

## 2025-08-18 ENCOUNTER — HOSPITAL ENCOUNTER (EMERGENCY)
Facility: OTHER | Age: 65
Discharge: HOME | End: 2025-08-18
Attending: PHYSICIAN ASSISTANT
Payer: COMMERCIAL

## 2025-08-18 ENCOUNTER — APPOINTMENT (OUTPATIENT)
Dept: CT IMAGING | Facility: OTHER | Age: 65
End: 2025-08-18
Attending: PHYSICIAN ASSISTANT
Payer: COMMERCIAL

## 2025-08-18 ENCOUNTER — PATIENT OUTREACH (OUTPATIENT)
Dept: CARE COORDINATION | Facility: CLINIC | Age: 65
End: 2025-08-18

## 2025-08-18 ASSESSMENT — ACTIVITIES OF DAILY LIVING (ADL)
ADLS_ACUITY_SCORE: 41

## 2025-08-18 ASSESSMENT — ENCOUNTER SYMPTOMS
CHILLS: 0
ABDOMINAL PAIN: 0
SHORTNESS OF BREATH: 0
COUGH: 1
FEVER: 0

## 2025-08-19 ENCOUNTER — NURSE TRIAGE (OUTPATIENT)
Dept: FAMILY MEDICINE | Facility: OTHER | Age: 65
End: 2025-08-19
Payer: COMMERCIAL

## 2025-08-20 ENCOUNTER — OFFICE VISIT (OUTPATIENT)
Dept: FAMILY MEDICINE | Facility: OTHER | Age: 65
End: 2025-08-20
Attending: NURSE PRACTITIONER
Payer: COMMERCIAL

## 2025-08-20 ENCOUNTER — PATIENT OUTREACH (OUTPATIENT)
Dept: ONCOLOGY | Facility: OTHER | Age: 65
End: 2025-08-20
Payer: COMMERCIAL

## 2025-08-20 VITALS
OXYGEN SATURATION: 97 % | RESPIRATION RATE: 16 BRPM | TEMPERATURE: 96.7 F | HEART RATE: 73 BPM | SYSTOLIC BLOOD PRESSURE: 138 MMHG | BODY MASS INDEX: 41.12 KG/M2 | DIASTOLIC BLOOD PRESSURE: 78 MMHG | WEIGHT: 262 LBS | HEIGHT: 67 IN

## 2025-08-20 DIAGNOSIS — I26.99 BILATERAL PULMONARY EMBOLISM (H): Primary | ICD-10-CM

## 2025-08-20 DIAGNOSIS — I27.20 PULMONARY HYPERTENSION (H): ICD-10-CM

## 2025-08-20 DIAGNOSIS — R11.0 NAUSEA: ICD-10-CM

## 2025-08-20 DIAGNOSIS — R10.13 ABDOMINAL PAIN, EPIGASTRIC: ICD-10-CM

## 2025-08-20 DIAGNOSIS — I26.99 OTHER ACUTE PULMONARY EMBOLISM, UNSPECIFIED WHETHER ACUTE COR PULMONALE PRESENT (H): ICD-10-CM

## 2025-08-20 RX ORDER — MECLIZINE HYDROCHLORIDE 25 MG/1
TABLET ORAL
COMMUNITY

## 2025-08-20 RX ORDER — ONDANSETRON 4 MG/1
4 TABLET, ORALLY DISINTEGRATING ORAL EVERY 8 HOURS PRN
Qty: 10 TABLET | Refills: 0 | Status: SHIPPED | OUTPATIENT
Start: 2025-08-20

## 2025-08-20 ASSESSMENT — PAIN SCALES - GENERAL: PAINLEVEL_OUTOF10: SEVERE PAIN (8)

## 2025-08-21 ENCOUNTER — APPOINTMENT (OUTPATIENT)
Dept: CT IMAGING | Facility: OTHER | Age: 65
End: 2025-08-21
Attending: PHYSICIAN ASSISTANT
Payer: COMMERCIAL

## 2025-08-21 ENCOUNTER — NURSE TRIAGE (OUTPATIENT)
Dept: FAMILY MEDICINE | Facility: OTHER | Age: 65
End: 2025-08-21

## 2025-08-21 ENCOUNTER — MYC MEDICAL ADVICE (OUTPATIENT)
Dept: CARDIOLOGY | Facility: OTHER | Age: 65
End: 2025-08-21

## 2025-08-21 ENCOUNTER — APPOINTMENT (OUTPATIENT)
Dept: ULTRASOUND IMAGING | Facility: OTHER | Age: 65
End: 2025-08-21
Attending: PHYSICIAN ASSISTANT
Payer: COMMERCIAL

## 2025-08-21 ENCOUNTER — HOSPITAL ENCOUNTER (EMERGENCY)
Facility: OTHER | Age: 65
Discharge: HOME | End: 2025-08-21
Attending: PHYSICIAN ASSISTANT
Payer: COMMERCIAL

## 2025-08-21 PROCEDURE — 74177 CT ABD & PELVIS W/CONTRAST: CPT

## 2025-08-21 PROCEDURE — 74177 CT ABD & PELVIS W/CONTRAST: CPT | Mod: 26 | Performed by: RADIOLOGY

## 2025-08-21 PROCEDURE — 76705 ECHO EXAM OF ABDOMEN: CPT

## 2025-08-21 PROCEDURE — 76705 ECHO EXAM OF ABDOMEN: CPT | Mod: 26 | Performed by: STUDENT IN AN ORGANIZED HEALTH CARE EDUCATION/TRAINING PROGRAM

## 2025-08-21 ASSESSMENT — ACTIVITIES OF DAILY LIVING (ADL)
ADLS_ACUITY_SCORE: 41

## 2025-08-22 ENCOUNTER — OFFICE VISIT (OUTPATIENT)
Dept: FAMILY MEDICINE | Facility: OTHER | Age: 65
End: 2025-08-22
Attending: FAMILY MEDICINE
Payer: COMMERCIAL

## 2025-08-22 VITALS
TEMPERATURE: 97.8 F | RESPIRATION RATE: 12 BRPM | SYSTOLIC BLOOD PRESSURE: 122 MMHG | BODY MASS INDEX: 41.04 KG/M2 | OXYGEN SATURATION: 91 % | DIASTOLIC BLOOD PRESSURE: 76 MMHG | HEART RATE: 67 BPM | WEIGHT: 262 LBS

## 2025-08-22 DIAGNOSIS — I26.99 BILATERAL PULMONARY EMBOLISM (H): Primary | ICD-10-CM

## 2025-08-22 DIAGNOSIS — R10.13 ABDOMINAL PAIN, EPIGASTRIC: ICD-10-CM

## 2025-08-22 DIAGNOSIS — K59.01 SLOW TRANSIT CONSTIPATION: ICD-10-CM

## 2025-08-22 DIAGNOSIS — R11.0 NAUSEA: ICD-10-CM

## 2025-08-22 DIAGNOSIS — M94.0 ACUTE COSTOCHONDRITIS: ICD-10-CM

## 2025-08-22 PROCEDURE — 99214 OFFICE O/P EST MOD 30 MIN: CPT | Performed by: PHYSICIAN ASSISTANT

## 2025-08-22 PROCEDURE — 1125F AMNT PAIN NOTED PAIN PRSNT: CPT | Performed by: PHYSICIAN ASSISTANT

## 2025-08-22 PROCEDURE — 3074F SYST BP LT 130 MM HG: CPT | Performed by: PHYSICIAN ASSISTANT

## 2025-08-22 PROCEDURE — 3078F DIAST BP <80 MM HG: CPT | Performed by: PHYSICIAN ASSISTANT

## 2025-08-22 RX ORDER — CYCLOBENZAPRINE HCL 10 MG
5-10 TABLET ORAL 3 TIMES DAILY PRN
Qty: 30 TABLET | Refills: 1 | Status: SHIPPED | OUTPATIENT
Start: 2025-08-22

## 2025-08-22 ASSESSMENT — PAIN SCALES - GENERAL: PAINLEVEL_OUTOF10: SEVERE PAIN (8)

## 2025-08-23 PROBLEM — I27.20 PULMONARY HYPERTENSION (H): Status: ACTIVE | Noted: 2025-08-23

## 2025-08-23 PROBLEM — R10.13 ABDOMINAL PAIN, EPIGASTRIC: Status: ACTIVE | Noted: 2025-08-23

## 2025-08-23 PROBLEM — I26.99 BILATERAL PULMONARY EMBOLISM (H): Status: ACTIVE | Noted: 2025-08-23

## 2025-08-23 PROBLEM — R11.0 NAUSEA: Status: ACTIVE | Noted: 2025-08-23

## 2025-08-25 DIAGNOSIS — I26.99 PULMONARY EMBOLISM WITHOUT ACUTE COR PULMONALE, UNSPECIFIED CHRONICITY, UNSPECIFIED PULMONARY EMBOLISM TYPE (H): ICD-10-CM

## 2025-08-25 DIAGNOSIS — R06.09 DOE (DYSPNEA ON EXERTION): ICD-10-CM

## 2025-08-25 DIAGNOSIS — I27.20 PULMONARY HYPERTENSION (H): Primary | ICD-10-CM

## 2025-08-26 ENCOUNTER — MYC MEDICAL ADVICE (OUTPATIENT)
Dept: FAMILY MEDICINE | Facility: OTHER | Age: 65
End: 2025-08-26
Payer: COMMERCIAL

## 2025-08-28 ENCOUNTER — ALLIED HEALTH/NURSE VISIT (OUTPATIENT)
Dept: FAMILY MEDICINE | Facility: OTHER | Age: 65
End: 2025-08-28
Attending: FAMILY MEDICINE
Payer: COMMERCIAL

## 2025-08-28 DIAGNOSIS — E53.8 VITAMIN B12 DEFICIENCY (NON ANEMIC): Primary | ICD-10-CM

## 2025-08-28 PROCEDURE — 96372 THER/PROPH/DIAG INJ SC/IM: CPT | Performed by: FAMILY MEDICINE

## 2025-08-28 PROCEDURE — 250N000011 HC RX IP 250 OP 636: Performed by: FAMILY MEDICINE

## 2025-08-28 RX ADMIN — CYANOCOBALAMIN 1000 MCG: 1000 INJECTION, SOLUTION INTRAMUSCULAR; SUBCUTANEOUS at 09:58

## (undated) RX ORDER — CYANOCOBALAMIN 1000 UG/ML
INJECTION, SOLUTION INTRAMUSCULAR; SUBCUTANEOUS
Status: DISPENSED
Start: 2025-05-15

## (undated) RX ORDER — CYANOCOBALAMIN 1000 UG/ML
INJECTION, SOLUTION INTRAMUSCULAR; SUBCUTANEOUS
Status: DISPENSED
Start: 2022-11-09

## (undated) RX ORDER — LIDOCAINE HYDROCHLORIDE 10 MG/ML
INJECTION, SOLUTION EPIDURAL; INFILTRATION; INTRACAUDAL; PERINEURAL
Status: DISPENSED
Start: 2023-01-16

## (undated) RX ORDER — CYANOCOBALAMIN 1000 UG/ML
INJECTION, SOLUTION INTRAMUSCULAR; SUBCUTANEOUS
Status: DISPENSED
Start: 2022-12-05

## (undated) RX ORDER — TRIAMCINOLONE ACETONIDE 40 MG/ML
INJECTION, SUSPENSION INTRA-ARTICULAR; INTRAMUSCULAR
Status: DISPENSED
Start: 2023-01-16

## (undated) RX ORDER — CEFTRIAXONE SODIUM 1 G/50ML
INJECTION, SOLUTION INTRAVENOUS
Status: DISPENSED
Start: 2022-05-10

## (undated) RX ORDER — KETOROLAC TROMETHAMINE 15 MG/ML
INJECTION, SOLUTION INTRAMUSCULAR; INTRAVENOUS
Status: DISPENSED
Start: 2022-05-10

## (undated) RX ORDER — SODIUM CHLORIDE 9 MG/ML
INJECTION, SOLUTION INTRAVENOUS
Status: DISPENSED
Start: 2022-05-10

## (undated) RX ORDER — CYANOCOBALAMIN 1000 UG/ML
INJECTION, SOLUTION INTRAMUSCULAR; SUBCUTANEOUS
Status: DISPENSED
Start: 2023-01-25

## (undated) RX ORDER — POTASSIUM CHLORIDE 1500 MG/1
TABLET, EXTENDED RELEASE ORAL
Status: DISPENSED
Start: 2025-07-03

## (undated) RX ORDER — TRIAMCINOLONE ACETONIDE 40 MG/ML
INJECTION, SUSPENSION INTRA-ARTICULAR; INTRAMUSCULAR
Status: DISPENSED
Start: 2022-09-19

## (undated) RX ORDER — CYANOCOBALAMIN 1000 UG/ML
INJECTION, SOLUTION INTRAMUSCULAR; SUBCUTANEOUS
Status: DISPENSED
Start: 2022-12-01

## (undated) RX ORDER — POTASSIUM CHLORIDE 1500 MG/1
TABLET, EXTENDED RELEASE ORAL
Status: DISPENSED
Start: 2022-05-10

## (undated) RX ORDER — CYANOCOBALAMIN 1000 UG/ML
INJECTION, SOLUTION INTRAMUSCULAR; SUBCUTANEOUS
Status: DISPENSED
Start: 2022-11-17

## (undated) RX ORDER — CYANOCOBALAMIN 1000 UG/ML
INJECTION, SOLUTION INTRAMUSCULAR; SUBCUTANEOUS
Status: DISPENSED
Start: 2022-12-28

## (undated) RX ORDER — KETOROLAC TROMETHAMINE 30 MG/ML
INJECTION, SOLUTION INTRAMUSCULAR; INTRAVENOUS
Status: DISPENSED
Start: 2025-03-25

## (undated) RX ORDER — CYANOCOBALAMIN 1000 UG/ML
INJECTION, SOLUTION INTRAMUSCULAR; SUBCUTANEOUS
Status: DISPENSED
Start: 2025-08-28

## (undated) RX ORDER — ONDANSETRON 2 MG/ML
INJECTION INTRAMUSCULAR; INTRAVENOUS
Status: DISPENSED
Start: 2022-05-10

## (undated) RX ORDER — CYANOCOBALAMIN 1000 UG/ML
INJECTION, SOLUTION INTRAMUSCULAR; SUBCUTANEOUS
Status: DISPENSED
Start: 2022-11-25

## (undated) RX ORDER — LIDOCAINE HYDROCHLORIDE 10 MG/ML
INJECTION, SOLUTION EPIDURAL; INFILTRATION; INTRACAUDAL; PERINEURAL
Status: DISPENSED
Start: 2022-09-19